# Patient Record
Sex: FEMALE | Race: BLACK OR AFRICAN AMERICAN | Employment: OTHER | URBAN - METROPOLITAN AREA
[De-identification: names, ages, dates, MRNs, and addresses within clinical notes are randomized per-mention and may not be internally consistent; named-entity substitution may affect disease eponyms.]

---

## 2017-01-03 ENCOUNTER — TELEPHONE (OUTPATIENT)
Dept: PRIMARY CARE CLINIC | Age: 82
End: 2017-01-03

## 2017-01-03 DIAGNOSIS — I26.99 OTHER PULMONARY EMBOLISM WITHOUT ACUTE COR PULMONALE, UNSPECIFIED CHRONICITY (HCC): ICD-10-CM

## 2017-01-03 RX ORDER — WARFARIN SODIUM 6 MG/1
6 TABLET ORAL DAILY
Qty: 30 TABLET | Refills: 3 | Status: SHIPPED | OUTPATIENT
Start: 2017-01-03 | End: 2017-05-16 | Stop reason: SDUPTHER

## 2017-01-03 RX ORDER — WARFARIN SODIUM 1 MG/1
1 TABLET ORAL DAILY
Qty: 90 TABLET | Refills: 1 | Status: SHIPPED | OUTPATIENT
Start: 2017-01-03 | End: 2017-01-03 | Stop reason: CLARIF

## 2017-01-03 RX ORDER — BUSPIRONE HYDROCHLORIDE 5 MG/1
5 TABLET ORAL 3 TIMES DAILY
Qty: 90 TABLET | Refills: 0 | Status: SHIPPED | OUTPATIENT
Start: 2017-01-03 | End: 2017-01-03 | Stop reason: CLARIF

## 2017-01-04 ENCOUNTER — ANTI-COAG VISIT (OUTPATIENT)
Dept: PRIMARY CARE CLINIC | Age: 82
End: 2017-01-04

## 2017-01-04 ENCOUNTER — TELEPHONE (OUTPATIENT)
Dept: PRIMARY CARE CLINIC | Age: 82
End: 2017-01-04

## 2017-01-04 LAB — INR BLD: 1.2

## 2017-01-06 ENCOUNTER — TELEPHONE (OUTPATIENT)
Dept: PRIMARY CARE CLINIC | Age: 82
End: 2017-01-06

## 2017-01-08 RX ORDER — CIPROFLOXACIN 250 MG/1
250 TABLET, FILM COATED ORAL 2 TIMES DAILY
Qty: 20 TABLET | Refills: 0 | Status: SHIPPED | OUTPATIENT
Start: 2017-01-08 | End: 2017-01-18

## 2017-01-09 ENCOUNTER — ANTI-COAG VISIT (OUTPATIENT)
Dept: PRIMARY CARE CLINIC | Age: 82
End: 2017-01-09

## 2017-01-09 LAB — INR BLD: 1.4

## 2017-01-18 ENCOUNTER — OFFICE VISIT (OUTPATIENT)
Dept: PRIMARY CARE CLINIC | Age: 82
End: 2017-01-18

## 2017-01-18 VITALS
SYSTOLIC BLOOD PRESSURE: 155 MMHG | OXYGEN SATURATION: 99 % | BODY MASS INDEX: 30.36 KG/M2 | RESPIRATION RATE: 14 BRPM | TEMPERATURE: 97 F | DIASTOLIC BLOOD PRESSURE: 74 MMHG | HEART RATE: 70 BPM | WEIGHT: 165 LBS

## 2017-01-18 DIAGNOSIS — E08.21 DIABETIC NEPHROPATHY ASSOCIATED WITH DIABETES MELLITUS DUE TO UNDERLYING CONDITION (HCC): ICD-10-CM

## 2017-01-18 DIAGNOSIS — E03.4 HYPOTHYROIDISM DUE TO ACQUIRED ATROPHY OF THYROID: ICD-10-CM

## 2017-01-18 DIAGNOSIS — E55.9 VITAMIN D DEFICIENCY: ICD-10-CM

## 2017-01-18 DIAGNOSIS — E21.0 PRIMARY HYPERPARATHYROIDISM (HCC): ICD-10-CM

## 2017-01-18 DIAGNOSIS — Z79.4 TYPE 2 DIABETES MELLITUS WITH COMPLICATION, WITH LONG-TERM CURRENT USE OF INSULIN (HCC): ICD-10-CM

## 2017-01-18 DIAGNOSIS — B37.31 CANDIDA VAGINITIS: ICD-10-CM

## 2017-01-18 DIAGNOSIS — R30.0 DYSURIA: ICD-10-CM

## 2017-01-18 DIAGNOSIS — G25.2 COARSE TREMORS: Primary | ICD-10-CM

## 2017-01-18 DIAGNOSIS — G35 MULTIPLE SCLEROSIS (HCC): ICD-10-CM

## 2017-01-18 DIAGNOSIS — E11.8 TYPE 2 DIABETES MELLITUS WITH COMPLICATION, WITH LONG-TERM CURRENT USE OF INSULIN (HCC): ICD-10-CM

## 2017-01-18 LAB
A/G RATIO: 1.5 (ref 1.1–2.2)
ALBUMIN SERPL-MCNC: 4 G/DL (ref 3.4–5)
ALP BLD-CCNC: 56 U/L (ref 40–129)
ALT SERPL-CCNC: 14 U/L (ref 10–40)
ANION GAP SERPL CALCULATED.3IONS-SCNC: 14 MMOL/L (ref 3–16)
AST SERPL-CCNC: 16 U/L (ref 15–37)
BASOPHILS ABSOLUTE: 0 K/UL (ref 0–0.2)
BASOPHILS RELATIVE PERCENT: 0.2 %
BILIRUB SERPL-MCNC: 0.3 MG/DL (ref 0–1)
BILIRUBIN URINE: NEGATIVE
BLOOD, URINE: NEGATIVE
BUN BLDV-MCNC: 26 MG/DL (ref 7–20)
CALCIUM SERPL-MCNC: 10.5 MG/DL (ref 8.3–10.6)
CHLORIDE BLD-SCNC: 106 MMOL/L (ref 99–110)
CHOLESTEROL, TOTAL: 148 MG/DL (ref 0–199)
CLARITY: CLEAR
CO2: 20 MMOL/L (ref 21–32)
COLOR: YELLOW
CREAT SERPL-MCNC: 0.8 MG/DL (ref 0.6–1.2)
EOSINOPHILS ABSOLUTE: 0.1 K/UL (ref 0–0.6)
EOSINOPHILS RELATIVE PERCENT: 0.8 %
GFR AFRICAN AMERICAN: >60
GFR NON-AFRICAN AMERICAN: >60
GLOBULIN: 2.7 G/DL
GLUCOSE BLD-MCNC: 91 MG/DL (ref 70–99)
GLUCOSE URINE: NEGATIVE MG/DL
HCT VFR BLD CALC: 33.4 % (ref 36–48)
HDLC SERPL-MCNC: 53 MG/DL (ref 40–60)
HEMOGLOBIN: 10.8 G/DL (ref 12–16)
KETONES, URINE: NEGATIVE MG/DL
LDL CHOLESTEROL CALCULATED: 81 MG/DL
LEUKOCYTE ESTERASE, URINE: NEGATIVE
LYMPHOCYTES ABSOLUTE: 1.6 K/UL (ref 1–5.1)
LYMPHOCYTES RELATIVE PERCENT: 21.4 %
MCH RBC QN AUTO: 30 PG (ref 26–34)
MCHC RBC AUTO-ENTMCNC: 32.4 G/DL (ref 31–36)
MCV RBC AUTO: 92.7 FL (ref 80–100)
MICROSCOPIC EXAMINATION: NORMAL
MONOCYTES ABSOLUTE: 0.5 K/UL (ref 0–1.3)
MONOCYTES RELATIVE PERCENT: 6.6 %
NEUTROPHILS ABSOLUTE: 5.2 K/UL (ref 1.7–7.7)
NEUTROPHILS RELATIVE PERCENT: 71 %
NITRITE, URINE: NEGATIVE
PDW BLD-RTO: 17.5 % (ref 12.4–15.4)
PH UA: 6.5
PLATELET # BLD: 169 K/UL (ref 135–450)
PMV BLD AUTO: 10.9 FL (ref 5–10.5)
POTASSIUM SERPL-SCNC: 4.5 MMOL/L (ref 3.5–5.1)
PROTEIN UA: NEGATIVE MG/DL
RBC # BLD: 3.61 M/UL (ref 4–5.2)
SODIUM BLD-SCNC: 140 MMOL/L (ref 136–145)
SPECIFIC GRAVITY UA: 1.01
TOTAL CK: 92 U/L (ref 26–192)
TOTAL PROTEIN: 6.7 G/DL (ref 6.4–8.2)
TRIGL SERPL-MCNC: 69 MG/DL (ref 0–150)
TSH REFLEX FT4: 5.84 UIU/ML (ref 0.27–4.2)
URINE TYPE: NORMAL
UROBILINOGEN, URINE: 0.2 E.U./DL
VITAMIN B-12: >2000 PG/ML (ref 211–911)
VLDLC SERPL CALC-MCNC: 14 MG/DL
WBC # BLD: 7.3 K/UL (ref 4–11)

## 2017-01-18 PROCEDURE — 99214 OFFICE O/P EST MOD 30 MIN: CPT | Performed by: INTERNAL MEDICINE

## 2017-01-18 RX ORDER — FLUCONAZOLE 150 MG/1
150 TABLET ORAL
Qty: 3 TABLET | Refills: 0 | Status: SHIPPED | OUTPATIENT
Start: 2017-01-18 | End: 2017-09-14

## 2017-01-18 RX ORDER — LIDOCAINE 50 MG/G
OINTMENT TOPICAL
Qty: 10 G | Refills: 5 | Status: SHIPPED | OUTPATIENT
Start: 2017-01-18 | End: 2017-09-27 | Stop reason: SDUPTHER

## 2017-01-18 ASSESSMENT — ENCOUNTER SYMPTOMS
NAUSEA: 0
DIARRHEA: 1
RHINORRHEA: 1
TROUBLE SWALLOWING: 0
ABDOMINAL PAIN: 1
SHORTNESS OF BREATH: 1
CONSTIPATION: 1
VOMITING: 1
VOICE CHANGE: 0

## 2017-01-18 ASSESSMENT — PATIENT HEALTH QUESTIONNAIRE - PHQ9
SUM OF ALL RESPONSES TO PHQ9 QUESTIONS 1 & 2: 0
1. LITTLE INTEREST OR PLEASURE IN DOING THINGS: 0
SUM OF ALL RESPONSES TO PHQ QUESTIONS 1-9: 0
2. FEELING DOWN, DEPRESSED OR HOPELESS: 0

## 2017-01-19 LAB
ESTIMATED AVERAGE GLUCOSE: 105.4 MG/DL
HBA1C MFR BLD: 5.3 %
INR BLD: 1.5
T4 FREE: 1.5 NG/DL (ref 0.9–1.8)
VITAMIN D 25-HYDROXY: 59.9 NG/ML

## 2017-01-19 ASSESSMENT — ENCOUNTER SYMPTOMS
SINUS PRESSURE: 0
EYE REDNESS: 0
COUGH: 0
EYE DISCHARGE: 0
WHEEZING: 0
SORE THROAT: 0
CHEST TIGHTNESS: 0
PHOTOPHOBIA: 0
EYE PAIN: 0
CHOKING: 0
EYE ITCHING: 0
STRIDOR: 0
APNEA: 0

## 2017-01-20 LAB — URINE CULTURE, ROUTINE: NORMAL

## 2017-01-23 ENCOUNTER — ANTI-COAG VISIT (OUTPATIENT)
Dept: PRIMARY CARE CLINIC | Age: 82
End: 2017-01-23

## 2017-01-26 LAB — INR BLD: 1.5

## 2017-01-27 ENCOUNTER — TELEPHONE (OUTPATIENT)
Dept: PRIMARY CARE CLINIC | Age: 82
End: 2017-01-27

## 2017-01-27 ENCOUNTER — ANTI-COAG VISIT (OUTPATIENT)
Dept: PRIMARY CARE CLINIC | Age: 82
End: 2017-01-27

## 2017-02-03 ENCOUNTER — ANTI-COAG VISIT (OUTPATIENT)
Dept: PRIMARY CARE CLINIC | Age: 82
End: 2017-02-03

## 2017-02-03 LAB — INR BLD: 1.2

## 2017-02-09 ENCOUNTER — ANTI-COAG VISIT (OUTPATIENT)
Dept: PRIMARY CARE CLINIC | Age: 82
End: 2017-02-09

## 2017-02-09 DIAGNOSIS — R30.0 DYSURIA: Primary | ICD-10-CM

## 2017-02-09 LAB
INR BLD: 1.2
INR BLD: 1.2 (ref 2–3)

## 2017-02-10 ENCOUNTER — ANTI-COAG VISIT (OUTPATIENT)
Dept: PRIMARY CARE CLINIC | Age: 82
End: 2017-02-10

## 2017-02-10 LAB
BILIRUBIN URINE: NEGATIVE
BLOOD, URINE: NEGATIVE
CLARITY: CLEAR
COLOR: YELLOW
EPITHELIAL CELLS, UA: 1 /HPF (ref 0–5)
GLUCOSE URINE: NEGATIVE MG/DL
HYALINE CASTS: 2 /HPF (ref 0–8)
KETONES, URINE: NEGATIVE MG/DL
LEUKOCYTE ESTERASE, URINE: ABNORMAL
MICROSCOPIC EXAMINATION: YES
NITRITE, URINE: NEGATIVE
PH UA: 6.5
PROTEIN UA: NEGATIVE MG/DL
RBC UA: 2 /HPF (ref 0–4)
SPECIFIC GRAVITY UA: 1.01
URINE TYPE: ABNORMAL
UROBILINOGEN, URINE: 0.2 E.U./DL
WBC UA: 24 /HPF (ref 0–5)

## 2017-02-12 LAB — URINE CULTURE, ROUTINE: NORMAL

## 2017-02-12 RX ORDER — FLUCONAZOLE 150 MG/1
150 TABLET ORAL WEEKLY
Qty: 3 TABLET | Refills: 0 | Status: SHIPPED | OUTPATIENT
Start: 2017-02-12 | End: 2017-03-05

## 2017-03-01 LAB — INR BLD: 1.6

## 2017-03-02 ENCOUNTER — ANTI-COAG VISIT (OUTPATIENT)
Dept: PRIMARY CARE CLINIC | Age: 82
End: 2017-03-02

## 2017-03-09 LAB — INR BLD: 2 (ref 2–3)

## 2017-03-14 ENCOUNTER — ANTI-COAG VISIT (OUTPATIENT)
Dept: PRIMARY CARE CLINIC | Age: 82
End: 2017-03-14

## 2017-03-16 ENCOUNTER — TELEPHONE (OUTPATIENT)
Dept: PRIMARY CARE CLINIC | Age: 82
End: 2017-03-16

## 2017-03-16 DIAGNOSIS — R82.81 PYURIA: ICD-10-CM

## 2017-03-16 DIAGNOSIS — R35.0 FREQUENCY OF URINATION: Primary | ICD-10-CM

## 2017-03-16 DIAGNOSIS — R30.0 DYSURIA: ICD-10-CM

## 2017-03-16 LAB — INR BLD: 1.3 (ref 2–3)

## 2017-03-18 ENCOUNTER — ANTI-COAG VISIT (OUTPATIENT)
Dept: PRIMARY CARE CLINIC | Age: 82
End: 2017-03-18

## 2017-03-18 DIAGNOSIS — A49.8 PROTEUS MIRABILIS INFECTION: Primary | ICD-10-CM

## 2017-03-18 LAB
ORGANISM: ABNORMAL
URINE CULTURE, ROUTINE: ABNORMAL

## 2017-03-18 RX ORDER — AMOXICILLIN 500 MG/1
1 TABLET, FILM COATED ORAL 3 TIMES DAILY
Qty: 30 TABLET | Refills: 0 | Status: SHIPPED | OUTPATIENT
Start: 2017-03-18 | End: 2018-08-22

## 2017-03-24 ENCOUNTER — ANTI-COAG VISIT (OUTPATIENT)
Dept: PRIMARY CARE CLINIC | Age: 82
End: 2017-03-24

## 2017-03-29 ENCOUNTER — TELEPHONE (OUTPATIENT)
Dept: PRIMARY CARE CLINIC | Age: 82
End: 2017-03-29

## 2017-03-29 LAB — INR BLD: 1.7

## 2017-03-30 ENCOUNTER — ANTI-COAG VISIT (OUTPATIENT)
Dept: PRIMARY CARE CLINIC | Age: 82
End: 2017-03-30

## 2017-03-31 DIAGNOSIS — Z79.4 TYPE 2 DIABETES MELLITUS WITH COMPLICATION, WITH LONG-TERM CURRENT USE OF INSULIN (HCC): ICD-10-CM

## 2017-03-31 DIAGNOSIS — E11.8 TYPE 2 DIABETES MELLITUS WITH COMPLICATION, WITH LONG-TERM CURRENT USE OF INSULIN (HCC): ICD-10-CM

## 2017-04-07 ENCOUNTER — ANTI-COAG VISIT (OUTPATIENT)
Dept: PRIMARY CARE CLINIC | Age: 82
End: 2017-04-07

## 2017-04-07 LAB — INR BLD: 1.6

## 2017-04-12 ENCOUNTER — OFFICE VISIT (OUTPATIENT)
Dept: PRIMARY CARE CLINIC | Age: 82
End: 2017-04-12

## 2017-04-12 ENCOUNTER — ANTI-COAG VISIT (OUTPATIENT)
Dept: PRIMARY CARE CLINIC | Age: 82
End: 2017-04-12

## 2017-04-12 ENCOUNTER — HOSPITAL ENCOUNTER (OUTPATIENT)
Dept: GENERAL RADIOLOGY | Facility: MEDICAL CENTER | Age: 82
Discharge: HOME | End: 2017-04-12
Attending: INTERNAL MEDICINE | Admitting: INTERNAL MEDICINE

## 2017-04-12 VITALS
HEART RATE: 69 BPM | OXYGEN SATURATION: 96 % | DIASTOLIC BLOOD PRESSURE: 57 MMHG | SYSTOLIC BLOOD PRESSURE: 118 MMHG | RESPIRATION RATE: 18 BRPM | TEMPERATURE: 97.4 F

## 2017-04-12 DIAGNOSIS — M54.2 MUSCULOSKELETAL NECK PAIN: Primary | ICD-10-CM

## 2017-04-12 DIAGNOSIS — N39.0 URINARY TRACT INFECTION, SITE UNSPECIFIED: ICD-10-CM

## 2017-04-12 DIAGNOSIS — R30.0 DYSURIA: Primary | ICD-10-CM

## 2017-04-12 DIAGNOSIS — M54.2 CERVICAL SPINE PAIN: ICD-10-CM

## 2017-04-12 LAB
ALBUMIN SERPL-MCNC: 3.5 G/DL (ref 3.4–5)
ANION GAP SERPL CALCULATED.3IONS-SCNC: 12 MMOL/L (ref 3–16)
BACTERIA: ABNORMAL /HPF
BILIRUBIN URINE: NEGATIVE
BLOOD, URINE: NEGATIVE
BUN BLDV-MCNC: 34 MG/DL (ref 7–20)
CALCIUM SERPL-MCNC: 9.8 MG/DL (ref 8.3–10.6)
CHLORIDE BLD-SCNC: 105 MMOL/L (ref 99–110)
CLARITY: ABNORMAL
CO2: 24 MMOL/L (ref 21–32)
COLOR: YELLOW
CREAT SERPL-MCNC: 0.8 MG/DL (ref 0.6–1.2)
EPITHELIAL CELLS, UA: 1 /HPF (ref 0–5)
GFR AFRICAN AMERICAN: >60
GFR NON-AFRICAN AMERICAN: >60
GLUCOSE BLD-MCNC: 132 MG/DL (ref 70–99)
GLUCOSE URINE: NEGATIVE MG/DL
HYALINE CASTS: 0 /LPF (ref 0–8)
KETONES, URINE: NEGATIVE MG/DL
LEUKOCYTE ESTERASE, URINE: ABNORMAL
MICROSCOPIC EXAMINATION: YES
NITRITE, URINE: NEGATIVE
PH UA: 8.5
PHOSPHORUS: 2.3 MG/DL (ref 2.5–4.9)
POTASSIUM SERPL-SCNC: 4.2 MMOL/L (ref 3.5–5.1)
PROTEIN UA: 30 MG/DL
RBC UA: 2 /HPF (ref 0–4)
SODIUM BLD-SCNC: 141 MMOL/L (ref 136–145)
SPECIFIC GRAVITY UA: 1.02
URINE TYPE: ABNORMAL
UROBILINOGEN, URINE: 0.2 E.U./DL
WBC UA: 34 /HPF (ref 0–5)

## 2017-04-12 PROCEDURE — 99213 OFFICE O/P EST LOW 20 MIN: CPT | Performed by: INTERNAL MEDICINE

## 2017-04-12 RX ORDER — BACLOFEN 10 MG/1
10 TABLET ORAL NIGHTLY
Qty: 10 TABLET | Refills: 0 | Status: SHIPPED | OUTPATIENT
Start: 2017-04-12 | End: 2017-09-27

## 2017-04-12 RX ORDER — SULFAMETHOXAZOLE AND TRIMETHOPRIM 800; 160 MG/1; MG/1
0.5 TABLET ORAL 2 TIMES DAILY
Qty: 10 TABLET | Refills: 0 | Status: SHIPPED | OUTPATIENT
Start: 2017-04-12 | End: 2017-04-22

## 2017-04-13 ENCOUNTER — TELEPHONE (OUTPATIENT)
Dept: PRIMARY CARE CLINIC | Age: 82
End: 2017-04-13

## 2017-04-13 LAB
BASOPHILS ABSOLUTE: 0 K/UL (ref 0–0.2)
BASOPHILS RELATIVE PERCENT: 0.6 %
EOSINOPHILS ABSOLUTE: 0.1 K/UL (ref 0–0.6)
EOSINOPHILS RELATIVE PERCENT: 1.2 %
HCT VFR BLD CALC: 31.8 % (ref 36–48)
HEMOGLOBIN: 10.3 G/DL (ref 12–16)
LYMPHOCYTES ABSOLUTE: 1.5 K/UL (ref 1–5.1)
LYMPHOCYTES RELATIVE PERCENT: 22.8 %
MCH RBC QN AUTO: 30.1 PG (ref 26–34)
MCHC RBC AUTO-ENTMCNC: 32.4 G/DL (ref 31–36)
MCV RBC AUTO: 92.7 FL (ref 80–100)
MONOCYTES ABSOLUTE: 0.8 K/UL (ref 0–1.3)
MONOCYTES RELATIVE PERCENT: 12.3 %
NEUTROPHILS ABSOLUTE: 4.2 K/UL (ref 1.7–7.7)
NEUTROPHILS RELATIVE PERCENT: 63.1 %
PDW BLD-RTO: 16 % (ref 12.4–15.4)
PLATELET # BLD: 186 K/UL (ref 135–450)
PMV BLD AUTO: 10.5 FL (ref 5–10.5)
RBC # BLD: 3.44 M/UL (ref 4–5.2)
WBC # BLD: 6.7 K/UL (ref 4–11)

## 2017-04-14 LAB
ORGANISM: ABNORMAL
URINE CULTURE, ROUTINE: ABNORMAL

## 2017-04-16 ASSESSMENT — PATIENT HEALTH QUESTIONNAIRE - PHQ9
1. LITTLE INTEREST OR PLEASURE IN DOING THINGS: 0
2. FEELING DOWN, DEPRESSED OR HOPELESS: 0
SUM OF ALL RESPONSES TO PHQ9 QUESTIONS 1 & 2: 0
SUM OF ALL RESPONSES TO PHQ QUESTIONS 1-9: 0

## 2017-04-16 ASSESSMENT — ENCOUNTER SYMPTOMS
PHOTOPHOBIA: 0
DIARRHEA: 0
SINUS PRESSURE: 0
TROUBLE SWALLOWING: 0
VOMITING: 0
VISUAL CHANGE: 0
RHINORRHEA: 0
BLOOD IN STOOL: 0
ABDOMINAL PAIN: 0
GASTROINTESTINAL NEGATIVE: 1
CONSTIPATION: 0
NAUSEA: 0
RESPIRATORY NEGATIVE: 1

## 2017-04-18 DIAGNOSIS — E11.21 DIABETIC NEPHROPATHY ASSOCIATED WITH TYPE 2 DIABETES MELLITUS (HCC): Primary | ICD-10-CM

## 2017-04-18 RX ORDER — AMOXICILLIN 250 MG/1
250 CAPSULE ORAL 3 TIMES DAILY
Qty: 30 CAPSULE | Refills: 0 | Status: SHIPPED | OUTPATIENT
Start: 2017-04-18 | End: 2017-04-28

## 2017-04-20 ENCOUNTER — TELEPHONE (OUTPATIENT)
Dept: PRIMARY CARE CLINIC | Age: 82
End: 2017-04-20

## 2017-04-20 LAB — INR BLD: 1.2 (ref 2–3)

## 2017-04-21 ENCOUNTER — ANTI-COAG VISIT (OUTPATIENT)
Dept: PRIMARY CARE CLINIC | Age: 82
End: 2017-04-21

## 2017-04-21 LAB
INR BLD: 1.2
INR BLD: 1.2

## 2017-04-24 ENCOUNTER — PATIENT MESSAGE (OUTPATIENT)
Dept: PRIMARY CARE CLINIC | Age: 82
End: 2017-04-24

## 2017-04-26 ENCOUNTER — ANTI-COAG VISIT (OUTPATIENT)
Dept: PRIMARY CARE CLINIC | Age: 82
End: 2017-04-26

## 2017-04-26 DIAGNOSIS — M54.2 NECK PAIN: Primary | ICD-10-CM

## 2017-04-26 LAB — INR BLD: 1.3

## 2017-04-26 RX ORDER — WARFARIN SODIUM 1 MG/1
3 TABLET ORAL DAILY
Qty: 90 TABLET | Refills: 3 | Status: SHIPPED | OUTPATIENT
Start: 2017-04-26 | End: 2017-09-14 | Stop reason: SDUPTHER

## 2017-04-27 ENCOUNTER — ANTI-COAG VISIT (OUTPATIENT)
Dept: PRIMARY CARE CLINIC | Age: 82
End: 2017-04-27

## 2017-05-03 ENCOUNTER — OFFICE VISIT (OUTPATIENT)
Dept: PRIMARY CARE CLINIC | Age: 82
End: 2017-05-03

## 2017-05-03 ENCOUNTER — ANTI-COAG VISIT (OUTPATIENT)
Dept: PRIMARY CARE CLINIC | Age: 82
End: 2017-05-03

## 2017-05-03 VITALS
BODY MASS INDEX: 29.88 KG/M2 | OXYGEN SATURATION: 97 % | WEIGHT: 168.6 LBS | HEIGHT: 63 IN | SYSTOLIC BLOOD PRESSURE: 144 MMHG | HEART RATE: 64 BPM | DIASTOLIC BLOOD PRESSURE: 72 MMHG

## 2017-05-03 DIAGNOSIS — Z79.4 TYPE 2 DIABETES MELLITUS WITH COMPLICATION, WITH LONG-TERM CURRENT USE OF INSULIN (HCC): ICD-10-CM

## 2017-05-03 DIAGNOSIS — E83.39 HYPOPHOSPHATEMIA: ICD-10-CM

## 2017-05-03 DIAGNOSIS — E11.8 TYPE 2 DIABETES MELLITUS WITH COMPLICATION, WITH LONG-TERM CURRENT USE OF INSULIN (HCC): ICD-10-CM

## 2017-05-03 DIAGNOSIS — R60.0 BILATERAL LEG EDEMA: ICD-10-CM

## 2017-05-03 DIAGNOSIS — E03.4 HYPOTHYROIDISM DUE TO ACQUIRED ATROPHY OF THYROID: ICD-10-CM

## 2017-05-03 DIAGNOSIS — R35.0 URINARY FREQUENCY: ICD-10-CM

## 2017-05-03 DIAGNOSIS — E21.0 PRIMARY HYPERPARATHYROIDISM (HCC): ICD-10-CM

## 2017-05-03 DIAGNOSIS — E21.3 HYPERPARATHYROIDISM (HCC): Primary | ICD-10-CM

## 2017-05-03 DIAGNOSIS — E21.3 HYPERPARATHYROIDISM (HCC): ICD-10-CM

## 2017-05-03 DIAGNOSIS — Z23 NEED FOR PNEUMOCOCCAL VACCINATION: Primary | ICD-10-CM

## 2017-05-03 DIAGNOSIS — R19.5 HIGH STOOL BILE ACIDS: ICD-10-CM

## 2017-05-03 LAB
A/G RATIO: 1.5 (ref 1.1–2.2)
ALBUMIN SERPL-MCNC: 4.3 G/DL (ref 3.4–5)
ALP BLD-CCNC: 63 U/L (ref 40–129)
ALT SERPL-CCNC: 20 U/L (ref 10–40)
ANION GAP SERPL CALCULATED.3IONS-SCNC: 14 MMOL/L (ref 3–16)
AST SERPL-CCNC: 18 U/L (ref 15–37)
BILIRUB SERPL-MCNC: <0.2 MG/DL (ref 0–1)
BUN BLDV-MCNC: 46 MG/DL (ref 7–20)
CALCIUM SERPL-MCNC: 10.8 MG/DL (ref 8.3–10.6)
CHLORIDE BLD-SCNC: 103 MMOL/L (ref 99–110)
CO2: 23 MMOL/L (ref 21–32)
CREAT SERPL-MCNC: 0.7 MG/DL (ref 0.6–1.2)
CREATININE URINE: 18.5 MG/DL (ref 28–259)
GFR AFRICAN AMERICAN: >60
GFR NON-AFRICAN AMERICAN: >60
GLOBULIN: 2.8 G/DL
GLUCOSE BLD-MCNC: 84 MG/DL (ref 70–99)
INR BLD: 1.5 (ref 2–3)
MICROALBUMIN UR-MCNC: 1.6 MG/DL
MICROALBUMIN/CREAT UR-RTO: 86.5 MG/G (ref 0–30)
PARATHYROID HORMONE INTACT: 79.8 PG/ML (ref 14–72)
PHOSPHORUS: 3.2 MG/DL (ref 2.5–4.9)
POTASSIUM SERPL-SCNC: 4.2 MMOL/L (ref 3.5–5.1)
PREALBUMIN: 26 MG/DL (ref 20–40)
SODIUM BLD-SCNC: 140 MMOL/L (ref 136–145)
TOTAL PROTEIN: 7.1 G/DL (ref 6.4–8.2)
TSH REFLEX FT4: 2.66 UIU/ML (ref 0.27–4.2)

## 2017-05-03 PROCEDURE — 3288F FALL RISK ASSESSMENT DOCD: CPT | Performed by: INTERNAL MEDICINE

## 2017-05-03 PROCEDURE — G8510 SCR DEP NEG, NO PLAN REQD: HCPCS | Performed by: INTERNAL MEDICINE

## 2017-05-03 PROCEDURE — 99214 OFFICE O/P EST MOD 30 MIN: CPT | Performed by: INTERNAL MEDICINE

## 2017-05-03 RX ORDER — CHOLESTYRAMINE 4 G/9G
2 POWDER, FOR SUSPENSION ORAL 2 TIMES DAILY
Qty: 180 PACKET | Refills: 11 | Status: SHIPPED | OUTPATIENT
Start: 2017-05-03 | End: 2021-07-02

## 2017-05-03 ASSESSMENT — ENCOUNTER SYMPTOMS
ABDOMINAL PAIN: 0
TROUBLE SWALLOWING: 0
VOMITING: 0
PHOTOPHOBIA: 0
DIARRHEA: 0
CONSTIPATION: 0
RESPIRATORY NEGATIVE: 1
RHINORRHEA: 0
BLOOD IN STOOL: 0
SINUS PRESSURE: 0
NAUSEA: 0
GASTROINTESTINAL NEGATIVE: 1

## 2017-05-03 ASSESSMENT — PATIENT HEALTH QUESTIONNAIRE - PHQ9
SUM OF ALL RESPONSES TO PHQ9 QUESTIONS 1 & 2: 0
1. LITTLE INTEREST OR PLEASURE IN DOING THINGS: 0
2. FEELING DOWN, DEPRESSED OR HOPELESS: 0
SUM OF ALL RESPONSES TO PHQ QUESTIONS 1-9: 0

## 2017-05-04 LAB
ESTIMATED AVERAGE GLUCOSE: 105.4 MG/DL
HBA1C MFR BLD: 5.3 %

## 2017-05-06 LAB
ORGANISM: ABNORMAL
URINE CULTURE, ROUTINE: ABNORMAL

## 2017-05-06 RX ORDER — CIPROFLOXACIN 250 MG/1
250 TABLET, FILM COATED ORAL 2 TIMES DAILY
Qty: 20 TABLET | Refills: 0 | Status: SHIPPED | OUTPATIENT
Start: 2017-05-06 | End: 2017-05-16

## 2017-05-08 ENCOUNTER — HOSPITAL ENCOUNTER (OUTPATIENT)
Dept: PHYSICAL THERAPY | Age: 82
Discharge: HOME | End: 2017-05-31
Attending: INTERNAL MEDICINE | Admitting: INTERNAL MEDICINE

## 2017-05-08 ASSESSMENT — PAIN SCALES - GENERAL: PAINLEVEL_OUTOF10: 7

## 2017-05-08 ASSESSMENT — PAIN DESCRIPTION - DESCRIPTORS: DESCRIPTORS: SHARP

## 2017-05-08 ASSESSMENT — PAIN DESCRIPTION - LOCATION: LOCATION: NECK

## 2017-05-08 ASSESSMENT — PAIN DESCRIPTION - PROGRESSION: CLINICAL_PROGRESSION: NOT CHANGED

## 2017-05-11 ENCOUNTER — TELEPHONE (OUTPATIENT)
Dept: PRIMARY CARE CLINIC | Age: 82
End: 2017-05-11

## 2017-05-11 ENCOUNTER — ANTI-COAG VISIT (OUTPATIENT)
Dept: PRIMARY CARE CLINIC | Age: 82
End: 2017-05-11

## 2017-05-11 LAB — INR BLD: 1.6

## 2017-05-15 DIAGNOSIS — G35 MULTIPLE SCLEROSIS (HCC): Primary | ICD-10-CM

## 2017-05-16 DIAGNOSIS — I10 ESSENTIAL HYPERTENSION: ICD-10-CM

## 2017-05-16 RX ORDER — ATENOLOL 50 MG/1
TABLET ORAL
Qty: 90 TABLET | Refills: 3 | Status: SHIPPED | OUTPATIENT
Start: 2017-05-16 | End: 2017-09-06

## 2017-05-16 RX ORDER — WARFARIN SODIUM 6 MG/1
6 TABLET ORAL DAILY
Qty: 30 TABLET | Refills: 3 | Status: SHIPPED | OUTPATIENT
Start: 2017-05-16 | End: 2017-09-14 | Stop reason: SDUPTHER

## 2017-05-17 ENCOUNTER — ANTI-COAG VISIT (OUTPATIENT)
Dept: PRIMARY CARE CLINIC | Age: 82
End: 2017-05-17

## 2017-05-17 LAB — INR BLD: 2.1 (ref 2–3)

## 2017-05-24 LAB — INR BLD: 1.9

## 2017-05-25 ENCOUNTER — ANTI-COAG VISIT (OUTPATIENT)
Dept: PRIMARY CARE CLINIC | Age: 82
End: 2017-05-25

## 2017-05-31 ENCOUNTER — ANTI-COAG VISIT (OUTPATIENT)
Dept: PRIMARY CARE CLINIC | Age: 82
End: 2017-05-31

## 2017-05-31 LAB — INR BLD: 1.8

## 2017-05-31 RX ORDER — MONTELUKAST SODIUM 10 MG/1
TABLET ORAL
Qty: 30 TABLET | Refills: 7 | Status: SHIPPED | OUTPATIENT
Start: 2017-05-31 | End: 2017-09-27 | Stop reason: SDUPTHER

## 2017-06-05 ENCOUNTER — TELEPHONE (OUTPATIENT)
Dept: PRIMARY CARE CLINIC | Age: 82
End: 2017-06-05

## 2017-06-05 DIAGNOSIS — R30.0 DYSURIA: Primary | ICD-10-CM

## 2017-06-06 DIAGNOSIS — R30.0 DYSURIA: ICD-10-CM

## 2017-06-06 LAB
BILIRUBIN URINE: NEGATIVE
BLOOD, URINE: NEGATIVE
CLARITY: CLEAR
COLOR: YELLOW
EPITHELIAL CELLS, UA: 1 /HPF (ref 0–5)
GLUCOSE URINE: NEGATIVE MG/DL
HYALINE CASTS: 0 /LPF (ref 0–8)
KETONES, URINE: NEGATIVE MG/DL
LEUKOCYTE ESTERASE, URINE: ABNORMAL
MICROSCOPIC EXAMINATION: YES
NITRITE, URINE: NEGATIVE
PH UA: 6.5
PROTEIN UA: NEGATIVE MG/DL
RBC UA: 4 /HPF (ref 0–4)
SPECIFIC GRAVITY UA: 1.01
URINE TYPE: ABNORMAL
UROBILINOGEN, URINE: 0.2 E.U./DL
WBC UA: 7 /HPF (ref 0–5)

## 2017-06-07 LAB — URINE CULTURE, ROUTINE: NORMAL

## 2017-06-14 ENCOUNTER — ANTI-COAG VISIT (OUTPATIENT)
Dept: PRIMARY CARE CLINIC | Age: 82
End: 2017-06-14

## 2017-06-14 DIAGNOSIS — M65.319 TRIGGER FINGER OF THUMB, UNSPECIFIED LATERALITY: Primary | ICD-10-CM

## 2017-06-14 LAB — INR BLD: 2.6 (ref 2–3)

## 2017-06-16 DIAGNOSIS — M79.643 PAIN OF HAND, UNSPECIFIED LATERALITY: Primary | ICD-10-CM

## 2017-06-21 LAB — INR BLD: 2.6

## 2017-06-26 ENCOUNTER — ANTI-COAG VISIT (OUTPATIENT)
Dept: PRIMARY CARE CLINIC | Age: 82
End: 2017-06-26

## 2017-06-29 LAB — INR BLD: 2.7

## 2017-06-30 DIAGNOSIS — E11.8 TYPE 2 DIABETES MELLITUS WITH COMPLICATION, WITH LONG-TERM CURRENT USE OF INSULIN (HCC): ICD-10-CM

## 2017-06-30 DIAGNOSIS — R30.0 DYSURIA: Primary | ICD-10-CM

## 2017-06-30 DIAGNOSIS — Z79.4 TYPE 2 DIABETES MELLITUS WITH COMPLICATION, WITH LONG-TERM CURRENT USE OF INSULIN (HCC): ICD-10-CM

## 2017-06-30 LAB
EPITHELIAL CELLS, UA: 0 /HPF (ref 0–5)
HYALINE CASTS: 1 /LPF (ref 0–8)
RBC UA: 6 /HPF (ref 0–4)
WBC UA: 432 /HPF (ref 0–5)

## 2017-07-01 DIAGNOSIS — N30.90 BLADDER INFECTION: Primary | ICD-10-CM

## 2017-07-01 RX ORDER — NITROFURANTOIN MACROCRYSTALS 50 MG/1
50 CAPSULE ORAL 4 TIMES DAILY
Qty: 40 CAPSULE | Refills: 0 | Status: SHIPPED | OUTPATIENT
Start: 2017-07-01 | End: 2017-07-11

## 2017-07-01 RX ORDER — TERCONAZOLE 80 MG/1
80 SUPPOSITORY VAGINAL NIGHTLY
Qty: 7 SUPPOSITORY | Refills: 0 | Status: SHIPPED | OUTPATIENT
Start: 2017-07-01 | End: 2017-07-08

## 2017-07-03 ENCOUNTER — ANTI-COAG VISIT (OUTPATIENT)
Dept: PRIMARY CARE CLINIC | Age: 82
End: 2017-07-03

## 2017-07-05 ENCOUNTER — ANTI-COAG VISIT (OUTPATIENT)
Dept: PRIMARY CARE CLINIC | Age: 82
End: 2017-07-05

## 2017-07-06 ENCOUNTER — ANTI-COAG VISIT (OUTPATIENT)
Dept: PRIMARY CARE CLINIC | Age: 82
End: 2017-07-06

## 2017-07-06 LAB — INR BLD: 2.4 (ref 2–3)

## 2017-07-13 ENCOUNTER — TELEPHONE (OUTPATIENT)
Dept: PRIMARY CARE CLINIC | Age: 82
End: 2017-07-13

## 2017-07-13 ENCOUNTER — ANTI-COAG VISIT (OUTPATIENT)
Dept: PRIMARY CARE CLINIC | Age: 82
End: 2017-07-13

## 2017-07-13 LAB — INR BLD: 1.7 (ref 2–3)

## 2017-07-19 LAB — INR BLD: 2 (ref 2–3)

## 2017-07-21 ENCOUNTER — ANTI-COAG VISIT (OUTPATIENT)
Dept: PRIMARY CARE CLINIC | Age: 82
End: 2017-07-21

## 2017-07-24 DIAGNOSIS — R30.0 DYSURIA: Primary | ICD-10-CM

## 2017-07-24 DIAGNOSIS — R30.0 DYSURIA: ICD-10-CM

## 2017-07-24 LAB
BILIRUBIN URINE: NEGATIVE
BLOOD, URINE: ABNORMAL
CLARITY: ABNORMAL
COLOR: YELLOW
EPITHELIAL CELLS, UA: 0 /HPF (ref 0–5)
GLUCOSE URINE: NEGATIVE MG/DL
HYALINE CASTS: 0 /LPF (ref 0–8)
KETONES, URINE: NEGATIVE MG/DL
LEUKOCYTE ESTERASE, URINE: ABNORMAL
MICROSCOPIC EXAMINATION: YES
NITRITE, URINE: NEGATIVE
PH UA: 6.5
PROTEIN UA: NEGATIVE MG/DL
RBC UA: 10 /HPF (ref 0–4)
SPECIFIC GRAVITY UA: 1.01
URINE TYPE: ABNORMAL
UROBILINOGEN, URINE: 0.2 E.U./DL
WBC UA: 377 /HPF (ref 0–5)

## 2017-07-25 DIAGNOSIS — N39.0 URINARY TRACT INFECTION, SITE UNSPECIFIED: Primary | ICD-10-CM

## 2017-07-25 RX ORDER — CIPROFLOXACIN 250 MG/1
250 TABLET, FILM COATED ORAL 2 TIMES DAILY
Qty: 20 TABLET | Refills: 0 | Status: SHIPPED | OUTPATIENT
Start: 2017-07-25 | End: 2017-08-04

## 2017-07-26 ENCOUNTER — ANTI-COAG VISIT (OUTPATIENT)
Dept: PRIMARY CARE CLINIC | Age: 82
End: 2017-07-26

## 2017-07-26 LAB
INR BLD: 2.8
ORGANISM: ABNORMAL
URINE CULTURE, ROUTINE: ABNORMAL

## 2017-08-03 ENCOUNTER — ANTI-COAG VISIT (OUTPATIENT)
Dept: PRIMARY CARE CLINIC | Age: 82
End: 2017-08-03

## 2017-08-03 LAB — INR BLD: 2.1

## 2017-08-09 ENCOUNTER — TELEPHONE (OUTPATIENT)
Dept: PRIMARY CARE CLINIC | Age: 82
End: 2017-08-09

## 2017-08-09 DIAGNOSIS — N39.0 URINARY TRACT INFECTION WITHOUT HEMATURIA, SITE UNSPECIFIED: ICD-10-CM

## 2017-08-09 DIAGNOSIS — N39.0 URINARY TRACT INFECTION WITHOUT HEMATURIA, SITE UNSPECIFIED: Primary | ICD-10-CM

## 2017-08-09 LAB
BILIRUBIN URINE: NEGATIVE
BLOOD, URINE: NEGATIVE
CLARITY: CLEAR
COLOR: YELLOW
GLUCOSE URINE: NEGATIVE MG/DL
INR BLD: 2.4 (ref 2–3)
KETONES, URINE: NEGATIVE MG/DL
LEUKOCYTE ESTERASE, URINE: NEGATIVE
MICROSCOPIC EXAMINATION: NORMAL
NITRITE, URINE: NEGATIVE
PH UA: 6
PROTEIN UA: NEGATIVE MG/DL
SPECIFIC GRAVITY UA: 1.01
URINE TYPE: NORMAL
UROBILINOGEN, URINE: 0.2 E.U./DL

## 2017-08-11 LAB — URINE CULTURE, ROUTINE: NORMAL

## 2017-08-11 RX ORDER — PANTOPRAZOLE SODIUM 40 MG/1
TABLET, DELAYED RELEASE ORAL
Qty: 30 TABLET | Refills: 11 | Status: SHIPPED | OUTPATIENT
Start: 2017-08-11 | End: 2017-09-27 | Stop reason: SDUPTHER

## 2017-08-12 DIAGNOSIS — E11.8 TYPE 2 DIABETES MELLITUS WITH COMPLICATION, WITH LONG-TERM CURRENT USE OF INSULIN (HCC): ICD-10-CM

## 2017-08-12 DIAGNOSIS — E11.8 TYPE 2 DIABETES WITH COMPLICATION (HCC): ICD-10-CM

## 2017-08-12 DIAGNOSIS — Z79.4 TYPE 2 DIABETES MELLITUS WITH COMPLICATION, WITH LONG-TERM CURRENT USE OF INSULIN (HCC): ICD-10-CM

## 2017-08-14 ENCOUNTER — ANTI-COAG VISIT (OUTPATIENT)
Dept: PRIMARY CARE CLINIC | Age: 82
End: 2017-08-14

## 2017-08-14 RX ORDER — PEN NEEDLE, DIABETIC 31 GX5/16"
NEEDLE, DISPOSABLE MISCELLANEOUS
Qty: 100 EACH | Refills: 3 | Status: SHIPPED | OUTPATIENT
Start: 2017-08-14 | End: 2017-09-27 | Stop reason: SDUPTHER

## 2017-08-14 RX ORDER — LANCETS 33 GAUGE
EACH MISCELLANEOUS
Qty: 100 EACH | Refills: 8 | Status: SHIPPED | OUTPATIENT
Start: 2017-08-14 | End: 2017-09-27 | Stop reason: SDUPTHER

## 2017-08-16 LAB — INR BLD: 2.4 (ref 2–3)

## 2017-08-18 ENCOUNTER — ANTI-COAG VISIT (OUTPATIENT)
Dept: PRIMARY CARE CLINIC | Age: 82
End: 2017-08-18

## 2017-08-22 RX ORDER — KETOCONAZOLE 20 MG/G
CREAM TOPICAL
Qty: 120 G | Refills: 5 | Status: SHIPPED | OUTPATIENT
Start: 2017-08-22 | End: 2018-07-09 | Stop reason: SDUPTHER

## 2017-08-22 RX ORDER — KETOCONAZOLE 20 MG/G
CREAM TOPICAL
Qty: 360 G | Refills: 11 | Status: SHIPPED | OUTPATIENT
Start: 2017-08-22 | End: 2017-09-27 | Stop reason: SDUPTHER

## 2017-08-23 ENCOUNTER — ANTI-COAG VISIT (OUTPATIENT)
Dept: PRIMARY CARE CLINIC | Age: 82
End: 2017-08-23

## 2017-08-23 LAB — INR BLD: 1.9

## 2017-08-30 ENCOUNTER — ANTI-COAG VISIT (OUTPATIENT)
Dept: PRIMARY CARE CLINIC | Age: 82
End: 2017-08-30

## 2017-08-30 LAB — INR BLD: 1.9

## 2017-08-31 RX ORDER — SODIUM BICARBONATE 325 MG/1
TABLET ORAL
Qty: 60 TABLET | Refills: 10 | Status: SHIPPED | OUTPATIENT
Start: 2017-08-31 | End: 2017-09-27 | Stop reason: SDUPTHER

## 2017-09-06 ENCOUNTER — PATIENT MESSAGE (OUTPATIENT)
Dept: PRIMARY CARE CLINIC | Age: 82
End: 2017-09-06

## 2017-09-06 LAB — INR BLD: 1.8

## 2017-09-06 RX ORDER — VERAPAMIL HYDROCHLORIDE 240 MG/1
TABLET, FILM COATED, EXTENDED RELEASE ORAL
Qty: 30 TABLET | Refills: 11 | Status: SHIPPED | OUTPATIENT
Start: 2017-09-06 | End: 2017-09-14 | Stop reason: SDUPTHER

## 2017-09-07 ENCOUNTER — ANTI-COAG VISIT (OUTPATIENT)
Dept: PRIMARY CARE CLINIC | Age: 82
End: 2017-09-07

## 2017-09-07 DIAGNOSIS — E03.4 HYPOTHYROIDISM DUE TO ACQUIRED ATROPHY OF THYROID: ICD-10-CM

## 2017-09-07 RX ORDER — LEVOTHYROXINE SODIUM 0.03 MG/1
TABLET ORAL
Qty: 30 TABLET | Refills: 11 | Status: SHIPPED | OUTPATIENT
Start: 2017-09-07 | End: 2017-09-27 | Stop reason: SDUPTHER

## 2017-09-13 LAB — INR BLD: 3.4

## 2017-09-14 RX ORDER — VERAPAMIL HYDROCHLORIDE 240 MG/1
TABLET, FILM COATED, EXTENDED RELEASE ORAL
Qty: 30 TABLET | Refills: 11 | Status: SHIPPED | OUTPATIENT
Start: 2017-09-14 | End: 2017-09-27 | Stop reason: SDUPTHER

## 2017-09-14 RX ORDER — WARFARIN SODIUM 6 MG/1
6 TABLET ORAL DAILY
Qty: 30 TABLET | Refills: 3 | Status: SHIPPED | OUTPATIENT
Start: 2017-09-14 | End: 2017-09-16

## 2017-09-14 RX ORDER — WARFARIN SODIUM 1 MG/1
3 TABLET ORAL DAILY
Qty: 90 TABLET | Refills: 3 | Status: SHIPPED | OUTPATIENT
Start: 2017-09-14 | End: 2017-09-16

## 2017-09-15 ENCOUNTER — ANTI-COAG VISIT (OUTPATIENT)
Dept: PRIMARY CARE CLINIC | Age: 82
End: 2017-09-15

## 2017-09-16 RX ORDER — WARFARIN SODIUM 6 MG/1
TABLET ORAL
Qty: 30 TABLET | Refills: 3 | Status: SHIPPED | OUTPATIENT
Start: 2017-09-16 | End: 2017-09-27 | Stop reason: SDUPTHER

## 2017-09-16 RX ORDER — WARFARIN SODIUM 1 MG/1
TABLET ORAL
Qty: 90 TABLET | Refills: 3 | Status: SHIPPED | OUTPATIENT
Start: 2017-09-16 | End: 2017-09-27 | Stop reason: SDUPTHER

## 2017-09-21 ENCOUNTER — TELEPHONE (OUTPATIENT)
Dept: PRIMARY CARE CLINIC | Age: 82
End: 2017-09-21

## 2017-09-25 DIAGNOSIS — G35 MULTIPLE SCLEROSIS (HCC): Primary | ICD-10-CM

## 2017-09-25 DIAGNOSIS — R53.81 PHYSICAL DECONDITIONING: ICD-10-CM

## 2017-09-27 ENCOUNTER — OFFICE VISIT (OUTPATIENT)
Dept: PRIMARY CARE CLINIC | Age: 82
End: 2017-09-27

## 2017-09-27 VITALS
WEIGHT: 170 LBS | SYSTOLIC BLOOD PRESSURE: 134 MMHG | DIASTOLIC BLOOD PRESSURE: 67 MMHG | HEART RATE: 67 BPM | TEMPERATURE: 97.3 F | OXYGEN SATURATION: 94 % | RESPIRATION RATE: 18 BRPM | BODY MASS INDEX: 30.12 KG/M2

## 2017-09-27 DIAGNOSIS — R29.810 MOUTH DROOP DUE TO FACIAL WEAKNESS: ICD-10-CM

## 2017-09-27 DIAGNOSIS — E11.8 TYPE 2 DIABETES MELLITUS WITH COMPLICATION, WITH LONG-TERM CURRENT USE OF INSULIN (HCC): ICD-10-CM

## 2017-09-27 DIAGNOSIS — Z86.711 HISTORY OF PULMONARY EMBOLISM: ICD-10-CM

## 2017-09-27 DIAGNOSIS — I10 ESSENTIAL HYPERTENSION: ICD-10-CM

## 2017-09-27 DIAGNOSIS — Z23 NEED FOR VACCINATION AGAINST STREPTOCOCCUS PNEUMONIAE: ICD-10-CM

## 2017-09-27 DIAGNOSIS — Z79.4 TYPE 2 DIABETES MELLITUS WITH COMPLICATION, WITH LONG-TERM CURRENT USE OF INSULIN (HCC): ICD-10-CM

## 2017-09-27 DIAGNOSIS — R60.0 BILATERAL LEG EDEMA: ICD-10-CM

## 2017-09-27 DIAGNOSIS — R51.9 FRONTAL HEADACHE: ICD-10-CM

## 2017-09-27 DIAGNOSIS — M81.0 OSTEOPOROSIS WITHOUT PATHOLOGICAL FRACTURE: ICD-10-CM

## 2017-09-27 DIAGNOSIS — Z23 NEED FOR INFLUENZA VACCINATION: ICD-10-CM

## 2017-09-27 DIAGNOSIS — E03.4 HYPOTHYROIDISM DUE TO ACQUIRED ATROPHY OF THYROID: ICD-10-CM

## 2017-09-27 DIAGNOSIS — L85.3 DRY SKIN: ICD-10-CM

## 2017-09-27 DIAGNOSIS — M79.641 RIGHT HAND PAIN: ICD-10-CM

## 2017-09-27 DIAGNOSIS — E55.9 VITAMIN D DEFICIENCY: ICD-10-CM

## 2017-09-27 DIAGNOSIS — R30.0 DYSURIA: Primary | ICD-10-CM

## 2017-09-27 DIAGNOSIS — R30.0 DYSURIA: ICD-10-CM

## 2017-09-27 DIAGNOSIS — J45.991 COUGH VARIANT ASTHMA: ICD-10-CM

## 2017-09-27 LAB
A/G RATIO: 1.4 (ref 1.1–2.2)
ALBUMIN SERPL-MCNC: 4.3 G/DL (ref 3.4–5)
ALP BLD-CCNC: 56 U/L (ref 40–129)
ALT SERPL-CCNC: 16 U/L (ref 10–40)
ANION GAP SERPL CALCULATED.3IONS-SCNC: 15 MMOL/L (ref 3–16)
AST SERPL-CCNC: 17 U/L (ref 15–37)
BASOPHILS ABSOLUTE: 0 K/UL (ref 0–0.2)
BASOPHILS RELATIVE PERCENT: 0.2 %
BILIRUB SERPL-MCNC: 0.3 MG/DL (ref 0–1)
BUN BLDV-MCNC: 58 MG/DL (ref 7–20)
CALCIUM SERPL-MCNC: 10.8 MG/DL (ref 8.3–10.6)
CHLORIDE BLD-SCNC: 102 MMOL/L (ref 99–110)
CHOLESTEROL, TOTAL: 158 MG/DL (ref 0–199)
CO2: 24 MMOL/L (ref 21–32)
CREAT SERPL-MCNC: 0.8 MG/DL (ref 0.6–1.2)
CREATININE URINE: 24 MG/DL (ref 28–259)
EOSINOPHILS ABSOLUTE: 0 K/UL (ref 0–0.6)
EOSINOPHILS RELATIVE PERCENT: 0.5 %
GFR AFRICAN AMERICAN: >60
GFR NON-AFRICAN AMERICAN: >60
GLOBULIN: 3.1 G/DL
GLUCOSE BLD-MCNC: 111 MG/DL (ref 70–99)
HCT VFR BLD CALC: 35.9 % (ref 36–48)
HDLC SERPL-MCNC: 77 MG/DL (ref 40–60)
HEMOGLOBIN: 11.6 G/DL (ref 12–16)
LDL CHOLESTEROL CALCULATED: 71 MG/DL
LYMPHOCYTES ABSOLUTE: 1.4 K/UL (ref 1–5.1)
LYMPHOCYTES RELATIVE PERCENT: 15.5 %
MCH RBC QN AUTO: 29.1 PG (ref 26–34)
MCHC RBC AUTO-ENTMCNC: 32.3 G/DL (ref 31–36)
MCV RBC AUTO: 90.2 FL (ref 80–100)
MICROALBUMIN UR-MCNC: 6.4 MG/DL
MICROALBUMIN/CREAT UR-RTO: 266.7 MG/G (ref 0–30)
MONOCYTES ABSOLUTE: 0.7 K/UL (ref 0–1.3)
MONOCYTES RELATIVE PERCENT: 8 %
NEUTROPHILS ABSOLUTE: 6.6 K/UL (ref 1.7–7.7)
NEUTROPHILS RELATIVE PERCENT: 75.8 %
PDW BLD-RTO: 18.4 % (ref 12.4–15.4)
PLATELET # BLD: 172 K/UL (ref 135–450)
PMV BLD AUTO: 10.9 FL (ref 5–10.5)
POTASSIUM SERPL-SCNC: 4.5 MMOL/L (ref 3.5–5.1)
PREALBUMIN: 24.6 MG/DL (ref 20–40)
RBC # BLD: 3.98 M/UL (ref 4–5.2)
SEDIMENTATION RATE, ERYTHROCYTE: 16 MM/HR (ref 0–30)
SODIUM BLD-SCNC: 141 MMOL/L (ref 136–145)
TOTAL PROTEIN: 7.4 G/DL (ref 6.4–8.2)
TRIGL SERPL-MCNC: 49 MG/DL (ref 0–150)
TSH REFLEX FT4: 2.02 UIU/ML (ref 0.27–4.2)
VITAMIN D 25-HYDROXY: 56.1 NG/ML
VLDLC SERPL CALC-MCNC: 10 MG/DL
WBC # BLD: 8.7 K/UL (ref 4–11)

## 2017-09-27 PROCEDURE — 99214 OFFICE O/P EST MOD 30 MIN: CPT | Performed by: INTERNAL MEDICINE

## 2017-09-27 PROCEDURE — G0008 ADMIN INFLUENZA VIRUS VAC: HCPCS | Performed by: INTERNAL MEDICINE

## 2017-09-27 PROCEDURE — G0009 ADMIN PNEUMOCOCCAL VACCINE: HCPCS | Performed by: INTERNAL MEDICINE

## 2017-09-27 PROCEDURE — 90662 IIV NO PRSV INCREASED AG IM: CPT | Performed by: INTERNAL MEDICINE

## 2017-09-27 PROCEDURE — 90732 PPSV23 VACC 2 YRS+ SUBQ/IM: CPT | Performed by: INTERNAL MEDICINE

## 2017-09-27 RX ORDER — LEVOTHYROXINE SODIUM 0.03 MG/1
TABLET ORAL
Qty: 30 TABLET | Refills: 11 | Status: SHIPPED | OUTPATIENT
Start: 2017-09-27 | End: 2018-08-22 | Stop reason: SDUPTHER

## 2017-09-27 RX ORDER — INHALER, ASSIST DEVICES
1 SPACER (EA) MISCELLANEOUS 2 TIMES DAILY
Qty: 1 EACH | Refills: 3 | Status: SHIPPED | OUTPATIENT
Start: 2017-09-27 | End: 2020-01-15 | Stop reason: SDUPTHER

## 2017-09-27 RX ORDER — ATORVASTATIN CALCIUM 40 MG/1
TABLET, FILM COATED ORAL
Qty: 90 TABLET | Refills: 3 | Status: SHIPPED | OUTPATIENT
Start: 2017-09-27 | End: 2018-08-22 | Stop reason: SDUPTHER

## 2017-09-27 RX ORDER — HYDRALAZINE HYDROCHLORIDE 50 MG/1
TABLET, FILM COATED ORAL
Qty: 90 TABLET | Refills: 11 | Status: SHIPPED | OUTPATIENT
Start: 2017-09-27 | End: 2018-07-30 | Stop reason: SDUPTHER

## 2017-09-27 RX ORDER — SODIUM BICARBONATE 325 MG/1
TABLET ORAL
Qty: 60 TABLET | Refills: 11 | Status: SHIPPED | OUTPATIENT
Start: 2017-09-27 | End: 2018-09-11 | Stop reason: SDUPTHER

## 2017-09-27 RX ORDER — AZELASTINE 1 MG/ML
1 SPRAY, METERED NASAL 2 TIMES DAILY
Qty: 1 BOTTLE | Refills: 11 | Status: SHIPPED | OUTPATIENT
Start: 2017-09-27 | End: 2018-08-22 | Stop reason: SDUPTHER

## 2017-09-27 RX ORDER — DIVALPROEX SODIUM 125 MG/1
125 TABLET, DELAYED RELEASE ORAL 3 TIMES DAILY
Qty: 270 TABLET | Refills: 4 | Status: SHIPPED | OUTPATIENT
Start: 2017-09-27 | End: 2017-10-30

## 2017-09-27 RX ORDER — LANCETS 33 GAUGE
EACH MISCELLANEOUS
Qty: 100 EACH | Refills: 8 | Status: SHIPPED | OUTPATIENT
Start: 2017-09-27 | End: 2018-06-21 | Stop reason: SDUPTHER

## 2017-09-27 RX ORDER — MONTELUKAST SODIUM 10 MG/1
TABLET ORAL
Qty: 30 TABLET | Refills: 7 | Status: SHIPPED | OUTPATIENT
Start: 2017-09-27 | End: 2018-05-07 | Stop reason: SDUPTHER

## 2017-09-27 RX ORDER — KETOCONAZOLE 20 MG/G
CREAM TOPICAL
Qty: 360 G | Refills: 11 | Status: SHIPPED | OUTPATIENT
Start: 2017-09-27 | End: 2018-09-19 | Stop reason: SDUPTHER

## 2017-09-27 RX ORDER — PANTOPRAZOLE SODIUM 40 MG/1
TABLET, DELAYED RELEASE ORAL
Qty: 30 TABLET | Refills: 11 | Status: SHIPPED | OUTPATIENT
Start: 2017-09-27 | End: 2018-07-30 | Stop reason: SDUPTHER

## 2017-09-27 RX ORDER — FUROSEMIDE 20 MG/1
20 TABLET ORAL DAILY
Qty: 30 TABLET | Refills: 5 | Status: SHIPPED | OUTPATIENT
Start: 2017-09-27 | End: 2018-04-02

## 2017-09-27 RX ORDER — WARFARIN SODIUM 6 MG/1
TABLET ORAL
Qty: 30 TABLET | Refills: 3 | Status: SHIPPED | OUTPATIENT
Start: 2017-09-27 | End: 2017-09-27

## 2017-09-27 RX ORDER — BUDESONIDE AND FORMOTEROL FUMARATE DIHYDRATE 160; 4.5 UG/1; UG/1
AEROSOL RESPIRATORY (INHALATION)
Qty: 10.2 INHALER | Refills: 11 | Status: SHIPPED | OUTPATIENT
Start: 2017-09-27 | End: 2018-08-22 | Stop reason: SDUPTHER

## 2017-09-27 RX ORDER — LIDOCAINE 50 MG/G
OINTMENT TOPICAL
Qty: 10 G | Refills: 5 | Status: SHIPPED | OUTPATIENT
Start: 2017-09-27 | End: 2018-08-22

## 2017-09-27 RX ORDER — WARFARIN SODIUM 1 MG/1
TABLET ORAL
Qty: 90 TABLET | Refills: 3 | Status: SHIPPED | OUTPATIENT
Start: 2017-09-27 | End: 2017-09-27

## 2017-09-27 RX ORDER — AMMONIUM LACTATE 12 G/100G
LOTION TOPICAL
Qty: 225 ML | Refills: 4 | Status: SHIPPED | OUTPATIENT
Start: 2017-09-27 | End: 2018-10-12 | Stop reason: SDUPTHER

## 2017-09-27 RX ORDER — VERAPAMIL HYDROCHLORIDE 240 MG/1
TABLET, FILM COATED, EXTENDED RELEASE ORAL
Qty: 30 TABLET | Refills: 11 | Status: SHIPPED | OUTPATIENT
Start: 2017-09-27 | End: 2018-07-30 | Stop reason: SDUPTHER

## 2017-09-27 ASSESSMENT — ENCOUNTER SYMPTOMS
DIARRHEA: 0
RHINORRHEA: 0
ABDOMINAL PAIN: 0
VOMITING: 0
PHOTOPHOBIA: 0
CONSTIPATION: 0
NAUSEA: 0
SINUS PRESSURE: 0
BLOOD IN STOOL: 0
GASTROINTESTINAL NEGATIVE: 1
RESPIRATORY NEGATIVE: 1
TROUBLE SWALLOWING: 0

## 2017-09-27 ASSESSMENT — PATIENT HEALTH QUESTIONNAIRE - PHQ9
2. FEELING DOWN, DEPRESSED OR HOPELESS: 1
1. LITTLE INTEREST OR PLEASURE IN DOING THINGS: 1
SUM OF ALL RESPONSES TO PHQ QUESTIONS 1-9: 2
SUM OF ALL RESPONSES TO PHQ9 QUESTIONS 1 & 2: 2

## 2017-09-27 NOTE — PROGRESS NOTES
Subjective:      Patient ID: Elias Olvera is a [de-identified] y.o. female. HPI  Patient is seen for evaluation of the followin. Dysuria for 3 days. Severe urge incontinence. mybetric has not helped yet. Will see urology again. Using impressa. 2 helped a little. Increased to size 3 today and too early to tell. 2. Dry skin helped with lac hydrin. Lab Results   Component Value Date    TSHFT4 2.66 2017    TSH 4.22 (H) 2015     Needs updated TSH. 3. Essential hypertension has been controlled. Needs updated renal.  New frontal headaches intermittently. Controlled on current regimen. 4. Type 2 diabetes mellitus with complication, with long-term current use of insulin ([address] present for years controlled with diet and Lantus. No more diarrhea since metformin was discontinued. needs follow-up A1c. No numbness or tingling of feet. No hypoglycemia. patient test blood sugars 3 times a day. 5. Hypothyroidism due to acquired atrophy of thyroid present for years and clinically hypothyroid with complaint of leg edema and fatigue and dry skin. Also cold intolerance. 6. Cough variant asthma control with inhaler with no increased cough shortness of breath or wheezing. Present for years. 7. Right hand pain severe tendon pain. Pain is in the first metacarpal joint. This is exquisitely tender with palpation and any movement of the finger. Patient has to keep it splinted to control pain. Symptoms present for months     8. Bilateral leg edema , has compression stockings but needs a new pair. Also on chronic Coumadin therapy for history of PE in the past.  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 It did occur during immobilization during her hospitalization. Discussed switching patient to a novel agent since INR is 1.9 today. Patient agrees to start on Eliquis 5 mg twice a day.   We'll need to discontinue and trouble swallowing. Decreased neck muscle spasm and decreased pain. ROM still limited to 30 degrees flexion and extension. Eyes: Negative for photophobia and visual disturbance. Cataracts   Respiratory: Negative. Cardiovascular: Positive for leg swelling. Negative for chest pain and palpitations. Hypertension hyperlipidemia with increased leg edema past week. Gastrointestinal: Negative. Negative for abdominal pain, blood in stool, constipation, diarrhea, nausea and vomiting. Bowel acid diarrhea has been controlled cholestyramine. Remote cholecystectomy. Endocrine: Negative for polydipsia and polyphagia. Genitourinary: Positive for frequency and urgency. Negative for dysuria, flank pain, hematuria, pelvic pain, vaginal bleeding, vaginal discharge and vaginal pain. Scared to take ditropan after reading side effects   Musculoskeletal: Positive for gait problem, joint swelling and neck pain. Ambulating with walker. Multiple sclerosis symptoms have been stable. No progression in years. Skin: Negative for pallor and rash. Allergic/Immunologic: Negative for environmental allergies, food allergies and immunocompromised state. Neurological: Negative for dizziness, seizures, syncope, speech difficulty, weakness, light-headedness, numbness and headaches. Hematological: Negative. Psychiatric/Behavioral: Negative for agitation, confusion, decreased concentration and sleep disturbance. The patient is not nervous/anxious. Sleep disturbance related to frequent urination       Objective:   Physical Exam   Constitutional: She is oriented to person, place, and time. She appears well-developed and well-nourished. HENT:   Head: Normocephalic and atraumatic. Right Ear: External ear normal.   Left Ear: External ear normal.   Nose: Nose normal.   Mouth/Throat: Oropharynx is clear and moist. No oropharyngeal exudate.    Spasm in  Neck with limited range of

## 2017-09-27 NOTE — MR AVS SNAPSHOT
Urine Culture [BWL700 Custom]  12/26/2017 9/27/2018         Information from Your Visit        Department     Name Address Phone Fax    [address] [facility] [address] [place] [address] [phone]      You Were Seen for:         Comments    Dysuria   [788. 1. ICD-9-CM]         Vital Signs     Blood Pressure Pulse Temperature Respirations Weight Oxygen Saturation    134/67 67 97.3 °F (36.3 °C) (Oral) 18 170 lb (77.1 kg) 94%    Body Mass Index Smoking Status                30.12 kg/m2 Never Smoker          Additional Information about your Body Mass Index (BMI)           Your BMI as listed above is considered obese (30 or more). BMI is an estimate of body fat, calculated from your height and weight. The higher your BMI, the greater your risk of heart disease, high blood pressure, type 2 diabetes, stroke, gallstones, arthritis, sleep apnea, and certain cancers. BMI is not perfect. It may overestimate body fat in athletes and people who are more muscular. Even a small weight loss (between 5 and 10 percent of your current weight) by decreasing your calorie intake and becoming more physically active will help lower your risk of developing or worsening diseases associated with obesity. Learn more at: [employer]             Today's Medication Changes          These changes are accurate as of: 9/27/17 11:25 AM.  If you have any questions, ask your nurse or doctor. CHANGE how you take these medications           Insulin Pen Needle 31G X 5 MM Misc   Commonly known as:  B-D UF III MINI PEN NEEDLES   Instructions:  USE AS DIRECTED NIGHTLY WITH LANTUS PEN   Quantity:  100 each   Refills:  5   What changed:  See the new instructions. Changed by:  Zoe Pope MD       * ketoconazole 2 % cream   Commonly known as:  NIZORAL   Instructions:  Apply topically daily.    Quantity:  120 g   Refills:  5 daily    glucose blood VI test strips (ONE TOUCH TEST STRIPS) strip 1 each by In Vitro route 4 times daily    Incontinence Supply Disposable (PALLAVI SERENITY BRIEFS LARGE) MISC 1 each by Does not apply route three times daily Large to extra large. Give Pallavi underwear protections. Daughter will select the brand. allopurinol (ZYLOPRIM) 300 MG tablet TAKE 1 TABLET BY MOUTH DAILY. CREON 84105 UNITS per capsule TAKE 3 CAPSULES BY MOUTH 3 TIMES DAILY (WITH MEALS). oxybutynin (DITROPAN XL) 5 MG CR tablet Take 1 tablet by mouth daily    insulin lispro (HUMALOG KWIKPEN) 100 UNIT/ML pen Inject 2-5 Units into the skin 3 times daily (with meals)    metFORMIN ER (GLUCOPHAGE-XR) 750 MG XR tablet TAKE 1 TABLET BY MOUTH 2 TIMES DAILY. fluocinonide (LIDEX) 0.05 % cream Apply topically 2 times daily. Incontinence Supplies (BEDPAN) MISC Sorry, but \"1 bottle\" is the only way it will allow us to order this bedpan. vitamin D (CHOLECALCIFEROL) 1000 UNIT TABS tablet Take 3 tablets by mouth daily. loratadine (CLARITIN) 10 MG tablet Take 1 tablet by mouth daily. Biotin 1000 MCG TABS Take  by mouth daily. ALBUTEROL 90 mcg by Does not apply route. Multiple Vitamins-Minerals (CENTRUM SILVER PO) Take  by mouth. Lancet Devices (ACCU-CHEK SOFTCLIX LANCET DEV) by Does not apply route 2 times daily as needed. Amoxicillin 500 MG TABS Take 1 each by mouth 3 times daily    budesonide-formoterol (SYMBICORT) 160-4.5 MCG/ACT AERO Inhale 2 puffs into the lungs 2 times daily for 90 days. Allergies              Belcher Meal     Nausea, vomiting and diarrhea with almond milk.     Celexa [Citalopram Hydrobromide]     Famciclovir     Lactose     Metronidazole     Peanut-containing Drug Products     Udall Oil [Nutritional Supplements] Diarrhea    Zofran       We Ordered/Performed the following           INFLUENZA, HIGH DOSE, 65 YRS +, IM, PF, PREFILL SYR, 0.5ML (FLUZONE HD)     TSH WITH REFLEX TO FT4 Additional Information  If you have questions, please contact the physician practice where you receive care. Remember, MyChart is NOT to be used for urgent needs. For medical emergencies, dial 911. For questions regarding your MyChart account call [phone]. If you have a clinical question, please call your doctor's office.

## 2017-09-28 LAB
ESTIMATED AVERAGE GLUCOSE: 105.4 MG/DL
HBA1C MFR BLD: 5.3 %

## 2017-09-29 LAB
ORGANISM: ABNORMAL
URINE CULTURE, ROUTINE: ABNORMAL
URINE CULTURE, ROUTINE: ABNORMAL

## 2017-10-02 ENCOUNTER — ANTI-COAG VISIT (OUTPATIENT)
Dept: PRIMARY CARE CLINIC | Age: 82
End: 2017-10-02

## 2017-10-23 DIAGNOSIS — R31.29 MICROSCOPIC HEMATURIA: Primary | ICD-10-CM

## 2017-10-23 DIAGNOSIS — R30.0 DYSURIA: ICD-10-CM

## 2017-10-23 LAB
BACTERIA: ABNORMAL /HPF
BILIRUBIN URINE: NEGATIVE
BLOOD, URINE: NEGATIVE
CLARITY: CLEAR
COLOR: YELLOW
EPITHELIAL CELLS, UA: 2 /HPF (ref 0–5)
GLUCOSE URINE: NEGATIVE MG/DL
HYALINE CASTS: 0 /LPF (ref 0–8)
KETONES, URINE: NEGATIVE MG/DL
LEUKOCYTE ESTERASE, URINE: ABNORMAL
MICROSCOPIC EXAMINATION: YES
NITRITE, URINE: NEGATIVE
PH UA: 8.5
PROTEIN UA: NEGATIVE MG/DL
RBC UA: 2 /HPF (ref 0–4)
SPECIFIC GRAVITY UA: 1.01
URINE TYPE: ABNORMAL
UROBILINOGEN, URINE: 0.2 E.U./DL
WBC UA: 2 /HPF (ref 0–5)

## 2017-10-24 RX ORDER — FLUCONAZOLE 150 MG/1
150 TABLET ORAL ONCE
Qty: 1 TABLET | Refills: 0 | Status: SHIPPED | OUTPATIENT
Start: 2017-10-24 | End: 2017-10-24

## 2017-10-25 ENCOUNTER — TELEPHONE (OUTPATIENT)
Dept: PRIMARY CARE CLINIC | Age: 82
End: 2017-10-25

## 2017-10-26 ENCOUNTER — HOSPITAL ENCOUNTER (OUTPATIENT)
Dept: VASCULAR LAB | Age: 82
Discharge: HOME | End: 2017-10-26
Attending: INTERNAL MEDICINE | Admitting: INTERNAL MEDICINE

## 2017-10-26 DIAGNOSIS — R51.9 FRONTAL HEADACHE: ICD-10-CM

## 2017-10-26 DIAGNOSIS — R30.0 DYSURIA: Primary | ICD-10-CM

## 2017-10-26 DIAGNOSIS — R60.0 EDEMA, LOWER EXTREMITY: Primary | ICD-10-CM

## 2017-10-26 DIAGNOSIS — R60.0 LOCALIZED EDEMA: ICD-10-CM

## 2017-10-26 RX ORDER — SULFAMETHOXAZOLE AND TRIMETHOPRIM 800; 160 MG/1; MG/1
1 TABLET ORAL DAILY
Qty: 3 TABLET | Refills: 0 | Status: SHIPPED | OUTPATIENT
Start: 2017-10-26 | End: 2018-03-15 | Stop reason: SDUPTHER

## 2017-10-30 RX ORDER — DIVALPROEX SODIUM 125 MG/1
125 TABLET, DELAYED RELEASE ORAL 3 TIMES DAILY
Qty: 270 TABLET | Refills: 2 | Status: SHIPPED | OUTPATIENT
Start: 2017-10-30 | End: 2018-10-17 | Stop reason: SDUPTHER

## 2017-12-18 ENCOUNTER — PATIENT MESSAGE (OUTPATIENT)
Dept: PRIMARY CARE CLINIC | Age: 82
End: 2017-12-18

## 2017-12-18 DIAGNOSIS — E79.0 HYPERURICEMIA: ICD-10-CM

## 2017-12-18 NOTE — TELEPHONE ENCOUNTER
From: Ty Ansari  To: Rahul Mcelroy MD  Sent: 12/18/2017 12:50 PM EST  Subject: Prescription Question    Need refill sent for Allopurinol

## 2017-12-19 DIAGNOSIS — E79.0 HYPERURICEMIA: ICD-10-CM

## 2017-12-19 RX ORDER — ALLOPURINOL 300 MG/1
TABLET ORAL
Qty: 90 TABLET | Refills: 1 | Status: SHIPPED | OUTPATIENT
Start: 2017-12-19 | End: 2017-12-19

## 2017-12-19 RX ORDER — ALLOPURINOL 300 MG/1
TABLET ORAL
Qty: 90 TABLET | Refills: 3 | Status: SHIPPED | OUTPATIENT
Start: 2017-12-19 | End: 2018-08-22 | Stop reason: SDUPTHER

## 2017-12-21 LAB — DIABETIC RETINOPATHY: NORMAL

## 2018-02-02 RX ORDER — OSELTAMIVIR PHOSPHATE 75 MG/1
75 CAPSULE ORAL 2 TIMES DAILY
Qty: 10 CAPSULE | Refills: 0 | Status: SHIPPED | OUTPATIENT
Start: 2018-02-02 | End: 2018-02-07

## 2018-02-11 DIAGNOSIS — Z86.711 HISTORY OF PULMONARY EMBOLISM: ICD-10-CM

## 2018-02-13 RX ORDER — APIXABAN 5 MG/1
5 TABLET, FILM COATED ORAL 2 TIMES DAILY
Qty: 60 TABLET | Refills: 2 | Status: SHIPPED | OUTPATIENT
Start: 2018-02-13 | End: 2018-05-17 | Stop reason: SDUPTHER

## 2018-02-26 DIAGNOSIS — E11.8 TYPE 2 DIABETES MELLITUS WITH COMPLICATION, WITH LONG-TERM CURRENT USE OF INSULIN (HCC): ICD-10-CM

## 2018-02-26 DIAGNOSIS — Z79.4 TYPE 2 DIABETES MELLITUS WITH COMPLICATION, WITH LONG-TERM CURRENT USE OF INSULIN (HCC): ICD-10-CM

## 2018-02-27 DIAGNOSIS — E11.8 TYPE 2 DIABETES MELLITUS WITH COMPLICATION, WITH LONG-TERM CURRENT USE OF INSULIN (HCC): Primary | ICD-10-CM

## 2018-02-27 DIAGNOSIS — Z79.4 TYPE 2 DIABETES MELLITUS WITH COMPLICATION, WITH LONG-TERM CURRENT USE OF INSULIN (HCC): Primary | ICD-10-CM

## 2018-02-27 RX ORDER — BLOOD-GLUCOSE METER
1 EACH MISCELLANEOUS
Qty: 1 KIT | Refills: 1 | Status: SHIPPED | OUTPATIENT
Start: 2018-02-27 | End: 2018-03-08 | Stop reason: SDUPTHER

## 2018-02-27 NOTE — PROGRESS NOTES
Subjective:      Patient ID: Jenny Almonte is a 82 y.o. female. HPI    Review of Systems    Objective:   Physical Exam    Assessment:      Received call that patient needs test strip script faxed with ICD 10 code inorder to dispense. Order Ecribed with ICD 10 code.       Plan:

## 2018-02-28 ENCOUNTER — TELEPHONE (OUTPATIENT)
Dept: PRIMARY CARE CLINIC | Age: 83
End: 2018-02-28

## 2018-02-28 NOTE — TELEPHONE ENCOUNTER
Caller:Reagan Dunn (spouse)   FOLLOW UP:   Pt's husb states that a letter was faxed to Dr Jimi Lorenz in order that pt would receive a glucometer and test strips free of charge. Pt wants to know if you have received a fax from [email or web address] in order to benefit from this promotion. [employer]: [phone]  pls advise. Thank you!      Rosalie: [phone]

## 2018-03-02 DIAGNOSIS — N39.0 URINARY TRACT INFECTION WITHOUT HEMATURIA, SITE UNSPECIFIED: Primary | ICD-10-CM

## 2018-03-04 DIAGNOSIS — N30.90 CYSTITIS: Primary | ICD-10-CM

## 2018-03-04 LAB
ORGANISM: ABNORMAL
URINE CULTURE, ROUTINE: ABNORMAL
URINE CULTURE, ROUTINE: ABNORMAL

## 2018-03-04 RX ORDER — CEFUROXIME AXETIL 500 MG/1
500 TABLET ORAL 2 TIMES DAILY
Qty: 20 TABLET | Refills: 0 | Status: SHIPPED | OUTPATIENT
Start: 2018-03-04 | End: 2018-03-14

## 2018-03-08 DIAGNOSIS — E11.8 TYPE 2 DIABETES MELLITUS WITH COMPLICATION, WITH LONG-TERM CURRENT USE OF INSULIN (HCC): ICD-10-CM

## 2018-03-08 DIAGNOSIS — Z79.4 TYPE 2 DIABETES MELLITUS WITH COMPLICATION, WITH LONG-TERM CURRENT USE OF INSULIN (HCC): ICD-10-CM

## 2018-03-09 RX ORDER — BLOOD-GLUCOSE METER
1 EACH MISCELLANEOUS
Qty: 1 KIT | Refills: 1 | Status: SHIPPED | OUTPATIENT
Start: 2018-03-09 | End: 2021-07-02

## 2018-03-15 DIAGNOSIS — R30.0 DYSURIA: ICD-10-CM

## 2018-03-15 RX ORDER — SULFAMETHOXAZOLE AND TRIMETHOPRIM 800; 160 MG/1; MG/1
1 TABLET ORAL DAILY
Qty: 3 TABLET | Refills: 0 | Status: SHIPPED | OUTPATIENT
Start: 2018-03-15 | End: 2018-05-16 | Stop reason: ALTCHOICE

## 2018-03-16 DIAGNOSIS — R30.0 DYSURIA: ICD-10-CM

## 2018-03-16 LAB
BILIRUBIN URINE: NEGATIVE
BLOOD, URINE: NEGATIVE
CLARITY: CLEAR
COLOR: YELLOW
GLUCOSE URINE: NEGATIVE MG/DL
KETONES, URINE: NEGATIVE MG/DL
LEUKOCYTE ESTERASE, URINE: NEGATIVE
MICROSCOPIC EXAMINATION: NORMAL
NITRITE, URINE: NEGATIVE
PH UA: 6.5
PROTEIN UA: NEGATIVE MG/DL
SPECIFIC GRAVITY UA: 1.01
URINE TYPE: NORMAL
UROBILINOGEN, URINE: 0.2 E.U./DL

## 2018-03-16 NOTE — PROGRESS NOTES
Subjective:      Patient ID: Reyes Germain is a 82 y.o. female. HPI    Review of Systems    Objective:   Physical Exam    Assessment:      Dysuria, will get urinalysis and culture and give bactrim ds daily for three days.       Plan:

## 2018-03-18 LAB — URINE CULTURE, ROUTINE: NORMAL

## 2018-03-18 RX ORDER — FLUCONAZOLE 150 MG/1
150 TABLET ORAL ONCE
Qty: 1 TABLET | Refills: 0 | Status: SHIPPED | OUTPATIENT
Start: 2018-03-18 | End: 2018-09-19 | Stop reason: SDUPTHER

## 2018-03-28 DIAGNOSIS — R30.0 DYSURIA: ICD-10-CM

## 2018-03-28 DIAGNOSIS — I15.9 SECONDARY HYPERTENSION: ICD-10-CM

## 2018-03-28 DIAGNOSIS — Z79.4 TYPE 2 DIABETES MELLITUS WITH COMPLICATION, WITH LONG-TERM CURRENT USE OF INSULIN (HCC): Primary | ICD-10-CM

## 2018-03-28 DIAGNOSIS — Z79.4 TYPE 2 DIABETES MELLITUS WITH COMPLICATION, WITH LONG-TERM CURRENT USE OF INSULIN (HCC): ICD-10-CM

## 2018-03-28 DIAGNOSIS — E11.8 TYPE 2 DIABETES MELLITUS WITH COMPLICATION, WITH LONG-TERM CURRENT USE OF INSULIN (HCC): Primary | ICD-10-CM

## 2018-03-28 DIAGNOSIS — E11.8 TYPE 2 DIABETES MELLITUS WITH COMPLICATION, WITH LONG-TERM CURRENT USE OF INSULIN (HCC): ICD-10-CM

## 2018-03-28 LAB
A/G RATIO: 1.4 (ref 1.1–2.2)
ALBUMIN SERPL-MCNC: 4.2 G/DL (ref 3.4–5)
ALP BLD-CCNC: 60 U/L (ref 40–129)
ALT SERPL-CCNC: 15 U/L (ref 10–40)
ANION GAP SERPL CALCULATED.3IONS-SCNC: 15 MMOL/L (ref 3–16)
AST SERPL-CCNC: 16 U/L (ref 15–37)
BASOPHILS ABSOLUTE: 0 K/UL (ref 0–0.2)
BASOPHILS RELATIVE PERCENT: 0.2 %
BILIRUB SERPL-MCNC: 0.3 MG/DL (ref 0–1)
BILIRUBIN URINE: NEGATIVE
BLOOD, URINE: NEGATIVE
BUN BLDV-MCNC: 53 MG/DL (ref 7–20)
CALCIUM SERPL-MCNC: 10.7 MG/DL (ref 8.3–10.6)
CHLORIDE BLD-SCNC: 104 MMOL/L (ref 99–110)
CHOLESTEROL, TOTAL: 152 MG/DL (ref 0–199)
CLARITY: ABNORMAL
CO2: 24 MMOL/L (ref 21–32)
COLOR: YELLOW
CREAT SERPL-MCNC: 0.7 MG/DL (ref 0.6–1.2)
EOSINOPHILS ABSOLUTE: 0.1 K/UL (ref 0–0.6)
EOSINOPHILS RELATIVE PERCENT: 1.3 %
EPITHELIAL CELLS, UA: 0 /HPF (ref 0–5)
GFR AFRICAN AMERICAN: >60
GFR NON-AFRICAN AMERICAN: >60
GLOBULIN: 3 G/DL
GLUCOSE BLD-MCNC: 132 MG/DL (ref 70–99)
GLUCOSE URINE: NEGATIVE MG/DL
HCT VFR BLD CALC: 38.3 % (ref 36–48)
HDLC SERPL-MCNC: 70 MG/DL (ref 40–60)
HEMOGLOBIN: 12.5 G/DL (ref 12–16)
HYALINE CASTS: 0 /LPF (ref 0–8)
KETONES, URINE: NEGATIVE MG/DL
LDL CHOLESTEROL CALCULATED: 70 MG/DL
LEUKOCYTE ESTERASE, URINE: NEGATIVE
LYMPHOCYTES ABSOLUTE: 1.9 K/UL (ref 1–5.1)
LYMPHOCYTES RELATIVE PERCENT: 26.1 %
MCH RBC QN AUTO: 30.2 PG (ref 26–34)
MCHC RBC AUTO-ENTMCNC: 32.5 G/DL (ref 31–36)
MCV RBC AUTO: 92.7 FL (ref 80–100)
MICROSCOPIC EXAMINATION: YES
MONOCYTES ABSOLUTE: 0.5 K/UL (ref 0–1.3)
MONOCYTES RELATIVE PERCENT: 7.4 %
NEUTROPHILS ABSOLUTE: 4.6 K/UL (ref 1.7–7.7)
NEUTROPHILS RELATIVE PERCENT: 65 %
NITRITE, URINE: NEGATIVE
PDW BLD-RTO: 18.7 % (ref 12.4–15.4)
PH UA: 6
PLATELET # BLD: 197 K/UL (ref 135–450)
PMV BLD AUTO: 11 FL (ref 5–10.5)
POTASSIUM SERPL-SCNC: 4 MMOL/L (ref 3.5–5.1)
PROTEIN UA: NEGATIVE MG/DL
RBC # BLD: 4.13 M/UL (ref 4–5.2)
RBC UA: 4 /HPF (ref 0–4)
SODIUM BLD-SCNC: 143 MMOL/L (ref 136–145)
SPECIFIC GRAVITY UA: 1.01
TOTAL PROTEIN: 7.2 G/DL (ref 6.4–8.2)
TRIGL SERPL-MCNC: 62 MG/DL (ref 0–150)
URINE TYPE: ABNORMAL
UROBILINOGEN, URINE: 0.2 E.U./DL
VLDLC SERPL CALC-MCNC: 12 MG/DL
WBC # BLD: 7.2 K/UL (ref 4–11)
WBC UA: 1 /HPF (ref 0–5)

## 2018-03-29 DIAGNOSIS — Z12.11 COLON CANCER SCREENING: Primary | ICD-10-CM

## 2018-03-29 LAB
ESTIMATED AVERAGE GLUCOSE: 111.2 MG/DL
HBA1C MFR BLD: 5.5 %

## 2018-03-31 DIAGNOSIS — N30.90 CYSTITIS WITHOUT HEMATURIA: Primary | ICD-10-CM

## 2018-03-31 LAB
ORGANISM: ABNORMAL
URINE CULTURE, ROUTINE: ABNORMAL
URINE CULTURE, ROUTINE: ABNORMAL

## 2018-03-31 RX ORDER — SULFAMETHOXAZOLE AND TRIMETHOPRIM 400; 80 MG/1; MG/1
1 TABLET ORAL 2 TIMES DAILY
Qty: 20 TABLET | Refills: 0 | Status: SHIPPED | OUTPATIENT
Start: 2018-03-31 | End: 2018-04-10

## 2018-04-02 DIAGNOSIS — R79.9 ELEVATED BUN: ICD-10-CM

## 2018-04-02 DIAGNOSIS — E83.52 HYPERCALCEMIA: Primary | ICD-10-CM

## 2018-04-05 DIAGNOSIS — E11.8 TYPE 2 DIABETES MELLITUS WITH COMPLICATION, WITH LONG-TERM CURRENT USE OF INSULIN (HCC): ICD-10-CM

## 2018-04-05 DIAGNOSIS — E86.0 DEHYDRATION: Primary | ICD-10-CM

## 2018-04-05 DIAGNOSIS — Z79.4 TYPE 2 DIABETES MELLITUS WITH COMPLICATION, WITH LONG-TERM CURRENT USE OF INSULIN (HCC): ICD-10-CM

## 2018-05-16 ENCOUNTER — OFFICE VISIT (OUTPATIENT)
Dept: PRIMARY CARE CLINIC | Age: 83
End: 2018-05-16

## 2018-05-16 VITALS
TEMPERATURE: 97.1 F | BODY MASS INDEX: 31.01 KG/M2 | HEART RATE: 68 BPM | SYSTOLIC BLOOD PRESSURE: 143 MMHG | DIASTOLIC BLOOD PRESSURE: 66 MMHG | WEIGHT: 175 LBS | OXYGEN SATURATION: 95 %

## 2018-05-16 DIAGNOSIS — M10.00 IDIOPATHIC GOUT, UNSPECIFIED CHRONICITY, UNSPECIFIED SITE: ICD-10-CM

## 2018-05-16 DIAGNOSIS — G35 MULTIPLE SCLEROSIS, PRIMARY CHRONIC PROGRESSIVE (HCC): Primary | ICD-10-CM

## 2018-05-16 DIAGNOSIS — Z79.4 TYPE 2 DIABETES MELLITUS WITH COMPLICATION, WITH LONG-TERM CURRENT USE OF INSULIN (HCC): ICD-10-CM

## 2018-05-16 DIAGNOSIS — K59.01 SLOW TRANSIT CONSTIPATION: ICD-10-CM

## 2018-05-16 DIAGNOSIS — E11.21 DIABETIC NEPHROPATHY ASSOCIATED WITH TYPE 2 DIABETES MELLITUS (HCC): ICD-10-CM

## 2018-05-16 DIAGNOSIS — B35.1 NAIL FUNGUS: ICD-10-CM

## 2018-05-16 DIAGNOSIS — E21.0 PRIMARY HYPERPARATHYROIDISM (HCC): ICD-10-CM

## 2018-05-16 DIAGNOSIS — E83.52 HYPERCALCEMIA: ICD-10-CM

## 2018-05-16 DIAGNOSIS — Z91.81 AT HIGH RISK FOR FALLS: ICD-10-CM

## 2018-05-16 DIAGNOSIS — E11.8 TYPE 2 DIABETES MELLITUS WITH COMPLICATION, WITH LONG-TERM CURRENT USE OF INSULIN (HCC): ICD-10-CM

## 2018-05-16 DIAGNOSIS — E03.4 HYPOTHYROIDISM DUE TO ACQUIRED ATROPHY OF THYROID: ICD-10-CM

## 2018-05-16 DIAGNOSIS — Z12.11 COLON CANCER SCREENING: ICD-10-CM

## 2018-05-16 LAB
ALBUMIN SERPL-MCNC: 4.1 G/DL (ref 3.4–5)
ANION GAP SERPL CALCULATED.3IONS-SCNC: 14 MMOL/L (ref 3–16)
BUN BLDV-MCNC: 49 MG/DL (ref 7–20)
CALCIUM SERPL-MCNC: 10.3 MG/DL (ref 8.3–10.6)
CHLORIDE BLD-SCNC: 102 MMOL/L (ref 99–110)
CO2: 21 MMOL/L (ref 21–32)
CREAT SERPL-MCNC: 0.8 MG/DL (ref 0.6–1.2)
GFR AFRICAN AMERICAN: >60
GFR NON-AFRICAN AMERICAN: >60
GLUCOSE BLD-MCNC: 112 MG/DL (ref 70–99)
PARATHYROID HORMONE INTACT: 72.1 PG/ML (ref 14–72)
PHOSPHORUS: 2.8 MG/DL (ref 2.5–4.9)
POTASSIUM SERPL-SCNC: 4.4 MMOL/L (ref 3.5–5.1)
PROTEIN PROTEIN: 0.01 G/DL
PROTEIN PROTEIN: 13 MG/DL
SODIUM BLD-SCNC: 137 MMOL/L (ref 136–145)
TSH REFLEX FT4: 2.73 UIU/ML (ref 0.27–4.2)
URIC ACID, SERUM: 3.5 MG/DL (ref 2.6–6)

## 2018-05-16 PROCEDURE — 99213 OFFICE O/P EST LOW 20 MIN: CPT | Performed by: INTERNAL MEDICINE

## 2018-05-16 RX ORDER — TERBINAFINE HYDROCHLORIDE 250 MG/1
250 TABLET ORAL DAILY
Qty: 42 TABLET | Refills: 0 | Status: SHIPPED | OUTPATIENT
Start: 2018-05-16 | End: 2018-06-27

## 2018-05-16 ASSESSMENT — PATIENT HEALTH QUESTIONNAIRE - PHQ9
1. LITTLE INTEREST OR PLEASURE IN DOING THINGS: 0
SUM OF ALL RESPONSES TO PHQ QUESTIONS 1-9: 0
SUM OF ALL RESPONSES TO PHQ9 QUESTIONS 1 & 2: 0
2. FEELING DOWN, DEPRESSED OR HOPELESS: 0

## 2018-05-17 DIAGNOSIS — Z86.711 HISTORY OF PULMONARY EMBOLISM: ICD-10-CM

## 2018-05-17 DIAGNOSIS — R80.0 ISOLATED PROTEINURIA WITHOUT SPECIFIC MORPHOLOGIC LESION: Primary | ICD-10-CM

## 2018-05-17 LAB
ESTIMATED AVERAGE GLUCOSE: 99.7 MG/DL
HBA1C MFR BLD: 5.1 %

## 2018-05-17 RX ORDER — APIXABAN 5 MG/1
5 TABLET, FILM COATED ORAL 2 TIMES DAILY
Qty: 60 TABLET | Refills: 2 | Status: SHIPPED | OUTPATIENT
Start: 2018-05-17 | End: 2018-08-17 | Stop reason: SDUPTHER

## 2018-05-17 ASSESSMENT — ENCOUNTER SYMPTOMS
ABDOMINAL PAIN: 0
NAUSEA: 0
SINUS PRESSURE: 0
RHINORRHEA: 0
CONSTIPATION: 0
PHOTOPHOBIA: 0
TROUBLE SWALLOWING: 0
GASTROINTESTINAL NEGATIVE: 1
BLOOD IN STOOL: 0
RESPIRATORY NEGATIVE: 1
VOMITING: 0
DIARRHEA: 0

## 2018-05-18 LAB
ALBUMIN SERPL-MCNC: 3.5 G/DL (ref 3.1–4.9)
ALPHA-1-GLOBULIN: 0.2 G/DL (ref 0.2–0.4)
ALPHA-2-GLOBULIN: 0.7 G/DL (ref 0.4–1.1)
BETA GLOBULIN: 1 G/DL (ref 0.9–1.6)
GAMMA GLOBULIN: 1.3 G/DL (ref 0.6–1.8)
SPE/IFE INTERPRETATION: NORMAL
TOTAL PROTEIN: 6.6 G/DL (ref 6.4–8.2)
URINE ELECTROPHORESIS INTERP: NORMAL

## 2018-05-23 DIAGNOSIS — Z12.11 COLON CANCER SCREENING: ICD-10-CM

## 2018-05-23 LAB
CONTROL: NORMAL
HEMOCCULT STL QL: NEGATIVE

## 2018-05-23 PROCEDURE — 82274 ASSAY TEST FOR BLOOD FECAL: CPT | Performed by: INTERNAL MEDICINE

## 2018-05-24 DIAGNOSIS — I10 ESSENTIAL HYPERTENSION: ICD-10-CM

## 2018-05-24 RX ORDER — ATENOLOL 50 MG/1
TABLET ORAL
Qty: 90 TABLET | Refills: 3 | Status: SHIPPED | OUTPATIENT
Start: 2018-05-24 | End: 2018-11-13 | Stop reason: SDUPTHER

## 2018-06-08 ENCOUNTER — TELEPHONE (OUTPATIENT)
Dept: PRIMARY CARE CLINIC | Age: 83
End: 2018-06-08

## 2018-06-08 DIAGNOSIS — R30.0 DYSURIA: Primary | ICD-10-CM

## 2018-06-08 DIAGNOSIS — R80.0 ISOLATED PROTEINURIA WITHOUT SPECIFIC MORPHOLOGIC LESION: ICD-10-CM

## 2018-06-08 DIAGNOSIS — R30.0 DYSURIA: ICD-10-CM

## 2018-06-08 LAB
BACTERIA: ABNORMAL /HPF
BILIRUBIN URINE: NEGATIVE
BLOOD, URINE: NEGATIVE
CLARITY: ABNORMAL
COLOR: YELLOW
EPITHELIAL CELLS, UA: 0 /HPF (ref 0–5)
GLUCOSE URINE: NEGATIVE MG/DL
HYALINE CASTS: 0 /LPF (ref 0–8)
KETONES, URINE: NEGATIVE MG/DL
LEUKOCYTE ESTERASE, URINE: ABNORMAL
MICROSCOPIC EXAMINATION: YES
NITRITE, URINE: POSITIVE
PH UA: 6.5
PROTEIN PROTEIN: 0.01 G/DL
PROTEIN PROTEIN: 12 MG/DL
PROTEIN UA: NEGATIVE MG/DL
RBC UA: 1 /HPF (ref 0–4)
SPECIFIC GRAVITY UA: 1.01
URINE TYPE: ABNORMAL
UROBILINOGEN, URINE: 0.2 E.U./DL
WBC UA: 26 /HPF (ref 0–5)

## 2018-06-08 RX ORDER — CIPROFLOXACIN 250 MG/1
250 TABLET, FILM COATED ORAL 2 TIMES DAILY
Qty: 20 TABLET | Refills: 0 | Status: SHIPPED | OUTPATIENT
Start: 2018-06-08 | End: 2018-06-18

## 2018-06-10 LAB
ORGANISM: ABNORMAL
URINE CULTURE, ROUTINE: ABNORMAL
URINE CULTURE, ROUTINE: ABNORMAL

## 2018-06-11 LAB — URINE ELECTROPHORESIS INTERP: NORMAL

## 2018-06-20 DIAGNOSIS — E11.8 TYPE 2 DIABETES MELLITUS WITH COMPLICATION, WITH LONG-TERM CURRENT USE OF INSULIN (HCC): ICD-10-CM

## 2018-06-20 DIAGNOSIS — Z79.4 TYPE 2 DIABETES MELLITUS WITH COMPLICATION, WITH LONG-TERM CURRENT USE OF INSULIN (HCC): ICD-10-CM

## 2018-06-21 DIAGNOSIS — Z79.4 TYPE 2 DIABETES MELLITUS WITH COMPLICATION, WITH LONG-TERM CURRENT USE OF INSULIN (HCC): ICD-10-CM

## 2018-06-21 DIAGNOSIS — E11.8 TYPE 2 DIABETES MELLITUS WITH COMPLICATION, WITH LONG-TERM CURRENT USE OF INSULIN (HCC): ICD-10-CM

## 2018-06-21 RX ORDER — LANCETS 33 GAUGE
EACH MISCELLANEOUS
Qty: 100 EACH | Refills: 8 | Status: SHIPPED | OUTPATIENT
Start: 2018-06-21 | End: 2019-06-04 | Stop reason: SDUPTHER

## 2018-07-09 ENCOUNTER — TELEPHONE (OUTPATIENT)
Dept: PRIMARY CARE CLINIC | Age: 83
End: 2018-07-09

## 2018-07-09 DIAGNOSIS — R35.0 FREQUENCY OF URINATION: ICD-10-CM

## 2018-07-09 DIAGNOSIS — R35.0 FREQUENCY OF URINATION: Primary | ICD-10-CM

## 2018-07-09 LAB
BILIRUBIN URINE: NEGATIVE
BLOOD, URINE: NEGATIVE
CLARITY: CLEAR
COLOR: YELLOW
GLUCOSE URINE: NEGATIVE MG/DL
KETONES, URINE: NEGATIVE MG/DL
LEUKOCYTE ESTERASE, URINE: NEGATIVE
MICROSCOPIC EXAMINATION: NORMAL
NITRITE, URINE: NEGATIVE
PH UA: 6
PROTEIN UA: NEGATIVE MG/DL
SPECIFIC GRAVITY UA: 1.02
URINE TYPE: NORMAL
UROBILINOGEN, URINE: 0.2 E.U./DL

## 2018-07-09 NOTE — TELEPHONE ENCOUNTER
Patient's daughter calling stating MOM is currently having urinary frequency, and dysuria.  Patient has chronic UTI's and daughter is wanting to know if she can bring a specimen in to test?Patient was just treated a month ago for UTI and exact symptoms are reoccurring  Please advise

## 2018-07-10 ENCOUNTER — TELEPHONE (OUTPATIENT)
Dept: PRIMARY CARE CLINIC | Age: 83
End: 2018-07-10

## 2018-07-10 DIAGNOSIS — B37.2 CANDIDIASIS OF SKIN: ICD-10-CM

## 2018-07-11 LAB — URINE CULTURE, ROUTINE: NORMAL

## 2018-07-12 DIAGNOSIS — G35 MULTIPLE SCLEROSIS, PRIMARY CHRONIC PROGRESSIVE (HCC): Primary | ICD-10-CM

## 2018-07-12 DIAGNOSIS — Z91.81 AT HIGH RISK FOR FALLS: ICD-10-CM

## 2018-07-17 ENCOUNTER — HOSPITAL ENCOUNTER (OUTPATIENT)
Dept: PHYSICAL THERAPY | Age: 83
Setting detail: THERAPIES SERIES
Discharge: HOME | End: 2018-07-17
Payer: MEDICARE

## 2018-07-17 PROCEDURE — 97110 THERAPEUTIC EXERCISES: CPT | Performed by: PHYSICAL THERAPIST

## 2018-07-17 PROCEDURE — 97116 GAIT TRAINING THERAPY: CPT | Performed by: PHYSICAL THERAPIST

## 2018-07-17 NOTE — FLOWSHEET NOTE
Physical Therapy Daily Treatment Note  Date:  2018     Patient Name:  Asia Elena    :  1935  MRN: [id number]  Restrictions/Precautions:    Medical/Treatment Diagnosis Information:  · Diagnosis: Z91.81 (ICD-10-CM) - At high risk for falls; G35 (ICD-10-CM) - Multiple sclerosis, primary chronic progressive ([facility] [address]  · Treatment Diagnosis: Decreased Balance  Insurance/Certification information:  PT Insurance Information: [employer] Medicare  Physician Information:  Referring Practitioner: Queta Garcia MD Follow-up:   Plan of care signed (Y/N):    Visit# / total visits:  per POC through 18  Pain level: /10     G-Code noted on 2018:   PT G-Codes  Functional Assessment Tool Used: Activities Specific Balance Confidence (ABC) Scale  Score: 6.8% average confidence = 93.2% mobility impairment  Functional Limitation: Mobility: Walking and moving around  Mobility: Walking and Moving Around Current Status (): At least 80 percent but less than 100 percent impaired, limited or restricted  Mobility: Walking and Moving Around Goal Status (): At least 40 percent but less than 60 percent impaired, limited or restricted    Progress Note: [x]  Yes  []  No  Next due by: Visit #10      Subjective:  Pt reports that she has been doing some of her exercises at home. Objective:  Observation: Pt ambulates with standard walker from waiting room into clinic, with kyphotic posture and slow gait speed, decreased step length, requiring verbal cues for posture.   Test measurements:  L Hip weakness, 2+/5 strength    Exercises:  Exercise/Equipment Resistance/Repetitions Other comments   NuStep 5 min Level 2 resistance   Sit to stand 10 x  UE support on arm rests of chair   L hip flex - seated  L hip flex - standing 3 x, failed due to fatigue  10 x     L Knee ext - seated 10 x     Ankle DF - seated 15 x bilat    Minisquats 10 x  Holding onto parallel bars                                             Other

## 2018-07-19 ENCOUNTER — HOSPITAL ENCOUNTER (OUTPATIENT)
Dept: PHYSICAL THERAPY | Age: 83
Setting detail: THERAPIES SERIES
Discharge: HOME | End: 2018-07-19
Payer: MEDICARE

## 2018-07-19 PROCEDURE — 97110 THERAPEUTIC EXERCISES: CPT | Performed by: PHYSICAL THERAPIST

## 2018-07-19 PROCEDURE — 97116 GAIT TRAINING THERAPY: CPT | Performed by: PHYSICAL THERAPIST

## 2018-07-20 NOTE — FLOWSHEET NOTE
Physical Therapy Daily Treatment Note  Date:  2018     Patient Name:  David Adams    :  1935  MRN: [id number]  Restrictions/Precautions:    Medical/Treatment Diagnosis Information:  · Diagnosis: Z91.81 (ICD-10-CM) - At high risk for falls; G35 (ICD-10-CM) - Multiple sclerosis, primary chronic progressive ([facility] [address]  · Treatment Diagnosis: Decreased Balance  Insurance/Certification information:  PT Insurance Information: [employer] Medicare  Physician Information:  Referring Practitioner: Norma Bang MD Follow-up:   Plan of care signed (Y/N):    Visit# / total visits: 3 / 12 per POC through 18  Pain level: /10     G-Code noted on 2018:   PT G-Codes  Functional Assessment Tool Used: Activities Specific Balance Confidence (ABC) Scale  Score: 6.8% average confidence = 93.2% mobility impairment  Functional Limitation: Mobility: Walking and moving around  Mobility: Walking and Moving Around Current Status (): At least 80 percent but less than 100 percent impaired, limited or restricted  Mobility: Walking and Moving Around Goal Status (): At least 40 percent but less than 60 percent impaired, limited or restricted    Progress Note: [x]  Yes  []  No  Next due by: Visit #10      Subjective:  Pt c/o fatigue following last visit, and had difficulty getting up from her     Objective:  Observation: Pt ambulates with standard walker from waiting room into clinic, with kyphotic posture and slow gait speed, decreased step length, requiring verbal cues for posture. Pt requires min assist with LLE to transfer onto and off of NuStep to clear the pedals.   Test measurements:  L Hip weakness, 2+/5 strength     Exercises:  Exercise/Equipment Resistance/Repetitions Other comments   NuStep 6 min Level 2 resistance   Sit to stand 5 x  UE support on arm rests of chair   L hip flex - seated  L hip flex - standing -  10 x     L Knee ext - seated 10 x     Ankle DF - seated 20 x bilat    Minisquats 5 x entrance of patient's home.     Plan:   [x] Continue per plan of care [] Alter current plan (see comments)  [] Plan of care initiated [] Hold pending MD visit [] Discharge    Plan for Next Session:  endurance and balance exercises    Electronically signed by: , PT [id number]       Juana Parsons

## 2018-07-24 ENCOUNTER — HOSPITAL ENCOUNTER (OUTPATIENT)
Dept: PHYSICAL THERAPY | Age: 83
Setting detail: THERAPIES SERIES
Discharge: HOME | End: 2018-07-24
Payer: MEDICARE

## 2018-07-24 PROCEDURE — 97116 GAIT TRAINING THERAPY: CPT | Performed by: PHYSICAL THERAPIST

## 2018-07-24 PROCEDURE — 97110 THERAPEUTIC EXERCISES: CPT | Performed by: PHYSICAL THERAPIST

## 2018-07-24 NOTE — FLOWSHEET NOTE
Physical Therapy Daily Treatment Note  Date:  2018     Patient Name:  Fredi Garcia    :  1935  MRN: [id number]  Restrictions/Precautions:    Medical/Treatment Diagnosis Information:  · Diagnosis: Z91.81 (ICD-10-CM) - At high risk for falls; G35 (ICD-10-CM) - Multiple sclerosis, primary chronic progressive ([facility] [address]  · Treatment Diagnosis: Decreased Balance  Insurance/Certification information:  PT Insurance Information: [employer] Medicare  Physician Information:  Referring Practitioner: Tyrone Sandhoff MD Follow-up:   Plan of care signed (Y/N):    Visit# / total visits:  per POC through 18  Pain level: no rating given, patient reports having some hand soreness     G-Code noted on 2018:   PT G-Codes  Functional Assessment Tool Used: Activities Specific Balance Confidence (ABC) Scale  Score: 6.8% average confidence = 93.2% mobility impairment  Functional Limitation: Mobility: Walking and moving around  Mobility: Walking and Moving Around Current Status (): At least 80 percent but less than 100 percent impaired, limited or restricted  Mobility: Walking and Moving Around Goal Status (): At least 40 percent but less than 60 percent impaired, limited or restricted    Progress Note: [x]  Yes  []  No  Next due by: Visit #10      Subjective:  Pt reports that she felt good after last visit, and that she feels great when she uses the NuStep. Objective:  Observation: Pt ambulates with standard walker from waiting room into clinic, with kyphotic posture and slow gait speed, decreased step length, requiring verbal cues for posture. Pt requires min assist with LLE to transfer onto and off of NuStep to clear the pedals.   Test measurements:  L Hip weakness, 2+/5 strength     Exercises:  Exercise/Equipment Resistance/Repetitions Other comments   NuStep 8 min Level 2 resistance, PT verbal cues to maintain >70 SPM   Sit to stand 10 x  UE support on arm rests of chair, PT instruction for \"nose over toes\" and foot positioning. L hip flex - seated  L hip flex - standing -  10 x     L Knee ext - seated 10 x     Ankle DF - seated 20 x bilat    Minisquats 5 x   Holding onto parallel bars        Gait Training 2 x 50' with standard walker          2 laps in // bars walking backwards    2 laps lateral walking each direction PT verbal cues for posture, and for foot clearance, with education to listen for feet shuffling on floor. PT cues for step length and foot clearance    PT instruction for neutral hip and toes forward. Other Therapeutic Activities:  HEP planning, patient and family education on transferring, posture, role of PT, plan of care    Home Exercise Program:  See above    Manual Treatments:  n/a    Modalities:  n/a    Timed Code Treatment Minutes:  TE x 18 min,  GT x 11 min    Total Treatment Minutes:  29 min    Treatment/Activity Tolerance:  [] Patient tolerated treatment well [x] Patient limited by fatigue  [] Patient limited by pain  [] Patient limited by other medical complications  [] Other:     Assessment: Pt presents to PT with a history of falling with injury. Pt has limited mobility around the home, and is reliant on family, friends, and assistive devices for ADLs. Prognosis: [] Good [x] Fair  [] Poor    Patient Requires Follow-up: [x] Yes  [] No    Goals:  Short term goals  Time Frame for Short term goals: within 3 weeks, patient will:  Short term goal 1: Improve TUG score from 107 seconds to 60 seconds or less to improve mobility to restroom. Short term goal 2: Improve 30 second chair stand test from 5 repetitions to 8 repetitions or more to improve safety and strength with transfers. Short term goal 3: Improve 2 min step test from 30 repetitions to 45 repetitions or more to improve endurance with walking.   Long term goals  Time Frame for Long term goals : within 6 weeks, patient will:  Long term goal 1: Improve TUG score from 107

## 2018-07-26 ENCOUNTER — HOSPITAL ENCOUNTER (OUTPATIENT)
Dept: PHYSICAL THERAPY | Age: 83
Setting detail: THERAPIES SERIES
Discharge: HOME | End: 2018-07-26
Payer: MEDICARE

## 2018-07-26 PROCEDURE — 97110 THERAPEUTIC EXERCISES: CPT | Performed by: PHYSICAL THERAPIST

## 2018-07-26 PROCEDURE — 97116 GAIT TRAINING THERAPY: CPT | Performed by: PHYSICAL THERAPIST

## 2018-07-27 NOTE — FLOWSHEET NOTE
Physical Therapy Daily Treatment Note  Date:  2018     Patient Name:  Nj Goncalves    :  1935  MRN: [id number]  Restrictions/Precautions:    Medical/Treatment Diagnosis Information:  · Diagnosis: Z91.81 (ICD-10-CM) - At high risk for falls; G35 (ICD-10-CM) - Multiple sclerosis, primary chronic progressive ([facility] [address]  · Treatment Diagnosis: Decreased Balance  Insurance/Certification information:  PT Insurance Information: A[employer]na Medicare  Physician Information:  Referring Practitioner: Tomer Adhikari MD Follow-up:   Plan of care signed (Y/N):    Visit# / total visits:  per POC through 18  Pain level: no rating given, patient reports having some hand soreness     G-Code noted on 2018:   PT G-Codes  Functional Assessment Tool Used: Activities Specific Balance Confidence (ABC) Scale  Score: 6.8% average confidence = 93.2% mobility impairment  Functional Limitation: Mobility: Walking and moving around  Mobility: Walking and Moving Around Current Status (): At least 80 percent but less than 100 percent impaired, limited or restricted  Mobility: Walking and Moving Around Goal Status (): At least 40 percent but less than 60 percent impaired, limited or restricted    Progress Note: [x]  Yes  []  No  Next due by: Visit #10      Subjective:  Pt states that she is very fatigued today due to her asthma and to having very little sleep last night. Objective:  Observation: Pt fatigues more quickly with NuStep this date. Test measurements:     Exercises:  Exercise/Equipment Resistance/Repetitions Other comments   NuStep 6 min Level 2 resistance, PT verbal cues to maintain >70 SPM   Sit to stand 10 x  UE support on arm rests of chair, PT instruction for \"nose over toes\" and foot positioning.    L hip flex - seated  L hip flex - standing -  10 x     L Knee ext - seated 10 x     Ankle DF - seated 20 x bilat L DF weakness, PT verbal cues for L ankle eccentric focus   Minisquats 5 x

## 2018-07-30 DIAGNOSIS — I10 ESSENTIAL HYPERTENSION: ICD-10-CM

## 2018-07-30 RX ORDER — PANTOPRAZOLE SODIUM 40 MG/1
TABLET, DELAYED RELEASE ORAL
Qty: 30 TABLET | Refills: 3 | Status: SHIPPED | OUTPATIENT
Start: 2018-07-30 | End: 2019-01-02 | Stop reason: SDUPTHER

## 2018-07-30 RX ORDER — HYDRALAZINE HYDROCHLORIDE 50 MG/1
TABLET, FILM COATED ORAL
Qty: 90 TABLET | Refills: 3 | Status: SHIPPED | OUTPATIENT
Start: 2018-07-30 | End: 2018-11-30 | Stop reason: SDUPTHER

## 2018-07-30 NOTE — TELEPHONE ENCOUNTER
Last ov: 05/16/18  Next ov: n/a    meds pended    Requested Prescriptions     Pending Prescriptions Disp Refills    pantoprazole (PROTONIX) 40 MG tablet [Pharmacy Med Name: PANTOPRAZOLE SOD DR 40 MG TAB] 30 tablet 3     Sig: TAKE 1 TABLET BY MOUTH DAILY.  hydrALAZINE (APRESOLINE) 50 MG tablet [Pharmacy Med Name: HYDRALAZINE 50 MG TABLET] 90 tablet 3     Sig: TAKE 1 TABLET BY MOUTH 3 TIMES DAILY.

## 2018-07-31 ENCOUNTER — HOSPITAL ENCOUNTER (OUTPATIENT)
Dept: PHYSICAL THERAPY | Age: 83
Setting detail: THERAPIES SERIES
Discharge: HOME | End: 2018-07-31
Payer: MEDICARE

## 2018-07-31 PROCEDURE — 97110 THERAPEUTIC EXERCISES: CPT | Performed by: PHYSICAL THERAPIST

## 2018-07-31 PROCEDURE — 97116 GAIT TRAINING THERAPY: CPT | Performed by: PHYSICAL THERAPIST

## 2018-07-31 NOTE — FLOWSHEET NOTE
Physical Therapy Daily Treatment Note  Date:  2018      Patient Name:  Sammi Chen    :  1935  MRN: [id number]  Restrictions/Precautions:    Medical/Treatment Diagnosis Information:  · Diagnosis: Z91.81 (ICD-10-CM) - At high risk for falls; G35 (ICD-10-CM) - Multiple sclerosis, primary chronic progressive ([facility] [address]  · Treatment Diagnosis: Decreased Balance  Insurance/Certification information:  PT Insurance Information: A[employer]na Medicare  Physician Information:  Referring Practitioner: Awais Cano MD Follow-up:   Plan of care signed (Y/N):    Visit# / total visits:  per POC through 18  Pain level: no rating given, patient reports having some hand soreness     G-Code noted on 2018:   PT G-Codes  Functional Assessment Tool Used: Activities Specific Balance Confidence (ABC) Scale  Score: 6.8% average confidence = 93.2% mobility impairment  Functional Limitation: Mobility: Walking and moving around  Mobility: Walking and Moving Around Current Status (): At least 80 percent but less than 100 percent impaired, limited or restricted  Mobility: Walking and Moving Around Goal Status (): At least 40 percent but less than 60 percent impaired, limited or restricted    Progress Note: [x]  Yes  []  No  Next due by: Visit #10      Subjective:  Pt reports that she is achy, and blames the rainy weather. Objective:  Observation: Pt able to tolerate increased time on NuStep, and demonstrates increased endurance during sit-to-stand with more repetitions, and able to softly land on her chair as she sits. Pt ambulates with increased foot clearance during swing phase. Test measurements:     Exercises:  Exercise/Equipment Resistance/Repetitions Other comments   NuStep 8 min Level 2 resistance, PT verbal cues to maintain >70 SPM   Sit to stand 8 x  UE support on arm rests of chair, PT instruction for \"nose over toes\" and foot positioning.    L hip flex - seated  L hip flex - standing -  15 x     L Knee ext - seated 15 x     Ankle DF - seated  Ankle PF - seated 20 x bilat  20 x bilat L DF weakness, PT verbal cues for L ankle eccentric focus   Minisquats 5 x   Holding onto parallel bars        Gait Training 2 x 50' with standard walker          2 laps in // bars walking backwards    2 laps lateral walking each direction PT verbal cues for posture, and for foot clearance, with education to listen for feet shuffling on floor. PT cues for step length and foot clearance    PT instruction for neutral hip and toes forward. Other Therapeutic Activities:  HEP planning, patient and family education on transferring, posture, role of PT, plan of care    Home Exercise Program:  See above    Manual Treatments:  n/a    Modalities:  n/a    Timed Code Treatment Minutes:  TE x 26 min,  GT x 13 min    Total Treatment Minutes:  39 min    Treatment/Activity Tolerance:  [] Patient tolerated treatment well [x] Patient limited by fatigue  [] Patient limited by pain  [] Patient limited by other medical complications  [] Other:     Assessment: Pt demonstrates improved strength and endurance with all exercises and gait activities. Pt will continue to benefit from skilled PT to work towards goals. Prognosis: [] Good [x] Fair  [] Poor    Patient Requires Follow-up: [x] Yes  [] No    Goals:  Short term goals  Time Frame for Short term goals: within 3 weeks, patient will:  Short term goal 1: Improve TUG score from 107 seconds to 60 seconds or less to improve mobility to restroom. Short term goal 2: Improve 30 second chair stand test from 5 repetitions to 8 repetitions or more to improve safety and strength with transfers. Short term goal 3: Improve 2 min step test from 30 repetitions to 45 repetitions or more to improve endurance with walking.   Long term goals  Time Frame for Long term goals : within 6 weeks, patient will:  Long term goal 1: Improve TUG score from 107 seconds to 20 seconds or less to improve mobility around home. Long term goal 2: Improve 30 second chair stand test from 5 repetitions to 10 repetitions or more to improve safety and strength with transfers. Long term goal 3: Improve 2 min step test from 30 repetitions to 60 repetitions or more to improve endurance with walking. Long term goal 4: Improve ABC scale score from 93% impairment to 50% impairment or less to improve confidence with mobility. Long term goal 5: Climb 5 steps to enter front entrance of patient's home.     Plan:   [x] Continue per plan of care [] Alter current plan (see comments)  [] Plan of care initiated [] Hold pending MD visit [] Discharge    Plan for Next Session:  endurance and balance exercises    Electronically signed by: , PT [id number]       Susan Bhat

## 2018-08-01 DIAGNOSIS — Z12.31 ENCOUNTER FOR SCREENING MAMMOGRAM FOR BREAST CANCER: Primary | ICD-10-CM

## 2018-08-02 ENCOUNTER — HOSPITAL ENCOUNTER (OUTPATIENT)
Dept: PHYSICAL THERAPY | Age: 83
Setting detail: THERAPIES SERIES
Discharge: HOME | End: 2018-08-02
Payer: MEDICARE

## 2018-08-02 PROCEDURE — 97116 GAIT TRAINING THERAPY: CPT | Performed by: PHYSICAL THERAPIST

## 2018-08-02 PROCEDURE — 97110 THERAPEUTIC EXERCISES: CPT | Performed by: PHYSICAL THERAPIST

## 2018-08-02 NOTE — FLOWSHEET NOTE
hip flex - standing -  20 x     L Knee ext - seated 20 x     Ankle DF - seated  Ankle PF - seated 20 x bilat  20 x bilat L DF weakness, PT verbal cues for L ankle eccentric focus             Gait Training 2 x 50' with standard walker          2 laps in // bars walking backwards    2 laps lateral walking each direction PT verbal cues for posture, and for foot clearance, with education to listen for feet shuffling on floor. PT cues for step length and foot clearance    PT instruction for neutral hip and toes forward. Other Therapeutic Activities:  HEP planning, patient and family education on transferring, posture, role of PT, plan of care    Home Exercise Program:  See above    Manual Treatments:  n/a    Modalities:  n/a    Timed Code Treatment Minutes:  TE x 28 min,  GT x 12 min    Total Treatment Minutes:  40 min    Treatment/Activity Tolerance:  [x] Patient tolerated treatment well [] Patient limited by fatigue  [] Patient limited by pain  [] Patient limited by other medical complications  [] Other:     Assessment: Pt demonstrates improved strength and endurance with all exercises and gait activities. Pt will continue to benefit from skilled PT to work towards goals. Prognosis: [] Good [x] Fair  [] Poor    Patient Requires Follow-up: [x] Yes  [] No    Goals:  Short term goals  Time Frame for Short term goals: within 3 weeks, patient will:  Short term goal 1: Improve TUG score from 107 seconds to 60 seconds or less to improve mobility to restroom. Short term goal 2: Improve 30 second chair stand test from 5 repetitions to 8 repetitions or more to improve safety and strength with transfers. Short term goal 3: Improve 2 min step test from 30 repetitions to 45 repetitions or more to improve endurance with walking.   Long term goals  Time Frame for Long term goals : within 6 weeks, patient will:  Long term goal 1: Improve TUG score from 107 seconds to 20 seconds or less to improve mobility around home. Long term goal 2: Improve 30 second chair stand test from 5 repetitions to 10 repetitions or more to improve safety and strength with transfers. Long term goal 3: Improve 2 min step test from 30 repetitions to 60 repetitions or more to improve endurance with walking. Long term goal 4: Improve ABC scale score from 93% impairment to 50% impairment or less to improve confidence with mobility. Long term goal 5: Climb 5 steps to enter front entrance of patient's home.     Plan:   [x] Continue per plan of care [] Alter current plan (see comments)  [] Plan of care initiated [] Hold pending MD visit [] Discharge    Plan for Next Session:  endurance and balance exercises, reassess functional tests next visit  Electronically signed by: , PT [id number]       Lilli Campbell

## 2018-08-07 ENCOUNTER — HOSPITAL ENCOUNTER (OUTPATIENT)
Dept: PHYSICAL THERAPY | Age: 83
Setting detail: THERAPIES SERIES
Discharge: HOME | End: 2018-08-07
Payer: MEDICARE

## 2018-08-07 PROCEDURE — 97116 GAIT TRAINING THERAPY: CPT | Performed by: PHYSICAL THERAPIST

## 2018-08-07 PROCEDURE — 97110 THERAPEUTIC EXERCISES: CPT | Performed by: PHYSICAL THERAPIST

## 2018-08-07 NOTE — FLOWSHEET NOTE
for \"nose over toes\" and foot positioning. L hip flex - seated  L hip flex - standing -  20 x     L Knee ext - seated 20 x     Ankle DF - seated  Ankle PF - seated 20 x bilat  20 x bilat L DF weakness, PT verbal cues for L ankle eccentric focus             Gait Training 2 x 50' with standard walker          2 laps in // bars walking backwards    2 laps lateral walking each direction PT verbal cues for posture, and for foot clearance, with education to listen for feet shuffling on floor. PT cues for step length and foot clearance    PT instruction for neutral hip and toes forward. Other Therapeutic Activities:  HEP planning, patient and family education on transferring, posture, role of PT, plan of care    Home Exercise Program:  See above    Manual Treatments:  n/a    Modalities:  n/a    Timed Code Treatment Minutes:  TE x 30 min,  GT x 9 min    Total Treatment Minutes:  39 min    Treatment/Activity Tolerance:  [x] Patient tolerated treatment well [] Patient limited by fatigue  [] Patient limited by pain  [] Patient limited by other medical complications  [] Other:     Assessment: Pt demonstrates improved strength and endurance with all exercises and gait activities. Pt will continue to benefit from skilled PT to work towards goals. Prognosis: [] Good [x] Fair  [] Poor    Patient Requires Follow-up: [x] Yes  [] No    Goals:  Short term goals  Time Frame for Short term goals: within 3 weeks, patient will:  Short term goal 1: Improve TUG score from 107 seconds to 60 seconds or less to improve mobility to restroom. Short term goal 2: Improve 30 second chair stand test from 5 repetitions to 8 repetitions or more to improve safety and strength with transfers. Short term goal 3: Improve 2 min step test from 30 repetitions to 45 repetitions or more to improve endurance with walking.   Long term goals  Time Frame for Long term goals : within 6 weeks, patient will:  Long term

## 2018-08-09 ENCOUNTER — HOSPITAL ENCOUNTER (OUTPATIENT)
Dept: PHYSICAL THERAPY | Age: 83
Setting detail: THERAPIES SERIES
Discharge: HOME | End: 2018-08-09
Payer: MEDICARE

## 2018-08-09 PROCEDURE — 97110 THERAPEUTIC EXERCISES: CPT | Performed by: PHYSICAL THERAPIST

## 2018-08-09 PROCEDURE — 97116 GAIT TRAINING THERAPY: CPT | Performed by: PHYSICAL THERAPIST

## 2018-08-10 NOTE — FLOWSHEET NOTE
Physical Therapy Daily Treatment Note  Date:  2018      Patient Name:  Jeanette Rudd    :  1935  MRN: [id number]  Restrictions/Precautions:    Medical/Treatment Diagnosis Information:  · Diagnosis: Z91.81 (ICD-10-CM) - At high risk for falls; G35 (ICD-10-CM) - Multiple sclerosis, primary chronic progressive ([facility] [address]  · Treatment Diagnosis: Decreased Balance  Insurance/Certification information:  PT Insurance Information: A[employer]na Medicare  Physician Information:  Referring Practitioner: Cinthia Mckinnon MD Follow-up:   Plan of care signed (Y/N):    Visit# / total visits:  per POC through 18  Pain level: no rating given, patient reports having some hand soreness     G-Code noted on 2018:   PT G-Codes  Functional Assessment Tool Used: Activities Specific Balance Confidence (ABC) Scale  Score: 6.8% average confidence = 93.2% mobility impairment  Functional Limitation: Mobility: Walking and moving around  Mobility: Walking and Moving Around Current Status (): At least 80 percent but less than 100 percent impaired, limited or restricted  Mobility: Walking and Moving Around Goal Status (): At least 40 percent but less than 60 percent impaired, limited or restricted    Progress Note: [x]  Yes  []  No  Next due by: Visit #10      Subjective:  Pt c/o being tired today, thinks it is due to the weather being so hot. Objective:  Observation: Pt demonstrates active L DF WNL, and is able to tolerate increased LLE exercises fewer rests. Pt exhibits improved foot clearance during swing phase bilaterally and requires less frequent cues for posture. Test measurements:     Exercises:  Exercise/Equipment Resistance/Repetitions Other comments   NuStep 8 min Level 3 resistance, PT verbal cues to maintain >80 SPM   Sit to stand 11 x  UE support on arm rests of wheelchair, PT instruction for \"nose over toes\" and foot positioning.    L hip flex - seated  L hip flex - standing -  20 x     L Knee ext - seated 20 x     Ankle DF - seated  Ankle PF - seated 20 x bilat  20 x bilat L DF weakness, PT verbal cues for L ankle eccentric focus   ADD Squeeze 20 x bilat Using small purple ball between knees        Gait Training 1 x 50', 1 x 25' with standard walker        2 laps in // bars walking backwards    2 laps lateral walking each direction PT verbal cues for posture, and for foot clearance, with education to listen for feet shuffling on floor. PT cues for step length and foot clearance    PT instruction for neutral hip and toes forward. Other Therapeutic Activities:  n/a    Home Exercise Program:  See above    Manual Treatments:  n/a    Modalities:  n/a    Timed Code Treatment Minutes:  TE x 22 min,  GT x 10 min    Total Treatment Minutes:  32 min    Treatment/Activity Tolerance:  [x] Patient tolerated treatment well [] Patient limited by fatigue  [] Patient limited by pain  [] Patient limited by other medical complications  [] Other:     Assessment: Pt demonstrates improved strength and endurance with all exercises and gait activities. Pt will continue to benefit from skilled PT to work towards goals. Prognosis: [] Good [x] Fair  [] Poor    Patient Requires Follow-up: [x] Yes  [] No    Goals:  Short term goals  Time Frame for Short term goals: within 3 weeks, patient will:  Short term goal 1: Improve TUG score from 107 seconds to 60 seconds or less to improve mobility to restroom. Short term goal 2: Improve 30 second chair stand test from 5 repetitions to 8 repetitions or more to improve safety and strength with transfers. Short term goal 3: Improve 2 min step test from 30 repetitions to 45 repetitions or more to improve endurance with walking. Long term goals  Time Frame for Long term goals : within 6 weeks, patient will:  Long term goal 1: Improve TUG score from 107 seconds to 20 seconds or less to improve mobility around home.   Long term goal 2: Improve 30 second chair stand test from 5 repetitions to 10 repetitions or more to improve safety and strength with transfers. Long term goal 3: Improve 2 min step test from 30 repetitions to 60 repetitions or more to improve endurance with walking. Long term goal 4: Improve ABC scale score from 93% impairment to 50% impairment or less to improve confidence with mobility. Long term goal 5: Climb 5 steps to enter front entrance of patient's home. Plan:   [x] Continue per plan of care [] Alter current plan (see comments)  [] Plan of care initiated [] Hold pending MD visit [] Discharge    Plan for Next Session:  endurance and balance exercises, reassess goals and progress note next visit.   Electronically signed by: , PT [id number]       Melanie Jain

## 2018-08-14 ENCOUNTER — HOSPITAL ENCOUNTER (OUTPATIENT)
Dept: PHYSICAL THERAPY | Age: 83
Setting detail: THERAPIES SERIES
Discharge: HOME | End: 2018-08-14
Payer: MEDICARE

## 2018-08-14 PROCEDURE — 97110 THERAPEUTIC EXERCISES: CPT | Performed by: PHYSICAL THERAPIST

## 2018-08-14 PROCEDURE — 97530 THERAPEUTIC ACTIVITIES: CPT | Performed by: PHYSICAL THERAPIST

## 2018-08-14 PROCEDURE — G8979 MOBILITY GOAL STATUS: HCPCS | Performed by: PHYSICAL THERAPIST

## 2018-08-14 PROCEDURE — G8978 MOBILITY CURRENT STATUS: HCPCS | Performed by: PHYSICAL THERAPIST

## 2018-08-14 NOTE — FLOWSHEET NOTE
strength with transfers. MET  Short term goal 3: Improve 2 min step test from 30 repetitions to 45 repetitions or more to improve endurance with walking. Long term goals Partially met    Time Frame for Long term goals : within 6 weeks, patient will:  Long term goal 1: Improve TUG score from 107 seconds to 20 seconds or less to improve mobility around home. Not met  Long term goal 2: Improve 30 second chair stand test from 5 repetitions to 10 repetitions or more to improve safety and strength with transfers. MET  Long term goal 3: Improve 2 min step test from 30 repetitions to 60 repetitions or more to improve endurance with walking. Not met  Long term goal 4: Improve ABC scale score from 93% impairment to 50% impairment or less to improve confidence with mobility. Partially met  Long term goal 5: Climb 5 steps to enter front entrance of patient's home.  Not met    Plan:   [x] Continue per plan of care [] Alter current plan (see comments)  [] Plan of care initiated [] Hold pending MD visit [] Discharge    Plan for Next Session:  Increase standing exercises as tolerated  Electronically signed by: , PT [id number]       Puja Dean

## 2018-08-16 ENCOUNTER — HOSPITAL ENCOUNTER (OUTPATIENT)
Dept: PHYSICAL THERAPY | Age: 83
Setting detail: THERAPIES SERIES
Discharge: HOME | End: 2018-08-16
Payer: MEDICARE

## 2018-08-16 PROCEDURE — 97110 THERAPEUTIC EXERCISES: CPT | Performed by: PHYSICAL THERAPIST

## 2018-08-16 NOTE — FLOWSHEET NOTE
Physical Therapy Daily Treatment Note  Date:  2018      Patient Name:  Mirna Velasco    :  1935  MRN: [id number]  Restrictions/Precautions:    Medical/Treatment Diagnosis Information:  · Diagnosis: Z91.81 (ICD-10-CM) - At high risk for falls; G35 (ICD-10-CM) - Multiple sclerosis, primary chronic progressive ([facility] [address]  · Treatment Diagnosis: Decreased Balance  Insurance/Certification information:  PT Insurance Information: A[employer]na Medicare  Physician Information:  Referring Practitioner: Vince Freeman MD Follow-up:   Plan of care signed (Y/N):  Yes  Visit# / total visits:  per updated POC through 10/4/18  Pain level: no rating given     G-Code noted on 2018:   PT G-Codes  Functional Assessment Tool Used: Activities Specific Balance Confidence (ABC) Scale  Score: 6.8% average confidence = 93.2% mobility impairment  Functional Limitation: Mobility: Walking and moving around  Mobility: Walking and Moving Around Current Status (): At least 80 percent but less than 100 percent impaired, limited or restricted  Mobility: Walking and Moving Around Goal Status (): At least 40 percent but less than 60 percent impaired, limited or restricted    G-Code noted on 2018:   PT G-Codes  Functional Assessment Tool Used: Activities-Specific Balance Confidence Scale  Score: 20.6% average confidence; 79.4% impairement  Functional Limitation: Mobility: Walking and moving around  Mobility: Walking and Moving Around Current Status (): At least 60 percent but less than 80 percent impaired, limited or restricted  Mobility: Walking and Moving Around Goal Status (): At least 40 percent but less than 60 percent impaired, limited or restricted     Progress Note: [x]  Yes  []  No  Next due by: Visit #10      Subjective:  Pt reports that she is feeling okay today. Objective:  Observation: Pt able to tolerate increased speed and resistance on NuStep with only one rest break.    Test measurements:     Exercises:  Exercise/Equipment Resistance/Repetitions Other comments   NuStep 8 min Level 4 resistance, PT verbal cues to maintain >85 SPM   Sit to stand 12 x  UE support on arm rests of wheelchair, PT instruction for \"nose over toes\" and foot positioning. L hip flex - seated  Bilat hip flex - standing -  20 x each   In parallel bars, PT cues for hip height   L Knee ext - seated 20 x  PT verbak cues for slow eccentric   Ankle DF - seated  Ankle PF - seated 20 x bilat  20 x bilat L DF weakness, PT verbal cues for L ankle eccentric focus   ADD Squeeze 20 x bilat Using small purple ball between knees        Gait Training 1 x 50', 1 x 25' with standard walker     PT verbal cues for posture, and for foot clearance, with education to listen for feet shuffling on floor. Standing Hip Abd 15 x bilat Pt standing in parallel bars, with PT giving cues for hip position and posture. Other Therapeutic Activities:  n/a    Home Exercise Program:  See above    Manual Treatments:  n/a    Modalities:  n/a    Timed Code Treatment Minutes:  TE x 24 min,  GT x 5 min    Total Treatment Minutes:  29 min    Treatment/Activity Tolerance:  [x] Patient tolerated treatment well [] Patient limited by fatigue  [] Patient limited by pain  [] Patient limited by other medical complications  [] Other:     Assessment: Pt demonstrates improved strength and endurance with all exercises and gait activities. Pt will continue to benefit from skilled PT to work towards goals. Prognosis: [] Good [x] Fair  [] Poor    Patient Requires Follow-up: [x] Yes  [] No    Goals:  Short term goals  Time Frame for Short term goals: within 3 weeks, patient will:  Short term goal 1: Improve TUG score from 107 seconds to 60 seconds or less to improve mobility to restroom.  Partially met  Short term goal 2: Improve 30 second chair stand test from 5 repetitions to 8 repetitions or more to improve safety and strength with

## 2018-08-17 DIAGNOSIS — Z86.711 HISTORY OF PULMONARY EMBOLISM: ICD-10-CM

## 2018-08-17 RX ORDER — SITAGLIPTIN 50 MG/1
TABLET, FILM COATED ORAL
Qty: 30 TABLET | Refills: 7 | Status: SHIPPED | OUTPATIENT
Start: 2018-08-17 | End: 2019-03-17 | Stop reason: SDUPTHER

## 2018-08-17 RX ORDER — APIXABAN 5 MG/1
5 TABLET, FILM COATED ORAL 2 TIMES DAILY
Qty: 60 TABLET | Refills: 2 | Status: SHIPPED | OUTPATIENT
Start: 2018-08-17 | End: 2018-09-18 | Stop reason: SDUPTHER

## 2018-08-17 NOTE — PROGRESS NOTES
re-certify for additional visits:      Requested frequency/duration: 2 X/week for 6 additional weeks from initial POC (through 10/4/18)  Electronically signed by: , PT [id number]       Julissa Ballard PT    If you have any questions or concerns, please don't hesitate to call.   Thank you for your referral.    Physician Signature:________________________________Date:__________________  By signing above, therapists plan is approved by physician

## 2018-08-21 ENCOUNTER — HOSPITAL ENCOUNTER (OUTPATIENT)
Dept: PHYSICAL THERAPY | Age: 83
Setting detail: THERAPIES SERIES
Discharge: HOME | End: 2018-08-21
Payer: MEDICARE

## 2018-08-21 PROCEDURE — 97110 THERAPEUTIC EXERCISES: CPT | Performed by: PHYSICAL THERAPIST

## 2018-08-21 PROCEDURE — 97116 GAIT TRAINING THERAPY: CPT | Performed by: PHYSICAL THERAPIST

## 2018-08-21 NOTE — FLOWSHEET NOTE
measurements:     Exercises:  Exercise/Equipment Resistance/Repetitions Other comments   NuStep 8 min Level 4 resistance, PT verbal cues to maintain >85 SPM   Sit to stand 12 x  UE support on arm rests of wheelchair, PT instruction for \"nose over toes\" and foot positioning. Bilat hip flex - standing 20 x each, tapping toes up onto 6\" step. In parallel bars, PT cues for hip height, pt unable to lift L foot up to top of 6\" step   L Knee ext - seated 20 x  PT verbak cues for slow eccentric   Ankle DF - seated  Ankle PF - seated 20 x bilat  20 x bilat L DF weakness, PT verbal cues for L ankle eccentric focus   ADD Squeeze 20 x bilat Using small purple ball between knees        Gait Training 3 x 25' with standard walker     PT verbal cues for  Step length. Pt improving with foot clearance without PT cues   Standing Hip Abd 15 x bilat Pt standing in parallel bars, with PT giving cues for hip position and posture. Other Therapeutic Activities:  n/a    Home Exercise Program:  See above    Manual Treatments:  n/a    Modalities:  n/a    Timed Code Treatment Minutes:  TE x 22 min,  GT x 10 min    Total Treatment Minutes:  32 min    Treatment/Activity Tolerance:  [x] Patient tolerated treatment well [] Patient limited by fatigue  [] Patient limited by pain  [] Patient limited by other medical complications  [] Other:     Assessment: Pt demonstrates improved strength and endurance with all exercises and gait activities. Pt will continue to benefit from skilled PT to work towards goals. Prognosis: [] Good [x] Fair  [] Poor    Patient Requires Follow-up: [x] Yes  [] No    Goals:  Short term goals  Time Frame for Short term goals: within 3 weeks, patient will:  Short term goal 1: Improve TUG score from 107 seconds to 60 seconds or less to improve mobility to restroom.  Partially met  Short term goal 2: Improve 30 second chair stand test from 5 repetitions to 8 repetitions or more to improve safety and strength with transfers. MET  Short term goal 3: Improve 2 min step test from 30 repetitions to 45 repetitions or more to improve endurance with walking. Long term goals Partially met     Time Frame for Long term goals : within 6 weeks, patient will:  Long term goal 1: Improve TUG score from 107 seconds to 20 seconds or less to improve mobility around home. Not met  Long term goal 2: Improve 30 second chair stand test from 5 repetitions to 10 repetitions or more to improve safety and strength with transfers. MET  Long term goal 3: Improve 2 min step test from 30 repetitions to 60 repetitions or more to improve endurance with walking. Not met  Long term goal 4: Improve ABC scale score from 93% impairment to 50% impairment or less to improve confidence with mobility. Partially met  Long term goal 5: Climb 5 steps to enter front entrance of patient's home. Not met    Plan:   [x] Continue per plan of care [] Alter current plan (see comments)  [] Plan of care initiated [] Hold pending MD visit [] Discharge    Plan for Next Session:  Continue to improve endurance as tolerated, standing balance.   Electronically signed by: , PT [id number]       Otilia Aguirre

## 2018-08-22 ENCOUNTER — OFFICE VISIT (OUTPATIENT)
Dept: PRIMARY CARE CLINIC | Age: 83
End: 2018-08-22

## 2018-08-22 VITALS
WEIGHT: 180 LBS | DIASTOLIC BLOOD PRESSURE: 67 MMHG | SYSTOLIC BLOOD PRESSURE: 133 MMHG | BODY MASS INDEX: 31.89 KG/M2 | HEART RATE: 66 BPM | OXYGEN SATURATION: 97 % | TEMPERATURE: 97.3 F | RESPIRATION RATE: 18 BRPM

## 2018-08-22 DIAGNOSIS — R30.0 DYSURIA: ICD-10-CM

## 2018-08-22 DIAGNOSIS — E11.8 TYPE 2 DIABETES MELLITUS WITH COMPLICATION, WITH LONG-TERM CURRENT USE OF INSULIN (HCC): Primary | ICD-10-CM

## 2018-08-22 DIAGNOSIS — R10.33 PERIUMBILICAL ABDOMINAL PAIN: ICD-10-CM

## 2018-08-22 DIAGNOSIS — E03.4 HYPOTHYROIDISM DUE TO ACQUIRED ATROPHY OF THYROID: ICD-10-CM

## 2018-08-22 DIAGNOSIS — Z79.4 TYPE 2 DIABETES MELLITUS WITH COMPLICATION, WITH LONG-TERM CURRENT USE OF INSULIN (HCC): ICD-10-CM

## 2018-08-22 DIAGNOSIS — Z79.4 TYPE 2 DIABETES MELLITUS WITH COMPLICATION, WITH LONG-TERM CURRENT USE OF INSULIN (HCC): Primary | ICD-10-CM

## 2018-08-22 DIAGNOSIS — R35.0 URINARY FREQUENCY: ICD-10-CM

## 2018-08-22 DIAGNOSIS — E11.8 TYPE 2 DIABETES MELLITUS WITH COMPLICATION, WITH LONG-TERM CURRENT USE OF INSULIN (HCC): ICD-10-CM

## 2018-08-22 DIAGNOSIS — E79.0 HYPERURICEMIA: ICD-10-CM

## 2018-08-22 LAB
A/G RATIO: 1.4 (ref 1.1–2.2)
ALBUMIN SERPL-MCNC: 4.1 G/DL (ref 3.4–5)
ALP BLD-CCNC: 64 U/L (ref 40–129)
ALT SERPL-CCNC: 19 U/L (ref 10–40)
ANION GAP SERPL CALCULATED.3IONS-SCNC: 15 MMOL/L (ref 3–16)
AST SERPL-CCNC: 18 U/L (ref 15–37)
BASOPHILS ABSOLUTE: 0 K/UL (ref 0–0.2)
BASOPHILS RELATIVE PERCENT: 0.4 %
BILIRUB SERPL-MCNC: 0.3 MG/DL (ref 0–1)
BILIRUBIN URINE: NEGATIVE
BLOOD, URINE: NEGATIVE
BUN BLDV-MCNC: 44 MG/DL (ref 7–20)
CALCIUM SERPL-MCNC: 11.2 MG/DL (ref 8.3–10.6)
CHLORIDE BLD-SCNC: 103 MMOL/L (ref 99–110)
CLARITY: CLEAR
CO2: 21 MMOL/L (ref 21–32)
COLOR: YELLOW
CREAT SERPL-MCNC: 0.8 MG/DL (ref 0.6–1.2)
CREATININE URINE: 24.3 MG/DL (ref 28–259)
EOSINOPHILS ABSOLUTE: 0.1 K/UL (ref 0–0.6)
EOSINOPHILS RELATIVE PERCENT: 0.6 %
EPITHELIAL CELLS, UA: 1 /HPF (ref 0–5)
GFR AFRICAN AMERICAN: >60
GFR NON-AFRICAN AMERICAN: >60
GLOBULIN: 3 G/DL
GLUCOSE BLD-MCNC: 129 MG/DL (ref 70–99)
GLUCOSE URINE: NEGATIVE MG/DL
HCT VFR BLD CALC: 37.5 % (ref 36–48)
HEMOGLOBIN: 12.3 G/DL (ref 12–16)
HYALINE CASTS: 1 /LPF (ref 0–8)
KETONES, URINE: NEGATIVE MG/DL
LEUKOCYTE ESTERASE, URINE: ABNORMAL
LYMPHOCYTES ABSOLUTE: 1.5 K/UL (ref 1–5.1)
LYMPHOCYTES RELATIVE PERCENT: 14.9 %
MCH RBC QN AUTO: 30.5 PG (ref 26–34)
MCHC RBC AUTO-ENTMCNC: 32.7 G/DL (ref 31–36)
MCV RBC AUTO: 93.3 FL (ref 80–100)
MICROALBUMIN UR-MCNC: 3.8 MG/DL
MICROALBUMIN/CREAT UR-RTO: 156.4 MG/G (ref 0–30)
MICROSCOPIC EXAMINATION: YES
MONOCYTES ABSOLUTE: 0.7 K/UL (ref 0–1.3)
MONOCYTES RELATIVE PERCENT: 6.5 %
NEUTROPHILS ABSOLUTE: 7.9 K/UL (ref 1.7–7.7)
NEUTROPHILS RELATIVE PERCENT: 77.6 %
NITRITE, URINE: NEGATIVE
PDW BLD-RTO: 17.8 % (ref 12.4–15.4)
PH UA: 6
PLATELET # BLD: 212 K/UL (ref 135–450)
PMV BLD AUTO: 10.4 FL (ref 5–10.5)
POTASSIUM SERPL-SCNC: 4.6 MMOL/L (ref 3.5–5.1)
PROTEIN UA: NEGATIVE MG/DL
RBC # BLD: 4.02 M/UL (ref 4–5.2)
RBC UA: 1 /HPF (ref 0–4)
SODIUM BLD-SCNC: 139 MMOL/L (ref 136–145)
SPECIFIC GRAVITY UA: 1.01
TOTAL PROTEIN: 7.1 G/DL (ref 6.4–8.2)
TSH REFLEX FT4: 1.52 UIU/ML (ref 0.27–4.2)
URINE TYPE: ABNORMAL
UROBILINOGEN, URINE: 0.2 E.U./DL
WBC # BLD: 10.2 K/UL (ref 4–11)
WBC UA: 2 /HPF (ref 0–5)

## 2018-08-22 PROCEDURE — 99214 OFFICE O/P EST MOD 30 MIN: CPT | Performed by: INTERNAL MEDICINE

## 2018-08-22 RX ORDER — AZELASTINE 1 MG/ML
1 SPRAY, METERED NASAL 2 TIMES DAILY
Qty: 1 BOTTLE | Refills: 11 | Status: SHIPPED | OUTPATIENT
Start: 2018-08-22 | End: 2018-11-13 | Stop reason: SDUPTHER

## 2018-08-22 RX ORDER — LEVOTHYROXINE SODIUM 0.03 MG/1
TABLET ORAL
Qty: 30 TABLET | Refills: 11 | Status: SHIPPED | OUTPATIENT
Start: 2018-08-22 | End: 2019-08-07 | Stop reason: SDUPTHER

## 2018-08-22 RX ORDER — BUDESONIDE AND FORMOTEROL FUMARATE DIHYDRATE 160; 4.5 UG/1; UG/1
AEROSOL RESPIRATORY (INHALATION)
Qty: 10.2 INHALER | Refills: 11 | Status: SHIPPED | OUTPATIENT
Start: 2018-08-22 | End: 2019-08-07 | Stop reason: SDUPTHER

## 2018-08-22 RX ORDER — ATORVASTATIN CALCIUM 40 MG/1
TABLET, FILM COATED ORAL
Qty: 90 TABLET | Refills: 3 | Status: SHIPPED | OUTPATIENT
Start: 2018-08-22 | End: 2019-08-07 | Stop reason: SDUPTHER

## 2018-08-22 RX ORDER — ALLOPURINOL 300 MG/1
TABLET ORAL
Qty: 90 TABLET | Refills: 3 | Status: SHIPPED | OUTPATIENT
Start: 2018-08-22 | End: 2019-08-07 | Stop reason: SDUPTHER

## 2018-08-22 ASSESSMENT — PATIENT HEALTH QUESTIONNAIRE - PHQ9
SUM OF ALL RESPONSES TO PHQ QUESTIONS 1-9: 0
SUM OF ALL RESPONSES TO PHQ QUESTIONS 1-9: 0
2. FEELING DOWN, DEPRESSED OR HOPELESS: 0
SUM OF ALL RESPONSES TO PHQ9 QUESTIONS 1 & 2: 0
1. LITTLE INTEREST OR PLEASURE IN DOING THINGS: 0

## 2018-08-22 ASSESSMENT — ENCOUNTER SYMPTOMS
DIARRHEA: 0
RESPIRATORY NEGATIVE: 1
RHINORRHEA: 0
VOMITING: 0
TROUBLE SWALLOWING: 0
PHOTOPHOBIA: 0
BLOOD IN STOOL: 0
NAUSEA: 0
SINUS PRESSURE: 0
ABDOMINAL PAIN: 0
CONSTIPATION: 0

## 2018-08-22 NOTE — PROGRESS NOTES
2018     Diana Fowler (:  1935) is a 82 y.o. female, here for evaluation of the following medical concerns:    Diabetes   She presents for her follow-up diabetic visit. She has type 2 diabetes mellitus. No MedicAlert identification noted. Her disease course has been stable. There are no hypoglycemic associated symptoms. Pertinent negatives for hypoglycemia include no confusion, dizziness, headaches, nervousness/anxiousness, pallor, seizures or speech difficulty. There are no diabetic associated symptoms. Pertinent negatives for diabetes include no chest pain, no fatigue, no polydipsia, no polyphagia and no weakness. There are no hypoglycemic complications. Symptoms are stable. There are no diabetic complications. Pertinent negatives for diabetic complications include no CVA, PVD or retinopathy. Her weight is stable. She is following a diabetic diet. When asked about meal planning, she reported none. She has not had a previous visit with a dietitian. She participates in exercise daily. An ACE inhibitor/angiotensin II receptor blocker is being taken. Abdominal Pain   This is a new problem. The current episode started in the past 7 days. The onset quality is sudden. The problem occurs intermittently. The problem has been waxing and waning. The pain is located in the periumbilical region. The pain is at a severity of 5/10. The pain is moderate. The quality of the pain is aching. The abdominal pain does not radiate. Associated symptoms include flatus and frequency. Pertinent negatives include no anorexia, belching, constipation, diarrhea, dysuria, fever, headaches, hematuria, melena, myalgias, nausea or vomiting. Nothing aggravates the pain. She has tried nothing for the symptoms. The treatment provided no relief. There is no history of Crohn's disease, pancreatitis or PUD. cholecytectomy   Urinary Frequency    This is a recurrent problem. The current episode started in the past 7 days.  The problem vaginal pain. Scared to take ditropan after reading side effects   Musculoskeletal: Positive for gait problem, joint swelling and neck pain. Negative for myalgias. Ambulating with walker. Multiple sclerosis symptoms have been stable. No progression in years. Skin: Negative for pallor and rash. Allergic/Immunologic: Negative for environmental allergies, food allergies and immunocompromised state. Neurological: Negative for dizziness, seizures, syncope, speech difficulty, weakness, light-headedness, numbness and headaches. Hematological: Negative. Psychiatric/Behavioral: Negative for agitation, confusion, decreased concentration and sleep disturbance. The patient is not nervous/anxious. Sleep disturbance related to frequent urination       Prior to Visit Medications    Medication Sig Taking? Authorizing Provider   ELIQUIS 5 MG TABS tablet TAKE 1 TABLET BY MOUTH 2 TIMES DAILY STOP COUMADIN Yes Lisy Perez MD   JANUVIA 50 MG tablet TAKE 1 TABLET BY MOUTH DAILY. Yes Lisy Perez MD   verapamil (CALAN SR) 240 MG extended release tablet TAKE 1 TABLET BY MOUTH NIGHTLY Yes Lisy Perez MD   pantoprazole (PROTONIX) 40 MG tablet TAKE 1 TABLET BY MOUTH DAILY. Yes Lisy Perez MD   hydrALAZINE (APRESOLINE) 50 MG tablet TAKE 1 TABLET BY MOUTH 3 TIMES DAILY. Yes Lisy Perez MD   ketoconazole (NIZORAL) 2 % cream Apply topically daily. Yes Lisy Perez MD   [facility] 22N MISC USE AS DIRECTED FOUR TIMES A DAY Yes Karolina Alston MD   blood glucose test strips (ONE TOUCH ULTRA TEST) strip 1 each by In Vitro route 4 times daily (before meals and nightly) E11.8 is ICD 10 code Yes Karolina Alston MD   insulin glargine (LANTUS SOLOSTAR) 100 UNIT/ML injection pen Inject 14 Units into the skin nightly Yes Vin Keller MD   atenolol (TENORMIN) 50 MG tablet TAKE 1 TABLET BY MOUTH DAILY.  Yes Lisy Perez MD montelukast (SINGULAIR) 10 MG tablet TAKE 1 TABLET BY MOUTH AT BEDTIME Yes Kathy Calloway MD   Blood Glucose Monitoring Suppl (ONE TOUCH ULTRA 2) w/Device KIT 1 kit by Does not apply route 4 times daily (with meals and nightly) May substitute with covered brand. Ell.8 is ICD 10 code Yes Kathy Calloway MD   allopurinol (ZYLOPRIM) 300 MG tablet TAKE 1 TABLET BY MOUTH DAILY. Yes Kathy Calloway MD   divalproex (DEPAKOTE) 125 MG DR tablet TAKE 1 TABLET BY MOUTH 3 TIMES DAILY Yes Kathy Calloway MD   ammonium lactate (LAC-HYDRIN) 12 % lotion APPLY TOPICALLY DAILY. Yes Kathy Calloway MD   budesonide-formoterol (SYMBICORT) 160-4.5 MCG/ACT AERO INHALE 2 PUFFS INTO THE LUNGS 2 TIMES DAILY. Yes Kathy Calloway MD   atorvastatin (LIPITOR) 40 MG tablet TAKE 1 TABLET BY MOUTH DAILY. Yes Kathy Calloway MD   azelastine (ASTELIN) 0.1 % nasal spray 1 spray by Nasal route 2 times daily Use in each nostril as directed Yes Kathy Calloway MD   Insulin Pen Needle (B-D UF III MINI PEN NEEDLES) 31G X 5 MM MISC USE AS DIRECTED NIGHTLY WITH LANTUS PEN Yes Kathy Calloway MD   Blood Glucose Monitoring Suppl ([address]) w/Device KIT 1 kit by Does not apply route daily Yes Kathy Calloway MD   Elastic Bandages & Supports ([address]) [address] 2 each by Does not apply route daily 40 mm hg thigh high Yes Kathy Calloway MD   ketoconazole (NIZORAL) 2 % cream APPLY TOPICALLY DAILY. Yes Kathy Calloway MD   levothyroxine (SYNTHROID) 25 MCG tablet TAKE 1 TABLET BY MOUTH DAILY Yes Kathy Calloway MD   sodium bicarbonate 325 MG tablet TAKE 1 TABLET BY MOUTH 2 TIMES DAILY Yes Kathy Calloway MD   Spacer/Aero-Holding Chambers (AEROCHAMBER MV) MISC 1 each by Does not apply route 2 times daily Yes Kathy Calloway MD   Lift Chair MISC by Does not apply route Needs a lift chair due to mutiple sclerosis.   Needs assistance Temp: 97.3 °F (36.3 °C)   TempSrc: Oral   SpO2: 97%   Weight: 180 lb (81.6 kg)     Estimated body mass index is 31.89 kg/m² as calculated from the following:    Height as of 6/2/17: 5' 2.99\" (1.6 m). Weight as of this encounter: 180 lb (81.6 kg). Physical Exam   Constitutional: She is oriented to person, place, and time. She appears well-developed and well-nourished. HENT:   Head: Normocephalic and atraumatic. Right Ear: External ear normal.   Left Ear: External ear normal.   Nose: Nose normal.   Mouth/Throat: Oropharynx is clear and moist. No oropharyngeal exudate. Spasm in  Neck with limited range of motion   Eyes: Pupils are equal, round, and reactive to light. Conjunctivae and EOM are normal. Right eye exhibits no discharge. Left eye exhibits no discharge. Neck: Normal range of motion. Neck supple. No JVD present. No tracheal deviation present. No thyromegaly present. Cardiovascular: Normal rate, regular rhythm, normal heart sounds and intact distal pulses. BLE +2    Pulmonary/Chest: Effort normal. No respiratory distress. She has no wheezes. She has no rales. She exhibits no tenderness. Abdominal: Soft. Bowel sounds are normal. She exhibits no distension and no mass. There is no tenderness. There is no rebound and no guarding. Genitourinary: No vaginal discharge found. Musculoskeletal: She exhibits edema. Uses walker for ambulation    Increasing edema for the past week   Lymphadenopathy:     She has no cervical adenopathy. Neurological: She is alert and oriented to person, place, and time. Mild cog-wheeling and resting tremor. Skin: Skin is warm and dry. No erythema. Psychiatric: Judgment and thought content normal.       ASSESSMENT/PLAN:  1.  Type 2 diabetes mellitus with complication, with long-term current use of insulin ([facility])  Lab Results   Component Value Date    LABA1C 5.4 08/22/2018    LABA1C 5.1 05/16/2018    LABA1C 5.5 03/28/2018     Lab Results   Component Value Date    LABMICR 3.80 (H) 08/22/2018    LABMICR YES 08/22/2018    LDLCALC 70 03/28/2018    CREATININE 0.8 08/22/2018       - Hemoglobin A1C; Future  - Microalbumin / Creatinine Urine Ratio; Future  - Fructosamine; Future  - Comprehensive Metabolic Panel; Future    2. Hypothyroidism due to acquired atrophy of thyroid  Clinically euthyroid will monitor TSH.  - TSH WITH REFLEX TO FT4; Future    3. Urinary frequency  Evaluate for urinary infection  - Urinalysis; Future  - Urine Culture; Future  - CBC Auto Differential; Future    4. Periumbilical abdominal pain  Benign abdominal exam.  Will further evaluate if no urinary infection    5. Hyperuricemia controlled on allopurinol and no recent gout  Lab Results   Component Value Date    LABURIC 3.5 05/16/2018         6. Dysuria  Same as problem #3    Return in 3 months. Sandie Gallegos received counseling on the following healthy behaviors: medication adherence    Patient given educational materials on after visit summary with diagnosis and treatment plan. I have instructed Sandie Gallegos to complete a self tracking handout on Blood Pressures  and Weights and instructed them to bring it with them to her next appointment. Discussed use, benefit, and side effects of prescribed medications. Barriers to medication compliance addressed. All patient questions answered. Pt voiced understanding. An electronic signature was used to authenticate this note.     --Keyonna Avila MD on 8/22/2018 at 11:58 AM

## 2018-08-23 ENCOUNTER — HOSPITAL ENCOUNTER (OUTPATIENT)
Dept: PHYSICAL THERAPY | Age: 83
Setting detail: THERAPIES SERIES
Discharge: HOME | End: 2018-08-23
Payer: MEDICARE

## 2018-08-23 LAB
ESTIMATED AVERAGE GLUCOSE: 108.3 MG/DL
HBA1C MFR BLD: 5.4 %

## 2018-08-23 PROCEDURE — 97116 GAIT TRAINING THERAPY: CPT | Performed by: PHYSICAL THERAPIST

## 2018-08-23 PROCEDURE — 97110 THERAPEUTIC EXERCISES: CPT | Performed by: PHYSICAL THERAPIST

## 2018-08-23 ASSESSMENT — PATIENT HEALTH QUESTIONNAIRE - PHQ9
1. LITTLE INTEREST OR PLEASURE IN DOING THINGS: 0
2. FEELING DOWN, DEPRESSED OR HOPELESS: 0
SUM OF ALL RESPONSES TO PHQ QUESTIONS 1-9: 0
SUM OF ALL RESPONSES TO PHQ9 QUESTIONS 1 & 2: 0
SUM OF ALL RESPONSES TO PHQ QUESTIONS 1-9: 0

## 2018-08-23 ASSESSMENT — CROHNS DISEASE ACTIVITY INDEX (CDAI): CDAI SCORE: 0

## 2018-08-23 ASSESSMENT — ENCOUNTER SYMPTOMS
BELCHING: 0
FLATUS: 1

## 2018-08-23 NOTE — FLOWSHEET NOTE
measurements:     Exercises:  Exercise/Equipment Resistance/Repetitions Other comments   NuStep 9 min Level 4 resistance, PT verbal cues to maintain >85 SPM   Sit to stand 12 x  UE support on arm rests of wheelchair, PT instruction for \"nose over toes\" and foot positioning. Bilat hip flex - standing 20 x each, tapping toes up onto 6\" step. In parallel bars, PT cues for hip height, pt unable to lift L foot up to top of 6\" step   L Knee ext - seated 30 x  PT verbak cues for slow eccentric, fatigues on LLE   Ankle DF - seated  Ankle PF - seated 30 x bilat  30 x bilat L DF weakness, PT verbal cues for L ankle eccentric focus   ADD Squeeze 30 x bilat Using small purple ball between knees        Gait Training 3 x 25' with standard walker    Backward walking 3 x 6' in parallel bars     PT verbal cues for  Step length. Pt improving with foot clearance without PT cues   Standing Hip Abd 20 x bilat Pt standing in parallel bars, with PT giving cues for hip position and posture. Other Therapeutic Activities:  n/a    Home Exercise Program:  See above    Manual Treatments:  n/a    Modalities:  n/a    Timed Code Treatment Minutes:  TE x 22 min,  GT x 12 min    Total Treatment Minutes:  34 min    Treatment/Activity Tolerance:  [x] Patient tolerated treatment well [] Patient limited by fatigue  [] Patient limited by pain  [] Patient limited by other medical complications  [] Other:     Assessment: Pt demonstrates improved strength and endurance with all exercises and gait activities. Pt will continue to benefit from skilled PT to work towards goals. Prognosis: [] Good [x] Fair  [] Poor    Patient Requires Follow-up: [x] Yes  [] No    Goals:  Short term goals  Time Frame for Short term goals: within 3 weeks, patient will:  Short term goal 1: Improve TUG score from 107 seconds to 60 seconds or less to improve mobility to restroom.  Partially met  Short term goal 2: Improve 30 second chair stand test

## 2018-08-24 LAB
FRUCTOSAMINE: 283 UMOL/L (ref 170–285)
URINE CULTURE, ROUTINE: NORMAL

## 2018-08-28 ENCOUNTER — HOSPITAL ENCOUNTER (OUTPATIENT)
Dept: PHYSICAL THERAPY | Age: 83
Setting detail: THERAPIES SERIES
Discharge: HOME | End: 2018-08-28
Payer: MEDICARE

## 2018-08-28 PROCEDURE — 97116 GAIT TRAINING THERAPY: CPT | Performed by: PHYSICAL THERAPIST

## 2018-08-28 PROCEDURE — 97110 THERAPEUTIC EXERCISES: CPT | Performed by: PHYSICAL THERAPIST

## 2018-08-28 NOTE — FLOWSHEET NOTE
safety and strength with transfers. MET  Short term goal 3: Improve 2 min step test from 30 repetitions to 45 repetitions or more to improve endurance with walking. Long term goals Partially met     Time Frame for Long term goals : within 6 weeks, patient will:  Long term goal 1: Improve TUG score from 107 seconds to 20 seconds or less to improve mobility around home. Not met  Long term goal 2: Improve 30 second chair stand test from 5 repetitions to 10 repetitions or more to improve safety and strength with transfers. MET  Long term goal 3: Improve 2 min step test from 30 repetitions to 60 repetitions or more to improve endurance with walking. Not met  Long term goal 4: Improve ABC scale score from 93% impairment to 50% impairment or less to improve confidence with mobility. Partially met  Long term goal 5: Climb 5 steps to enter front entrance of patient's home. Not met    Plan:   [x] Continue per plan of care [] Alter current plan (see comments)  [] Plan of care initiated [] Hold pending MD visit [] Discharge    Plan for Next Session:  Continue to improve endurance as tolerated, standing balance.   Electronically signed by: , PT [id number]       Claudell Search

## 2018-08-30 ENCOUNTER — HOSPITAL ENCOUNTER (OUTPATIENT)
Dept: PHYSICAL THERAPY | Age: 83
Setting detail: THERAPIES SERIES
Discharge: HOME | End: 2018-08-30
Payer: MEDICARE

## 2018-08-30 PROCEDURE — 97116 GAIT TRAINING THERAPY: CPT | Performed by: PHYSICAL THERAPIST

## 2018-08-30 PROCEDURE — 97110 THERAPEUTIC EXERCISES: CPT | Performed by: PHYSICAL THERAPIST

## 2018-09-04 ENCOUNTER — HOSPITAL ENCOUNTER (OUTPATIENT)
Dept: PHYSICAL THERAPY | Age: 83
Setting detail: THERAPIES SERIES
Discharge: HOME | End: 2018-09-04
Payer: MEDICARE

## 2018-09-04 PROCEDURE — 97116 GAIT TRAINING THERAPY: CPT | Performed by: PHYSICAL THERAPIST

## 2018-09-04 PROCEDURE — 97110 THERAPEUTIC EXERCISES: CPT | Performed by: PHYSICAL THERAPIST

## 2018-09-04 NOTE — FLOWSHEET NOTE
No    Goals:  Short term goals  Time Frame for Short term goals: within 3 weeks, patient will:  Short term goal 1: Improve TUG score from 107 seconds to 60 seconds or less to improve mobility to restroom. Partially met  Short term goal 2: Improve 30 second chair stand test from 5 repetitions to 8 repetitions or more to improve safety and strength with transfers. MET  Short term goal 3: Improve 2 min step test from 30 repetitions to 45 repetitions or more to improve endurance with walking. Long term goals Partially met     Time Frame for Long term goals : within 6 weeks, patient will:  Long term goal 1: Improve TUG score from 107 seconds to 20 seconds or less to improve mobility around home. Not met  Long term goal 2: Improve 30 second chair stand test from 5 repetitions to 10 repetitions or more to improve safety and strength with transfers. MET  Long term goal 3: Improve 2 min step test from 30 repetitions to 60 repetitions or more to improve endurance with walking. Not met  Long term goal 4: Improve ABC scale score from 93% impairment to 50% impairment or less to improve confidence with mobility. Partially met  Long term goal 5: Climb 5 steps to enter front entrance of patient's home. Not met    Plan:   [x] Continue per plan of care [] Alter current plan (see comments)  [] Plan of care initiated [] Hold pending MD visit [] Discharge    Plan for Next Session:  Continue to improve endurance as tolerated, standing balance.   Electronically signed by: , PT [id number]       Romelia Cevallos

## 2018-09-06 ENCOUNTER — HOSPITAL ENCOUNTER (OUTPATIENT)
Dept: PHYSICAL THERAPY | Age: 83
Setting detail: THERAPIES SERIES
Discharge: HOME | End: 2018-09-06
Payer: MEDICARE

## 2018-09-06 PROCEDURE — 97110 THERAPEUTIC EXERCISES: CPT | Performed by: PHYSICAL THERAPIST

## 2018-09-06 PROCEDURE — 97116 GAIT TRAINING THERAPY: CPT | Performed by: PHYSICAL THERAPIST

## 2018-09-06 NOTE — FLOWSHEET NOTE
Physical Therapy Daily Treatment Note  Date:  2018      Patient Name:  Mino Cuenca    :  1935  MRN: [id number]  Restrictions/Precautions:    Medical/Treatment Diagnosis Information:  · Diagnosis: Z91.81 (ICD-10-CM) - At high risk for falls; G35 (ICD-10-CM) - Multiple sclerosis, primary chronic progressive ([facility] [address]  · Treatment Diagnosis: Decreased Balance  Insurance/Certification information:  PT Insurance Information: A[employer]na Medicare  Physician Information:  Referring Practitioner: Johan Lee MD Follow-up:   Plan of care signed (Y/N):  Yes  Visit# / total visits:  per updated POC through 10/4/18  Pain level: no rating given     G-Code noted on 2018:   PT G-Codes  Functional Assessment Tool Used: Activities Specific Balance Confidence (ABC) Scale  Score: 6.8% average confidence = 93.2% mobility impairment  Functional Limitation: Mobility: Walking and moving around  Mobility: Walking and Moving Around Current Status (): At least 80 percent but less than 100 percent impaired, limited or restricted  Mobility: Walking and Moving Around Goal Status (): At least 40 percent but less than 60 percent impaired, limited or restricted    G-Code noted on 2018:   PT G-Codes  Functional Assessment Tool Used: Activities-Specific Balance Confidence Scale  Score: 20.6% average confidence; 79.4% impairement  Functional Limitation: Mobility: Walking and moving around  Mobility: Walking and Moving Around Current Status (): At least 60 percent but less than 80 percent impaired, limited or restricted  Mobility: Walking and Moving Around Goal Status (): At least 40 percent but less than 60 percent impaired, limited or restricted     Progress Note: [x]  Yes  []  No  Next due by: Visit #10      Subjective:  Pt reports that she has had a lot of family issues going on, and that her L leg has been \"giving her fits\" and feels weak.            Objective:  Observation:  Pt tolerated increased repetitions this date   Test measurements:     Exercises:  Exercise/Equipment Resistance/Repetitions Other comments   NuStep 10 min Level 4 resistance, PT verbal cues to maintain >90 SPM   Sit to stand 15 x  UE support on arm rests of wheelchair, PT instruction for \"nose over toes\" and foot positioning. L Knee ext - seated 30 x  PT verbak cues for slow eccentric, fatigues on LLE   Ankle DF - seated  Ankle PF - seated 30 x bilat  30 x bilat L DF weakness, PT verbal cues for L ankle eccentric focus   ADD Squeeze 30 x bilat Using small purple ball between knees        Gait Training 2 x 25' with standard walker       PT verbal cues for  Step length. Pt improving with foot clearance without PT cues   Step up onto 2\" box 15 x bilat, alternating sides PT gives cues to lift foot off of box to step down, to avoid \"flopping\" it back down to the ground   Standing Hip Abd Held due to groin soreness from performing at home PT educated that pt may have performed this exercise too fast, or kicked into abduction too far, causing groin strain. Other Therapeutic Activities:  n/a    Home Exercise Program:  See above    Manual Treatments:  n/a    Modalities:  n/a    Timed Code Treatment Minutes:  TE x 27 min,  GT x 12 min    Total Treatment Minutes:  39 min    Treatment/Activity Tolerance:  [x] Patient tolerated treatment well [] Patient limited by fatigue  [] Patient limited by pain  [] Patient limited by other medical complications  [] Other:     Assessment: Pt demonstrates improved strength and endurance with all exercises and gait activities. Pt will continue to benefit from skilled PT to work towards goals.       Prognosis: [] Good [x] Fair  [] Poor    Patient Requires Follow-up: [x] Yes  [] No    Goals:  Short term goals  Time Frame for Short term goals: within 3 weeks, patient will:  Short term goal 1: Improve TUG score from 107 seconds to 60 seconds or less to improve mobility to

## 2018-09-07 DIAGNOSIS — R30.0 DYSURIA: ICD-10-CM

## 2018-09-07 DIAGNOSIS — R30.0 DYSURIA: Primary | ICD-10-CM

## 2018-09-07 LAB
BILIRUBIN URINE: NEGATIVE
BLOOD, URINE: NEGATIVE
CLARITY: ABNORMAL
COLOR: YELLOW
EPITHELIAL CELLS, UA: 2 /HPF (ref 0–5)
GLUCOSE URINE: NEGATIVE MG/DL
HYALINE CASTS: 0 /LPF (ref 0–8)
KETONES, URINE: NEGATIVE MG/DL
LEUKOCYTE ESTERASE, URINE: ABNORMAL
MICROSCOPIC EXAMINATION: YES
NITRITE, URINE: NEGATIVE
PH UA: 8.5
PROTEIN UA: 30 MG/DL
RBC UA: 1 /HPF (ref 0–4)
SPECIFIC GRAVITY UA: 1.01
URINE TYPE: ABNORMAL
UROBILINOGEN, URINE: 0.2 E.U./DL
WBC UA: 4 /HPF (ref 0–5)

## 2018-09-09 DIAGNOSIS — N30.00 ACUTE CYSTITIS WITHOUT HEMATURIA: Primary | ICD-10-CM

## 2018-09-09 LAB
ORGANISM: ABNORMAL
URINE CULTURE, ROUTINE: ABNORMAL
URINE CULTURE, ROUTINE: ABNORMAL

## 2018-09-09 RX ORDER — SULFAMETHOXAZOLE AND TRIMETHOPRIM 400; 80 MG/1; MG/1
1 TABLET ORAL 2 TIMES DAILY
Qty: 20 TABLET | Refills: 0 | Status: SHIPPED | OUTPATIENT
Start: 2018-09-09 | End: 2018-09-19

## 2018-09-11 ENCOUNTER — HOSPITAL ENCOUNTER (OUTPATIENT)
Dept: PHYSICAL THERAPY | Age: 83
Setting detail: THERAPIES SERIES
Discharge: HOME | End: 2018-09-11
Payer: MEDICARE

## 2018-09-11 PROCEDURE — 97110 THERAPEUTIC EXERCISES: CPT | Performed by: PHYSICAL THERAPIST

## 2018-09-11 PROCEDURE — 97116 GAIT TRAINING THERAPY: CPT | Performed by: PHYSICAL THERAPIST

## 2018-09-11 RX ORDER — SODIUM BICARBONATE 325 MG/1
TABLET ORAL
Qty: 60 TABLET | Refills: 11 | Status: SHIPPED | OUTPATIENT
Start: 2018-09-11 | End: 2019-08-07 | Stop reason: SDUPTHER

## 2018-09-13 ENCOUNTER — HOSPITAL ENCOUNTER (OUTPATIENT)
Dept: PHYSICAL THERAPY | Age: 83
Setting detail: THERAPIES SERIES
Discharge: HOME | End: 2018-09-13
Payer: MEDICARE

## 2018-09-13 PROCEDURE — 97530 THERAPEUTIC ACTIVITIES: CPT | Performed by: PHYSICAL THERAPIST

## 2018-09-13 PROCEDURE — G8979 MOBILITY GOAL STATUS: HCPCS | Performed by: PHYSICAL THERAPIST

## 2018-09-13 PROCEDURE — G8978 MOBILITY CURRENT STATUS: HCPCS | Performed by: PHYSICAL THERAPIST

## 2018-09-13 PROCEDURE — 97110 THERAPEUTIC EXERCISES: CPT | Performed by: PHYSICAL THERAPIST

## 2018-09-13 NOTE — FLOWSHEET NOTE
Physical Therapy Daily Treatment Note  Date:  2018      Patient Name:  Mary Collins    :  1935  MRN: [id number]  Restrictions/Precautions:    Medical/Treatment Diagnosis Information:  · Diagnosis: Z91.81 (ICD-10-CM) - At high risk for falls; G35 (ICD-10-CM) - Multiple sclerosis, primary chronic progressive ([facility] [address]  · Treatment Diagnosis: Decreased Balance  Insurance/Certification information:  PT Insurance Information: Aetna Medicare  Physician Information:  Referring Practitioner: Sylwia Veronica MD Follow-up:   Plan of care signed (Y/N):  Yes  Visit# / total visits:  per updated POC through 10/4/18  Pain level: no rating given     G-Code noted on 2018:   PT G-Codes  Functional Assessment Tool Used: Activities Specific Balance Confidence (ABC) Scale  Score: 6.8% average confidence = 93.2% mobility impairment  Functional Limitation: Mobility: Walking and moving around  Mobility: Walking and Moving Around Current Status (): At least 80 percent but less than 100 percent impaired, limited or restricted  Mobility: Walking and Moving Around Goal Status (): At least 40 percent but less than 60 percent impaired, limited or restricted    G-Code noted on 2018:   PT G-Codes  Functional Assessment Tool Used: Activities-Specific Balance Confidence Scale  Score: 20.6% average confidence; 79.4% impairement  Functional Limitation: Mobility: Walking and moving around  Mobility: Walking and Moving Around Current Status (): At least 60 percent but less than 80 percent impaired, limited or restricted  Mobility: Walking and Moving Around Goal Status (): At least 40 percent but less than 60 percent impaired, limited or restricted     G-Code noted on 2018:   PT G-Codes  Functional Assessment Tool Used:  Activities-Specific Balance Confidence Scale  Score: 32% average confidence; 68% impairment  Functional Limitation: Mobility: Walking and moving around  Mobility: Walking and 2: Improve 30 second chair stand test from 5 repetitions to 8 repetitions or more to improve safety and strength with transfers. MET  Short term goal 3: Improve 2 min step test from 30 repetitions to 45 repetitions or more to improve endurance with walking. Long term goals MET     Time Frame for Long term goals : within 6 weeks, patient will:  Long term goal 1: Improve TUG score from 107 seconds to 20 seconds or less to improve mobility around home. Not met  Long term goal 2: Improve 30 second chair stand test from 5 repetitions to 10 repetitions or more to improve safety and strength with transfers. MET  Long term goal 3: Improve 2 min step test from 30 repetitions to 60 repetitions or more to improve endurance with walking. Not met  Long term goal 4: Improve ABC scale score from 93% impairment to 50% impairment or less to improve confidence with mobility. Partially met  Long term goal 5: Climb 5 steps to enter front entrance of patient's home. Not met    Plan:   [x] Continue per plan of care [] Alter current plan (see comments)  [] Plan of care initiated [] Hold pending MD visit [] Discharge    Plan for Next Session:  Continue to improve endurance as tolerated, standing balance.   Electronically signed by: , PT [id number]       Anayeli Hernandez

## 2018-09-13 NOTE — PROGRESS NOTES
Outpatient Physical Therapy  Phone: [phone] Fax: [phone]    To: Angely Macdonald    From: Elias Astudillo, BINA   Date: 2018  Patient: Sheyla Zepeda     : 1935 MRN: [id number]  Medical/Treatment Diagnosis Information:  · Diagnosis: Z91.81 (ICD-10-CM) - At high risk for falls; G35 (ICD-10-CM) - Multiple sclerosis, primary chronic progressive ([facility]  · Treatment Diagnosis: Decreased Balance       Physical Therapy Progress Note    Time Period for Report:  18-18    Total Visits to date:  23  Cancels/No-shows to date: 0     Plan of Care/Treatment to date:  [x] Therapeutic Exercise (Review/Progress HEP and provide verbal/tactile cueing for activities related to strengthening, flexibility,  endurance, ROM.)       [x] Therapeutic Activity (Provide verbal/tactile cueing for dynamic activities to promote functional tasks.)          [x] Gait Training (Provide verbal/tactile/visual cueing for facilitation of normalized gait pattern without or with the least restrictive AD to decrease pain and/or risk for falling.)          [] Neuromuscular Re-education (Review/Progress HEP and provide verbal/tactile cueing for activities related to improving balance, coordination, kinesthetic sense, posture, motor skill, proprioception.)         [] Manual Therapy (Provide manual therapy to mobilize soft tissue/joints for the purpose of modulating pain, promoting relaxation, increasing ROM, reducing/eliminating soft tissue swelling/inflammation/tightness, improving soft tissue extensibility)                [] Modalities (For modulating pain/tenderness/paresthesias, reducing swelling/inflammation/tightness, improving soft tissue extensibility, and/or to increase muscle tone/strength):     [] Ultrasound  [] Electrical Stimulation        [] Cervical Traction [] Lumbar Traction    ?    [] Cold/hotpack [] Iontophoresis   Other:      []          []     Significant Findings At Last Visit/Comments:    · Activities-Specific

## 2018-09-13 NOTE — FLOWSHEET NOTE
Objective:  · Observation:  Pt able to ambulate 25 ft into clinic with minimal cues for gait pattern and posture  · Test measurements:      Exercises:  Exercise/Equipment Resistance/Repetitions Other comments   NuStep 10 min Level 4 resistance, PT verbal cues to maintain >90 SPM   Sit to stand 15 x  UE support on arm rests of wheelchair, PT instruction for \"nose over toes\" and foot positioning.           L Knee ext - seated 30 x  PT verbak cues for slow eccentric, fatigues on LLE   Ankle DF - seated  Ankle PF - seated 30 x bilat  30 x bilat L DF weakness, PT verbal cues for L ankle eccentric focus   ADD Squeeze 30 x bilat Using small purple ball between knees           Gait Training 2 x 25' with standard walker          PT verbal cues for  Step length. Pt improving with foot clearance without PT cues   Step up onto 2\" box 15 x bilat, alternating sides PT gives cues to lift foot off of box to step down, to avoid \"flopping\" it back down to the ground   Standing Hip Abd Held due to groin soreness from performing at home PT educated that pt may have performed this exercise too fast, or kicked into abduction too far, causing groin strain.                                       Other Therapeutic Activities:  n/a     Home Exercise Program:  See above     Manual Treatments:  n/a     Modalities:  n/a     Timed Code Treatment Minutes:  TE x 30 min,  GT x 12 min     Total Treatment Minutes:  42 min     Treatment/Activity Tolerance:  [x] Patient tolerated treatment well         [] Patient limited by fatigue  [] Patient limited by pain                       [] Patient limited by other medical complications  [] Other:      Assessment: Pt demonstrates improved strength and endurance with all exercises and gait activities.   Pt will continue to benefit from skilled PT to work towards goals.       Prognosis:     [] Good  [x] Fair                 [] Poor     Patient Requires Follow-up:            [x] Yes                 [] No     Goals:  Short term goals  Time Frame for Short term goals: within 3 weeks, patient will:  Short term goal 1: Improve TUG score from 107 seconds to 60 seconds or less to improve mobility to restroom. Partially met  Short term goal 2: Improve 30 second chair stand test from 5 repetitions to 8 repetitions or more to improve safety and strength with transfers. MET  Short term goal 3: Improve 2 min step test from 30 repetitions to 45 repetitions or more to improve endurance with walking. Long term goals Partially met     Time Frame for Long term goals : within 6 weeks, patient will:  Long term goal 1: Improve TUG score from 107 seconds to 20 seconds or less to improve mobility around home. Not met  Long term goal 2: Improve 30 second chair stand test from 5 repetitions to 10 repetitions or more to improve safety and strength with transfers. MET  Long term goal 3: Improve 2 min step test from 30 repetitions to 60 repetitions or more to improve endurance with walking. Not met  Long term goal 4: Improve ABC scale score from 93% impairment to 50% impairment or less to improve confidence with mobility. Partially met  Long term goal 5: Climb 5 steps to enter front entrance of patient's home. Not met     Plan:      [x] Continue per plan of care      [] Alter current plan (see comments)  [] Plan of care initiated  [] Hold pending MD visit            [] Discharge     Plan for Next Session:  Continue to improve endurance as tolerated, standing balance.   Electronically signed by: , PT [id number]                                                   Danny Estevez

## 2018-09-17 ENCOUNTER — HOSPITAL ENCOUNTER (OUTPATIENT)
Dept: PHYSICAL THERAPY | Age: 83
Setting detail: THERAPIES SERIES
Discharge: HOME | End: 2018-09-17
Payer: MEDICARE

## 2018-09-17 PROCEDURE — 97110 THERAPEUTIC EXERCISES: CPT | Performed by: PHYSICAL THERAPIST

## 2018-09-17 PROCEDURE — 97116 GAIT TRAINING THERAPY: CPT | Performed by: PHYSICAL THERAPIST

## 2018-09-18 DIAGNOSIS — Z86.711 HISTORY OF PULMONARY EMBOLISM: ICD-10-CM

## 2018-09-19 ENCOUNTER — TELEPHONE (OUTPATIENT)
Dept: PRIMARY CARE CLINIC | Age: 83
End: 2018-09-19

## 2018-09-19 ENCOUNTER — PATIENT MESSAGE (OUTPATIENT)
Dept: PRIMARY CARE CLINIC | Age: 83
End: 2018-09-19

## 2018-09-19 RX ORDER — KETOCONAZOLE 20 MG/G
CREAM TOPICAL
Qty: 120 G | Refills: 5 | Status: SHIPPED | OUTPATIENT
Start: 2018-09-19 | End: 2018-09-20

## 2018-09-19 RX ORDER — FLUCONAZOLE 150 MG/1
150 TABLET ORAL ONCE
Qty: 1 TABLET | Refills: 0 | Status: SHIPPED | OUTPATIENT
Start: 2018-09-19 | End: 2018-09-19

## 2018-09-20 ENCOUNTER — HOSPITAL ENCOUNTER (OUTPATIENT)
Dept: PHYSICAL THERAPY | Age: 83
Setting detail: THERAPIES SERIES
Discharge: HOME | End: 2018-09-20
Payer: MEDICARE

## 2018-09-20 PROCEDURE — 97110 THERAPEUTIC EXERCISES: CPT | Performed by: PHYSICAL THERAPIST

## 2018-09-20 PROCEDURE — 97116 GAIT TRAINING THERAPY: CPT | Performed by: PHYSICAL THERAPIST

## 2018-09-20 RX ORDER — KETOCONAZOLE 20 MG/G
CREAM TOPICAL
Qty: 360 G | Refills: 11 | Status: SHIPPED | OUTPATIENT
Start: 2018-09-20 | End: 2018-11-07

## 2018-09-24 NOTE — FLOWSHEET NOTE
from 107 seconds to 60 seconds or less to improve mobility to restroom. MET  Short term goal 2: Improve 30 second chair stand test from 5 repetitions to 8 repetitions or more to improve safety and strength with transfers. MET  Short term goal 3: Improve 2 min step test from 30 repetitions to 45 repetitions or more to improve endurance with walking. Long term goals MET     Time Frame for Long term goals : within 6 weeks, patient will:  Long term goal 1: Improve TUG score from 107 seconds to 20 seconds or less to improve mobility around home. Not met  Long term goal 2: Improve 30 second chair stand test from 5 repetitions to 10 repetitions or more to improve safety and strength with transfers. MET  Long term goal 3: Improve 2 min step test from 30 repetitions to 60 repetitions or more to improve endurance with walking. Not met  Long term goal 4: Improve ABC scale score from 93% impairment to 50% impairment or less to improve confidence with mobility. Partially met  Long term goal 5: Climb 5 steps to enter front entrance of patient's home. Not met    Plan:   [x] Continue per plan of care [] Alter current plan (see comments)  [] Plan of care initiated [] Hold pending MD visit [] Discharge    Plan for Next Session:  Continue to improve endurance as tolerated, standing balance.   Electronically signed by: , PT [id number]       Melanie Jain

## 2018-09-25 ENCOUNTER — HOSPITAL ENCOUNTER (OUTPATIENT)
Dept: PHYSICAL THERAPY | Age: 83
Setting detail: THERAPIES SERIES
Discharge: HOME | End: 2018-09-25
Payer: MEDICARE

## 2018-09-25 PROCEDURE — 97110 THERAPEUTIC EXERCISES: CPT | Performed by: PHYSICAL THERAPIST

## 2018-09-25 PROCEDURE — 97116 GAIT TRAINING THERAPY: CPT | Performed by: PHYSICAL THERAPIST

## 2018-09-25 NOTE — FLOWSHEET NOTE
improve mobility to restroom. MET  Short term goal 2: Improve 30 second chair stand test from 5 repetitions to 8 repetitions or more to improve safety and strength with transfers. MET  Short term goal 3: Improve 2 min step test from 30 repetitions to 45 repetitions or more to improve endurance with walking. Long term goals MET     Time Frame for Long term goals : within 6 weeks, patient will:  Long term goal 1: Improve TUG score from 107 seconds to 20 seconds or less to improve mobility around home. Not met  Long term goal 2: Improve 30 second chair stand test from 5 repetitions to 10 repetitions or more to improve safety and strength with transfers. MET  Long term goal 3: Improve 2 min step test from 30 repetitions to 60 repetitions or more to improve endurance with walking. Not met  Long term goal 4: Improve ABC scale score from 93% impairment to 50% impairment or less to improve confidence with mobility. Partially met  Long term goal 5: Climb 5 steps to enter front entrance of patient's home. Not met    Plan:   [x] Continue per plan of care [] Alter current plan (see comments)  [] Plan of care initiated [] Hold pending MD visit [] Discharge    Plan for Next Session:  Continue to improve endurance as tolerated, standing balance.   Electronically signed by: , PT [id number]       Romelia Cevallos

## 2018-09-27 ENCOUNTER — HOSPITAL ENCOUNTER (OUTPATIENT)
Dept: PHYSICAL THERAPY | Age: 83
Setting detail: THERAPIES SERIES
Discharge: HOME | End: 2018-09-27
Payer: MEDICARE

## 2018-09-27 PROCEDURE — 97110 THERAPEUTIC EXERCISES: CPT | Performed by: PHYSICAL THERAPIST

## 2018-09-27 PROCEDURE — 97116 GAIT TRAINING THERAPY: CPT | Performed by: PHYSICAL THERAPIST

## 2018-09-27 NOTE — FLOWSHEET NOTE
Physical Therapy Daily Treatment Note  Date:  2018     Patient Name:  Fredi Garcia    :  1935  MRN: [id number]  Restrictions/Precautions:    Medical/Treatment Diagnosis Information:  · Diagnosis: Z91.81 (ICD-10-CM) - At high risk for falls; G35 (ICD-10-CM) - Multiple sclerosis, primary chronic progressive ([facility] [address]  · Treatment Diagnosis: Decreased Balance  Insurance/Certification information:  PT Insurance Information: A[employer]na Medicare  Physician Information:  Referring Practitioner: Tyrone Sandhoff MD Follow-up:   Plan of care signed (Y/N):  Yes  Visit# / total visits:  per updated POC through 10/4/18  Pain level: no rating given     G-Code noted on 2018:   PT G-Codes  Functional Assessment Tool Used: Activities Specific Balance Confidence (ABC) Scale  Score: 6.8% average confidence = 93.2% mobility impairment  Functional Limitation: Mobility: Walking and moving around  Mobility: Walking and Moving Around Current Status (): At least 80 percent but less than 100 percent impaired, limited or restricted  Mobility: Walking and Moving Around Goal Status (): At least 40 percent but less than 60 percent impaired, limited or restricted    G-Code noted on 2018:   PT G-Codes  Functional Assessment Tool Used: Activities-Specific Balance Confidence Scale  Score: 20.6% average confidence; 79.4% impairement  Functional Limitation: Mobility: Walking and moving around  Mobility: Walking and Moving Around Current Status (): At least 60 percent but less than 80 percent impaired, limited or restricted  Mobility: Walking and Moving Around Goal Status (): At least 40 percent but less than 60 percent impaired, limited or restricted     G-Code noted on 2018:   PT G-Codes  Functional Assessment Tool Used:  Activities-Specific Balance Confidence Scale  Score: 32% average confidence; 68% impairment  Functional Limitation: Mobility: Walking and moving around  Mobility: Walking and

## 2018-10-02 ENCOUNTER — HOSPITAL ENCOUNTER (OUTPATIENT)
Dept: PHYSICAL THERAPY | Age: 83
Setting detail: THERAPIES SERIES
Discharge: HOME | End: 2018-10-02
Payer: MEDICARE

## 2018-10-02 PROCEDURE — 97110 THERAPEUTIC EXERCISES: CPT | Performed by: PHYSICAL THERAPIST

## 2018-10-02 PROCEDURE — 97116 GAIT TRAINING THERAPY: CPT | Performed by: PHYSICAL THERAPIST

## 2018-10-02 NOTE — FLOWSHEET NOTE
Moving Around Current Status (): At least 60 percent but less than 80 percent impaired, limited or restricted  Mobility: Walking and Moving Around Goal Status (): At least 40 percent but less than 60 percent impaired, limited or restricted     Progress Note: [x]  Yes  []  No  Next due by: Visit #29 or 10/13/18      Subjective: Pt reports that she is doing okay today. States that she is upset about family issues. Objective:  Observation:   Pt substitutes with hip abduction during step up/toe tap exercise onto 4\" step. Pt is able to lift foot up and off the 4\" box without scraping foot and letting it drop to the ground. Test measurements:       Exercises:  Exercise/Equipment Resistance/Repetitions Other comments   NuStep 10 min Level 3 resistance, PT verbal cues to maintain > 90 SPM   Sit to stand 20 x  Pt takes rest after 10 reps        L Knee ext - seated 40 x Pt takes rest after 15 reps on LLE   Ankle DF - seated  Ankle PF - seated 40 x   40 x    ADD Squeeze 40 x         Gait Training 2 x 20' with standard walker   Pt gives cues for posture, step length, and heel strike. Step up/Toe Tap 25 x onto 4\" box Alternating legs   Standing Hip Abd 20 x bilat                          Other Therapeutic Activities:  n/a    Home Exercise Program:  See above    Manual Treatments:  n/a    Modalities:  n/a    Timed Code Treatment Minutes:  TE x 36 min,  GT x 8 min    Total Treatment Minutes:  44 min    Treatment/Activity Tolerance:  [x] Patient tolerated treatment well [] Patient limited by fatigue  [] Patient limited by pain  [] Patient limited by other medical complications  [] Other:     Assessment: Pt improving towards all therapy goals, feels more confident with her balance. Pt will continue to benefit from skilled PT for balance, posture, gait normalization, and strength/endurance, to work towards goals.      Prognosis: [] Good [x] Fair  [] Poor    Patient Requires Follow-up: [x] Yes  []

## 2018-10-04 ENCOUNTER — HOSPITAL ENCOUNTER (OUTPATIENT)
Dept: PHYSICAL THERAPY | Age: 83
Setting detail: THERAPIES SERIES
Discharge: HOME | End: 2018-10-04
Payer: MEDICARE

## 2018-10-04 DIAGNOSIS — R35.0 URINARY FREQUENCY: Primary | ICD-10-CM

## 2018-10-04 PROCEDURE — 97110 THERAPEUTIC EXERCISES: CPT | Performed by: PHYSICAL THERAPIST

## 2018-10-04 PROCEDURE — 97116 GAIT TRAINING THERAPY: CPT | Performed by: PHYSICAL THERAPIST

## 2018-10-05 DIAGNOSIS — R35.0 URINARY FREQUENCY: ICD-10-CM

## 2018-10-05 LAB
BILIRUBIN URINE: NEGATIVE
BLOOD, URINE: NEGATIVE
CLARITY: CLEAR
COLOR: YELLOW
GLUCOSE URINE: NEGATIVE MG/DL
KETONES, URINE: NEGATIVE MG/DL
LEUKOCYTE ESTERASE, URINE: NEGATIVE
MICROSCOPIC EXAMINATION: NORMAL
NITRITE, URINE: NEGATIVE
PH UA: 6
PROTEIN UA: NEGATIVE MG/DL
SPECIFIC GRAVITY UA: 1.01
URINE TYPE: NORMAL
UROBILINOGEN, URINE: 0.2 E.U./DL

## 2018-10-08 LAB
ORGANISM: ABNORMAL
ORGANISM: ABNORMAL
URINE CULTURE, ROUTINE: ABNORMAL

## 2018-10-09 ENCOUNTER — HOSPITAL ENCOUNTER (OUTPATIENT)
Dept: PHYSICAL THERAPY | Age: 83
Setting detail: THERAPIES SERIES
Discharge: HOME | End: 2018-10-09
Payer: MEDICARE

## 2018-10-09 DIAGNOSIS — N30.00 ACUTE CYSTITIS WITHOUT HEMATURIA: Primary | ICD-10-CM

## 2018-10-09 PROCEDURE — 97116 GAIT TRAINING THERAPY: CPT | Performed by: PHYSICAL THERAPIST

## 2018-10-09 PROCEDURE — 97110 THERAPEUTIC EXERCISES: CPT | Performed by: PHYSICAL THERAPIST

## 2018-10-09 RX ORDER — CIPROFLOXACIN 250 MG/1
250 TABLET, FILM COATED ORAL 2 TIMES DAILY
Qty: 20 TABLET | Refills: 0 | Status: SHIPPED | OUTPATIENT
Start: 2018-10-09 | End: 2018-10-19

## 2018-10-09 NOTE — FLOWSHEET NOTE
Physical Therapy Daily Treatment Note  Date:  10/9/2018     Patient Name:  Katlyn Yo    :  1935  MRN: [id number]  Restrictions/Precautions:    Medical/Treatment Diagnosis Information:  · Diagnosis: Z91.81 (ICD-10-CM) - At high risk for falls; G35 (ICD-10-CM) - Multiple sclerosis, primary chronic progressive ([facility] [address]  · Treatment Diagnosis: Decreased Balance  Insurance/Certification information:  PT Insurance Information: A[employer]na Medicare  Physician Information:  Referring Practitioner: Azeem Mays MD Follow-up:   Plan of care signed (Y/N):  Yes  Visit# / total visits: 32 / 30 per updated POC through 10/25/18  Pain level: no rating given     G-Code noted on 2018:   PT G-Codes  Functional Assessment Tool Used: Activities Specific Balance Confidence (ABC) Scale  Score: 6.8% average confidence = 93.2% mobility impairment  Functional Limitation: Mobility: Walking and moving around  Mobility: Walking and Moving Around Current Status (): At least 80 percent but less than 100 percent impaired, limited or restricted  Mobility: Walking and Moving Around Goal Status (): At least 40 percent but less than 60 percent impaired, limited or restricted    G-Code noted on 2018:   PT G-Codes  Functional Assessment Tool Used: Activities-Specific Balance Confidence Scale  Score: 20.6% average confidence; 79.4% impairement  Functional Limitation: Mobility: Walking and moving around  Mobility: Walking and Moving Around Current Status (): At least 60 percent but less than 80 percent impaired, limited or restricted  Mobility: Walking and Moving Around Goal Status (): At least 40 percent but less than 60 percent impaired, limited or restricted     G-Code noted on 2018:   PT G-Codes  Functional Assessment Tool Used:  Activities-Specific Balance Confidence Scale  Score: 32% average confidence; 68% impairment  Functional Limitation: Mobility: Walking and moving around  Mobility: Walking and

## 2018-10-11 ENCOUNTER — HOSPITAL ENCOUNTER (OUTPATIENT)
Dept: PHYSICAL THERAPY | Age: 83
Setting detail: THERAPIES SERIES
Discharge: HOME | End: 2018-10-11
Payer: MEDICARE

## 2018-10-11 PROCEDURE — 97110 THERAPEUTIC EXERCISES: CPT | Performed by: PHYSICAL THERAPIST

## 2018-10-11 PROCEDURE — G8978 MOBILITY CURRENT STATUS: HCPCS | Performed by: PHYSICAL THERAPIST

## 2018-10-11 PROCEDURE — 97116 GAIT TRAINING THERAPY: CPT | Performed by: PHYSICAL THERAPIST

## 2018-10-11 PROCEDURE — 97530 THERAPEUTIC ACTIVITIES: CPT | Performed by: PHYSICAL THERAPIST

## 2018-10-11 PROCEDURE — G8979 MOBILITY GOAL STATUS: HCPCS | Performed by: PHYSICAL THERAPIST

## 2018-10-11 NOTE — PROGRESS NOTES
Balance Confidence Scale score improved from 93% impairment to 62% impairment. · TUG score improved from 107 sec to 47 seconds. · Modified 30 Second Chair Stand Test improved from 5 repetitions with bilat UE to 10 repetitions with LUE only. · Two Minute March Test improved from 30 repetitions to 62 repetitions. Progress towards goals:    Short term goals  Time Frame for Short term goals: within 3 weeks, patient will:  Short term goal 1: Improve TUG score from 107 seconds to 60 seconds or less to improve mobility to restroom. MET  Short term goal 2: Improve 30 second chair stand test from 5 repetitions to 8 repetitions or more to improve safety and strength with transfers. MET  Short term goal 3: Improve 2 min step test from 30 repetitions to 45 repetitions or more to improve endurance with walking. Long term goals MET     Time Frame for Long term goals : within 6 weeks, patient will:  Long term goal 1: Improve TUG score from 107 seconds to 20 seconds or less to improve mobility around home. Not met  Long term goal 2: Improve 30 second chair stand test from 5 repetitions to 10 repetitions or more to improve safety and strength with transfers. MET  Long term goal 3: Improve 2 min step test from 30 repetitions to 60 repetitions or more to improve endurance with walking. MET  Long term goal 4: Improve ABC scale score from 93% impairment to 50% impairment or less to improve confidence with mobility. Partially met  Long term goal 5: Climb 5 steps to enter front entrance of patient's home. Not met    Assessment: Pt improving towards all therapy goals, feels more confident with her balance. Pt will continue to benefit from skilled PT for balance, posture, gait normalization, and strength/endurance, to work towards goals.      Frequency/Duration:  # Days per week: [] 1 day # Weeks: [] 1 week [] 4 weeks      [x] 2 days?    [] 2 weeks [] 5 weeks      [] 3 days   [] 3 weeks [x] 6 weeks     Rehab Potential: []

## 2018-10-11 NOTE — FLOWSHEET NOTE
skilled PT for balance, endurance and strength with ADLs. Prognosis: [] Good [x] Fair  [] Poor    Patient Requires Follow-up: [x] Yes  [] No    Goals:  Short term goals  Time Frame for Short term goals: within 3 weeks, patient will:  Short term goal 1: Improve TUG score from 107 seconds to 60 seconds or less to improve mobility to restroom. MET  Short term goal 2: Improve 30 second chair stand test from 5 repetitions to 8 repetitions or more to improve safety and strength with transfers. MET  Short term goal 3: Improve 2 min step test from 30 repetitions to 45 repetitions or more to improve endurance with walking. Long term goals MET     Time Frame for Long term goals : within 6 weeks, patient will:  Long term goal 1: Improve TUG score from 107 seconds to 20 seconds or less to improve mobility around home. Not met  Long term goal 2: Improve 30 second chair stand test from 5 repetitions to 10 repetitions or more to improve safety and strength with transfers. MET  Long term goal 3: Improve 2 min step test from 30 repetitions to 60 repetitions or more to improve endurance with walking. MET  Long term goal 4: Improve ABC scale score from 93% impairment to 50% impairment or less to improve confidence with mobility. Partially met  Long term goal 5: Climb 5 steps to enter front entrance of patient's home. Not met    Plan:   [x] Continue per plan of care [] Alter current plan (see comments)  [] Plan of care initiated [] Hold pending MD visit [] Discharge    Plan for Next Session:  Continue to improve endurance as tolerated, standing balance.    Electronically signed by: , PT [id number]       Johnson Castrejon

## 2018-10-12 DIAGNOSIS — L85.3 DRY SKIN: ICD-10-CM

## 2018-10-16 ENCOUNTER — HOSPITAL ENCOUNTER (OUTPATIENT)
Dept: PHYSICAL THERAPY | Age: 83
Setting detail: THERAPIES SERIES
Discharge: HOME | End: 2018-10-16
Payer: MEDICARE

## 2018-10-16 PROCEDURE — 97116 GAIT TRAINING THERAPY: CPT | Performed by: PHYSICAL THERAPIST

## 2018-10-16 PROCEDURE — 97110 THERAPEUTIC EXERCISES: CPT | Performed by: PHYSICAL THERAPIST

## 2018-10-16 RX ORDER — AMMONIUM LACTATE 12 G/100G
LOTION TOPICAL
Qty: 225 ML | Refills: 4 | Status: SHIPPED | OUTPATIENT
Start: 2018-10-16 | End: 2019-06-22 | Stop reason: SDUPTHER

## 2018-10-16 NOTE — FLOWSHEET NOTE
Physical Therapy Daily Treatment Note  Date:  10/16/2018     Patient Name:  Cornelius Cuadra    :  1935  MRN: [id number]  Restrictions/Precautions:    Medical/Treatment Diagnosis Information:  · Diagnosis: Z91.81 (ICD-10-CM) - At high risk for falls; G35 (ICD-10-CM) - Multiple sclerosis, primary chronic progressive ([facility] [address]  · Treatment Diagnosis: Decreased Balance  Insurance/Certification information:  PT Insurance Information: A[employer]na Medicare  Physician Information:  Referring Practitioner: Eloise Dia MD Follow-up:   Plan of care signed (Y/N):  Yes  Visit# / total visits:  per updated POC through 10/25/18  Pain level: no rating given     G-Code noted on 2018:   PT G-Codes  Functional Assessment Tool Used: Activities Specific Balance Confidence (ABC) Scale  Score: 6.8% average confidence = 93.2% mobility impairment  Functional Limitation: Mobility: Walking and moving around  Mobility: Walking and Moving Around Current Status (): At least 80 percent but less than 100 percent impaired, limited or restricted  Mobility: Walking and Moving Around Goal Status (): At least 40 percent but less than 60 percent impaired, limited or restricted    G-Code noted on 2018:   PT G-Codes  Functional Assessment Tool Used: Activities-Specific Balance Confidence Scale  Score: 20.6% average confidence; 79.4% impairement  Functional Limitation: Mobility: Walking and moving around  Mobility: Walking and Moving Around Current Status (): At least 60 percent but less than 80 percent impaired, limited or restricted  Mobility: Walking and Moving Around Goal Status (): At least 40 percent but less than 60 percent impaired, limited or restricted     G-Code noted on 2018:   PT G-Codes  Functional Assessment Tool Used:  Activities-Specific Balance Confidence Scale  Score: 32% average confidence; 68% impairment  Functional Limitation: Mobility: Walking and moving around  Mobility: Walking and Moving Around Current Status (): At least 60 percent but less than 80 percent impaired, limited or restricted  Mobility: Walking and Moving Around Goal Status (): At least 40 percent but less than 60 percent impaired, limited or restricted    G-Code noted on 10/11/2018:   PT G-Codes  Functional Assessment Tool Used: Activities-Specific Balance Confidence Scale  Score: 38% average confidence; 62% impairment  Functional Limitation: Mobility: Walking and moving around  Mobility: Walking and Moving Around Current Status (): At least 60 percent but less than 80 percent impaired, limited or restricted  Mobility: Walking and Moving Around Goal Status (): At least 40 percent but less than 60 percent impaired, limited or restricted      Progress Note: [x]  Yes  []  No  Next due by: Visit #29 or 10/13/18      Subjective: Pt states that she has been dealing with a lot, but has been encouraged by her family and friends to at least get some walking in.             Objective:  Observation:   Pt ambulates 60 ft with RW, takes sitting rest, and then ambulates 40 ft with RW before fatiguing, and asking to sit. Test measurements:       Exercises:  Exercise/Equipment Resistance/Repetitions Other comments   NuStep 10 min Level 3 resistance, PT verbal cues to maintain > 105 SPM   Sit to stand 25 x         Bilat Knee ext - seated 40 x ea    Ankle DF - seated  Ankle PF - seated 40 x ea    ADD Squeeze 40 x ea         Gait Training 60' x 1 with RW  40' x 1 with RW Pt fatigues with ambulation. Requires minimal cues for posture   Step up and over 10 x on 4\" box    Standing Hip Abd 20 x ea                          Other Therapeutic Activities:  Functional testing re-assessment.     Home Exercise Program:  See above    Manual Treatments:  n/a    Modalities:  n/a    Timed Code Treatment Minutes:  TE x 32 min, GT x 9 min    Total Treatment Minutes:  41 min    Treatment/Activity Tolerance:  [x] Patient tolerated treatment

## 2018-10-17 ENCOUNTER — TELEPHONE (OUTPATIENT)
Dept: PRIMARY CARE CLINIC | Age: 83
End: 2018-10-17

## 2018-10-17 RX ORDER — DIVALPROEX SODIUM 125 MG/1
125 TABLET, DELAYED RELEASE ORAL 3 TIMES DAILY
Qty: 270 TABLET | Refills: 2 | Status: SHIPPED | OUTPATIENT
Start: 2018-10-17 | End: 2019-07-10 | Stop reason: SDUPTHER

## 2018-10-18 ENCOUNTER — APPOINTMENT (OUTPATIENT)
Dept: PHYSICAL THERAPY | Age: 83
End: 2018-10-18
Payer: MEDICARE

## 2018-10-18 ENCOUNTER — HOSPITAL ENCOUNTER (OUTPATIENT)
Dept: PHYSICAL THERAPY | Age: 83
Setting detail: THERAPIES SERIES
Discharge: HOME | End: 2018-10-18
Payer: MEDICARE

## 2018-10-18 PROCEDURE — 97116 GAIT TRAINING THERAPY: CPT | Performed by: PHYSICAL THERAPIST

## 2018-10-18 PROCEDURE — 97530 THERAPEUTIC ACTIVITIES: CPT | Performed by: PHYSICAL THERAPIST

## 2018-10-18 PROCEDURE — 97110 THERAPEUTIC EXERCISES: CPT | Performed by: PHYSICAL THERAPIST

## 2018-10-18 NOTE — FLOWSHEET NOTE
fatigue  [] Patient limited by pain  [] Patient limited by other medical complications  [] Other:     Assessment: Pt continues to improve towards all goals. Pt will continue to benefit from skilled PT for balance, endurance and strength with ADLs. Prognosis: [] Good [x] Fair  [] Poor    Patient Requires Follow-up: [x] Yes  [] No    Goals:  Short term goals  Time Frame for Short term goals: within 3 weeks, patient will:  Short term goal 1: Improve TUG score from 107 seconds to 60 seconds or less to improve mobility to restroom. MET  Short term goal 2: Improve 30 second chair stand test from 5 repetitions to 8 repetitions or more to improve safety and strength with transfers. MET  Short term goal 3: Improve 2 min step test from 30 repetitions to 45 repetitions or more to improve endurance with walking. Long term goals MET     Time Frame for Long term goals : within 6 weeks, patient will:  Long term goal 1: Improve TUG score from 107 seconds to 20 seconds or less to improve mobility around home. Not met  Long term goal 2: Improve 30 second chair stand test from 5 repetitions to 10 repetitions or more to improve safety and strength with transfers. MET  Long term goal 3: Improve 2 min step test from 30 repetitions to 60 repetitions or more to improve endurance with walking. MET  Long term goal 4: Improve ABC scale score from 93% impairment to 50% impairment or less to improve confidence with mobility. Partially met  Long term goal 5: Climb 5 steps to enter front entrance of patient's home. Partially met    Plan:   [x] Continue per plan of care [] Alter current plan (see comments)  [] Plan of care initiated [] Hold pending MD visit [] Discharge    Plan for Next Session:  Continue to improve endurance as tolerated, standing balance.    Electronically signed by: , PT [id number]       Peggi Collet

## 2018-10-23 ENCOUNTER — HOSPITAL ENCOUNTER (OUTPATIENT)
Dept: PHYSICAL THERAPY | Age: 83
Setting detail: THERAPIES SERIES
Discharge: HOME | End: 2018-10-23
Payer: MEDICARE

## 2018-10-23 PROCEDURE — 97110 THERAPEUTIC EXERCISES: CPT | Performed by: PHYSICAL THERAPIST

## 2018-10-23 NOTE — FLOWSHEET NOTE
Moving Around Current Status (): At least 60 percent but less than 80 percent impaired, limited or restricted  Mobility: Walking and Moving Around Goal Status (): At least 40 percent but less than 60 percent impaired, limited or restricted    G-Code noted on 10/11/2018:   PT G-Codes  Functional Assessment Tool Used: Activities-Specific Balance Confidence Scale  Score: 38% average confidence; 62% impairment  Functional Limitation: Mobility: Walking and moving around  Mobility: Walking and Moving Around Current Status (): At least 60 percent but less than 80 percent impaired, limited or restricted  Mobility: Walking and Moving Around Goal Status (): At least 40 percent but less than 60 percent impaired, limited or restricted      Progress Note: [x]  Yes  []  No  Next due by: Visit #37 or 11/10/18      Subjective:   Pt is having stomach issues today which is making her feel very fatigued. Pt requests not to ride the NuStep this date. Objective:  Observation:  Session shortened this date due to stomach issues. Test measurements:     Exercises:  Exercise/Equipment Resistance/Repetitions Other comments   NuStep     Sit to stand          Bilat Knee ext - seated 40 x ea    Ankle DF - seated  Ankle PF - seated 40 x ea    ADD Squeeze 40 x ea         Gait Training 40 ft with standard walker Pt ambulates more slowly this date due to general fatigue   Step up and over -    Standing Hip Abd -    Stairs Ascend/Descend 4 steps with bilat handrails, CGA, up with RLE, down with LLE PT CGA on lower step from patient, giving verbal cues for which foot to lead with.                     Other Therapeutic Activities: n/a    Home Exercise Program:  See above    Manual Treatments:  n/a    Modalities:  n/a    Timed Code Treatment Minutes:  TE x 25 min    Total Treatment Minutes:  25 min    Treatment/Activity Tolerance:  [] Patient tolerated treatment well [x] Patient limited by fatigue  [] Patient limited

## 2018-10-25 ENCOUNTER — HOSPITAL ENCOUNTER (OUTPATIENT)
Dept: PHYSICAL THERAPY | Age: 83
Setting detail: THERAPIES SERIES
Discharge: HOME | End: 2018-10-25
Payer: MEDICARE

## 2018-10-25 PROCEDURE — 97110 THERAPEUTIC EXERCISES: CPT | Performed by: PHYSICAL THERAPIST

## 2018-10-25 PROCEDURE — 97116 GAIT TRAINING THERAPY: CPT | Performed by: PHYSICAL THERAPIST

## 2018-10-29 ENCOUNTER — HOSPITAL ENCOUNTER (OUTPATIENT)
Dept: PHYSICAL THERAPY | Age: 83
Setting detail: THERAPIES SERIES
Discharge: HOME | End: 2018-10-29
Payer: MEDICARE

## 2018-10-29 PROCEDURE — 97110 THERAPEUTIC EXERCISES: CPT | Performed by: PHYSICAL THERAPIST

## 2018-10-29 PROCEDURE — 97116 GAIT TRAINING THERAPY: CPT | Performed by: PHYSICAL THERAPIST

## 2018-11-01 ENCOUNTER — HOSPITAL ENCOUNTER (OUTPATIENT)
Dept: PHYSICAL THERAPY | Age: 83
Setting detail: THERAPIES SERIES
Discharge: HOME | End: 2018-11-01
Payer: MEDICARE

## 2018-11-01 PROCEDURE — 97116 GAIT TRAINING THERAPY: CPT | Performed by: PHYSICAL THERAPIST

## 2018-11-01 PROCEDURE — 97110 THERAPEUTIC EXERCISES: CPT | Performed by: PHYSICAL THERAPIST

## 2018-11-01 NOTE — FLOWSHEET NOTE
Physical Therapy Daily Treatment Note  Date:  2018     Patient Name:  Elba Zuniga    :  1935  MRN: [id number]  Restrictions/Precautions:    Medical/Treatment Diagnosis Information:  · Diagnosis: Z91.81 (ICD-10-CM) - At high risk for falls; G35 (ICD-10-CM) - Multiple sclerosis, primary chronic progressive ([facility] [address]  · Treatment Diagnosis: Decreased Balance  Insurance/Certification information:  PT Insurance Information: A[employer]na Medicare  Physician Information:  Referring Practitioner: Keyla Epperson MD Follow-up:   Plan of care signed (Y/N):  Yes  Visit# / total visits: 33/40 per updated POC through 18  Pain level: no rating given     G-Code noted on 2018:   PT G-Codes  Functional Assessment Tool Used: Activities Specific Balance Confidence (ABC) Scale  Score: 6.8% average confidence = 93.2% mobility impairment  Functional Limitation: Mobility: Walking and moving around  Mobility: Walking and Moving Around Current Status (): At least 80 percent but less than 100 percent impaired, limited or restricted  Mobility: Walking and Moving Around Goal Status (): At least 40 percent but less than 60 percent impaired, limited or restricted    G-Code noted on 2018:   PT G-Codes  Functional Assessment Tool Used: Activities-Specific Balance Confidence Scale  Score: 20.6% average confidence; 79.4% impairement  Functional Limitation: Mobility: Walking and moving around  Mobility: Walking and Moving Around Current Status (): At least 60 percent but less than 80 percent impaired, limited or restricted  Mobility: Walking and Moving Around Goal Status (): At least 40 percent but less than 60 percent impaired, limited or restricted     G-Code noted on 2018:   PT G-Codes  Functional Assessment Tool Used:  Activities-Specific Balance Confidence Scale  Score: 32% average confidence; 68% impairment  Functional Limitation: Mobility: Walking and moving around  Mobility: Walking and

## 2018-11-06 ENCOUNTER — HOSPITAL ENCOUNTER (OUTPATIENT)
Dept: PHYSICAL THERAPY | Age: 83
Setting detail: THERAPIES SERIES
Discharge: HOME | End: 2018-11-06
Payer: MEDICARE

## 2018-11-06 PROCEDURE — 97116 GAIT TRAINING THERAPY: CPT | Performed by: PHYSICAL THERAPIST

## 2018-11-06 PROCEDURE — 97110 THERAPEUTIC EXERCISES: CPT | Performed by: PHYSICAL THERAPIST

## 2018-11-06 NOTE — FLOWSHEET NOTE
Moving Around Current Status (): At least 60 percent but less than 80 percent impaired, limited or restricted  Mobility: Walking and Moving Around Goal Status (): At least 40 percent but less than 60 percent impaired, limited or restricted    G-Code noted on 10/11/2018:   PT G-Codes  Functional Assessment Tool Used: Activities-Specific Balance Confidence Scale  Score: 38% average confidence; 62% impairment  Functional Limitation: Mobility: Walking and moving around  Mobility: Walking and Moving Around Current Status (): At least 60 percent but less than 80 percent impaired, limited or restricted  Mobility: Walking and Moving Around Goal Status (): At least 40 percent but less than 60 percent impaired, limited or restricted      Progress Note: [x]  Yes  []  No  Next due by: Visit #37 or 11/10/18      Subjective:  Pt doing well this date. Pt and daughter bring along patient's rolling walker. Objective:  Observation:  Pt able to ambulate with normalized bilateral step length, and with increased gait speed, when using rolling walker versus standard walker. Test measurements:         Exercises:  Exercise/Equipment Resistance/Repetitions Other comments   NuStep 10 min level 3 resist Pt maintains >100 steps per min, with minimal rest breaks and minimal PT cues   Sit to stand 25 x         Bilat Knee ext - seated 40 x ea . 5# ankle weights   Ankle DF - seated  Ankle PF - seated 40 x ea    ADD Squeeze 40 x ea         Gait Training 70 ft x 1 with rolling walker  40 ft x 1 with rolling walker PT gives verbal cues for step length, and to glide the RW forward as she steps   Step up and over -    Standing Hip Abd -    Stairs Ascend/Descend 4 steps with bilat handrails, CGA, up with RLE, down with LLE PT gives cues for posture and technique                    Other Therapeutic Activities: n/a    Home Exercise Program:  See above    Manual Treatments:  n/a    Modalities:  n/a    Timed Code Kaley,PT

## 2018-11-07 ENCOUNTER — OFFICE VISIT (OUTPATIENT)
Dept: PRIMARY CARE CLINIC | Age: 83
End: 2018-11-07
Payer: MEDICARE

## 2018-11-07 VITALS
RESPIRATION RATE: 18 BRPM | OXYGEN SATURATION: 96 % | SYSTOLIC BLOOD PRESSURE: 121 MMHG | BODY MASS INDEX: 32.25 KG/M2 | TEMPERATURE: 97.9 F | HEART RATE: 64 BPM | WEIGHT: 182 LBS | DIASTOLIC BLOOD PRESSURE: 66 MMHG

## 2018-11-07 DIAGNOSIS — E11.8 TYPE 2 DIABETES MELLITUS WITH COMPLICATION, WITH LONG-TERM CURRENT USE OF INSULIN (HCC): ICD-10-CM

## 2018-11-07 DIAGNOSIS — E55.9 VITAMIN D DEFICIENCY: ICD-10-CM

## 2018-11-07 DIAGNOSIS — H10.9 CONJUNCTIVITIS, BACTERIAL: ICD-10-CM

## 2018-11-07 DIAGNOSIS — R30.0 DYSURIA: ICD-10-CM

## 2018-11-07 DIAGNOSIS — G35 MULTIPLE SCLEROSIS, PRIMARY CHRONIC PROGRESSIVE (HCC): ICD-10-CM

## 2018-11-07 DIAGNOSIS — Z79.4 TYPE 2 DIABETES MELLITUS WITH COMPLICATION, WITH LONG-TERM CURRENT USE OF INSULIN (HCC): ICD-10-CM

## 2018-11-07 DIAGNOSIS — L30.9 ECZEMA, UNSPECIFIED TYPE: ICD-10-CM

## 2018-11-07 DIAGNOSIS — Z23 NEED FOR INFLUENZA VACCINATION: Primary | ICD-10-CM

## 2018-11-07 LAB
A/G RATIO: 1.7 (ref 1.1–2.2)
ALBUMIN SERPL-MCNC: 4.3 G/DL (ref 3.4–5)
ALP BLD-CCNC: 71 U/L (ref 40–129)
ALT SERPL-CCNC: 16 U/L (ref 10–40)
ANION GAP SERPL CALCULATED.3IONS-SCNC: 16 MMOL/L (ref 3–16)
AST SERPL-CCNC: 17 U/L (ref 15–37)
BILIRUB SERPL-MCNC: 0.3 MG/DL (ref 0–1)
BILIRUBIN URINE: NEGATIVE
BLOOD, URINE: NEGATIVE
BUN BLDV-MCNC: 48 MG/DL (ref 7–20)
CALCIUM SERPL-MCNC: 10.7 MG/DL (ref 8.3–10.6)
CHLORIDE BLD-SCNC: 102 MMOL/L (ref 99–110)
CLARITY: CLEAR
CO2: 21 MMOL/L (ref 21–32)
COLOR: YELLOW
CREAT SERPL-MCNC: 1 MG/DL (ref 0.6–1.2)
CREATININE URINE: 28.8 MG/DL (ref 28–259)
EPITHELIAL CELLS, UA: 0 /HPF (ref 0–5)
GFR AFRICAN AMERICAN: >60
GFR NON-AFRICAN AMERICAN: 53
GLOBULIN: 2.6 G/DL
GLUCOSE BLD-MCNC: 124 MG/DL (ref 70–99)
GLUCOSE URINE: NEGATIVE MG/DL
HYALINE CASTS: 0 /LPF (ref 0–8)
KETONES, URINE: NEGATIVE MG/DL
LEUKOCYTE ESTERASE, URINE: ABNORMAL
MICROALBUMIN UR-MCNC: 3.3 MG/DL
MICROALBUMIN/CREAT UR-RTO: 114.6 MG/G (ref 0–30)
MICROSCOPIC EXAMINATION: YES
NITRITE, URINE: NEGATIVE
PH UA: 6
POTASSIUM SERPL-SCNC: 4.6 MMOL/L (ref 3.5–5.1)
PROTEIN UA: NEGATIVE MG/DL
RBC UA: 1 /HPF (ref 0–4)
SODIUM BLD-SCNC: 139 MMOL/L (ref 136–145)
SPECIFIC GRAVITY UA: 1.01
TOTAL PROTEIN: 6.9 G/DL (ref 6.4–8.2)
URINE TYPE: ABNORMAL
UROBILINOGEN, URINE: 0.2 E.U./DL
VITAMIN B-12: >2000 PG/ML (ref 211–911)
VITAMIN D 25-HYDROXY: 59.1 NG/ML
WBC UA: 4 /HPF (ref 0–5)

## 2018-11-07 PROCEDURE — G8482 FLU IMMUNIZE ORDER/ADMIN: HCPCS | Performed by: INTERNAL MEDICINE

## 2018-11-07 PROCEDURE — 4040F PNEUMOC VAC/ADMIN/RCVD: CPT | Performed by: INTERNAL MEDICINE

## 2018-11-07 PROCEDURE — 90662 IIV NO PRSV INCREASED AG IM: CPT | Performed by: INTERNAL MEDICINE

## 2018-11-07 PROCEDURE — G8399 PT W/DXA RESULTS DOCUMENT: HCPCS | Performed by: INTERNAL MEDICINE

## 2018-11-07 PROCEDURE — 99214 OFFICE O/P EST MOD 30 MIN: CPT | Performed by: INTERNAL MEDICINE

## 2018-11-07 PROCEDURE — 1036F TOBACCO NON-USER: CPT | Performed by: INTERNAL MEDICINE

## 2018-11-07 PROCEDURE — 1123F ACP DISCUSS/DSCN MKR DOCD: CPT | Performed by: INTERNAL MEDICINE

## 2018-11-07 PROCEDURE — 1090F PRES/ABSN URINE INCON ASSESS: CPT | Performed by: INTERNAL MEDICINE

## 2018-11-07 PROCEDURE — G0008 ADMIN INFLUENZA VIRUS VAC: HCPCS | Performed by: INTERNAL MEDICINE

## 2018-11-07 PROCEDURE — G8427 DOCREV CUR MEDS BY ELIG CLIN: HCPCS | Performed by: INTERNAL MEDICINE

## 2018-11-07 PROCEDURE — G8417 CALC BMI ABV UP PARAM F/U: HCPCS | Performed by: INTERNAL MEDICINE

## 2018-11-07 PROCEDURE — 1101F PT FALLS ASSESS-DOCD LE1/YR: CPT | Performed by: INTERNAL MEDICINE

## 2018-11-07 RX ORDER — NEOMYCIN SULFATE, POLYMYXIN B SULFATE, BACITRACIN ZINC, HYDROCORTISONE 3.5; 10000; 400; 1 MG/G; [USP'U]/G; [USP'U]/G; MG/G
1 OINTMENT OPHTHALMIC 2 TIMES DAILY
Qty: 3.5 G | Refills: 0 | Status: SHIPPED | OUTPATIENT
Start: 2018-11-07 | End: 2018-11-17

## 2018-11-07 RX ORDER — KETOCONAZOLE 20 MG/G
CREAM TOPICAL
Qty: 120 G | Refills: 5 | Status: SHIPPED | OUTPATIENT
Start: 2018-11-07 | End: 2018-11-07 | Stop reason: SDUPTHER

## 2018-11-07 RX ORDER — KETOCONAZOLE 20 MG/G
CREAM TOPICAL
Qty: 360 G | Refills: 5 | Status: SHIPPED | OUTPATIENT
Start: 2018-11-07 | End: 2019-04-05

## 2018-11-07 NOTE — PROGRESS NOTES
insulin lispro (HUMALOG KWIKPEN) 100 UNIT/ML pen Inject 2-5 Units into the skin 3 times daily (with meals) Yes Robert Gillespie MD   fluocinonide (LIDEX) 0.05 % cream Apply topically 2 times daily. Yes Robert Gillespie MD   Incontinence Supplies (BEDPAN) [address], but \"5 bottle\" is the only way it will allow us to order this bedpan. Yes Robert Gillespie MD   vitamin D (CHOLECALCIFEROL) 1000 UNIT TABS tablet Take 3 tablets by mouth daily. Yes Robert Gillespie MD   loratadine (CLARITIN) 10 MG tablet Take 1 tablet by mouth daily. Yes Robert Gillespie MD   Biotin 1000 MCG TABS Take  by mouth daily. Yes Historical Provider, MD   ALBUTEROL 90 mcg by Does not apply route. Yes Historical Provider, MD   Multiple Vitamins-Minerals (CENTRUM SILVER PO) Take  by mouth. Yes Historical Provider, MD   Lancet Devices (ACCU-CHEK SOFTCLIX LANCET DEV) by Does not apply route 2 times daily as needed. Yes Historical Provider, MD        Social History   Substance Use Topics    Smoking status: Never Smoker    Smokeless tobacco: Never Used    Alcohol use No        Vitals:    11/07/18 1106   BP: 121/66   Site: Left Upper Arm   Position: Sitting   Cuff Size: Large Adult   Pulse: 64   Resp: 18   Temp: 97.9 °F (36.6 °C)   TempSrc: Oral   SpO2: 96%   Weight: 182 lb (82.6 kg)     Estimated body mass index is 32.25 kg/m² as calculated from the following:    Height as of 6/2/17: 5' 2.99\" (1.6 m). Weight as of this encounter: 182 lb (82.6 kg). Physical Exam   Constitutional: She is oriented to person, place, and time. She appears well-developed and well-nourished. HENT:   Head: Normocephalic and atraumatic. Right Ear: External ear normal.   Left Ear: External ear normal.   Nose: Nose normal.   Mouth/Throat: Oropharynx is clear and moist. No oropharyngeal exudate. Spasm in  Neck with limited range of motion   Eyes: Pupils are equal, round, and reactive to light.  Conjunctivae and EOM are normal. Right

## 2018-11-08 ENCOUNTER — HOSPITAL ENCOUNTER (OUTPATIENT)
Dept: PHYSICAL THERAPY | Age: 83
Setting detail: THERAPIES SERIES
Discharge: HOME | End: 2018-11-08
Payer: MEDICARE

## 2018-11-08 LAB
BASOPHILS ABSOLUTE: 0.1 K/UL (ref 0–0.2)
BASOPHILS RELATIVE PERCENT: 0.6 %
EOSINOPHILS ABSOLUTE: 0.1 K/UL (ref 0–0.6)
EOSINOPHILS RELATIVE PERCENT: 0.9 %
ESTIMATED AVERAGE GLUCOSE: 111.2 MG/DL
HBA1C MFR BLD: 5.5 %
HCT VFR BLD CALC: 37.3 % (ref 36–48)
HEMOGLOBIN: 12.2 G/DL (ref 12–16)
LYMPHOCYTES ABSOLUTE: 1.6 K/UL (ref 1–5.1)
LYMPHOCYTES RELATIVE PERCENT: 17.5 %
MCH RBC QN AUTO: 30.8 PG (ref 26–34)
MCHC RBC AUTO-ENTMCNC: 32.7 G/DL (ref 31–36)
MCV RBC AUTO: 94.2 FL (ref 80–100)
MONOCYTES ABSOLUTE: 0.7 K/UL (ref 0–1.3)
MONOCYTES RELATIVE PERCENT: 7.5 %
NEUTROPHILS ABSOLUTE: 6.5 K/UL (ref 1.7–7.7)
NEUTROPHILS RELATIVE PERCENT: 73.5 %
PDW BLD-RTO: 16.6 % (ref 12.4–15.4)
PLATELET # BLD: 176 K/UL (ref 135–450)
PMV BLD AUTO: 11 FL (ref 5–10.5)
RBC # BLD: 3.96 M/UL (ref 4–5.2)
WBC # BLD: 8.9 K/UL (ref 4–11)

## 2018-11-08 PROCEDURE — 97530 THERAPEUTIC ACTIVITIES: CPT | Performed by: PHYSICAL THERAPIST

## 2018-11-08 PROCEDURE — G8979 MOBILITY GOAL STATUS: HCPCS | Performed by: PHYSICAL THERAPIST

## 2018-11-08 PROCEDURE — G8978 MOBILITY CURRENT STATUS: HCPCS | Performed by: PHYSICAL THERAPIST

## 2018-11-08 PROCEDURE — 97110 THERAPEUTIC EXERCISES: CPT | Performed by: PHYSICAL THERAPIST

## 2018-11-08 PROCEDURE — 97116 GAIT TRAINING THERAPY: CPT | Performed by: PHYSICAL THERAPIST

## 2018-11-08 NOTE — FLOWSHEET NOTE
Physical Therapy Daily Treatment Note  Date:  2018     Patient Name:  Delmar Booth    :  1935  MRN: [id number]  Restrictions/Precautions:    Medical/Treatment Diagnosis Information:  · Diagnosis: Z91.81 (ICD-10-CM) - At high risk for falls; G35 (ICD-10-CM) - Multiple sclerosis, primary chronic progressive ([facility] [address]  · Treatment Diagnosis: Decreased Balance  Insurance/Certification information:  PT Insurance Information: Aetna Medicare  Physician Information:  Referring Practitioner: Jenifer Rodriguez MD Follow-up:   Plan of care signed (Y/N):  Yes  Visit# / total visits: 35/40 per updated POC through 18  Pain level: no rating given     G-Code noted on 2018:   PT G-Codes  Functional Assessment Tool Used: Activities Specific Balance Confidence (ABC) Scale  Score: 6.8% average confidence = 93.2% mobility impairment  Functional Limitation: Mobility: Walking and moving around  Mobility: Walking and Moving Around Current Status (): At least 80 percent but less than 100 percent impaired, limited or restricted  Mobility: Walking and Moving Around Goal Status (): At least 40 percent but less than 60 percent impaired, limited or restricted    G-Code noted on 2018:   PT G-Codes  Functional Assessment Tool Used: Activities-Specific Balance Confidence Scale  Score: 20.6% average confidence; 79.4% impairement  Functional Limitation: Mobility: Walking and moving around  Mobility: Walking and Moving Around Current Status (): At least 60 percent but less than 80 percent impaired, limited or restricted  Mobility: Walking and Moving Around Goal Status (): At least 40 percent but less than 60 percent impaired, limited or restricted     G-Code noted on 2018:   PT G-Codes  Functional Assessment Tool Used:  Activities-Specific Balance Confidence Scale  Score: 32% average confidence; 68% impairment  Functional Limitation: Mobility: Walking and moving around  Mobility: Walking and Moving Around Current Status (): At least 60 percent but less than 80 percent impaired, limited or restricted  Mobility: Walking and Moving Around Goal Status (): At least 40 percent but less than 60 percent impaired, limited or restricted    G-Code noted on 10/11/2018:   PT G-Codes  Functional Assessment Tool Used: Activities-Specific Balance Confidence Scale  Score: 38% average confidence; 62% impairment  Functional Limitation: Mobility: Walking and moving around  Mobility: Walking and Moving Around Current Status (): At least 60 percent but less than 80 percent impaired, limited or restricted  Mobility: Walking and Moving Around Goal Status (): At least 40 percent but less than 60 percent impaired, limited or restricted      G-Code noted on 2018:   PT G-Codes  Functional Assessment Tool Used: Activities-Specific Balance Confidence Scale  Score: 41% average confidence; 59% impairment  Functional Limitation: Mobility: Walking and moving around  Mobility: Walking and Moving Around Current Status (): At least 40 percent but less than 60 percent impaired, limited or restricted  Mobility: Walking and Moving Around Goal Status (): At least 40 percent but less than 60 percent impaired, limited or restricted    Progress Note: [x]  Yes  []  No  Next due by: Visit #37 or 11/10/18      Subjective:  Pt c/o fatigue, lack of sleep on this date. Patient's daughter accompanied patient to this session, and asked about continuing therapy until patient returned to baseline (walking with 4WW independently, ascending/descending stairs independently, improved mobility around home and community). PT discussed that as long as improvements are being made, and goals are attainable, and that therapy is medically necessary, that this shouldn't be a barrier. Objective:  Observation:  Pt fatigues on NuStep and requires frequent rest breaks.    Test measurements:    TU sec   30 Sec Chair

## 2018-11-09 LAB
ORGANISM: ABNORMAL
URINE CULTURE, ROUTINE: ABNORMAL
URINE CULTURE, ROUTINE: ABNORMAL

## 2018-11-13 ENCOUNTER — HOSPITAL ENCOUNTER (OUTPATIENT)
Dept: PHYSICAL THERAPY | Age: 83
Setting detail: THERAPIES SERIES
Discharge: HOME | End: 2018-11-13
Payer: MEDICARE

## 2018-11-13 DIAGNOSIS — I10 ESSENTIAL HYPERTENSION: ICD-10-CM

## 2018-11-13 PROCEDURE — 97116 GAIT TRAINING THERAPY: CPT | Performed by: PHYSICAL THERAPIST

## 2018-11-13 PROCEDURE — 97110 THERAPEUTIC EXERCISES: CPT | Performed by: PHYSICAL THERAPIST

## 2018-11-13 RX ORDER — AZELASTINE 1 MG/ML
1 SPRAY, METERED NASAL 2 TIMES DAILY
Qty: 1 BOTTLE | Refills: 11 | Status: SHIPPED | OUTPATIENT
Start: 2018-11-13 | End: 2019-08-07 | Stop reason: SDUPTHER

## 2018-11-13 RX ORDER — ATENOLOL 50 MG/1
TABLET ORAL
Qty: 90 TABLET | Refills: 3 | Status: SHIPPED | OUTPATIENT
Start: 2018-11-13 | End: 2019-08-07 | Stop reason: SDUPTHER

## 2018-11-13 ASSESSMENT — ENCOUNTER SYMPTOMS
RESPIRATORY NEGATIVE: 1
NAUSEA: 0
DIARRHEA: 0
ABDOMINAL PAIN: 0
RHINORRHEA: 0
VOMITING: 0
SINUS PRESSURE: 0
GASTROINTESTINAL NEGATIVE: 1
CONSTIPATION: 0
TROUBLE SWALLOWING: 0
PHOTOPHOBIA: 0
BLOOD IN STOOL: 0

## 2018-11-13 ASSESSMENT — PATIENT HEALTH QUESTIONNAIRE - PHQ9
SUM OF ALL RESPONSES TO PHQ QUESTIONS 1-9: 0
SUM OF ALL RESPONSES TO PHQ9 QUESTIONS 1 & 2: 0
1. LITTLE INTEREST OR PLEASURE IN DOING THINGS: 0
SUM OF ALL RESPONSES TO PHQ QUESTIONS 1-9: 0
2. FEELING DOWN, DEPRESSED OR HOPELESS: 0

## 2018-11-15 ENCOUNTER — HOSPITAL ENCOUNTER (OUTPATIENT)
Dept: PHYSICAL THERAPY | Age: 83
Setting detail: THERAPIES SERIES
Discharge: HOME | End: 2018-11-15
Payer: MEDICARE

## 2018-11-15 PROCEDURE — G8979 MOBILITY GOAL STATUS: HCPCS | Performed by: PHYSICAL THERAPIST

## 2018-11-15 PROCEDURE — 97110 THERAPEUTIC EXERCISES: CPT | Performed by: PHYSICAL THERAPIST

## 2018-11-15 PROCEDURE — G8978 MOBILITY CURRENT STATUS: HCPCS | Performed by: PHYSICAL THERAPIST

## 2018-11-15 PROCEDURE — 97116 GAIT TRAINING THERAPY: CPT | Performed by: PHYSICAL THERAPIST

## 2018-11-19 ENCOUNTER — HOSPITAL ENCOUNTER (OUTPATIENT)
Dept: PHYSICAL THERAPY | Age: 83
Setting detail: THERAPIES SERIES
Discharge: HOME | End: 2018-11-19
Payer: MEDICARE

## 2018-11-19 PROCEDURE — 97116 GAIT TRAINING THERAPY: CPT | Performed by: PHYSICAL THERAPIST

## 2018-11-19 PROCEDURE — 97110 THERAPEUTIC EXERCISES: CPT | Performed by: PHYSICAL THERAPIST

## 2018-11-19 NOTE — FLOWSHEET NOTE
Moving Around Current Status (): At least 60 percent but less than 80 percent impaired, limited or restricted  Mobility: Walking and Moving Around Goal Status (): At least 40 percent but less than 60 percent impaired, limited or restricted    G-Code noted on 10/11/2018:   PT G-Codes  Functional Assessment Tool Used: Activities-Specific Balance Confidence Scale  Score: 38% average confidence; 62% impairment  Functional Limitation: Mobility: Walking and moving around  Mobility: Walking and Moving Around Current Status (): At least 60 percent but less than 80 percent impaired, limited or restricted  Mobility: Walking and Moving Around Goal Status (): At least 40 percent but less than 60 percent impaired, limited or restricted      Progress Note: [x]  Yes  []  No  Next due by: Visit 12/8/18      Subjective:  Pt states she is feeling good today. Objective:  Observation:  Pt demonstrates improved endurance this date on the NuStep, only stopping once for rest, and maintaining speed above 100 steps/min   Test measurements:       Exercises:  Exercise/Equipment Resistance/Repetitions Other comments   NuStep 10 min at level 3    Sit to stand 25 x         Bilat Knee ext - seated 40 x ea 1# ankle weights, stopping for rest intermediate through LLE. Verbal cues for slow eccentric return to start position   Ankle DF - seated  Ankle PF - seated     ADD Squeeze          Gait Training 80 ft x 1 with rolling walker  40 ft x 1 with rolling walker Normalized gait pattern, verbal cues for posture. Pt improving with self-corrections   Toe Taps to 4\" step 15 x bilat LLE fatigue after 10 reps.    Standing Hip Abd -    Stairs Ascend/Descend 4 steps with bilat handrails, CGA, up with RLE, down with LLE PT gives cues for posture and technique                    Other Therapeutic Activities: n/a    Home Exercise Program:  See above    Manual Treatments:  n/a    Modalities:  n/a    Timed Code Treatment Minutes:  TE x

## 2018-11-20 DIAGNOSIS — R30.0 DYSURIA: Primary | ICD-10-CM

## 2018-11-21 ENCOUNTER — HOSPITAL ENCOUNTER (OUTPATIENT)
Dept: PHYSICAL THERAPY | Age: 83
Setting detail: THERAPIES SERIES
Discharge: HOME | End: 2018-11-21
Payer: MEDICARE

## 2018-11-21 DIAGNOSIS — R30.0 DYSURIA: ICD-10-CM

## 2018-11-21 PROCEDURE — 97110 THERAPEUTIC EXERCISES: CPT | Performed by: PHYSICAL THERAPIST

## 2018-11-21 PROCEDURE — 97116 GAIT TRAINING THERAPY: CPT | Performed by: PHYSICAL THERAPIST

## 2018-11-21 NOTE — FLOWSHEET NOTE
Moving Around Current Status (): At least 60 percent but less than 80 percent impaired, limited or restricted  Mobility: Walking and Moving Around Goal Status (): At least 40 percent but less than 60 percent impaired, limited or restricted    G-Code noted on 10/11/2018:   PT G-Codes  Functional Assessment Tool Used: Activities-Specific Balance Confidence Scale  Score: 38% average confidence; 62% impairment  Functional Limitation: Mobility: Walking and moving around  Mobility: Walking and Moving Around Current Status (): At least 60 percent but less than 80 percent impaired, limited or restricted  Mobility: Walking and Moving Around Goal Status (): At least 40 percent but less than 60 percent impaired, limited or restricted      Progress Note: [x]  Yes  []  No  Next due by: Visit 12/8/18      Subjective:  Pt has no new complaints. Pt feels confident in progress she has made. Objective:  Observation:  Pt stops on Nustep 3 times due to R leg soreness. (-) Nena's for DVT. Pt reports it is just sore from using her legs more. Pt tolerated all activities with 1# ankle weights, showing quicker fatigue, but was able to complete. Test measurements:       Exercises:  Exercise/Equipment Resistance/Repetitions Other comments   NuStep 10 min at level 3    Sit to stand 25 x         Bilat Knee ext - seated 40 x ea 1# ankle weights, stopping for rest long-term through LLE. Verbal cues for slow eccentric return to start position   Ankle DF - seated  Ankle PF - seated     ADD Squeeze          Gait Training 80 ft x 1 with rolling walker + 1# ankle weights  40 ft x 1 with rolling walker Normalized gait pattern, verbal cues for posture. Pt improving with self-corrections   Toe Taps to 4\" step 15 x bilat with 1# ankle weights LLE fatigue after 10 reps.    Standing Hip Abd -    Stairs Ascend/Descend 4 steps with bilat handrails, CGA, up with RLE, down with LLE, with 1# ankle weights PT gives cues for comments)  [] Plan of care initiated [] Hold pending MD visit [] Discharge    Plan for Next Session:  Progress endurance and balance.    Electronically signed by: , PT [id number]       Rodrigo Guerrero

## 2018-11-23 LAB — URINE CULTURE, ROUTINE: NORMAL

## 2018-11-26 DIAGNOSIS — I10 ESSENTIAL HYPERTENSION: ICD-10-CM

## 2018-11-26 RX ORDER — VERAPAMIL HYDROCHLORIDE 240 MG/1
TABLET, FILM COATED, EXTENDED RELEASE ORAL
Qty: 30 TABLET | Refills: 3 | Status: SHIPPED | OUTPATIENT
Start: 2018-11-26 | End: 2019-02-22 | Stop reason: SDUPTHER

## 2018-11-27 ENCOUNTER — HOSPITAL ENCOUNTER (OUTPATIENT)
Dept: PHYSICAL THERAPY | Age: 83
Setting detail: THERAPIES SERIES
Discharge: HOME | End: 2018-11-27
Payer: MEDICARE

## 2018-11-27 PROCEDURE — 97116 GAIT TRAINING THERAPY: CPT | Performed by: PHYSICAL THERAPIST

## 2018-11-27 PROCEDURE — 97110 THERAPEUTIC EXERCISES: CPT | Performed by: PHYSICAL THERAPIST

## 2018-11-27 NOTE — PROGRESS NOTES
additional PT to work towards updated goals below, in order to achieve maximum independence with mobility and ADLs. · Pt able to perform step up onto 4\" step with LLE. · Pt ascends and descends 4 stairs with CGA, ascending with RLE first, descending with LLE first.  · Pt able to walk 80 ft with RW with normal gait pattern and minimal cues for gait speed or heel strike. Progress towards goals:      Updated Goals (11/27/18): Within 4 weeks, patient will:  1: Perform TUG 20 seconds or less with RW  2. Perform 12 repetitions on the 30 second chair stand test  3. Perform 70 repetitions on the 2 minute step test  4. Score less than 50% impairment on the ABC Scale  5. Climb 8 stairs with bilat handrails. Frequency/Duration:  # Days per week: [] 1 day # Weeks: [] 1 week [x] 4 weeks      [x] 2 days? [] 2 weeks [] 5 weeks      [] 3 days   [] 3 weeks [] 6 weeks     Rehab Potential: [] Excellent [x] Good [] Fair  [] Poor     Goal Status:  [] Achieved [x] Partially Achieved  [] Not Achieved     Patient Status: [] Continue per initial plan of Care     [] Patient now discharged     [x] Additional visits requested, Please re-certify for additional visits:      Requested frequency/duration: 2 X/week for 4 weeks (through 12/25/18)    Electronically signed by: , PT [id number]       Sabino Saunders PT    If you have any questions or concerns, please don't hesitate to call.   Thank you for your referral.    Physician Signature:________________________________Date:__________________  By signing above, therapists plan is approved by physician

## 2018-11-29 ENCOUNTER — HOSPITAL ENCOUNTER (OUTPATIENT)
Dept: PHYSICAL THERAPY | Age: 83
Setting detail: THERAPIES SERIES
Discharge: HOME | End: 2018-11-29
Payer: MEDICARE

## 2018-11-29 PROCEDURE — 97116 GAIT TRAINING THERAPY: CPT | Performed by: PHYSICAL THERAPIST

## 2018-11-29 PROCEDURE — 97110 THERAPEUTIC EXERCISES: CPT | Performed by: PHYSICAL THERAPIST

## 2018-11-30 DIAGNOSIS — I10 ESSENTIAL HYPERTENSION: ICD-10-CM

## 2018-11-30 RX ORDER — HYDRALAZINE HYDROCHLORIDE 50 MG/1
TABLET, FILM COATED ORAL
Qty: 90 TABLET | Refills: 3 | Status: CANCELLED | OUTPATIENT
Start: 2018-11-30

## 2018-11-30 RX ORDER — HYDRALAZINE HYDROCHLORIDE 50 MG/1
TABLET, FILM COATED ORAL
Qty: 90 TABLET | Refills: 5 | Status: SHIPPED | OUTPATIENT
Start: 2018-11-30 | End: 2019-05-26 | Stop reason: SDUPTHER

## 2018-12-04 ENCOUNTER — HOSPITAL ENCOUNTER (OUTPATIENT)
Dept: PHYSICAL THERAPY | Age: 83
Setting detail: THERAPIES SERIES
Discharge: HOME | End: 2018-12-04
Payer: MEDICARE

## 2018-12-04 PROCEDURE — 97116 GAIT TRAINING THERAPY: CPT | Performed by: PHYSICAL THERAPIST

## 2018-12-04 PROCEDURE — 97110 THERAPEUTIC EXERCISES: CPT | Performed by: PHYSICAL THERAPIST

## 2018-12-06 ENCOUNTER — HOSPITAL ENCOUNTER (OUTPATIENT)
Dept: PHYSICAL THERAPY | Age: 83
Setting detail: THERAPIES SERIES
Discharge: HOME | End: 2018-12-06
Payer: MEDICARE

## 2018-12-06 PROCEDURE — 97530 THERAPEUTIC ACTIVITIES: CPT | Performed by: PHYSICAL THERAPIST

## 2018-12-06 PROCEDURE — G8978 MOBILITY CURRENT STATUS: HCPCS | Performed by: PHYSICAL THERAPIST

## 2018-12-06 PROCEDURE — 97110 THERAPEUTIC EXERCISES: CPT | Performed by: PHYSICAL THERAPIST

## 2018-12-06 PROCEDURE — G8979 MOBILITY GOAL STATUS: HCPCS | Performed by: PHYSICAL THERAPIST

## 2018-12-06 PROCEDURE — 97116 GAIT TRAINING THERAPY: CPT | Performed by: PHYSICAL THERAPIST

## 2018-12-07 DIAGNOSIS — Z86.711 HISTORY OF PULMONARY EMBOLISM: ICD-10-CM

## 2018-12-10 RX ORDER — APIXABAN 5 MG/1
5 TABLET, FILM COATED ORAL 2 TIMES DAILY
Qty: 60 TABLET | Refills: 2 | Status: SHIPPED | OUTPATIENT
Start: 2018-12-10 | End: 2019-02-06 | Stop reason: SDUPTHER

## 2018-12-11 ENCOUNTER — APPOINTMENT (OUTPATIENT)
Dept: PHYSICAL THERAPY | Age: 83
End: 2018-12-11
Payer: MEDICARE

## 2018-12-12 ENCOUNTER — HOSPITAL ENCOUNTER (OUTPATIENT)
Dept: PHYSICAL THERAPY | Age: 83
Setting detail: THERAPIES SERIES
Discharge: HOME | End: 2018-12-12
Payer: MEDICARE

## 2018-12-12 PROCEDURE — 97116 GAIT TRAINING THERAPY: CPT | Performed by: PHYSICAL THERAPIST

## 2018-12-12 PROCEDURE — 97110 THERAPEUTIC EXERCISES: CPT | Performed by: PHYSICAL THERAPIST

## 2018-12-12 NOTE — FLOWSHEET NOTE
Moving Around Current Status (): At least 60 percent but less than 80 percent impaired, limited or restricted  Mobility: Walking and Moving Around Goal Status (): At least 40 percent but less than 60 percent impaired, limited or restricted    G-Code noted on 10/11/2018:   PT G-Codes  Functional Assessment Tool Used: Activities-Specific Balance Confidence Scale  Score: 38% average confidence; 62% impairment  Functional Limitation: Mobility: Walking and moving around  Mobility: Walking and Moving Around Current Status (): At least 60 percent but less than 80 percent impaired, limited or restricted  Mobility: Walking and Moving Around Goal Status (): At least 40 percent but less than 60 percent impaired, limited or restricted      G-Code noted on 11/8/2018:   PT G-Codes  Functional Assessment Tool Used: Activities-Specific Balance Confidence Scale  Score: 41% average confidence; 59% impairment  Functional Limitation: Mobility: Walking and moving around  Mobility: Walking and Moving Around Current Status (): At least 40 percent but less than 60 percent impaired, limited or restricted  Mobility: Walking and Moving Around Goal Status (): At least 40 percent but less than 60 percent impaired, limited or restricted    G-Code noted on 12/6/2018:   PT G-Codes  Functional Assessment Tool Used: Activities-Specific Balance Confidenc Scale  Score: 43% average confidence: 57% impairment  Functional Limitation: Mobility: Walking and moving around  Mobility: Walking and Moving Around Current Status (): At least 40 percent but less than 60 percent impaired, limited or restricted  Mobility: Walking and Moving Around Goal Status (): At least 40 percent but less than 60 percent impaired, limited or restricted     Progress Note: [x]  Yes  []  No  Next due by: Visit 1/6/18      Subjective:  Pt states that she feels cold, stiff, and sore. Objective:  Observation:  Pt fatigues with NuStep.

## 2018-12-13 ENCOUNTER — HOSPITAL ENCOUNTER (OUTPATIENT)
Dept: PHYSICAL THERAPY | Age: 83
Setting detail: THERAPIES SERIES
Discharge: HOME | End: 2018-12-13
Payer: MEDICARE

## 2018-12-13 PROCEDURE — 97110 THERAPEUTIC EXERCISES: CPT | Performed by: PHYSICAL THERAPIST

## 2018-12-13 PROCEDURE — 97116 GAIT TRAINING THERAPY: CPT | Performed by: PHYSICAL THERAPIST

## 2018-12-13 NOTE — FLOWSHEET NOTE
Moving Around Current Status (): At least 60 percent but less than 80 percent impaired, limited or restricted  Mobility: Walking and Moving Around Goal Status (): At least 40 percent but less than 60 percent impaired, limited or restricted    G-Code noted on 10/11/2018:   PT G-Codes  Functional Assessment Tool Used: Activities-Specific Balance Confidence Scale  Score: 38% average confidence; 62% impairment  Functional Limitation: Mobility: Walking and moving around  Mobility: Walking and Moving Around Current Status (): At least 60 percent but less than 80 percent impaired, limited or restricted  Mobility: Walking and Moving Around Goal Status (): At least 40 percent but less than 60 percent impaired, limited or restricted      G-Code noted on 11/8/2018:   PT G-Codes  Functional Assessment Tool Used: Activities-Specific Balance Confidence Scale  Score: 41% average confidence; 59% impairment  Functional Limitation: Mobility: Walking and moving around  Mobility: Walking and Moving Around Current Status (): At least 40 percent but less than 60 percent impaired, limited or restricted  Mobility: Walking and Moving Around Goal Status (): At least 40 percent but less than 60 percent impaired, limited or restricted    G-Code noted on 12/6/2018:   PT G-Codes  Functional Assessment Tool Used: Activities-Specific Balance Confidenc Scale  Score: 43% average confidence: 57% impairment  Functional Limitation: Mobility: Walking and moving around  Mobility: Walking and Moving Around Current Status (): At least 40 percent but less than 60 percent impaired, limited or restricted  Mobility: Walking and Moving Around Goal Status (): At least 40 percent but less than 60 percent impaired, limited or restricted     Progress Note: [x]  Yes  []  No  Next due by: Visit 1/6/18      Subjective:  Pt reports that she feels much more energetic than yesterday.   Pt states that she is able to use her R \"arthritic\" hand more for writing, because she is not having to use it as much to support her body weight during walking, transfers, etc.          Objective:  Observation:   Pt able to ascend/descend 4 stairs x 2 reps. With a seated break between reps. Pt tolerates all exercises with fewer rest breaks this date. Test measurements:    TU.8 sec (18)   30 sec Chair Stand: 14 reps (18)    2 min march: 60 reps (18)      Exercises:  Exercise/Equipment Resistance/Repetitions Other comments   NuStep 10 min at level 3 avg 107 steps/min, verbal cues for steps/min, and coaching to maintain breathing. Sit to stand  Rest after 15 reps        Bilat Knee ext - seated 40 x bilat, 1# ankle weight Pt gives cues for soft landing with LLE, break at 20 reps on LLE   Ankle DF - seated  Ankle PF - seated     ADD Squeeze          Gait Training 120 ft x 1 with rolling walker  40 ft x 1 with rolling walker   Normalized gait pattern, verbal cues for posture. Pt improving with self-corrections   Toe Taps to 6\" step 10 reps, failed with LLE, missing top of step by about 1\". Marching in place 4 x 10 reps bilat    Stairs 4 stairs x 2 reps Rest break between reps   Step up onto 4\" step                 Other Therapeutic Activities:      Home Exercise Program:    18: Standing at counter, marching in place    Manual Treatments:  n/a    Modalities:  n/a    Timed Code Treatment Minutes:   GT x 9 min, TE x 45 min    Total Treatment Minutes:  54 min    Treatment/Activity Tolerance:  [x] Patient tolerated treatment well [] Patient limited by fatigue  [] Patient limited by pain  [] Patient limited by other medical complications  [] Other:     Assessment: Pt has good days and bad days, but overall is continuing to make significant improvements towards goals and with functional activities.       Prognosis: [] Good [x] Fair  [] Poor    Patient Requires Follow-up: [x] Yes  [] No    Goals:  Short term goals  Time Frame for

## 2018-12-18 ENCOUNTER — HOSPITAL ENCOUNTER (OUTPATIENT)
Dept: PHYSICAL THERAPY | Age: 83
Setting detail: THERAPIES SERIES
Discharge: HOME | End: 2018-12-18
Payer: MEDICARE

## 2018-12-18 PROCEDURE — 97116 GAIT TRAINING THERAPY: CPT | Performed by: PHYSICAL THERAPIST

## 2018-12-18 PROCEDURE — 97110 THERAPEUTIC EXERCISES: CPT | Performed by: PHYSICAL THERAPIST

## 2018-12-18 NOTE — FLOWSHEET NOTE
Physical Therapy Daily Treatment Note  Date:  2018     Patient Name:  Angelina Fields    :  1935  MRN: [id number]  Restrictions/Precautions:    Medical/Treatment Diagnosis Information:  · Diagnosis: Z91.81 (ICD-10-CM) - At high risk for falls; G35 (ICD-10-CM) - Multiple sclerosis, primary chronic progressive ([address]  · Treatment Diagnosis: Decreased Balance  Insurance/Certification information:  PT Insurance Information: [employer]  Physician Information:  Referring Practitioner: Joann Santiago MD Follow-up:   Plan of care signed (Y/N):  Yes  Visit# / total visits: 46/48 per updated POC through 18  Pain level: no rating given     G-Code noted on 2018:   PT G-Codes  Functional Assessment Tool Used: Activities Specific Balance Confidence (ABC) Scale  Score: 6.8% average confidence = 93.2% mobility impairment  Functional Limitation: Mobility: Walking and moving around  Mobility: Walking and Moving Around Current Status (): At least 80 percent but less than 100 percent impaired, limited or restricted  Mobility: Walking and Moving Around Goal Status (): At least 40 percent but less than 60 percent impaired, limited or restricted    G-Code noted on 2018:   PT G-Codes  Functional Assessment Tool Used: Activities-Specific Balance Confidence Scale  Score: 20.6% average confidence; 79.4% impairement  Functional Limitation: Mobility: Walking and moving around  Mobility: Walking and Moving Around Current Status (): At least 60 percent but less than 80 percent impaired, limited or restricted  Mobility: Walking and Moving Around Goal Status (): At least 40 percent but less than 60 percent impaired, limited or restricted     G-Code noted on 2018:   PT G-Codes  Functional Assessment Tool Used:  Activities-Specific Balance Confidence Scale  Score: 32% average confidence; 68% impairment  Functional Limitation: Mobility: Walking and moving around  Mobility: Walking and Objective:  Observation:   Pt fatigues with standing marches, requires cues for hip flexion on LLE  Test measurements:    TU.8 sec (18)   30 sec Chair Stand: 14 reps (18)    2 min march: 60 reps (18)      Exercises:  Exercise/Equipment Resistance/Repetitions Other comments   NuStep 10 min at level 3 avg 101 steps/min, verbal cues for steps/min, and coaching to maintain breathing. Sit to stand 10 x          Bilat Knee ext - seated 40 x bilat, 1.5# ankle weight Pt gives cues for soft landing with LLE, break at 20 reps on LLE   Ankle DF - seated  Ankle PF - seated     ADD Squeeze          Gait Training 120 ft x 1 with rolling walker  40 ft x 2 with rolling walker   Normalized gait pattern, verbal cues for posture. Pt improving with self-corrections        Marching in place 2 x 10 with 1.5# weights  3 x 10 no weights Pt fatigued with weights, required cues for hip flexion   Stairs 4 stairs x 2 reps Rest break between reps   Step up onto 4\" step                 Other Therapeutic Activities:      Home Exercise Program:    18: Standing at counter, marching in place    Manual Treatments:  n/a    Modalities:  n/a    Timed Code Treatment Minutes:   GT x 10 min, TE x 44 min    Total Treatment Minutes:  54 min    Treatment/Activity Tolerance:  [x] Patient tolerated treatment well [] Patient limited by fatigue  [] Patient limited by pain  [] Patient limited by other medical complications  [] Other:     Assessment: Pt has good days and bad days, but overall is continuing to make significant improvements towards goals and with functional activities. Prognosis: [] Good [x] Fair  [] Poor    Patient Requires Follow-up: [x] Yes  [] No    Goals:  Short term goals  Time Frame for Short term goals: within 3 weeks, patient will:  Short term goal 1: Improve TUG score from 107 seconds to 60 seconds or less to improve mobility to restroom.  MET  Short term goal 2: Improve 30 second chair stand test from 5 repetitions to 8 repetitions or more to improve safety and strength with transfers. MET  Short term goal 3: Improve 2 min step test from 30 repetitions to 45 repetitions or more to improve endurance with walking. Long term goals MET     Time Frame for Long term goals : within 6 weeks, patient will:  Long term goal 1: Improve TUG score from 107 seconds to 20 seconds or less to improve mobility around home. Not met  Long term goal 2: Improve 30 second chair stand test from 5 repetitions to 10 repetitions or more to improve safety and strength with transfers. MET  Long term goal 3: Improve 2 min step test from 30 repetitions to 60 repetitions or more to improve endurance with walking. MET  Long term goal 4: Improve ABC scale score from 93% impairment to 50% impairment or less to improve confidence with mobility. Partially met  Long term goal 5: Climb 5 steps to enter front entrance of patient's home. Partially met    Updated Goals (11/27/18): Within 4 weeks, patient will:  1: Perform TUG 20 seconds or less with RW - NOT MET  2. Perform 12 repetitions on the 30 second chair stand test  - MET  3. Perform 70 repetitions on the 2 minute step test - NOT MET  4. Score less than 50% impairment on the ABC Scale - NOT MET  5. Climb 8 stairs with bilat handrails. - NOT MET    Plan:   [x] Continue per plan of care [] Alter current plan (see comments)  [] Plan of care initiated [] Hold pending MD visit [] Discharge    Plan for Next Session:  Continue to progress towards updated goals.     Electronically signed by: , PT [id number]       Mirza Mary

## 2018-12-20 ENCOUNTER — HOSPITAL ENCOUNTER (OUTPATIENT)
Dept: PHYSICAL THERAPY | Age: 83
Setting detail: THERAPIES SERIES
Discharge: HOME | End: 2018-12-20
Payer: MEDICARE

## 2018-12-20 PROCEDURE — 97116 GAIT TRAINING THERAPY: CPT | Performed by: PHYSICAL THERAPIST

## 2018-12-20 PROCEDURE — 97110 THERAPEUTIC EXERCISES: CPT | Performed by: PHYSICAL THERAPIST

## 2018-12-20 NOTE — FLOWSHEET NOTE
Moving Around Current Status (): At least 60 percent but less than 80 percent impaired, limited or restricted  Mobility: Walking and Moving Around Goal Status (): At least 40 percent but less than 60 percent impaired, limited or restricted    G-Code noted on 10/11/2018:   PT G-Codes  Functional Assessment Tool Used: Activities-Specific Balance Confidence Scale  Score: 38% average confidence; 62% impairment  Functional Limitation: Mobility: Walking and moving around  Mobility: Walking and Moving Around Current Status (): At least 60 percent but less than 80 percent impaired, limited or restricted  Mobility: Walking and Moving Around Goal Status (): At least 40 percent but less than 60 percent impaired, limited or restricted      G-Code noted on 11/8/2018:   PT G-Codes  Functional Assessment Tool Used: Activities-Specific Balance Confidence Scale  Score: 41% average confidence; 59% impairment  Functional Limitation: Mobility: Walking and moving around  Mobility: Walking and Moving Around Current Status (): At least 40 percent but less than 60 percent impaired, limited or restricted  Mobility: Walking and Moving Around Goal Status (): At least 40 percent but less than 60 percent impaired, limited or restricted    G-Code noted on 12/6/2018:   PT G-Codes  Functional Assessment Tool Used: Activities-Specific Balance Confidenc Scale  Score: 43% average confidence: 57% impairment  Functional Limitation: Mobility: Walking and moving around  Mobility: Walking and Moving Around Current Status (): At least 40 percent but less than 60 percent impaired, limited or restricted  Mobility: Walking and Moving Around Goal Status ():  At least 40 percent but less than 60 percent impaired, limited or restricted     Progress Note: [x]  Yes  []  No  Next due by: Visit 1/6/18      Subjective:  Pt states that she is feeling good today         Objective:  Observation:   Pt fatigues with standing marches, more to improve safety and strength with transfers. MET  Short term goal 3: Improve 2 min step test from 30 repetitions to 45 repetitions or more to improve endurance with walking. Long term goals MET     Time Frame for Long term goals : within 6 weeks, patient will:  Long term goal 1: Improve TUG score from 107 seconds to 20 seconds or less to improve mobility around home. Not met  Long term goal 2: Improve 30 second chair stand test from 5 repetitions to 10 repetitions or more to improve safety and strength with transfers. MET  Long term goal 3: Improve 2 min step test from 30 repetitions to 60 repetitions or more to improve endurance with walking. MET  Long term goal 4: Improve ABC scale score from 93% impairment to 50% impairment or less to improve confidence with mobility. Partially met  Long term goal 5: Climb 5 steps to enter front entrance of patient's home. Partially met    Updated Goals (11/27/18): Within 4 weeks, patient will:  1: Perform TUG 20 seconds or less with RW - NOT MET  2. Perform 12 repetitions on the 30 second chair stand test  - MET  3. Perform 70 repetitions on the 2 minute step test - NOT MET  4. Score less than 50% impairment on the ABC Scale - NOT MET  5. Climb 8 stairs with bilat handrails. - NOT MET    Plan:   [x] Continue per plan of care [] Alter current plan (see comments)  [] Plan of care initiated [] Hold pending MD visit [] Discharge    Plan for Next Session:  Continue to progress towards updated goals.     Electronically signed by: , PT [id number]       Rodrigo Guerrero

## 2018-12-26 ENCOUNTER — PATIENT MESSAGE (OUTPATIENT)
Dept: PRIMARY CARE CLINIC | Age: 83
End: 2018-12-26

## 2018-12-26 ENCOUNTER — TELEPHONE (OUTPATIENT)
Dept: PRIMARY CARE CLINIC | Age: 83
End: 2018-12-26

## 2018-12-26 RX ORDER — LIDOCAINE 50 MG/G
OINTMENT TOPICAL
Qty: 10 G | Refills: 5 | Status: SHIPPED | OUTPATIENT
Start: 2018-12-26 | End: 2019-08-07

## 2018-12-26 RX ORDER — LIDOCAINE 50 MG/G
OINTMENT TOPICAL
Qty: 35.44 G | Refills: 3 | Status: CANCELLED | OUTPATIENT
Start: 2018-12-26

## 2018-12-28 ENCOUNTER — TELEPHONE (OUTPATIENT)
Dept: PRIMARY CARE CLINIC | Age: 83
End: 2018-12-28

## 2018-12-28 DIAGNOSIS — R30.0 DYSURIA: ICD-10-CM

## 2018-12-28 DIAGNOSIS — R30.0 DYSURIA: Primary | ICD-10-CM

## 2018-12-28 LAB
BACTERIA: ABNORMAL /HPF
BILIRUBIN URINE: NEGATIVE
BLOOD, URINE: ABNORMAL
CLARITY: ABNORMAL
COLOR: YELLOW
EPITHELIAL CELLS, UA: 1 /HPF (ref 0–5)
GLUCOSE URINE: NEGATIVE MG/DL
HYALINE CASTS: 0 /LPF (ref 0–8)
KETONES, URINE: NEGATIVE MG/DL
LEUKOCYTE ESTERASE, URINE: ABNORMAL
MICROSCOPIC EXAMINATION: YES
NITRITE, URINE: POSITIVE
PH UA: 6
PROTEIN UA: 30 MG/DL
RBC UA: 6 /HPF (ref 0–4)
SPECIFIC GRAVITY UA: 1.01
URINE TYPE: ABNORMAL
UROBILINOGEN, URINE: 0.2 E.U./DL
WBC UA: >900 /HPF (ref 0–5)

## 2018-12-29 RX ORDER — SULFAMETHOXAZOLE AND TRIMETHOPRIM 800; 160 MG/1; MG/1
0.5 TABLET ORAL 2 TIMES DAILY
Qty: 10 TABLET | Refills: 0 | Status: SHIPPED | OUTPATIENT
Start: 2018-12-29 | End: 2019-01-08

## 2018-12-31 LAB
ORGANISM: ABNORMAL
URINE CULTURE, ROUTINE: ABNORMAL
URINE CULTURE, ROUTINE: ABNORMAL

## 2019-01-02 RX ORDER — PANTOPRAZOLE SODIUM 40 MG/1
TABLET, DELAYED RELEASE ORAL
Qty: 30 TABLET | Refills: 3 | Status: SHIPPED | OUTPATIENT
Start: 2019-01-02 | End: 2019-02-06 | Stop reason: SDUPTHER

## 2019-01-03 ENCOUNTER — HOSPITAL ENCOUNTER (OUTPATIENT)
Dept: PHYSICAL THERAPY | Age: 84
Setting detail: THERAPIES SERIES
Discharge: HOME | End: 2019-01-03
Payer: MEDICARE

## 2019-01-03 PROCEDURE — 97530 THERAPEUTIC ACTIVITIES: CPT | Performed by: PHYSICAL THERAPIST

## 2019-01-03 PROCEDURE — 97110 THERAPEUTIC EXERCISES: CPT | Performed by: PHYSICAL THERAPIST

## 2019-01-03 PROCEDURE — 97116 GAIT TRAINING THERAPY: CPT | Performed by: PHYSICAL THERAPIST

## 2019-01-11 ENCOUNTER — TELEPHONE (OUTPATIENT)
Dept: PRIMARY CARE CLINIC | Age: 84
End: 2019-01-11

## 2019-01-11 DIAGNOSIS — R35.0 URINARY FREQUENCY: ICD-10-CM

## 2019-01-11 DIAGNOSIS — R19.7 DIARRHEA, UNSPECIFIED TYPE: Primary | ICD-10-CM

## 2019-01-17 ENCOUNTER — TELEPHONE (OUTPATIENT)
Dept: PRIMARY CARE CLINIC | Age: 84
End: 2019-01-17

## 2019-01-17 DIAGNOSIS — R35.0 URINARY FREQUENCY: Primary | ICD-10-CM

## 2019-02-06 ENCOUNTER — OFFICE VISIT (OUTPATIENT)
Dept: PRIMARY CARE CLINIC | Age: 84
End: 2019-02-06
Payer: MEDICARE

## 2019-02-06 VITALS
BODY MASS INDEX: 32.07 KG/M2 | HEART RATE: 70 BPM | RESPIRATION RATE: 18 BRPM | WEIGHT: 181 LBS | DIASTOLIC BLOOD PRESSURE: 65 MMHG | OXYGEN SATURATION: 98 % | SYSTOLIC BLOOD PRESSURE: 138 MMHG | TEMPERATURE: 96.9 F

## 2019-02-06 DIAGNOSIS — E55.9 VITAMIN D DEFICIENCY: ICD-10-CM

## 2019-02-06 DIAGNOSIS — K21.9 GASTROESOPHAGEAL REFLUX DISEASE WITHOUT ESOPHAGITIS: ICD-10-CM

## 2019-02-06 DIAGNOSIS — E11.21 DIABETIC NEPHROPATHY ASSOCIATED WITH TYPE 2 DIABETES MELLITUS (HCC): ICD-10-CM

## 2019-02-06 DIAGNOSIS — E03.4 HYPOTHYROIDISM DUE TO ACQUIRED ATROPHY OF THYROID: ICD-10-CM

## 2019-02-06 DIAGNOSIS — E11.8 TYPE 2 DIABETES MELLITUS WITH COMPLICATION, WITH LONG-TERM CURRENT USE OF INSULIN (HCC): ICD-10-CM

## 2019-02-06 DIAGNOSIS — R35.0 URINARY FREQUENCY: Primary | ICD-10-CM

## 2019-02-06 DIAGNOSIS — Z79.4 TYPE 2 DIABETES MELLITUS WITH COMPLICATION, WITH LONG-TERM CURRENT USE OF INSULIN (HCC): ICD-10-CM

## 2019-02-06 DIAGNOSIS — Z12.31 ENCOUNTER FOR SCREENING MAMMOGRAM FOR BREAST CANCER: ICD-10-CM

## 2019-02-06 DIAGNOSIS — E21.0 PRIMARY HYPERPARATHYROIDISM (HCC): ICD-10-CM

## 2019-02-06 DIAGNOSIS — G35 MULTIPLE SCLEROSIS (HCC): ICD-10-CM

## 2019-02-06 DIAGNOSIS — Z12.11 COLON CANCER SCREENING: ICD-10-CM

## 2019-02-06 DIAGNOSIS — Z86.711 HISTORY OF PULMONARY EMBOLISM: ICD-10-CM

## 2019-02-06 LAB
A/G RATIO: 1.3 (ref 1.1–2.2)
ALBUMIN SERPL-MCNC: 3.8 G/DL (ref 3.4–5)
ALP BLD-CCNC: 70 U/L (ref 40–129)
ALT SERPL-CCNC: 16 U/L (ref 10–40)
ANION GAP SERPL CALCULATED.3IONS-SCNC: 15 MMOL/L (ref 3–16)
AST SERPL-CCNC: 16 U/L (ref 15–37)
BILIRUB SERPL-MCNC: 0.3 MG/DL (ref 0–1)
BUN BLDV-MCNC: 43 MG/DL (ref 7–20)
CALCIUM SERPL-MCNC: 10.8 MG/DL (ref 8.3–10.6)
CHLORIDE BLD-SCNC: 104 MMOL/L (ref 99–110)
CO2: 20 MMOL/L (ref 21–32)
CREAT SERPL-MCNC: 1 MG/DL (ref 0.6–1.2)
GFR AFRICAN AMERICAN: >60
GFR NON-AFRICAN AMERICAN: 53
GLOBULIN: 2.9 G/DL
GLUCOSE BLD-MCNC: 90 MG/DL (ref 70–99)
POTASSIUM SERPL-SCNC: 4.2 MMOL/L (ref 3.5–5.1)
SODIUM BLD-SCNC: 139 MMOL/L (ref 136–145)
TOTAL PROTEIN: 6.7 G/DL (ref 6.4–8.2)
TSH REFLEX FT4: 2.14 UIU/ML (ref 0.27–4.2)
VITAMIN D 25-HYDROXY: 58.3 NG/ML

## 2019-02-06 PROCEDURE — 99214 OFFICE O/P EST MOD 30 MIN: CPT | Performed by: INTERNAL MEDICINE

## 2019-02-06 RX ORDER — FLASH GLUCOSE SENSOR
1 KIT MISCELLANEOUS
Qty: 1 DEVICE | Refills: 5 | Status: SHIPPED | OUTPATIENT
Start: 2019-02-06 | End: 2019-03-13 | Stop reason: SDUPTHER

## 2019-02-06 RX ORDER — FLASH GLUCOSE SENSOR
1 KIT MISCELLANEOUS
Qty: 1 EACH | Refills: 5 | Status: SHIPPED | OUTPATIENT
Start: 2019-02-06 | End: 2019-03-13 | Stop reason: SDUPTHER

## 2019-02-06 RX ORDER — PANTOPRAZOLE SODIUM 40 MG/1
TABLET, DELAYED RELEASE ORAL
Qty: 30 TABLET | Refills: 3 | Status: SHIPPED | OUTPATIENT
Start: 2019-02-06 | End: 2019-07-02 | Stop reason: SDUPTHER

## 2019-02-06 RX ORDER — FLASH GLUCOSE SENSOR
1 KIT MISCELLANEOUS
Qty: 2 EACH | Refills: 11 | Status: SHIPPED | OUTPATIENT
Start: 2019-02-06 | End: 2019-03-13 | Stop reason: SDUPTHER

## 2019-02-06 RX ORDER — FLASH GLUCOSE SCANNING READER
1 EACH MISCELLANEOUS
Qty: 2 DEVICE | Refills: 5 | Status: SHIPPED | OUTPATIENT
Start: 2019-02-06 | End: 2019-03-13 | Stop reason: SDUPTHER

## 2019-02-06 ASSESSMENT — PATIENT HEALTH QUESTIONNAIRE - PHQ9
1. LITTLE INTEREST OR PLEASURE IN DOING THINGS: 0
SUM OF ALL RESPONSES TO PHQ QUESTIONS 1-9: 0
SUM OF ALL RESPONSES TO PHQ9 QUESTIONS 1 & 2: 0
SUM OF ALL RESPONSES TO PHQ QUESTIONS 1-9: 0
2. FEELING DOWN, DEPRESSED OR HOPELESS: 0

## 2019-02-07 DIAGNOSIS — E21.0 PRIMARY HYPERPARATHYROIDISM (HCC): Primary | ICD-10-CM

## 2019-02-07 LAB
ESTIMATED AVERAGE GLUCOSE: 102.5 MG/DL
HBA1C MFR BLD: 5.2 %

## 2019-02-08 DIAGNOSIS — R35.0 URINARY FREQUENCY: ICD-10-CM

## 2019-02-08 LAB
BACTERIA: ABNORMAL /HPF
BILIRUBIN URINE: NEGATIVE
BLOOD, URINE: NEGATIVE
CLARITY: CLEAR
COLOR: YELLOW
CREATININE URINE: 28.6 MG/DL (ref 28–259)
EPITHELIAL CELLS, UA: 0 /HPF (ref 0–5)
GLUCOSE URINE: NEGATIVE MG/DL
HYALINE CASTS: 0 /LPF (ref 0–8)
KETONES, URINE: NEGATIVE MG/DL
LEUKOCYTE ESTERASE, URINE: ABNORMAL
MICROALBUMIN UR-MCNC: 2.4 MG/DL
MICROALBUMIN/CREAT UR-RTO: 83.9 MG/G (ref 0–30)
MICROSCOPIC EXAMINATION: YES
NITRITE, URINE: NEGATIVE
PH UA: 8.5
PROTEIN UA: NEGATIVE MG/DL
RBC UA: 0 /HPF (ref 0–4)
SPECIFIC GRAVITY UA: 1.01
URINE TYPE: ABNORMAL
UROBILINOGEN, URINE: 0.2 E.U./DL
WBC UA: 26 /HPF (ref 0–5)

## 2019-02-10 DIAGNOSIS — N30.90 CYSTITIS: Primary | ICD-10-CM

## 2019-02-10 LAB
ORGANISM: ABNORMAL
URINE CULTURE, ROUTINE: ABNORMAL
URINE CULTURE, ROUTINE: ABNORMAL

## 2019-02-10 RX ORDER — SULFAMETHOXAZOLE AND TRIMETHOPRIM 800; 160 MG/1; MG/1
0.5 TABLET ORAL 2 TIMES DAILY
Qty: 10 TABLET | Refills: 0 | Status: SHIPPED | OUTPATIENT
Start: 2019-02-10 | End: 2019-02-20

## 2019-02-17 ASSESSMENT — ENCOUNTER SYMPTOMS
BLURRED VISION: 0
RESPIRATORY NEGATIVE: 1
SINUS PRESSURE: 0
DIARRHEA: 0
ABDOMINAL PAIN: 0
RHINORRHEA: 0
PHOTOPHOBIA: 0
CONSTIPATION: 0
VISUAL CHANGE: 0
GASTROINTESTINAL NEGATIVE: 1
NAUSEA: 0
TROUBLE SWALLOWING: 0
BLOOD IN STOOL: 0
VOMITING: 0

## 2019-02-17 ASSESSMENT — PATIENT HEALTH QUESTIONNAIRE - PHQ9
1. LITTLE INTEREST OR PLEASURE IN DOING THINGS: 0
2. FEELING DOWN, DEPRESSED OR HOPELESS: 0
SUM OF ALL RESPONSES TO PHQ9 QUESTIONS 1 & 2: 0
SUM OF ALL RESPONSES TO PHQ QUESTIONS 1-9: 0
SUM OF ALL RESPONSES TO PHQ QUESTIONS 1-9: 0

## 2019-02-22 DIAGNOSIS — I10 ESSENTIAL HYPERTENSION: ICD-10-CM

## 2019-02-23 RX ORDER — VERAPAMIL HYDROCHLORIDE 240 MG/1
TABLET, FILM COATED, EXTENDED RELEASE ORAL
Qty: 30 TABLET | Refills: 2 | Status: SHIPPED | OUTPATIENT
Start: 2019-02-23 | End: 2019-03-08

## 2019-03-08 DIAGNOSIS — I10 ESSENTIAL HYPERTENSION: ICD-10-CM

## 2019-03-08 DIAGNOSIS — R30.0 DYSURIA: ICD-10-CM

## 2019-03-08 DIAGNOSIS — R30.0 DYSURIA: Primary | ICD-10-CM

## 2019-03-08 LAB
BILIRUBIN URINE: NEGATIVE
BLOOD, URINE: NEGATIVE
CLARITY: ABNORMAL
COLOR: YELLOW
EPITHELIAL CELLS, UA: 1 /HPF (ref 0–5)
GLUCOSE URINE: NEGATIVE MG/DL
HYALINE CASTS: 1 /LPF (ref 0–8)
KETONES, URINE: NEGATIVE MG/DL
LEUKOCYTE ESTERASE, URINE: ABNORMAL
MICROSCOPIC EXAMINATION: YES
NITRITE, URINE: NEGATIVE
PH UA: 6 (ref 5–8)
PROTEIN UA: NEGATIVE MG/DL
RBC UA: 1 /HPF (ref 0–4)
SPECIFIC GRAVITY UA: 1.01 (ref 1–1.03)
URINE TYPE: ABNORMAL
UROBILINOGEN, URINE: 0.2 E.U./DL
WBC UA: 85 /HPF (ref 0–5)

## 2019-03-08 RX ORDER — CIPROFLOXACIN 250 MG/1
250 TABLET, FILM COATED ORAL 2 TIMES DAILY
Qty: 20 TABLET | Refills: 0 | Status: SHIPPED | OUTPATIENT
Start: 2019-03-08 | End: 2019-03-18

## 2019-03-08 RX ORDER — VERAPAMIL HYDROCHLORIDE 240 MG/1
TABLET, FILM COATED, EXTENDED RELEASE ORAL
Qty: 30 TABLET | Refills: 5 | Status: SHIPPED | OUTPATIENT
Start: 2019-03-08 | End: 2019-10-23

## 2019-03-10 DIAGNOSIS — Z86.711 HISTORY OF PULMONARY EMBOLISM: ICD-10-CM

## 2019-03-10 LAB
ORGANISM: ABNORMAL
ORGANISM: ABNORMAL
URINE CULTURE, ROUTINE: ABNORMAL

## 2019-03-11 RX ORDER — APIXABAN 5 MG/1
5 TABLET, FILM COATED ORAL 2 TIMES DAILY
Qty: 60 TABLET | Refills: 2 | Status: SHIPPED | OUTPATIENT
Start: 2019-03-11 | End: 2019-04-03 | Stop reason: SDUPTHER

## 2019-03-19 RX ORDER — SITAGLIPTIN 50 MG/1
TABLET, FILM COATED ORAL
Qty: 30 TABLET | Refills: 6 | Status: SHIPPED | OUTPATIENT
Start: 2019-03-19 | End: 2019-06-20

## 2019-03-29 DIAGNOSIS — N30.00 ACUTE CYSTITIS WITHOUT HEMATURIA: Primary | ICD-10-CM

## 2019-03-29 RX ORDER — NITROFURANTOIN 25; 75 MG/1; MG/1
100 CAPSULE ORAL 2 TIMES DAILY
Qty: 14 CAPSULE | Refills: 0 | Status: SHIPPED | OUTPATIENT
Start: 2019-03-29 | End: 2019-07-11 | Stop reason: SDUPTHER

## 2019-04-03 ENCOUNTER — TELEPHONE (OUTPATIENT)
Dept: PRIMARY CARE CLINIC | Age: 84
End: 2019-04-03

## 2019-04-03 DIAGNOSIS — Z86.711 HISTORY OF PULMONARY EMBOLISM: ICD-10-CM

## 2019-04-04 DIAGNOSIS — N18.30 CKD (CHRONIC KIDNEY DISEASE) STAGE 3, GFR 30-59 ML/MIN (HCC): Primary | ICD-10-CM

## 2019-04-04 DIAGNOSIS — N39.41 URGE INCONTINENCE: ICD-10-CM

## 2019-04-05 RX ORDER — KETOCONAZOLE 20 MG/G
CREAM TOPICAL
Qty: 30 G | Refills: 1 | Status: SHIPPED | OUTPATIENT
Start: 2019-04-05 | End: 2019-04-17 | Stop reason: SDUPTHER

## 2019-04-11 ENCOUNTER — HOSPITAL ENCOUNTER (OUTPATIENT)
Dept: ULTRASOUND IMAGING | Age: 84
Discharge: HOME | End: 2019-04-11
Payer: MEDICARE

## 2019-04-11 DIAGNOSIS — N18.30 CKD (CHRONIC KIDNEY DISEASE) STAGE 3, GFR 30-59 ML/MIN (HCC): ICD-10-CM

## 2019-04-11 PROCEDURE — 76770 US EXAM ABDO BACK WALL COMP: CPT

## 2019-04-14 DIAGNOSIS — R33.9 URINARY RETENTION WITH INCOMPLETE BLADDER EMPTYING: Primary | ICD-10-CM

## 2019-04-17 RX ORDER — KETOCONAZOLE 20 MG/G
CREAM TOPICAL
Qty: 120 G | Refills: 5 | Status: SHIPPED | OUTPATIENT
Start: 2019-04-17 | End: 2019-08-07 | Stop reason: SDUPTHER

## 2019-05-08 ENCOUNTER — OFFICE VISIT (OUTPATIENT)
Dept: PRIMARY CARE CLINIC | Age: 84
End: 2019-05-08
Payer: MEDICARE

## 2019-05-08 VITALS
DIASTOLIC BLOOD PRESSURE: 64 MMHG | TEMPERATURE: 97 F | WEIGHT: 176.4 LBS | HEIGHT: 63 IN | SYSTOLIC BLOOD PRESSURE: 135 MMHG | BODY MASS INDEX: 31.25 KG/M2 | OXYGEN SATURATION: 100 % | HEART RATE: 60 BPM

## 2019-05-08 DIAGNOSIS — E03.4 HYPOTHYROIDISM DUE TO ACQUIRED ATROPHY OF THYROID: ICD-10-CM

## 2019-05-08 DIAGNOSIS — Z79.4 TYPE 2 DIABETES MELLITUS WITH COMPLICATION, WITH LONG-TERM CURRENT USE OF INSULIN (HCC): ICD-10-CM

## 2019-05-08 DIAGNOSIS — E11.8 TYPE 2 DIABETES MELLITUS WITH COMPLICATION, WITH LONG-TERM CURRENT USE OF INSULIN (HCC): ICD-10-CM

## 2019-05-08 DIAGNOSIS — R33.9 URINARY RETENTION: ICD-10-CM

## 2019-05-08 DIAGNOSIS — I10 ESSENTIAL HYPERTENSION: Primary | ICD-10-CM

## 2019-05-08 LAB
ALBUMIN SERPL-MCNC: 4.1 G/DL (ref 3.4–5)
ANION GAP SERPL CALCULATED.3IONS-SCNC: 14 MMOL/L (ref 3–16)
BILIRUBIN URINE: NEGATIVE
BLOOD, URINE: NEGATIVE
BUN BLDV-MCNC: 38 MG/DL (ref 7–20)
CALCIUM SERPL-MCNC: 10.4 MG/DL (ref 8.3–10.6)
CHLORIDE BLD-SCNC: 101 MMOL/L (ref 99–110)
CHOLESTEROL, TOTAL: 149 MG/DL (ref 0–199)
CLARITY: CLEAR
CO2: 21 MMOL/L (ref 21–32)
COLOR: YELLOW
CREAT SERPL-MCNC: 0.8 MG/DL (ref 0.6–1.2)
CREATININE URINE: 54.5 MG/DL (ref 28–259)
GFR AFRICAN AMERICAN: >60
GFR NON-AFRICAN AMERICAN: >60
GLUCOSE BLD-MCNC: 101 MG/DL (ref 70–99)
GLUCOSE URINE: NEGATIVE MG/DL
HDLC SERPL-MCNC: 63 MG/DL (ref 40–60)
KETONES, URINE: NEGATIVE MG/DL
LDL CHOLESTEROL CALCULATED: 71 MG/DL
LEUKOCYTE ESTERASE, URINE: NEGATIVE
MICROALBUMIN UR-MCNC: 3.9 MG/DL
MICROALBUMIN/CREAT UR-RTO: 71.6 MG/G (ref 0–30)
MICROSCOPIC EXAMINATION: NORMAL
NITRITE, URINE: NEGATIVE
PH UA: 6 (ref 5–8)
PHOSPHORUS: 2.3 MG/DL (ref 2.5–4.9)
POTASSIUM SERPL-SCNC: 4.3 MMOL/L (ref 3.5–5.1)
PROTEIN UA: NEGATIVE MG/DL
SODIUM BLD-SCNC: 136 MMOL/L (ref 136–145)
SPECIFIC GRAVITY UA: 1.01 (ref 1–1.03)
TRIGL SERPL-MCNC: 75 MG/DL (ref 0–150)
TSH REFLEX FT4: 2.06 UIU/ML (ref 0.27–4.2)
URINE TYPE: NORMAL
UROBILINOGEN, URINE: 0.2 E.U./DL
VLDLC SERPL CALC-MCNC: 15 MG/DL

## 2019-05-08 PROCEDURE — 99214 OFFICE O/P EST MOD 30 MIN: CPT | Performed by: INTERNAL MEDICINE

## 2019-05-08 ASSESSMENT — ENCOUNTER SYMPTOMS
GASTROINTESTINAL NEGATIVE: 1
RHINORRHEA: 0
RESPIRATORY NEGATIVE: 1
BLOOD IN STOOL: 0
VOMITING: 0
ORTHOPNEA: 0
SHORTNESS OF BREATH: 0
ABDOMINAL PAIN: 0
NAUSEA: 0
CONSTIPATION: 0
DIARRHEA: 0
SINUS PRESSURE: 0
BLURRED VISION: 0
PHOTOPHOBIA: 0
TROUBLE SWALLOWING: 0

## 2019-05-08 NOTE — PROGRESS NOTES
2019     Solomon Karimi (:  1935) is a 82 y.o. female, here for evaluation of the following medical concerns:    Urinary Frequency    This is a chronic (renal us with 149 ml retention) problem. The problem has been unchanged. The quality of the pain is described as aching. The pain is at a severity of 2/10. The pain is mild. There has been no fever. She is not sexually active. There is no history of pyelonephritis. Associated symptoms include frequency. Pertinent negatives include no chills, discharge, flank pain, hematuria, hesitancy, nausea, possible pregnancy, sweats, urgency or vomiting. She has tried nothing (patient declined urecholine and urinary cath tid.) for the symptoms. The treatment provided no relief. Her past medical history is significant for recurrent UTIs. There is no history of catheterization. Diabetes   She presents for her follow-up diabetic visit. She has type 2 diabetes mellitus. Her disease course has been stable. There are no hypoglycemic associated symptoms. Pertinent negatives for hypoglycemia include no confusion, dizziness, headaches, nervousness/anxiousness, pallor, seizures, speech difficulty or sweats. There are no diabetic associated symptoms. Pertinent negatives for diabetes include no blurred vision, no chest pain, no fatigue, no polydipsia, no polyphagia, no polyuria and no weakness. There are no hypoglycemic complications. Symptoms are stable. There are no diabetic complications. Pertinent negatives for diabetic complications include no CVA, PVD or retinopathy. There are no known risk factors for coronary artery disease. Current diabetic treatments: 18-19 units lantus q hs. januvia 50 mg qd. She is compliant with treatment all of the time. Her weight is decreasing steadily. When asked about meal planning, she reported none. She has not had a previous visit with a dietitian. Her breakfast blood glucose range is generally  mg/dl.  She does not see a flank pain, hematuria, hesitancy, pelvic pain, urgency, vaginal bleeding, vaginal discharge and vaginal pain. Scared to take ditropan after reading side effects   Musculoskeletal: Positive for gait problem and joint swelling. Negative for neck pain. Ambulating with walker. Multiple sclerosis symptoms have been stable. No progression in years. Skin: Negative for pallor and rash. Allergic/Immunologic: Negative for environmental allergies, food allergies and immunocompromised state. Neurological: Negative for dizziness, seizures, syncope, speech difficulty, weakness, light-headedness, numbness and headaches. Hematological: Negative. Negative for adenopathy. Does not bruise/bleed easily. 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 Psychiatric/Behavioral: Negative for agitation, confusion, decreased concentration and sleep disturbance. The patient is not nervous/anxious. Sleep disturbance related to frequent urination       Prior to Visit Medications    Medication Sig Taking? Authorizing Provider   ketoconazole (NIZORAL) 2 % cream Apply topically daily. Yes Loki Martin MD   apixaban (ELIQUIS) 5 MG TABS tablet Take 1 tablet by mouth 2 times daily Stop coumadin Yes Loki Martin MD   JANUVIA 50 MG tablet TAKE 1 TABLET BY MOUTH DAILY.  Yes Loki Martin MD   Continuous Blood Gluc  (FREESTYLE SHIRLEY 14 DAY READER) JOCY 1 each by Does not apply route every 14 days DX: E11.8 diabetes Yes Loki Martin MD   Continuous Blood Gluc Sensor (FREESTYLE SHIRLEY 14 DAY SENSOR) MISC 1 each by Does not apply route every 14 days DX: E11.8 diabetes Yes Loki Martin MD   Continuous Blood Gluc  (FREESTYLE SHIRLEY READER) [address] 1 each by Does not apply route 4 times daily (before meals and nightly) DX: E11.8 diabetes Yes Loki Martin MD   Continuous Blood Gluc Sensor Rudolph Hampton 68Y MISC USE AS DIRECTED FOUR TIMES A DAY Yes Emeterio Hugo MD   blood glucose test strips (ONE TOUCH ULTRA TEST) strip 1 each by In Vitro route 4 times daily (before meals and nightly) E11.8 is ICD 10 code Yes Emeterio Hugo MD   montelukast (SINGULAIR) 10 MG tablet TAKE 1 TABLET BY MOUTH AT BEDTIME Yes Andrew Crum MD   Blood Glucose Monitoring Suppl (ONE TOUCH ULTRA 2) w/Device KIT 1 kit by Does not apply route 4 times daily (with meals and nightly) May substitute with covered brand. Ell.8 is ICD 10 code Yes Andrew Crum MD   Blood Glucose Monitoring Suppl ([address]) w/Device KIT 1 kit by Does not apply route daily Yes Andrew Crum MD   Elastic Bandages & Supports (MEDICAL COMPRESSION STOCKINGS) [address] 2 each by Does not apply route daily 40 mm hg thigh high Yes Andrew Crum MD   Spacer/Aero-Holding Chambers (AEROCHAMBER MV) MISC 1 each by Does not apply route 2 times daily Yes Andrew Crum MD   Lift Chair MISC by Does not apply route Needs a lift chair due to mutiple sclerosis. Needs assistance getting out of chair, but once out of chair can ambulate independently with walker or cane. Yes Andrew Crum MD   cholestyramine Yovani Showman) 4 G packet Take 2 packets by mouth 2 times daily Yes Andrew Crum MD   Incontinence Supply Disposable (PALLAVI SERENITY BRIEFS LARGE) MISC 1 each by Does not apply route three times daily Large to extra large. Give Pallavi underwear protections. Daughter will select the brand. Yes Andrew Crum MD   CREON 26248 UNITS per capsule TAKE 3 CAPSULES BY MOUTH 3 TIMES DAILY (WITH MEALS). Yes Andrew Crum MD   insulin lispro (HUMALOG KWIKPEN) 100 UNIT/ML pen Inject 2-5 Units into the skin 3 times daily (with meals) Yes Andrew Crum MD   fluocinonide (LIDEX) 0.05 % cream Apply topically 2 times daily.  Yes Andrew Crum MD   Incontinence Supplies (BEDPAN) MISC Sorry, but \"1 bottle\" is the only way it will allow us to order this bedpan. Yes Jose Villalobos MD   vitamin D (CHOLECALCIFEROL) 1000 UNIT TABS tablet Take 3 tablets by mouth daily. Yes Jose Villalobos MD   loratadine (CLARITIN) 10 MG tablet Take 1 tablet by mouth daily. Yes Jose Villalobos MD   Biotin 1000 MCG TABS Take  by mouth daily. Yes Historical Provider, MD   ALBUTEROL 90 mcg by Does not apply route. Yes Historical Provider, MD   Multiple Vitamins-Minerals (CENTRUM SILVER PO) Take  by mouth. Yes Historical Provider, MD   Lancet Devices (ACCU-CHEK SOFTCLIX LANCET DEV) by Does not apply route 2 times daily as needed. Yes Historical Provider, MD        Social History     Tobacco Use    Smoking status: Never Smoker    Smokeless tobacco: Never Used   Substance Use Topics    Alcohol use: No        Vitals:    05/08/19 1037   BP: 135/64   Pulse: 60   Temp: 97 °F (36.1 °C)   TempSrc: Oral   SpO2: 100%   Weight: 176 lb 6.4 oz (80 kg)   Height: 5' 3\" (1.6 m)     Estimated body mass index is 31.25 kg/m² as calculated from the following:    Height as of this encounter: 5' 3\" (1.6 m). Weight as of this encounter: 176 lb 6.4 oz (80 kg). Physical Exam   Constitutional: She is oriented to person, place, and time. She appears well-developed and well-nourished. HENT:   Head: Normocephalic and atraumatic. Right Ear: External ear normal.   Left Ear: External ear normal.   Nose: Nose normal.   Mouth/Throat: Oropharynx is clear and moist. No oropharyngeal exudate. Spasm in  Neck with limited range of motion   Eyes: Pupils are equal, round, and reactive to light. Conjunctivae and EOM are normal. Right eye exhibits no discharge. Left eye exhibits no discharge. Neck: Normal range of motion. Neck supple. No JVD present. No tracheal deviation present. No thyromegaly present. Cardiovascular: Normal rate, regular rhythm, normal heart sounds and intact distal pulses.    BLE +2 Pulmonary/Chest: Effort normal. No respiratory distress. She has no wheezes. She has no rales. She exhibits no tenderness. Abdominal: Soft. Bowel sounds are normal. She exhibits no distension and no mass. There is no tenderness. There is no rebound and no guarding. Musculoskeletal: She exhibits no edema. Uses walker for ambulation    Bilateral lower leg weakness. .  Good sitting posture. No difficulty swallowing. Lymphadenopathy:     She has no cervical adenopathy. Neurological: She is alert and oriented to person, place, and time. Mild cog-wheeling and resting tremor. Skin: Skin is warm and dry. No erythema. Psychiatric: She has a normal mood and affect. Her behavior is normal. Judgment and thought content normal.       ASSESSMENT/PLAN:   Diagnosis Orders   1. Essential hypertension is controlled, continue meds. 2. Hypothyroidism due to acquired atrophy of thyroid euthyroid clinically. TSH WITH REFLEX TO FT4   3. Type 2 diabetes mellitus with complication, with long-term current use of insulin (HCC) has been controlled. Hemoglobin A1C    Fructosamine    Microalbumin / Creatinine Urine Ratio    Renal Function Panel    Lipid Panel   4. Urinary retention does not want straight cath or urecholine. Urinalysis    Urine Culture   Leonor Tapia received counseling on the following healthy behaviors: medication adherence    Patient given educational materials on After visit summary with diagnosis and treatment plan. I have instructed Leonor Tapia to complete a self tracking handout on Blood Pressures  and Weights and instructed them to bring it with them to her next appointment. Discussed use, benefit, and side effects of prescribed medications. Barriers to medication compliance addressed. All patient questions answered. Pt voiced understanding. Patient is taking over the counter meds and discussed as to how they interact with prescription medications.     An electronic signature was used to authenticate this note.     --[facility] MD TAY on 5/8/2019 at 11:00 AM

## 2019-05-09 LAB
ESTIMATED AVERAGE GLUCOSE: 99.7 MG/DL
HBA1C MFR BLD: 5.1 %
URINE CULTURE, ROUTINE: NORMAL

## 2019-05-10 LAB — FRUCTOSAMINE: 276 UMOL/L (ref 170–285)

## 2019-05-26 DIAGNOSIS — I10 ESSENTIAL HYPERTENSION: ICD-10-CM

## 2019-05-29 RX ORDER — MONTELUKAST SODIUM 10 MG/1
TABLET ORAL
Qty: 90 TABLET | Refills: 3 | Status: SHIPPED | OUTPATIENT
Start: 2019-05-29 | End: 2020-05-18

## 2019-05-29 RX ORDER — HYDRALAZINE HYDROCHLORIDE 50 MG/1
TABLET, FILM COATED ORAL
Qty: 90 TABLET | Refills: 5 | Status: SHIPPED | OUTPATIENT
Start: 2019-05-29 | End: 2019-08-07 | Stop reason: SDUPTHER

## 2019-06-04 DIAGNOSIS — Z79.4 TYPE 2 DIABETES MELLITUS WITH COMPLICATION, WITH LONG-TERM CURRENT USE OF INSULIN (HCC): ICD-10-CM

## 2019-06-04 DIAGNOSIS — E11.8 TYPE 2 DIABETES MELLITUS WITH COMPLICATION, WITH LONG-TERM CURRENT USE OF INSULIN (HCC): ICD-10-CM

## 2019-06-06 DIAGNOSIS — Z79.4 TYPE 2 DIABETES MELLITUS WITH COMPLICATION, WITH LONG-TERM CURRENT USE OF INSULIN (HCC): ICD-10-CM

## 2019-06-06 DIAGNOSIS — E11.8 TYPE 2 DIABETES MELLITUS WITH COMPLICATION, WITH LONG-TERM CURRENT USE OF INSULIN (HCC): ICD-10-CM

## 2019-06-06 RX ORDER — LANCETS 33 GAUGE
EACH MISCELLANEOUS
Qty: 200 EACH | Refills: 11 | Status: SHIPPED | OUTPATIENT
Start: 2019-06-06 | End: 2021-10-06

## 2019-06-12 ENCOUNTER — TELEPHONE (OUTPATIENT)
Dept: PRIMARY CARE CLINIC | Age: 84
End: 2019-06-12

## 2019-06-12 DIAGNOSIS — R30.0 DYSURIA: Primary | ICD-10-CM

## 2019-06-12 DIAGNOSIS — R35.0 FREQUENCY OF URINATION: ICD-10-CM

## 2019-06-12 LAB
BACTERIA: ABNORMAL /HPF
BILIRUBIN URINE: NEGATIVE
BLOOD, URINE: NEGATIVE
CLARITY: ABNORMAL
COLOR: YELLOW
COMMENT UA: ABNORMAL
EPITHELIAL CELLS, UA: 0 /HPF (ref 0–5)
GLUCOSE URINE: NEGATIVE MG/DL
HYALINE CASTS: 0 /LPF (ref 0–8)
KETONES, URINE: NEGATIVE MG/DL
LEUKOCYTE ESTERASE, URINE: ABNORMAL
MICROSCOPIC EXAMINATION: YES
NITRITE, URINE: POSITIVE
PH UA: 6 (ref 5–8)
PROTEIN UA: 30 MG/DL
RBC UA: ABNORMAL /HPF (ref 0–2)
SPECIFIC GRAVITY UA: 1.02 (ref 1–1.03)
URINE TYPE: ABNORMAL
UROBILINOGEN, URINE: 0.2 E.U./DL
WBC UA: 672 /HPF (ref 0–5)

## 2019-06-12 NOTE — TELEPHONE ENCOUNTER
Pt wants to know if Dr. Sekou Yeh will order her a urine culture. Pls advise,thanks !       Best contact :[phone]

## 2019-06-13 DIAGNOSIS — R30.0 DYSURIA: Primary | ICD-10-CM

## 2019-06-14 RX ORDER — SULFAMETHOXAZOLE AND TRIMETHOPRIM 400; 80 MG/1; MG/1
1 TABLET ORAL DAILY
Qty: 14 TABLET | Refills: 0 | Status: SHIPPED | OUTPATIENT
Start: 2019-06-14 | End: 2019-06-20 | Stop reason: SDUPTHER

## 2019-06-15 LAB
ORGANISM: ABNORMAL
URINE CULTURE, ROUTINE: ABNORMAL
URINE CULTURE, ROUTINE: ABNORMAL

## 2019-06-20 RX ORDER — SULFAMETHOXAZOLE AND TRIMETHOPRIM 400; 80 MG/1; MG/1
1 TABLET ORAL DAILY
Qty: 14 TABLET | Refills: 0 | OUTPATIENT
Start: 2019-06-20 | End: 2019-06-27

## 2019-06-20 RX ORDER — SULFAMETHOXAZOLE AND TRIMETHOPRIM 400; 80 MG/1; MG/1
1 TABLET ORAL DAILY
Qty: 14 TABLET | Refills: 0 | Status: SHIPPED | OUTPATIENT
Start: 2019-06-20 | End: 2019-06-27

## 2019-07-02 DIAGNOSIS — K21.9 GASTROESOPHAGEAL REFLUX DISEASE WITHOUT ESOPHAGITIS: ICD-10-CM

## 2019-07-03 RX ORDER — PANTOPRAZOLE SODIUM 40 MG/1
TABLET, DELAYED RELEASE ORAL
Qty: 90 TABLET | Refills: 1 | Status: SHIPPED | OUTPATIENT
Start: 2019-07-03 | End: 2019-10-26 | Stop reason: ALTCHOICE

## 2019-07-09 DIAGNOSIS — R30.0 DYSURIA: ICD-10-CM

## 2019-07-09 LAB
BACTERIA: ABNORMAL /HPF
BILIRUBIN URINE: NEGATIVE
BLOOD, URINE: NEGATIVE
CLARITY: ABNORMAL
COLOR: YELLOW
EPITHELIAL CELLS, UA: 0 /HPF (ref 0–5)
GLUCOSE URINE: NEGATIVE MG/DL
HYALINE CASTS: 0 /LPF (ref 0–8)
KETONES, URINE: NEGATIVE MG/DL
LEUKOCYTE ESTERASE, URINE: ABNORMAL
MICROSCOPIC EXAMINATION: YES
NITRITE, URINE: NEGATIVE
PH UA: 6 (ref 5–8)
PROTEIN UA: NEGATIVE MG/DL
RBC UA: 5 /HPF (ref 0–4)
SPECIFIC GRAVITY UA: 1.01 (ref 1–1.03)
URINE TYPE: ABNORMAL
UROBILINOGEN, URINE: 0.2 E.U./DL
WBC UA: 869 /HPF (ref 0–5)

## 2019-07-11 DIAGNOSIS — N30.00 ACUTE CYSTITIS WITHOUT HEMATURIA: ICD-10-CM

## 2019-07-11 RX ORDER — NITROFURANTOIN 25; 75 MG/1; MG/1
100 CAPSULE ORAL 2 TIMES DAILY
Qty: 14 CAPSULE | Refills: 0 | Status: SHIPPED | OUTPATIENT
Start: 2019-07-11 | End: 2019-11-27 | Stop reason: SDUPTHER

## 2019-07-11 RX ORDER — DIVALPROEX SODIUM 125 MG/1
125 TABLET, DELAYED RELEASE ORAL 3 TIMES DAILY
Qty: 270 TABLET | Refills: 2 | Status: SHIPPED | OUTPATIENT
Start: 2019-07-11 | End: 2019-07-17 | Stop reason: SDUPTHER

## 2019-07-12 DIAGNOSIS — E11.8 TYPE 2 DIABETES MELLITUS WITH COMPLICATION, WITH LONG-TERM CURRENT USE OF INSULIN (HCC): ICD-10-CM

## 2019-07-12 DIAGNOSIS — Z79.4 TYPE 2 DIABETES MELLITUS WITH COMPLICATION, WITH LONG-TERM CURRENT USE OF INSULIN (HCC): ICD-10-CM

## 2019-07-12 LAB
ORGANISM: ABNORMAL
URINE CULTURE, ROUTINE: ABNORMAL
URINE CULTURE, ROUTINE: ABNORMAL

## 2019-07-17 RX ORDER — DIVALPROEX SODIUM 125 MG/1
125 TABLET, DELAYED RELEASE ORAL 3 TIMES DAILY
Qty: 270 TABLET | Refills: 2 | Status: SHIPPED | OUTPATIENT
Start: 2019-07-17 | End: 2019-07-18 | Stop reason: SDUPTHER

## 2019-07-17 RX ORDER — FLUCONAZOLE 150 MG/1
150 TABLET ORAL ONCE
Qty: 1 TABLET | Refills: 0 | Status: SHIPPED | OUTPATIENT
Start: 2019-07-17 | End: 2019-07-17

## 2019-08-07 ENCOUNTER — OFFICE VISIT (OUTPATIENT)
Dept: PRIMARY CARE CLINIC | Age: 84
End: 2019-08-07
Payer: MEDICARE

## 2019-08-07 VITALS
RESPIRATION RATE: 16 BRPM | HEART RATE: 85 BPM | DIASTOLIC BLOOD PRESSURE: 78 MMHG | TEMPERATURE: 97 F | BODY MASS INDEX: 32.06 KG/M2 | OXYGEN SATURATION: 98 % | SYSTOLIC BLOOD PRESSURE: 131 MMHG | WEIGHT: 181 LBS

## 2019-08-07 DIAGNOSIS — Z12.11 COLON CANCER SCREENING: Primary | ICD-10-CM

## 2019-08-07 DIAGNOSIS — E03.4 HYPOTHYROIDISM DUE TO ACQUIRED ATROPHY OF THYROID: ICD-10-CM

## 2019-08-07 DIAGNOSIS — E21.0 PRIMARY HYPERPARATHYROIDISM (HCC): ICD-10-CM

## 2019-08-07 DIAGNOSIS — Z79.4 TYPE 2 DIABETES MELLITUS WITH COMPLICATION, WITH LONG-TERM CURRENT USE OF INSULIN (HCC): ICD-10-CM

## 2019-08-07 DIAGNOSIS — I10 ESSENTIAL HYPERTENSION: ICD-10-CM

## 2019-08-07 DIAGNOSIS — E79.0 HYPERURICEMIA: ICD-10-CM

## 2019-08-07 DIAGNOSIS — Z86.711 HISTORY OF PULMONARY EMBOLISM: ICD-10-CM

## 2019-08-07 DIAGNOSIS — Z87.39 HISTORY OF GOUT: ICD-10-CM

## 2019-08-07 DIAGNOSIS — R30.0 DYSURIA: ICD-10-CM

## 2019-08-07 DIAGNOSIS — E11.8 TYPE 2 DIABETES MELLITUS WITH COMPLICATION, WITH LONG-TERM CURRENT USE OF INSULIN (HCC): ICD-10-CM

## 2019-08-07 PROCEDURE — 99214 OFFICE O/P EST MOD 30 MIN: CPT | Performed by: INTERNAL MEDICINE

## 2019-08-07 RX ORDER — POLYMYXIN B SULFATE AND TRIMETHOPRIM 1; 10000 MG/ML; [USP'U]/ML
1 SOLUTION OPHTHALMIC EVERY 6 HOURS
Qty: 10 ML | Refills: 0 | Status: SHIPPED | OUTPATIENT
Start: 2019-08-07 | End: 2019-08-17

## 2019-08-07 RX ORDER — ATENOLOL 50 MG/1
TABLET ORAL
Qty: 90 TABLET | Refills: 3 | Status: SHIPPED | OUTPATIENT
Start: 2019-08-07 | End: 2020-08-19

## 2019-08-07 RX ORDER — LIDOCAINE 50 MG/G
OINTMENT TOPICAL
Qty: 50 G | Refills: 5 | Status: SHIPPED | OUTPATIENT
Start: 2019-08-07 | End: 2020-12-01 | Stop reason: SDUPTHER

## 2019-08-07 RX ORDER — BUDESONIDE AND FORMOTEROL FUMARATE DIHYDRATE 160; 4.5 UG/1; UG/1
AEROSOL RESPIRATORY (INHALATION)
Qty: 10.2 INHALER | Refills: 11 | Status: SHIPPED | OUTPATIENT
Start: 2019-08-07 | End: 2019-08-30

## 2019-08-07 RX ORDER — HYDRALAZINE HYDROCHLORIDE 50 MG/1
TABLET, FILM COATED ORAL
Qty: 90 TABLET | Refills: 11 | Status: SHIPPED | OUTPATIENT
Start: 2019-08-07 | End: 2019-10-23

## 2019-08-07 RX ORDER — AZELASTINE 1 MG/ML
1 SPRAY, METERED NASAL 2 TIMES DAILY
Qty: 1 BOTTLE | Refills: 11 | Status: SHIPPED | OUTPATIENT
Start: 2019-08-07 | End: 2020-08-19

## 2019-08-07 RX ORDER — KETOCONAZOLE 20 MG/G
CREAM TOPICAL
Qty: 120 G | Refills: 5 | Status: SHIPPED | OUTPATIENT
Start: 2019-08-07 | End: 2020-01-15

## 2019-08-07 RX ORDER — DIVALPROEX SODIUM 125 MG/1
125 TABLET, DELAYED RELEASE ORAL 3 TIMES DAILY
Qty: 90 TABLET | Refills: 11 | Status: SHIPPED | OUTPATIENT
Start: 2019-08-07 | End: 2019-10-23

## 2019-08-07 RX ORDER — SODIUM BICARBONATE 325 MG/1
TABLET ORAL
Qty: 60 TABLET | Refills: 11 | Status: SHIPPED | OUTPATIENT
Start: 2019-08-07 | End: 2020-01-15

## 2019-08-07 RX ORDER — SULFAMETHOXAZOLE AND TRIMETHOPRIM 800; 160 MG/1; MG/1
0.5 TABLET ORAL 2 TIMES DAILY
Qty: 14 TABLET | Refills: 0 | Status: SHIPPED | OUTPATIENT
Start: 2019-08-07 | End: 2019-08-21

## 2019-08-07 RX ORDER — LEVOTHYROXINE SODIUM 0.03 MG/1
TABLET ORAL
Qty: 30 TABLET | Refills: 11 | Status: SHIPPED | OUTPATIENT
Start: 2019-08-07 | End: 2020-09-14

## 2019-08-07 RX ORDER — AMMONIUM LACTATE 12 G/100G
LOTION TOPICAL
Qty: 567 G | Refills: 5 | Status: SHIPPED | OUTPATIENT
Start: 2019-08-07 | End: 2021-03-18

## 2019-08-07 RX ORDER — ATORVASTATIN CALCIUM 40 MG/1
TABLET, FILM COATED ORAL
Qty: 90 TABLET | Refills: 3 | Status: SHIPPED | OUTPATIENT
Start: 2019-08-07 | End: 2020-07-17

## 2019-08-07 RX ORDER — LANCETS 30 GAUGE
1 EACH MISCELLANEOUS
Qty: 200 EACH | Refills: 11 | Status: SHIPPED | OUTPATIENT
Start: 2019-08-07 | End: 2021-07-02

## 2019-08-07 RX ORDER — ALLOPURINOL 300 MG/1
TABLET ORAL
Qty: 90 TABLET | Refills: 3 | Status: SHIPPED | OUTPATIENT
Start: 2019-08-07 | End: 2020-08-19

## 2019-08-07 ASSESSMENT — ENCOUNTER SYMPTOMS
SHORTNESS OF BREATH: 0
COUGH: 1
ABDOMINAL PAIN: 0
TROUBLE SWALLOWING: 0
VOMITING: 0
BLURRED VISION: 1
PHOTOPHOBIA: 0
HOARSE VOICE: 1
RHINORRHEA: 0
DIARRHEA: 0
CONSTIPATION: 0
GASTROINTESTINAL NEGATIVE: 1
SINUS PRESSURE: 1
NAUSEA: 0
BLOOD IN STOOL: 0
SINUS COMPLAINT: 1

## 2019-08-07 NOTE — PROGRESS NOTES
edema past week. Gastrointestinal: Negative. Negative for abdominal pain, blood in stool, constipation, diarrhea, nausea and vomiting. Bowel acid diarrhea has been controlled cholestyramine. Remote cholecystectomy. Endocrine: Negative for polydipsia, polyphagia and polyuria. Genitourinary: Positive for dysuria and frequency. Negative for flank pain, hematuria, pelvic pain, urgency, vaginal bleeding, vaginal discharge and vaginal pain. Urinary odor. Antibiotics clear up infection for a week and symptoms quickly return. Musculoskeletal: Positive for gait problem and joint swelling. Negative for neck pain. Ambulating with walker. Multiple sclerosis symptoms have been stable. No progression in years. Skin: Negative for pallor and rash. Allergic/Immunologic: Negative for environmental allergies, food allergies and immunocompromised state. Neurological: Negative for dizziness, seizures, syncope, speech difficulty, weakness, light-headedness, numbness and headaches. Hematological: Negative. Negative for adenopathy. Does not bruise/bleed easily. 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 Psychiatric/Behavioral: Negative for agitation, confusion, decreased concentration and sleep disturbance. The patient is not nervous/anxious. Sleep disturbance related to frequent urination     Wt Readings from Last 3 Encounters:   08/07/19 181 lb (82.1 kg)   05/08/19 176 lb 6.4 oz (80 kg)   02/06/19 181 lb (82.1 kg)     Prior to Visit Medications    Medication Sig Taking?  Authorizing Provider   ONETOUCH VERIO strip 1 EACH BY IN VITRO ROUTE 4 TIMES DAILY (BEFORE MEALS AND NIGHTLY) E11.8 IS ICD 10 CODE Yes Yamile Martin MD   divalproex (DEPAKOTE) 125 MG DR tablet Take 1 tablet by mouth 3 times daily Yes Dmitry Avalos MD   insulin glargine (LANTUS SOLOSTAR) 100 UNIT/ML injection pen Inject 15 Units into 160-4.5 MCG/ACT AERO   7. Hypothyroidism due to acquired atrophy of thyroid  levothyroxine (SYNTHROID) 25 MCG tablet       An electronic signature was used to authenticate this note.     --Richard Mclean MD on 8/7/2019 at 10:11 AM

## 2019-08-08 LAB
A/G RATIO: 1.5 (ref 1.1–2.2)
ALBUMIN SERPL-MCNC: 4 G/DL (ref 3.4–5)
ALP BLD-CCNC: 69 U/L (ref 40–129)
ALT SERPL-CCNC: 14 U/L (ref 10–40)
ANION GAP SERPL CALCULATED.3IONS-SCNC: 17 MMOL/L (ref 3–16)
AST SERPL-CCNC: 15 U/L (ref 15–37)
BACTERIA: ABNORMAL /HPF
BILIRUB SERPL-MCNC: 0.3 MG/DL (ref 0–1)
BILIRUBIN URINE: NEGATIVE
BLOOD, URINE: NEGATIVE
BUN BLDV-MCNC: 44 MG/DL (ref 7–20)
CALCIUM SERPL-MCNC: 11.2 MG/DL (ref 8.3–10.6)
CHLORIDE BLD-SCNC: 104 MMOL/L (ref 99–110)
CLARITY: ABNORMAL
CO2: 20 MMOL/L (ref 21–32)
COLOR: YELLOW
CREAT SERPL-MCNC: 0.8 MG/DL (ref 0.6–1.2)
EPITHELIAL CELLS, UA: 1 /HPF (ref 0–5)
ESTIMATED AVERAGE GLUCOSE: 116.9 MG/DL
GFR AFRICAN AMERICAN: >60
GFR NON-AFRICAN AMERICAN: >60
GLOBULIN: 2.7 G/DL
GLUCOSE BLD-MCNC: 133 MG/DL (ref 70–99)
GLUCOSE URINE: NEGATIVE MG/DL
HBA1C MFR BLD: 5.7 %
HYALINE CASTS: 0 /LPF (ref 0–8)
KETONES, URINE: NEGATIVE MG/DL
LEUKOCYTE ESTERASE, URINE: ABNORMAL
MICROSCOPIC EXAMINATION: YES
NITRITE, URINE: POSITIVE
PARATHYROID HORMONE INTACT: 91.5 PG/ML (ref 14–72)
PH UA: 6 (ref 5–8)
PHOSPHORUS: 2.6 MG/DL (ref 2.5–4.9)
POTASSIUM SERPL-SCNC: 3.9 MMOL/L (ref 3.5–5.1)
PROTEIN PROTEIN: 0.02 G/DL
PROTEIN PROTEIN: 16 MG/DL
PROTEIN UA: ABNORMAL MG/DL
RBC UA: ABNORMAL /HPF (ref 0–2)
SODIUM BLD-SCNC: 141 MMOL/L (ref 136–145)
SPECIFIC GRAVITY UA: 1.01 (ref 1–1.03)
TOTAL PROTEIN: 6.7 G/DL (ref 6.4–8.2)
URIC ACID, SERUM: 3 MG/DL (ref 2.6–6)
URINE TYPE: ABNORMAL
UROBILINOGEN, URINE: 0.2 E.U./DL
WBC UA: 860 /HPF (ref 0–5)

## 2019-08-09 LAB
CALCIUM IONIZED, CALC AT PH 7.4: 1.55 MMOL/L (ref 1.11–1.3)
CALCIUM IONIZED: 1.52 MMOL/L (ref 1.11–1.3)
FRUCTOSAMINE: 274 UMOL/L (ref 170–285)

## 2019-08-10 LAB
ORGANISM: ABNORMAL
URINE CULTURE, ROUTINE: ABNORMAL
URINE CULTURE, ROUTINE: ABNORMAL

## 2019-08-12 LAB
ALBUMIN SERPL-MCNC: 3.2 G/DL (ref 3.1–4.9)
ALPHA-1-GLOBULIN: 0.2 G/DL (ref 0.2–0.4)
ALPHA-2-GLOBULIN: 0.9 G/DL (ref 0.4–1.1)
BETA GLOBULIN: 1.2 G/DL (ref 0.9–1.6)
GAMMA GLOBULIN: 1.2 G/DL (ref 0.6–1.8)
SPE/IFE INTERPRETATION: NORMAL
URINE ELECTROPHORESIS INTERP: NORMAL

## 2019-08-30 DIAGNOSIS — R30.0 DYSURIA: ICD-10-CM

## 2019-08-30 RX ORDER — BUDESONIDE AND FORMOTEROL FUMARATE DIHYDRATE 160; 4.5 UG/1; UG/1
AEROSOL RESPIRATORY (INHALATION)
Qty: 30.6 INHALER | Refills: 3 | Status: SHIPPED | OUTPATIENT
Start: 2019-08-30 | End: 2020-11-09 | Stop reason: SDUPTHER

## 2019-09-11 ENCOUNTER — TELEPHONE (OUTPATIENT)
Dept: PRIMARY CARE CLINIC | Age: 84
End: 2019-09-11

## 2019-09-11 DIAGNOSIS — R35.0 FREQUENCY OF URINATION: ICD-10-CM

## 2019-09-11 DIAGNOSIS — R35.0 FREQUENCY OF URINATION: Primary | ICD-10-CM

## 2019-09-13 ENCOUNTER — PATIENT MESSAGE (OUTPATIENT)
Dept: PRIMARY CARE CLINIC | Age: 84
End: 2019-09-13

## 2019-09-13 LAB
ORGANISM: ABNORMAL
URINE CULTURE, ROUTINE: ABNORMAL

## 2019-09-14 RX ORDER — CIPROFLOXACIN 250 MG/1
250 TABLET, FILM COATED ORAL 2 TIMES DAILY
Qty: 28 TABLET | Refills: 0 | Status: SHIPPED | OUTPATIENT
Start: 2019-09-14 | End: 2019-09-28

## 2019-10-02 ENCOUNTER — PATIENT MESSAGE (OUTPATIENT)
Dept: PRIMARY CARE CLINIC | Age: 84
End: 2019-10-02

## 2019-10-02 DIAGNOSIS — R11.2 NAUSEA AND VOMITING, INTRACTABILITY OF VOMITING NOT SPECIFIED, UNSPECIFIED VOMITING TYPE: Primary | ICD-10-CM

## 2019-10-03 RX ORDER — PROMETHAZINE HYDROCHLORIDE 12.5 MG/1
12.5 TABLET ORAL EVERY 8 HOURS PRN
Qty: 20 TABLET | Refills: 0 | Status: SHIPPED | OUTPATIENT
Start: 2019-10-03 | End: 2019-10-10

## 2019-10-07 ENCOUNTER — NURSE TRIAGE (OUTPATIENT)
Dept: OTHER | Facility: CLINIC | Age: 84
End: 2019-10-07

## 2019-10-08 ENCOUNTER — CARE COORDINATION (OUTPATIENT)
Dept: CASE MANAGEMENT | Age: 84
End: 2019-10-08

## 2019-10-11 ENCOUNTER — CARE COORDINATION (OUTPATIENT)
Dept: CASE MANAGEMENT | Age: 84
End: 2019-10-11

## 2019-10-22 ENCOUNTER — TELEPHONE (OUTPATIENT)
Dept: PHARMACY | Facility: CLINIC | Age: 84
End: 2019-10-22

## 2019-10-22 DIAGNOSIS — K56.609 SMALL BOWEL OBSTRUCTION (HCC): Primary | ICD-10-CM

## 2019-10-22 PROCEDURE — 1111F DSCHRG MED/CURRENT MED MERGE: CPT

## 2019-10-23 RX ORDER — VERAPAMIL HYDROCHLORIDE 240 MG/1
TABLET, FILM COATED, EXTENDED RELEASE ORAL
COMMUNITY
End: 2019-11-18

## 2019-10-23 RX ORDER — ALBUTEROL SULFATE 90 UG/1
2 AEROSOL, METERED RESPIRATORY (INHALATION) EVERY 6 HOURS PRN
COMMUNITY
End: 2021-07-02

## 2019-10-23 RX ORDER — VERAPAMIL HYDROCHLORIDE 240 MG/1
240 TABLET, FILM COATED, EXTENDED RELEASE ORAL NIGHTLY
COMMUNITY
End: 2019-10-23

## 2019-10-23 RX ORDER — DIAPER,BRIEF,INFANT-TODD,DISP
EACH MISCELLANEOUS 2 TIMES DAILY PRN
COMMUNITY

## 2019-10-23 RX ORDER — DIVALPROEX SODIUM 125 MG/1
TABLET, DELAYED RELEASE ORAL
COMMUNITY
End: 2020-01-15 | Stop reason: SDUPTHER

## 2019-10-23 RX ORDER — HYDRALAZINE HYDROCHLORIDE 50 MG/1
75 TABLET, FILM COATED ORAL 2 TIMES DAILY
COMMUNITY
End: 2020-08-27

## 2019-10-26 RX ORDER — FAMOTIDINE 20 MG/1
20 TABLET, FILM COATED ORAL 2 TIMES DAILY
Qty: 60 TABLET | Refills: 3 | Status: SHIPPED | OUTPATIENT
Start: 2019-10-26 | End: 2020-02-18

## 2019-11-17 DIAGNOSIS — I10 ESSENTIAL HYPERTENSION: ICD-10-CM

## 2019-11-18 RX ORDER — VERAPAMIL HYDROCHLORIDE 240 MG/1
TABLET, FILM COATED, EXTENDED RELEASE ORAL
Qty: 90 TABLET | Refills: 1 | Status: SHIPPED | OUTPATIENT
Start: 2019-11-18 | End: 2020-05-18

## 2019-11-22 ENCOUNTER — TELEPHONE (OUTPATIENT)
Dept: PRIMARY CARE CLINIC | Age: 84
End: 2019-11-22

## 2019-11-22 DIAGNOSIS — R33.9 URINARY RETENTION WITH INCOMPLETE BLADDER EMPTYING: ICD-10-CM

## 2019-11-22 DIAGNOSIS — R33.9 URINARY RETENTION WITH INCOMPLETE BLADDER EMPTYING: Primary | ICD-10-CM

## 2019-11-22 DIAGNOSIS — N39.41 URGE INCONTINENCE: ICD-10-CM

## 2019-11-22 DIAGNOSIS — E79.0 HYPERURICEMIA: ICD-10-CM

## 2019-11-22 PROCEDURE — 81003 URINALYSIS AUTO W/O SCOPE: CPT | Performed by: INTERNAL MEDICINE

## 2019-11-23 LAB
BACTERIA: ABNORMAL /HPF
BILIRUBIN URINE: NEGATIVE
BLOOD, URINE: NEGATIVE
CLARITY: ABNORMAL
COLOR: YELLOW
COMMENT UA: ABNORMAL
CRYSTALS, UA: ABNORMAL /HPF
EPITHELIAL CELLS, UA: 3 /HPF (ref 0–5)
GLUCOSE URINE: NEGATIVE MG/DL
HYALINE CASTS: 13 /LPF (ref 0–8)
KETONES, URINE: NEGATIVE MG/DL
LEUKOCYTE ESTERASE, URINE: ABNORMAL
MICROSCOPIC EXAMINATION: YES
NITRITE, URINE: NEGATIVE
ORGANISM: ABNORMAL
PH UA: 7.5 (ref 5–8)
PROTEIN UA: NEGATIVE MG/DL
RBC UA: 2 /HPF (ref 0–4)
SPECIFIC GRAVITY UA: 1.01 (ref 1–1.03)
URINE CULTURE, ROUTINE: ABNORMAL
URINE REFLEX TO CULTURE: YES
URINE TYPE: ABNORMAL
UROBILINOGEN, URINE: 0.2 E.U./DL
WBC UA: 45 /HPF (ref 0–5)

## 2019-11-25 ENCOUNTER — TELEPHONE (OUTPATIENT)
Dept: PRIMARY CARE CLINIC | Age: 84
End: 2019-11-25

## 2019-11-26 RX ORDER — CIPROFLOXACIN 250 MG/1
250 TABLET, FILM COATED ORAL 2 TIMES DAILY
Qty: 20 TABLET | Refills: 0 | Status: SHIPPED | OUTPATIENT
Start: 2019-11-26 | End: 2019-11-27

## 2019-11-27 ENCOUNTER — TELEPHONE (OUTPATIENT)
Dept: PRIMARY CARE CLINIC | Age: 84
End: 2019-11-27

## 2019-11-27 DIAGNOSIS — N30.00 ACUTE CYSTITIS WITHOUT HEMATURIA: ICD-10-CM

## 2019-11-27 RX ORDER — NITROFURANTOIN 25; 75 MG/1; MG/1
100 CAPSULE ORAL 2 TIMES DAILY
Qty: 20 CAPSULE | Refills: 0 | Status: SHIPPED | OUTPATIENT
Start: 2019-11-27 | End: 2019-12-07

## 2019-12-01 DIAGNOSIS — Z79.4 TYPE 2 DIABETES MELLITUS WITH COMPLICATION, WITH LONG-TERM CURRENT USE OF INSULIN (HCC): ICD-10-CM

## 2019-12-01 DIAGNOSIS — E11.8 TYPE 2 DIABETES MELLITUS WITH COMPLICATION, WITH LONG-TERM CURRENT USE OF INSULIN (HCC): ICD-10-CM

## 2019-12-06 DIAGNOSIS — E11.8 TYPE 2 DIABETES MELLITUS WITH COMPLICATION, WITH LONG-TERM CURRENT USE OF INSULIN (HCC): ICD-10-CM

## 2019-12-06 DIAGNOSIS — R29.898 SEVERE MUSCLE DECONDITIONING: ICD-10-CM

## 2019-12-06 DIAGNOSIS — Z79.4 TYPE 2 DIABETES MELLITUS WITH COMPLICATION, WITH LONG-TERM CURRENT USE OF INSULIN (HCC): ICD-10-CM

## 2019-12-06 DIAGNOSIS — G35 MULTIPLE SCLEROSIS, PRIMARY CHRONIC PROGRESSIVE (HCC): ICD-10-CM

## 2019-12-06 DIAGNOSIS — S92.143P: Primary | ICD-10-CM

## 2019-12-31 DIAGNOSIS — Z86.711 HISTORY OF PULMONARY EMBOLISM: ICD-10-CM

## 2020-01-07 RX ORDER — PANTOPRAZOLE SODIUM 40 MG/1
TABLET, DELAYED RELEASE ORAL
Qty: 90 TABLET | Refills: 1 | Status: SHIPPED | OUTPATIENT
Start: 2020-01-07 | End: 2020-12-16

## 2020-01-15 ENCOUNTER — OFFICE VISIT (OUTPATIENT)
Dept: PRIMARY CARE CLINIC | Age: 85
End: 2020-01-15
Payer: MEDICARE

## 2020-01-15 VITALS
DIASTOLIC BLOOD PRESSURE: 76 MMHG | TEMPERATURE: 97 F | OXYGEN SATURATION: 100 % | SYSTOLIC BLOOD PRESSURE: 142 MMHG | HEART RATE: 60 BPM | RESPIRATION RATE: 16 BRPM

## 2020-01-15 LAB
A/G RATIO: 1.5 (ref 1.1–2.2)
ALBUMIN SERPL-MCNC: 4.3 G/DL (ref 3.4–5)
ALP BLD-CCNC: 66 U/L (ref 40–129)
ALT SERPL-CCNC: 12 U/L (ref 10–40)
AMORPHOUS: ABNORMAL /HPF
ANION GAP SERPL CALCULATED.3IONS-SCNC: 15 MMOL/L (ref 3–16)
AST SERPL-CCNC: 14 U/L (ref 15–37)
BACTERIA: ABNORMAL /HPF
BASOPHILS ABSOLUTE: 0 K/UL (ref 0–0.2)
BASOPHILS RELATIVE PERCENT: 0.5 %
BILIRUB SERPL-MCNC: 0.3 MG/DL (ref 0–1)
BILIRUBIN URINE: NEGATIVE
BLOOD, URINE: NEGATIVE
BUN BLDV-MCNC: 19 MG/DL (ref 7–20)
CALCIUM SERPL-MCNC: 11.3 MG/DL (ref 8.3–10.6)
CHLORIDE BLD-SCNC: 99 MMOL/L (ref 99–110)
CHOLESTEROL, TOTAL: 130 MG/DL (ref 0–199)
CLARITY: ABNORMAL
CO2: 19 MMOL/L (ref 21–32)
COLOR: YELLOW
COMMENT UA: ABNORMAL
CREAT SERPL-MCNC: 0.6 MG/DL (ref 0.6–1.2)
CREATININE URINE: 15.2 MG/DL (ref 28–259)
EOSINOPHILS ABSOLUTE: 0.1 K/UL (ref 0–0.6)
EOSINOPHILS RELATIVE PERCENT: 0.9 %
EPITHELIAL CELLS, UA: 1 /HPF (ref 0–5)
GFR AFRICAN AMERICAN: >60
GFR NON-AFRICAN AMERICAN: >60
GLOBULIN: 2.8 G/DL
GLUCOSE BLD-MCNC: 112 MG/DL (ref 70–99)
GLUCOSE URINE: NEGATIVE MG/DL
HCT VFR BLD CALC: 37.7 % (ref 36–48)
HDLC SERPL-MCNC: 51 MG/DL (ref 40–60)
HEMOGLOBIN: 12.5 G/DL (ref 12–16)
HYALINE CASTS: 1 /LPF (ref 0–8)
KETONES, URINE: NEGATIVE MG/DL
LDL CHOLESTEROL CALCULATED: 60 MG/DL
LEUKOCYTE ESTERASE, URINE: ABNORMAL
LYMPHOCYTES ABSOLUTE: 1.7 K/UL (ref 1–5.1)
LYMPHOCYTES RELATIVE PERCENT: 22.4 %
MCH RBC QN AUTO: 31.4 PG (ref 26–34)
MCHC RBC AUTO-ENTMCNC: 33.2 G/DL (ref 31–36)
MCV RBC AUTO: 94.5 FL (ref 80–100)
MICROALBUMIN UR-MCNC: 5 MG/DL
MICROALBUMIN/CREAT UR-RTO: 328.9 MG/G (ref 0–30)
MICROSCOPIC EXAMINATION: YES
MONOCYTES ABSOLUTE: 0.7 K/UL (ref 0–1.3)
MONOCYTES RELATIVE PERCENT: 9.1 %
MUCUS: ABNORMAL /LPF
NEUTROPHILS ABSOLUTE: 5 K/UL (ref 1.7–7.7)
NEUTROPHILS RELATIVE PERCENT: 67.1 %
NITRITE, URINE: NEGATIVE
PDW BLD-RTO: 16.7 % (ref 12.4–15.4)
PH UA: 6.5 (ref 5–8)
PLATELET # BLD: 179 K/UL (ref 135–450)
PMV BLD AUTO: 10.6 FL (ref 5–10.5)
POTASSIUM SERPL-SCNC: 4.4 MMOL/L (ref 3.5–5.1)
PREALBUMIN: 24.3 MG/DL (ref 20–40)
PROTEIN UA: NEGATIVE MG/DL
RBC # BLD: 3.99 M/UL (ref 4–5.2)
RBC UA: 2 /HPF (ref 0–4)
SODIUM BLD-SCNC: 133 MMOL/L (ref 136–145)
SPECIFIC GRAVITY UA: 1.01 (ref 1–1.03)
TOTAL PROTEIN: 7.1 G/DL (ref 6.4–8.2)
TRIGL SERPL-MCNC: 94 MG/DL (ref 0–150)
TSH REFLEX FT4: 2.23 UIU/ML (ref 0.27–4.2)
URINE TYPE: ABNORMAL
UROBILINOGEN, URINE: 0.2 E.U./DL
VITAMIN B-12: >2000 PG/ML (ref 211–911)
VITAMIN D 25-HYDROXY: 70.3 NG/ML
VLDLC SERPL CALC-MCNC: 19 MG/DL
WBC # BLD: 7.5 K/UL (ref 4–11)
WBC UA: 201 /HPF (ref 0–5)

## 2020-01-15 PROCEDURE — 90662 IIV NO PRSV INCREASED AG IM: CPT | Performed by: INTERNAL MEDICINE

## 2020-01-15 PROCEDURE — 99214 OFFICE O/P EST MOD 30 MIN: CPT | Performed by: INTERNAL MEDICINE

## 2020-01-15 PROCEDURE — G0008 ADMIN INFLUENZA VIRUS VAC: HCPCS | Performed by: INTERNAL MEDICINE

## 2020-01-15 RX ORDER — SODIUM BICARBONATE 650 MG/1
650 TABLET ORAL DAILY
Qty: 30 TABLET | Refills: 11 | Status: SHIPPED | OUTPATIENT
Start: 2020-01-15 | End: 2020-03-19

## 2020-01-15 RX ORDER — ESTRADIOL 0.1 MG/G
CREAM VAGINAL
Qty: 1 TUBE | Refills: 3 | Status: SHIPPED | OUTPATIENT
Start: 2020-01-15 | End: 2020-05-12 | Stop reason: SDUPTHER

## 2020-01-15 RX ORDER — INHALER, ASSIST DEVICES
1 SPACER (EA) MISCELLANEOUS 2 TIMES DAILY
Qty: 1 EACH | Refills: 3 | Status: SHIPPED | OUTPATIENT
Start: 2020-01-15

## 2020-01-15 RX ORDER — KETOCONAZOLE 20 MG/G
CREAM TOPICAL
Qty: 240 G | Refills: 5 | Status: SHIPPED | OUTPATIENT
Start: 2020-01-15 | End: 2020-08-27

## 2020-01-15 RX ORDER — DIVALPROEX SODIUM 125 MG/1
TABLET, DELAYED RELEASE ORAL
Qty: 90 TABLET | Refills: 5 | Status: SHIPPED | OUTPATIENT
Start: 2020-01-15 | End: 2020-03-30 | Stop reason: SDUPTHER

## 2020-01-15 ASSESSMENT — ENCOUNTER SYMPTOMS
PHOTOPHOBIA: 0
BLOOD IN STOOL: 0
RHINORRHEA: 0
GASTROINTESTINAL NEGATIVE: 1
DIARRHEA: 0
TROUBLE SWALLOWING: 0
COUGH: 0
VOMITING: 0
NAUSEA: 0
SHORTNESS OF BREATH: 0
ABDOMINAL PAIN: 0
CONSTIPATION: 0

## 2020-01-15 ASSESSMENT — PATIENT HEALTH QUESTIONNAIRE - PHQ9
2. FEELING DOWN, DEPRESSED OR HOPELESS: 0
SUM OF ALL RESPONSES TO PHQ QUESTIONS 1-9: 0
SUM OF ALL RESPONSES TO PHQ9 QUESTIONS 1 & 2: 0
1. LITTLE INTEREST OR PLEASURE IN DOING THINGS: 0
DEPRESSION UNABLE TO ASSESS: FUNCTIONAL CAPACITY MOTIVATION LIMITS ACCURACY
SUM OF ALL RESPONSES TO PHQ QUESTIONS 1-9: 0

## 2020-01-15 NOTE — PROGRESS NOTES
range is generally 130-140 mg/dl. An ACE inhibitor/angiotensin II receptor blocker is being taken. Patient Active Problem List   Diagnosis    Idiopathic gout    Coarse tremors    Saddle pulmonary embolus (HCC)    Multiple sclerosis, primary chronic progressive ([address]    Microscopic hematuria    Eczema    Primary hyperparathyroidism ([address]    Osteoporosis    Cough variant asthma    Hypothyroidism due to acquired atrophy of thyroid    Primary osteoarthritis of both first carpometacarpal joints    Long term use of bisphosphonates    Type 2 diabetes mellitus with complication, with long-term current use of insulin (HCC)    Partial small bowel obstruction ([address]    Diabetic nephropathy associated with type 2 diabetes mellitus ([address]    Neck pain    Mouth droop due to facial weakness    Hypertension    Cardiac shock syndrome (HCC)    Isolated proteinuria without specific morphologic lesion    Candidiasis of skin     Allergies   Allergen Reactions    [place] Meal      Nausea, vomiting and diarrhea with almond milk.  Celexa [Citalopram Hydrobromide]     Famciclovir     Lactose     Metronidazole     Peanut-Containing Drug Products     [place] Oil [Nutritional Supplements] Diarrhea    Zofran          Review of Systems   Constitutional: Negative for chills, fatigue and fever. HENT: Negative for congestion, dental problem, drooling, ear discharge, ear pain, hearing loss, nosebleeds, rhinorrhea, sneezing, tinnitus and trouble swallowing. Decreased neck muscle spasm and decreased pain. ROM still limited to 30 degrees flexion and extension. Eyes: Negative for blurred vision, photophobia and visual disturbance. Cataracts   Respiratory: Negative for cough and shortness of breath. Cardiovascular: Negative for chest pain, palpitations and leg swelling. Hypertension hyperlipidemia with increased leg edema past week. Gastrointestinal: Negative.   Negative for abdominal pain, TABS tablet Take 3 tablets by mouth daily. Yes Pako Gutierrez MD   loratadine (CLARITIN) 10 MG tablet Take 1 tablet by mouth daily. Yes Pako Gutierrez MD   Multiple Vitamins-Minerals (CENTRUM SILVER PO) Take 1 tablet by mouth daily  Yes Historical Provider, MD   Lancet Devices (ACCU-CHEK SOFTCLIX LANCET DEV) by Does not apply route 2 times daily as needed.  Yes Historical Provider, MD   pantoprazole (PROTONIX) 40 MG tablet TAKE 1 TABLET BY MOUTH EVERY DAY  Patient not taking: Reported on 1/15/2020  Pako Gutierrez MD   lipase-protease-amylase (CREON) 47635-29932 units delayed release capsule Take 3 capsules by mouth 3 times daily as needed (diarrhea)  Historical Provider, MD   Continuous Blood Gluc  (FREESTYLE SHIRLEY 14 DAY READER) JOCY 1 each by Does not apply route every 14 days DX: E11.8 diabetes  Patient not taking: Reported on 10/23/2019  Pako Gutierrze MD   Continuous Blood Gluc Sensor (FREESTYLE SHIRLEY 14 DAY SENSOR) MISC 1 each by Does not apply route every 14 days DX: E11.8 diabetes  Patient not taking: Reported on 10/23/2019  Pako Gutierrez MD   Continuous Blood Gluc  (FREESTYLE SHIRLEY READER) JOCY 1 each by Does not apply route 4 times daily (before meals and nightly) DX: E11.8 diabetes  Patient not taking: Reported on 10/23/2019  Pako Gutierrez MD   Continuous Blood Gluc Sensor ([address] [place] [address]) MISC 1 each by Does not apply route 4 times daily (before meals and nightly) DX: E11.8 diabetes  Patient not taking: Reported on 10/23/2019  Pkao Gutierrez MD   cholestyramine [employer]) 4 G packet Take 2 packets by mouth 2 times daily  Patient not taking: Reported on 1/15/2020  Pako Gutierrez MD   insulin lispro (HUMALOG KWIKPEN) 100 UNIT/ML pen Inject 2-5 Units into the skin 3 times daily (with meals)  Patient not taking: Reported on 10/23/2019  Pako Gutierrez MD        Social History     Tobacco Use    Smoking status: Never Smoker    Smokeless tobacco: Never Used   Substance Use Topics    Alcohol use: No        Vitals:    01/15/20 1103 01/15/20 1115   BP: (!) 147/65 (!) 142/76   Site: Left Upper Arm Left Upper Arm   Position: Sitting Sitting   Cuff Size: Large Adult Large Adult   Pulse: 60    Resp: 16    Temp: 97 °F (36.1 °C)    TempSrc: Oral    SpO2: 100%    Weight: Comment: Unable to obtain weight      Estimated body mass index is 32.06 kg/m² as calculated from the following:    Height as of 5/8/19: 5' 3\" (1.6 m). Weight as of 8/7/19: 181 lb (82.1 kg). Physical Exam  Constitutional:       Appearance: She is well-developed. HENT:      Head: Normocephalic and atraumatic. Right Ear: External ear normal. There is no impacted cerumen. Left Ear: External ear normal. There is no impacted cerumen. Nose: Nose normal. No congestion or rhinorrhea. Mouth/Throat:      Pharynx: No oropharyngeal exudate or posterior oropharyngeal erythema. Eyes:      General:         Right eye: No discharge. Left eye: No discharge. Conjunctiva/sclera: Conjunctivae normal.      Pupils: Pupils are equal, round, and reactive to light. Neck:      Musculoskeletal: Normal range of motion and neck supple. Thyroid: No thyromegaly. Vascular: No JVD. Trachea: No tracheal deviation. Cardiovascular:      Rate and Rhythm: Normal rate and regular rhythm. Heart sounds: Normal heart sounds. Comments: BLE +2   Pulmonary:      Effort: Pulmonary effort is normal. No respiratory distress. Breath sounds: No wheezing or rales. Chest:      Chest wall: No tenderness. Abdominal:      General: Bowel sounds are normal. There is no distension. Palpations: Abdomen is soft. There is no mass. Tenderness: There is no tenderness. There is no guarding or rebound. Musculoskeletal:      Comments: Uses walker for ambulation    Bilateral lower leg weakness. .  Good sitting posture.   No difficulty tablet   9. Dysuria with history of frequent urinary tract infections. Due to underlying recent stenosis from atrophic vaginitis. Will have patient start using x-rays [place] weekly and will culture to see current infection since very symptomatic. Urinalysis    Urine Culture    Urinalysis    Urine Culture   10 Vitamin D deficiency on supplement monitor levels effect goal 50-80. Vitamin D 25 Hydroxy    Vitamin D 25 Hydroxy   11. Mild protein-calorie malnutrition ([address] concern for protein amount nutrition after hospitalization with patient has been home over a month eating will monitor. Albumin. Will check due to leg edema. Prealbumin    Prealbumin   12 Need for influenza vaccination  INFLUENZA, HIGH DOSE, 65 YRS +, IM, PF, PREFILL SYR, 0.5ML (FLUZONE HD)       An electronic signature was used to authenticate this note.     --Pancho Ibrahim MD on 1/15/2020 at 11:35 AM

## 2020-01-16 LAB
ESTIMATED AVERAGE GLUCOSE: 108.3 MG/DL
FRUCTOSAMINE: 277 UMOL/L (ref 170–285)
HBA1C MFR BLD: 5.4 %
URINE CULTURE, ROUTINE: NORMAL

## 2020-01-16 ASSESSMENT — PATIENT HEALTH QUESTIONNAIRE - PHQ9
SUM OF ALL RESPONSES TO PHQ QUESTIONS 1-9: 0
2. FEELING DOWN, DEPRESSED OR HOPELESS: 0
SUM OF ALL RESPONSES TO PHQ QUESTIONS 1-9: 0
SUM OF ALL RESPONSES TO PHQ9 QUESTIONS 1 & 2: 0
1. LITTLE INTEREST OR PLEASURE IN DOING THINGS: 0

## 2020-01-16 ASSESSMENT — ENCOUNTER SYMPTOMS: BLURRED VISION: 0

## 2020-01-22 ENCOUNTER — TELEPHONE (OUTPATIENT)
Dept: ORTHOPEDIC SURGERY | Age: 85
End: 2020-01-22

## 2020-01-29 ENCOUNTER — TELEPHONE (OUTPATIENT)
Dept: PRIMARY CARE CLINIC | Age: 85
End: 2020-01-29

## 2020-01-29 LAB
ORGANISM: ABNORMAL
URINE CULTURE, ROUTINE: ABNORMAL

## 2020-01-29 RX ORDER — CEPHALEXIN 500 MG/1
500 CAPSULE ORAL 2 TIMES DAILY
Qty: 14 CAPSULE | Refills: 0 | Status: SHIPPED | OUTPATIENT
Start: 2020-01-29 | End: 2020-02-05

## 2020-02-14 ENCOUNTER — TELEPHONE (OUTPATIENT)
Dept: PRIMARY CARE CLINIC | Age: 85
End: 2020-02-14

## 2020-02-14 LAB
BILIRUBIN URINE: NEGATIVE
BLOOD, URINE: ABNORMAL
CLARITY: ABNORMAL
COLOR: YELLOW
EPITHELIAL CELLS, UA: 1 /HPF (ref 0–5)
GLUCOSE URINE: NEGATIVE MG/DL
HYALINE CASTS: 5 /LPF (ref 0–8)
KETONES, URINE: NEGATIVE MG/DL
LEUKOCYTE ESTERASE, URINE: ABNORMAL
MICROSCOPIC EXAMINATION: YES
NITRITE, URINE: NEGATIVE
PH UA: 6.5 (ref 5–8)
PROTEIN UA: 30 MG/DL
RBC UA: 5 /HPF (ref 0–4)
SPECIFIC GRAVITY UA: 1.01 (ref 1–1.03)
URINE TYPE: ABNORMAL
UROBILINOGEN, URINE: 1 E.U./DL
WBC UA: >900 /HPF (ref 0–5)

## 2020-02-15 RX ORDER — AMOXICILLIN 500 MG/1
500 CAPSULE ORAL 2 TIMES DAILY
Qty: 20 CAPSULE | Refills: 0 | Status: SHIPPED | OUTPATIENT
Start: 2020-02-15 | End: 2020-02-15

## 2020-02-15 RX ORDER — AMOXICILLIN 500 MG/1
500 CAPSULE ORAL 2 TIMES DAILY
Qty: 20 CAPSULE | Refills: 0 | Status: SHIPPED | OUTPATIENT
Start: 2020-02-15 | End: 2020-02-25

## 2020-02-15 RX ORDER — CIPROFLOXACIN 250 MG/1
250 TABLET, FILM COATED ORAL 2 TIMES DAILY
Qty: 20 TABLET | Refills: 0 | Status: SHIPPED | OUTPATIENT
Start: 2020-02-15 | End: 2020-02-15

## 2020-02-15 RX ORDER — AMOXICILLIN 500 MG/1
500 CAPSULE ORAL 2 TIMES DAILY
Qty: 20 CAPSULE | Refills: 0 | Status: SHIPPED | OUTPATIENT
Start: 2020-02-15 | End: 2020-02-15 | Stop reason: SDUPTHER

## 2020-02-16 LAB
ORGANISM: ABNORMAL
ORGANISM: ABNORMAL
URINE CULTURE, ROUTINE: ABNORMAL
URINE CULTURE, ROUTINE: ABNORMAL

## 2020-02-16 RX ORDER — SULFAMETHOXAZOLE AND TRIMETHOPRIM 800; 160 MG/1; MG/1
1 TABLET ORAL 2 TIMES DAILY
Qty: 14 TABLET | Refills: 0 | Status: SHIPPED | OUTPATIENT
Start: 2020-02-16 | End: 2020-02-23

## 2020-02-18 RX ORDER — FAMOTIDINE 20 MG/1
TABLET, FILM COATED ORAL
Qty: 180 TABLET | Refills: 1 | Status: SHIPPED | OUTPATIENT
Start: 2020-02-18 | End: 2020-10-13

## 2020-02-26 ENCOUNTER — OFFICE VISIT (OUTPATIENT)
Dept: PRIMARY CARE CLINIC | Age: 85
End: 2020-02-26
Payer: MEDICARE

## 2020-02-26 VITALS
DIASTOLIC BLOOD PRESSURE: 59 MMHG | HEART RATE: 65 BPM | RESPIRATION RATE: 14 BRPM | SYSTOLIC BLOOD PRESSURE: 108 MMHG | OXYGEN SATURATION: 96 % | TEMPERATURE: 97.3 F

## 2020-02-26 LAB
BASOPHILS ABSOLUTE: 0 K/UL (ref 0–0.2)
BASOPHILS RELATIVE PERCENT: 0.3 %
EOSINOPHILS ABSOLUTE: 0 K/UL (ref 0–0.6)
EOSINOPHILS RELATIVE PERCENT: 0.2 %
HCT VFR BLD CALC: 34.1 % (ref 36–48)
HEMOGLOBIN: 11.6 G/DL (ref 12–16)
LYMPHOCYTES ABSOLUTE: 1.4 K/UL (ref 1–5.1)
LYMPHOCYTES RELATIVE PERCENT: 11.9 %
MCH RBC QN AUTO: 31.9 PG (ref 26–34)
MCHC RBC AUTO-ENTMCNC: 33.8 G/DL (ref 31–36)
MCV RBC AUTO: 94.2 FL (ref 80–100)
MONOCYTES ABSOLUTE: 1 K/UL (ref 0–1.3)
MONOCYTES RELATIVE PERCENT: 8.3 %
NEUTROPHILS ABSOLUTE: 9.4 K/UL (ref 1.7–7.7)
NEUTROPHILS RELATIVE PERCENT: 79.3 %
PDW BLD-RTO: 16.6 % (ref 12.4–15.4)
PLATELET # BLD: 189 K/UL (ref 135–450)
PMV BLD AUTO: 9.9 FL (ref 5–10.5)
RBC # BLD: 3.62 M/UL (ref 4–5.2)
WBC # BLD: 11.9 K/UL (ref 4–11)

## 2020-02-26 PROCEDURE — 99214 OFFICE O/P EST MOD 30 MIN: CPT | Performed by: INTERNAL MEDICINE

## 2020-02-26 RX ORDER — POLYETHYLENE GLYCOL 3350 17 G/17G
17 POWDER, FOR SOLUTION ORAL 2 TIMES DAILY
Qty: 527 G | Refills: 5 | Status: SHIPPED | OUTPATIENT
Start: 2020-02-26 | End: 2020-03-05

## 2020-02-26 ASSESSMENT — ENCOUNTER SYMPTOMS
VOMITING: 1
CONSTIPATION: 1
NAUSEA: 1
DIARRHEA: 1
ABDOMINAL PAIN: 1

## 2020-02-26 NOTE — PROGRESS NOTES
2020     Dali Guajardo (:  1935) is a 82 y.o. female, here for evaluation of the following medical concerns:    Abdominal Pain   This is a recurrent (know large reducible ventral hernia) problem. The current episode started more than 1 month ago. The onset quality is sudden. The problem occurs intermittently. The problem has been unchanged. The pain is located in the generalized abdominal region (alot of bloating and discomfort. passing diarrhea, muddy stool , but is on bactrim. ). The pain is at a severity of 4/10. The pain is moderate. The quality of the pain is dull and aching. The abdominal pain does not radiate. Associated symptoms include constipation, diarrhea, dysuria, frequency, nausea and vomiting. Pertinent negatives include no fever, headaches or hematuria. Associated symptoms comments: On bactrim for UTI and urine clear now with chronic atrophic urethritis , now using estrace . 25 gm weekly. .     Fall two days ago. Standing up at the commode and spouse was cleaning her and her legs got weak and she slumped to the floor hitting back on the front of commode as she went down. Mid back pain since that time and bruising of the mid back. Mid back aching pain.   Patient Active Problem List   Diagnosis    Idiopathic gout    Coarse tremors    Saddle pulmonary embolus (HCC)    Multiple sclerosis, primary chronic progressive ([address]    Microscopic hematuria    Eczema    Primary hyperparathyroidism ([address]    Osteoporosis    Cough variant asthma    Hypothyroidism due to acquired atrophy of thyroid    Primary osteoarthritis of both first carpometacarpal joints    Long term use of bisphosphonates    Type 2 diabetes mellitus with complication, with long-term current use of insulin (HCC)    Partial small bowel obstruction ([address]    Diabetic nephropathy associated with type 2 diabetes mellitus ([address]    Neck pain    Mouth droop due to facial weakness    Hypertension    Cardiac shock syndrome ([address]    Isolated proteinuria without specific morphologic lesion    Candidiasis of skin     Allergies   Allergen Reactions    [place] Meal      Nausea, vomiting and diarrhea with almond milk.  Celexa [Citalopram Hydrobromide]     Famciclovir     Lactose     Metronidazole     Peanut-Containing Drug Products     [place] Oil [Nutritional Supplements] Diarrhea    Zofran        Review of Systems   Constitutional: Negative for chills, fatigue and fever. HENT: Negative for congestion, dental problem, drooling, ear discharge, ear pain, hearing loss, nosebleeds, rhinorrhea, sneezing, tinnitus and trouble swallowing. Decreased neck muscle spasm and decreased pain. ROM still limited to 30 degrees flexion and extension. Eyes: Negative for photophobia and visual disturbance. Cataracts   Respiratory: Negative for cough and shortness of breath. Cardiovascular: Negative for chest pain, palpitations and leg swelling. Hypertension hyperlipidemia with increased leg edema past week. Gastrointestinal: Positive for abdominal pain, constipation, diarrhea, nausea and vomiting. Negative for blood in stool. Bowel acid diarrhea has been controlled cholestyramine. Remote cholecystectomy. Endocrine: Negative for polydipsia, polyphagia and polyuria. Genitourinary: Positive for dysuria and frequency. Negative for flank pain, hematuria, pelvic pain, urgency, vaginal bleeding, vaginal discharge and vaginal pain. Urinary odor. Antibiotics clear up infection for a week and symptoms quickly return. Musculoskeletal: Positive for gait problem and joint swelling. Negative for neck pain. Progressive multiple sclerosis. Patient needs standby assistance with  [address] Cannot go up and down stairs. There are stair lifts in the house that allow patient to go from one floor to the other. Now very difficult for family to get her outside of the home and requires two people.    Skin: tablet TAKE 1 TABLET BY MOUTH EVERY EVENING Yes Diana Chung MD   albuterol sulfate  (90 Base) MCG/ACT inhaler Inhale 2 puffs into the lungs every 6 hours as needed for Shortness of Breath Yes Historical Provider, MD   aspirin 81 MG tablet Take 81 mg by mouth daily Yes Historical Provider, MD   Lactobacillus (PROBIOTIC ACIDOPHILUS PO) Take 1 capsule by mouth daily Yes Historical Provider, MD   hydrocortisone 1 % ointment Apply topically 2 times daily as needed (dry skin) Apply topically 2 times daily. Yes Historical Provider, MD   insulin glargine (LANTUS SOLOSTAR) 100 UNIT/ML injection Inject 13 Units into the skin nightly Yes Historical Provider, MD   hydrALAZINE (APRESOLINE) 50 MG tablet Take 75 mg by mouth 2 times daily Yes Historical Provider, MD   lipase-protease-amylase (CREON) 35538-28149 units delayed release capsule Take 3 capsules by mouth 3 times daily as needed (diarrhea) Yes Historical Provider, MD   budesonide-formoterol (SYMBICORT) 160-4.5 MCG/ACT AERO TAKE 2 PUFFS BY MOUTH TWICE A DAY Yes Diana Chung MD   azelastine (ASTELIN) 0.1 % nasal spray 1 spray by Nasal route 2 times daily Use in each nostril as directed Yes Diana Chung MD   atenolol (TENORMIN) 50 MG tablet TAKE 1 TABLET BY MOUTH DAILY. Yes Diana Chung MD   allopurinol (ZYLOPRIM) 300 MG tablet TAKE 1 TABLET BY MOUTH DAILY. Yes Diana Chung MD   atorvastatin (LIPITOR) 40 MG tablet TAKE 1 TABLET BY MOUTH DAILY. Yes Diana Chung MD   levothyroxine (SYNTHROID) 25 MCG tablet TAKE 1 TABLET BY MOUTH DAILY Yes Diana Chung MD   Lancets MISC 1 each by Does not apply route 4 times daily (before meals and nightly) Yes Diana Chung MD   ammonium lactate (LAC-HYDRIN) 12 % lotion Apply topically daily. Yes Diana Chung MD   lidocaine (XYLOCAINE) 5 % ointment Apply topically as needed.  Yes Diana Chung MD   ONETOUCH VERIO strip 1 EACH BY IN VITRO ROUTE 4 TIMES DAILY (BEFORE MEALS AND NIGHTLY) E11.8 IS ICD 10 CODE Yes Hu Urias MD   SITagliptin (JANUVIA) 100 MG tablet Take 1 tablet by mouth daily Yes Hu Urias MD   [facility] LANCETS 12T MISC USE AS DIRECTED FOUR TIMES A DAY Yes Hu Urias MD   montelukast (SINGULAIR) 10 MG tablet TAKE 1 TABLET BY MOUTH EVERYDAY AT BEDTIME Yes Hu Urias MD   Continuous Blood Gluc  (FREESTYLE SHIRLEY 14 DAY READER) JOCY 1 each by Does not apply route every 14 days DX: E11.8 diabetes Yes Hu Urisa MD   Continuous Blood Gluc Sensor (FREESTYLE SHIRLEY 14 DAY SENSOR) MISC 1 each by Does not apply route every 14 days DX: E11.8 diabetes Yes Hu Urias MD   Continuous Blood Gluc  (FREESTYLE SHIRLEY READER) JOCY 1 each by Does not apply route 4 times daily (before meals and nightly) DX: E11.8 diabetes Yes Hu Urias MD   Continuous Blood Gluc Sensor ([facility]) MISC 1 each by Does not apply route 4 times daily (before meals and nightly) DX: E11.8 diabetes Yes Hu Urias MD   Blood Glucose Monitoring Suppl (ONE TOUCH ULTRA 2) w/Device KIT 1 kit by Does not apply route 4 times daily (with meals and nightly) May substitute with covered brand. Ell.8 is ICD 10 code Yes Hu Urias MD   Blood Glucose Monitoring Suppl ([address]) w/Device KIT 1 kit by Does not apply route daily Yes Hu Urias MD   Lift Chair MISC by Does not apply route Needs a lift chair due to mutiple sclerosis. Needs assistance getting out of chair, but once out of chair can ambulate independently with walker or cane. Yes Hu Urias MD   cholestyramine [place]) 4 G packet Take 2 packets by mouth 2 times daily Yes Hu Urias MD   Incontinence Supply Disposable (JESSICA SERENITY BRIEFS LARGE) MISC 1 each by Does not apply route three times daily Large to extra large.         Give Pallavi underwear protections. Daughter will select the brand. Yes Ivette Starks MD   insulin lispro (HUMALOG KWIKPEN) 100 UNIT/ML pen Inject 2-5 Units into the skin 3 times daily (with meals) Yes Ivette Starks MD   fluocinonide (LIDEX) 0.05 % cream Apply topically 2 times daily. Yes Ivette Starks MD   Incontinence Supplies (BEDPAN) [address], but \"9 bottle\" is the only way it will allow us to order this bedpan. Yes Ivette Starks MD   vitamin D (CHOLECALCIFEROL) 1000 UNIT TABS tablet Take 3 tablets by mouth daily. Yes Ivette Starks MD   loratadine (CLARITIN) 10 MG tablet Take 1 tablet by mouth daily. Yes Ivette Starks MD   Multiple Vitamins-Minerals (CENTRUM SILVER PO) Take 1 tablet by mouth daily  Yes Historical Provider, MD   Lancet Devices (ACCU-CHEK SOFTCLIX LANCET DEV) by Does not apply route 2 times daily as needed. Yes Historical Provider, MD        Social History     Tobacco Use    Smoking status: Never Smoker    Smokeless tobacco: Never Used   Substance Use Topics    Alcohol use: No        Vitals:    02/26/20 1052   BP: (!) 108/59   Site: Left Upper Arm   Position: Sitting   Cuff Size: Large Adult   Pulse: 65   Resp: 14   Temp: 97.3 °F (36.3 °C)   TempSrc: Oral   SpO2: 96%   Weight: Comment: Patient unable to stand on scale     Estimated body mass index is 32.06 kg/m² as calculated from the following:    Height as of 5/8/19: 5' 3\" (1.6 m). Weight as of 8/7/19: 181 lb (82.1 kg). Physical Exam  Constitutional:       Appearance: She is well-developed. HENT:      Head: Normocephalic and atraumatic. Right Ear: External ear normal. There is no impacted cerumen. Left Ear: External ear normal. There is no impacted cerumen. Nose: Nose normal. No congestion or rhinorrhea. Mouth/Throat:      Pharynx: No oropharyngeal exudate or posterior oropharyngeal erythema. Eyes:      General:         Right eye: No discharge.          Left eye: No

## 2020-02-26 NOTE — LETTER
[facility]  [address] [id number]  Phone: [phone]  Fax: [phone]    Sonya Martin MD        February 26, 2020     Patient: Christelle Mcgee   YOB: 1935   Date of Visit: 2/26/2020       To [facility]:     Marielle Paulson requires a lift to help patient get off the floor. .    If you have any questions or concerns, please don't hesitate to call.     Sincerely,          Sonya Martin MD

## 2020-02-26 NOTE — LETTER
[facility]  [address] [address]  Phone: [phone]  Fax: [phone]    Rea Aguilar MD        February 26, 2020     Patient: Marisol Salmon   YOB: 1935   Date of Visit: 2/26/2020       To Whom It May Concern:     Martha Alee requires the pure wick external catheter for neurogenic bladder due to Multiple Sclerosis. .    If you have any questions or concerns, please don't hesitate to call.     Sincerely,          Rea Aguilar MD

## 2020-02-26 NOTE — LETTER
[facility]  [address] [address]  Phone: [phone]  Fax: [phone]    Zeyad Jackson MD        February 26, 2020     Patient: Joe Raphael   YOB: 1935   Date of Visit: 2/26/2020       To Whom It May Concern: It is my medical opinion that Flor Ko is handicapped and requires a lifetime placard due to multiple sclerosis and impaired mobility. .    If you have any questions or concerns, please don't hesitate to call.     Sincerely,          Zeyad Jackson MD

## 2020-02-26 NOTE — LETTER
[facility]  [address] [id number]  Phone: [phone]  Fax: [phone]    Rea Aguilar MD        February 26, 2020     Patient: Marisol Salmon   YOB: 1935   Date of Visit: 2/26/2020       To Whom It May Concern:     Martha Vickers requires an abdominal binder for large ventral hernia. .    If you have any questions or concerns, please don't hesitate to call.     Sincerely,        Rea Aguilar MD

## 2020-02-27 ENCOUNTER — TELEPHONE (OUTPATIENT)
Dept: PRIMARY CARE CLINIC | Age: 85
End: 2020-02-27

## 2020-02-27 LAB
A/G RATIO: 1.6 (ref 1.1–2.2)
ALBUMIN SERPL-MCNC: 3.9 G/DL (ref 3.4–5)
ALP BLD-CCNC: 64 U/L (ref 40–129)
ALT SERPL-CCNC: 12 U/L (ref 10–40)
ANION GAP SERPL CALCULATED.3IONS-SCNC: 16 MMOL/L (ref 3–16)
AST SERPL-CCNC: 16 U/L (ref 15–37)
BILIRUB SERPL-MCNC: 0.5 MG/DL (ref 0–1)
BUN BLDV-MCNC: 25 MG/DL (ref 7–20)
CALCIUM SERPL-MCNC: 10.4 MG/DL (ref 8.3–10.6)
CHLORIDE BLD-SCNC: 92 MMOL/L (ref 99–110)
CO2: 15 MMOL/L (ref 21–32)
CREAT SERPL-MCNC: 1 MG/DL (ref 0.6–1.2)
GFR AFRICAN AMERICAN: >60
GFR NON-AFRICAN AMERICAN: 53
GLOBULIN: 2.5 G/DL
GLUCOSE BLD-MCNC: 89 MG/DL (ref 70–99)
LIPASE: 16 U/L (ref 13–60)
POTASSIUM SERPL-SCNC: 5 MMOL/L (ref 3.5–5.1)
PREALBUMIN: 21 MG/DL (ref 20–40)
SODIUM BLD-SCNC: 123 MMOL/L (ref 136–145)
TOTAL PROTEIN: 6.4 G/DL (ref 6.4–8.2)

## 2020-02-27 NOTE — TELEPHONE ENCOUNTER
Spoke with patient's daughter. Patient feeling better today without diarrhea or abdominal pain. Will increase bicarb from 1/2 650 mg bid ( chronic dose ) to 650 mg bid for 3 days , then return to chronic dose. Increase sodium in diet. Repeat renal on 2/29/2020. Go to ER if confusion, abdominal pain, lethargy or any deterioration or failure for status to improve in the interim.

## 2020-02-28 LAB — H PYLORI BREATH TEST: NEGATIVE

## 2020-02-29 ASSESSMENT — ENCOUNTER SYMPTOMS
SHORTNESS OF BREATH: 0
PHOTOPHOBIA: 0
TROUBLE SWALLOWING: 0
RHINORRHEA: 0
COUGH: 0
BLOOD IN STOOL: 0

## 2020-03-04 ENCOUNTER — HOSPITAL ENCOUNTER (OUTPATIENT)
Dept: GENERAL RADIOLOGY | Age: 85
Discharge: HOME | End: 2020-03-04
Payer: MEDICARE

## 2020-03-04 ENCOUNTER — HOSPITAL ENCOUNTER (OUTPATIENT)
Age: 85
Discharge: HOME | End: 2020-03-04
Payer: MEDICARE

## 2020-03-04 DIAGNOSIS — E87.1 HYPONATREMIA: ICD-10-CM

## 2020-03-04 LAB — SODIUM URINE: 70 MMOL/L

## 2020-03-04 PROCEDURE — 74019 RADEX ABDOMEN 2 VIEWS: CPT

## 2020-03-04 PROCEDURE — 72070 X-RAY EXAM THORAC SPINE 2VWS: CPT

## 2020-03-05 DIAGNOSIS — E21.0 PRIMARY HYPERPARATHYROIDISM (HCC): ICD-10-CM

## 2020-03-05 PROBLEM — K58.1 IRRITABLE BOWEL SYNDROME WITH CONSTIPATION: Status: ACTIVE | Noted: 2020-03-05

## 2020-03-05 LAB
ALBUMIN SERPL-MCNC: 4 G/DL (ref 3.4–5)
ANION GAP SERPL CALCULATED.3IONS-SCNC: 14 MMOL/L (ref 3–16)
BUN BLDV-MCNC: 11 MG/DL (ref 7–20)
CALCIUM SERPL-MCNC: 10.7 MG/DL (ref 8.3–10.6)
CHLORIDE BLD-SCNC: 91 MMOL/L (ref 99–110)
CO2: 23 MMOL/L (ref 21–32)
CREAT SERPL-MCNC: 0.7 MG/DL (ref 0.6–1.2)
GFR AFRICAN AMERICAN: >60
GFR NON-AFRICAN AMERICAN: >60
GLUCOSE BLD-MCNC: 133 MG/DL (ref 70–99)
OSMOLALITY URINE: 206 MOSM/KG (ref 390–1070)
PARATHYROID HORMONE INTACT: 88.4 PG/ML (ref 14–72)
PHOSPHORUS: 2.4 MG/DL (ref 2.5–4.9)
POTASSIUM SERPL-SCNC: 4.3 MMOL/L (ref 3.5–5.1)
SODIUM BLD-SCNC: 128 MMOL/L (ref 136–145)

## 2020-03-05 RX ORDER — POLYETHYLENE GLYCOL 3350 17 G/17G
17 POWDER, FOR SOLUTION ORAL 2 TIMES DAILY
Qty: 527 G | Refills: 5 | Status: SHIPPED | OUTPATIENT
Start: 2020-03-05 | End: 2020-03-30 | Stop reason: SDUPTHER

## 2020-03-18 DIAGNOSIS — R30.0 DYSURIA: ICD-10-CM

## 2020-03-18 LAB
ALBUMIN SERPL-MCNC: 4 G/DL (ref 3.4–5)
ANION GAP SERPL CALCULATED.3IONS-SCNC: 16 MMOL/L (ref 3–16)
BACTERIA: ABNORMAL /HPF
BILIRUBIN URINE: NEGATIVE
BLOOD, URINE: NEGATIVE
BUN BLDV-MCNC: 12 MG/DL (ref 7–20)
CALCIUM SERPL-MCNC: 10.6 MG/DL (ref 8.3–10.6)
CHLORIDE BLD-SCNC: 98 MMOL/L (ref 99–110)
CLARITY: ABNORMAL
CO2: 19 MMOL/L (ref 21–32)
COLOR: YELLOW
CREAT SERPL-MCNC: 0.7 MG/DL (ref 0.6–1.2)
EPITHELIAL CELLS, UA: 1 /HPF (ref 0–5)
GFR AFRICAN AMERICAN: >60
GFR NON-AFRICAN AMERICAN: >60
GLUCOSE BLD-MCNC: 101 MG/DL (ref 70–99)
GLUCOSE URINE: NEGATIVE MG/DL
HYALINE CASTS: 4 /LPF (ref 0–8)
KETONES, URINE: NEGATIVE MG/DL
LEUKOCYTE ESTERASE, URINE: ABNORMAL
MICROSCOPIC EXAMINATION: YES
NITRITE, URINE: POSITIVE
PH UA: 7 (ref 5–8)
PHOSPHORUS: 2.4 MG/DL (ref 2.5–4.9)
POTASSIUM SERPL-SCNC: 4 MMOL/L (ref 3.5–5.1)
PROTEIN UA: 30 MG/DL
RBC UA: 2 /HPF (ref 0–4)
SODIUM BLD-SCNC: 133 MMOL/L (ref 136–145)
SPECIFIC GRAVITY UA: 1.01 (ref 1–1.03)
URINE TYPE: ABNORMAL
UROBILINOGEN, URINE: 1 E.U./DL
WBC UA: 130 /HPF (ref 0–5)

## 2020-03-19 RX ORDER — SODIUM BICARBONATE 650 MG/1
650 TABLET ORAL 2 TIMES DAILY
Qty: 60 TABLET | Refills: 11 | Status: SHIPPED | OUTPATIENT
Start: 2020-03-19 | End: 2020-03-30 | Stop reason: SDUPTHER

## 2020-03-20 ENCOUNTER — TELEPHONE (OUTPATIENT)
Dept: PRIMARY CARE CLINIC | Age: 85
End: 2020-03-20

## 2020-03-20 RX ORDER — SITAGLIPTIN 100 MG/1
TABLET, FILM COATED ORAL
Qty: 90 TABLET | Refills: 3 | Status: SHIPPED | OUTPATIENT
Start: 2020-03-20 | End: 2020-03-21

## 2020-03-20 NOTE — TELEPHONE ENCOUNTER
Patients daughter (Dr. Julieth Guzman ) calling to get mothers urine results please advise [phone]

## 2020-03-20 NOTE — TELEPHONE ENCOUNTER
Results called. Patient doing okay, with mild dysuria, no fever, can wait for sensitivities that will be available tomorrow.

## 2020-03-21 LAB
ORGANISM: ABNORMAL
URINE CULTURE, ROUTINE: ABNORMAL

## 2020-03-21 RX ORDER — CEFUROXIME AXETIL 250 MG/1
250 TABLET ORAL 2 TIMES DAILY
Qty: 20 TABLET | Refills: 0 | Status: SHIPPED | OUTPATIENT
Start: 2020-03-21 | End: 2020-03-31

## 2020-03-21 RX ORDER — CEFUROXIME AXETIL 250 MG/1
250 TABLET ORAL 2 TIMES DAILY
Qty: 20 TABLET | Refills: 0 | Status: SHIPPED | OUTPATIENT
Start: 2020-03-21 | End: 2020-03-21 | Stop reason: SDUPTHER

## 2020-03-30 RX ORDER — DIVALPROEX SODIUM 125 MG/1
TABLET, DELAYED RELEASE ORAL
Qty: 90 TABLET | Refills: 5 | Status: SHIPPED | OUTPATIENT
Start: 2020-03-30 | End: 2020-09-28

## 2020-03-30 RX ORDER — SODIUM BICARBONATE 650 MG/1
650 TABLET ORAL 2 TIMES DAILY
Qty: 60 TABLET | Refills: 11 | Status: SHIPPED | OUTPATIENT
Start: 2020-03-30 | End: 2020-12-02 | Stop reason: SDUPTHER

## 2020-03-30 RX ORDER — POLYETHYLENE GLYCOL 3350 17 G/17G
17 POWDER, FOR SOLUTION ORAL 2 TIMES DAILY
Qty: 527 G | Refills: 5 | Status: SHIPPED | OUTPATIENT
Start: 2020-03-30 | End: 2020-12-14 | Stop reason: SDUPTHER

## 2020-04-29 ENCOUNTER — TELEPHONE (OUTPATIENT)
Dept: PRIMARY CARE CLINIC | Age: 85
End: 2020-04-29

## 2020-04-29 DIAGNOSIS — R30.0 DYSURIA: ICD-10-CM

## 2020-04-29 LAB
BILIRUBIN URINE: NEGATIVE
BLOOD, URINE: NEGATIVE
CLARITY: ABNORMAL
COLOR: YELLOW
GLUCOSE URINE: NEGATIVE MG/DL
KETONES, URINE: NEGATIVE MG/DL
LEUKOCYTE ESTERASE, URINE: ABNORMAL
MICROSCOPIC EXAMINATION: YES
NITRITE, URINE: NEGATIVE
PH UA: 8.5 (ref 5–8)
PROTEIN UA: 30 MG/DL
SPECIFIC GRAVITY UA: 1.01 (ref 1–1.03)
URINE TYPE: ABNORMAL
UROBILINOGEN, URINE: 0.2 E.U./DL

## 2020-04-29 NOTE — TELEPHONE ENCOUNTER
Chart message sent that orders were placed for urinalysis and culture and the [facility] lab is still open.

## 2020-04-29 NOTE — TELEPHONE ENCOUNTER
Patient is calling to request a urinalysis lab culture, believes she has a UTI. Please advise at [phone].

## 2020-04-30 LAB
CRYSTALS, UA: ABNORMAL /HPF
EPITHELIAL CELLS, UA: 2 /HPF (ref 0–5)
HYALINE CASTS: 2 /LPF (ref 0–8)
RBC UA: ABNORMAL /HPF (ref 0–4)
WBC UA: 12 /HPF (ref 0–5)

## 2020-05-03 LAB
ORGANISM: ABNORMAL
URINE CULTURE, ROUTINE: ABNORMAL

## 2020-05-12 RX ORDER — ESTRADIOL 0.1 MG/G
CREAM VAGINAL
Qty: 1 TUBE | Refills: 3 | Status: SHIPPED | OUTPATIENT
Start: 2020-05-12 | End: 2020-10-13 | Stop reason: SDUPTHER

## 2020-05-18 RX ORDER — VERAPAMIL HYDROCHLORIDE 240 MG/1
TABLET, FILM COATED, EXTENDED RELEASE ORAL
Qty: 90 TABLET | Refills: 1 | Status: SHIPPED | OUTPATIENT
Start: 2020-05-18 | End: 2020-11-13

## 2020-05-18 RX ORDER — MONTELUKAST SODIUM 10 MG/1
TABLET ORAL
Qty: 90 TABLET | Refills: 3 | Status: SHIPPED | OUTPATIENT
Start: 2020-05-18 | End: 2020-12-14 | Stop reason: SDUPTHER

## 2020-05-27 ENCOUNTER — TELEPHONE (OUTPATIENT)
Dept: ADMINISTRATIVE | Age: 85
End: 2020-05-27

## 2020-05-27 ENCOUNTER — TELEPHONE (OUTPATIENT)
Dept: PRIMARY CARE CLINIC | Age: 85
End: 2020-05-27

## 2020-05-27 DIAGNOSIS — R30.0 DYSURIA: ICD-10-CM

## 2020-05-27 DIAGNOSIS — R19.7 DIARRHEA, UNSPECIFIED TYPE: ICD-10-CM

## 2020-05-27 LAB
BILIRUBIN URINE: NEGATIVE
BLOOD, URINE: ABNORMAL
CLARITY: ABNORMAL
COLOR: YELLOW
GLUCOSE URINE: NEGATIVE MG/DL
KETONES, URINE: NEGATIVE MG/DL
LEUKOCYTE ESTERASE, URINE: ABNORMAL
MICROSCOPIC EXAMINATION: YES
NITRITE, URINE: NEGATIVE
PH UA: 7.5 (ref 5–8)
PROTEIN UA: 30 MG/DL
SPECIFIC GRAVITY UA: 1.01 (ref 1–1.03)
URINE TYPE: ABNORMAL
UROBILINOGEN, URINE: 0.2 E.U./DL

## 2020-05-27 NOTE — TELEPHONE ENCOUNTER
Patient's Daughter Cary Mesa called today to request a DM 6 week check up. Was offered an appointment for 6/2, refused appointment. Patient would like Britton Rankin MD to schedule a virtual visit on a Wednesday morning as she would have to assist her mom with the video visit.

## 2020-05-27 NOTE — TELEPHONE ENCOUNTER
Pt's daughter wants to know if Dr Rickey Bonilla has seen the message regarding a urine culture. pls advise. Thank you!      Pt nabil: [phone]

## 2020-05-28 LAB
COMMENT UA: ABNORMAL
EPITHELIAL CELLS, UA: 1 /HPF (ref 0–5)
FINE CASTS, UA: ABNORMAL /LPF (ref 0–2)
RBC UA: 1 /HPF (ref 0–4)
URINE TYPE: ABNORMAL
WBC UA: >900 /HPF (ref 0–5)

## 2020-05-31 LAB
ORGANISM: ABNORMAL
URINE CULTURE, ROUTINE: ABNORMAL

## 2020-06-01 ENCOUNTER — PATIENT MESSAGE (OUTPATIENT)
Dept: PRIMARY CARE CLINIC | Age: 85
End: 2020-06-01

## 2020-06-01 RX ORDER — SULFAMETHOXAZOLE AND TRIMETHOPRIM 800; 160 MG/1; MG/1
1 TABLET ORAL 2 TIMES DAILY
Qty: 14 TABLET | Refills: 0 | Status: SHIPPED | OUTPATIENT
Start: 2020-06-01 | End: 2020-06-08

## 2020-06-10 ENCOUNTER — VIRTUAL VISIT (OUTPATIENT)
Dept: PRIMARY CARE CLINIC | Age: 85
End: 2020-06-10
Payer: MEDICARE

## 2020-06-10 ENCOUNTER — TELEPHONE (OUTPATIENT)
Dept: PRIMARY CARE CLINIC | Age: 85
End: 2020-06-10

## 2020-06-10 ENCOUNTER — NURSE TRIAGE (OUTPATIENT)
Dept: OTHER | Facility: CLINIC | Age: 85
End: 2020-06-10

## 2020-06-10 VITALS — DIASTOLIC BLOOD PRESSURE: 76 MMHG | SYSTOLIC BLOOD PRESSURE: 154 MMHG | TEMPERATURE: 98.9 F | HEART RATE: 76 BPM

## 2020-06-10 PROCEDURE — 99213 OFFICE O/P EST LOW 20 MIN: CPT | Performed by: INTERNAL MEDICINE

## 2020-06-10 ASSESSMENT — ENCOUNTER SYMPTOMS
ABDOMINAL PAIN: 1
COUGH: 0
SHORTNESS OF BREATH: 0
TROUBLE SWALLOWING: 0
RHINORRHEA: 0
PHOTOPHOBIA: 0
BLOOD IN STOOL: 0
NAUSEA: 1
DIARRHEA: 1
VOMITING: 1
CONSTIPATION: 1

## 2020-06-10 NOTE — TELEPHONE ENCOUNTER
Nurse Triage sent PT over to be scheduled today 6/10 either VV or in office. DR. Sally Callaway only had availability for tomorrow. Pt refused to see other provider, so she is set up with appointment 6/11 @ 8:00 A. M. Pt would stil like to see if she can be seen today because of her pain and distended abdomen and hernia. Please Call Pt and advice.

## 2020-06-10 NOTE — PROGRESS NOTES
6/10/2020     Lisa John (:  1935) is a 82 y.o. female, here for evaluation of the following medical concerns:    Abdominal Pain   This is a recurrent problem. The current episode started in the past 7 days. The onset quality is gradual. The problem occurs constantly. The problem has been gradually worsening. The pain is located in the generalized abdominal region. The pain is at a severity of 4/10. The pain is moderate. The quality of the pain is dull (very bloated. ). The abdominal pain does not radiate. Associated symptoms include anorexia, constipation, diarrhea, nausea and vomiting. Pertinent negatives include no dysuria, fever, frequency, headaches or hematuria. Associated symptoms comments: Patient gets temporary relief with vomiting and symptoms return. On bactrim for uti. Nothing aggravates the pain. Relieved by: vomiting. Treatments tried: clear liquids and miralax. The treatment provided no relief. small bowel obstruction due to adhesions times two in the past.     2020  7:34 AM - Mamie Aguayo Incoming Lab Results From Soft (Epic Adt)     Specimen Information: Urine, clean catch        Component Collected Lab   Organism Abnormal  2020  4:30 PM [facility]   Escherichia coli    Urine Culture, Routine 2020  4:30 PM 15 [facility]   >100,000 CFU/ml    Testing Performed By     Lab - [facility]. Name Director Address Valid Date Range    - Coleen Ganser Tonia Bakes, M.D., Ph.D. [address] [facility] [id number] 17 [id number]-Present   Narrative   Performed by: [facility]   ORDER#: [id number]                          ORDERED BY: Brigette Navas  SOURCE: Urine Clean Catch                  COLLECTED:  20 16:30  ANTIBIOTICS AT [facility]. :                      RECEIVED :  20 23:58  Performed at:  [facility]  [address] [facility] [address] [address]   Phone [phone]   Susceptibility     Escherichia coli (1)     Antibiotic Interpretation MARK Status   ampicillin Resistant >=32 mcg/mL    ceFAZolin Intermediate 16 mcg/mL     NOTE: Cefazolin should only be used for uncomplicated UTI        for E.coli or Klebsiella pneumoniae. cefepime Sensitive <=0.12 mcg/mL    cefTRIAXone Sensitive <=0.25 mcg/mL    ciprofloxacin Resistant >=4 mcg/mL    ertapenem Sensitive <=0.12 mcg/mL    gentamicin Resistant >=16 mcg/mL    levofloxacin Resistant >=8 mcg/mL    nitrofurantoin Sensitive <=16 mcg/mL    piperacillin-tazobactam Sensitive <=4 mcg/mL    tobramycin Intermediate 8 mcg/mL    trimethoprim-sulfamethoxazole             Review of Systems   Constitutional: Negative for chills, fatigue and fever. HENT: Negative for congestion, dental problem, drooling, ear discharge, ear pain, hearing loss, nosebleeds, rhinorrhea, sneezing, tinnitus and trouble swallowing. Decreased neck muscle spasm and decreased pain. ROM still limited to 30 degrees flexion and extension. Eyes: Negative for photophobia and visual disturbance. Cataracts   Respiratory: Negative for cough and shortness of breath. Cardiovascular: Negative for chest pain, palpitations and leg swelling. Hypertension hyperlipidemia with increased leg edema past week. Gastrointestinal: Positive for abdominal pain, anorexia, constipation, diarrhea, nausea and vomiting. Negative for blood in stool. Bowel acid diarrhea has been controlled cholestyramine. Remote cholecystectomy. Endocrine: Negative for polydipsia, polyphagia and polyuria. Genitourinary: Negative for dysuria, flank pain, frequency, hematuria, pelvic pain, urgency, vaginal bleeding, vaginal discharge and vaginal pain. Urinary odor. Antibiotics clear up infection for a week and symptoms quickly return. Musculoskeletal: Positive for gait problem. Negative for joint swelling and neck pain. Progressive multiple sclerosis. Patient needs standby assistance with  Shaune . Cannot go up and down stairs. There are stair lifts in the house that allow patient to go from one floor to the other. Now very difficult for family to get her outside of the home and requires two people. Skin: Negative for pallor and rash. Allergic/Immunologic: Negative for environmental allergies, food allergies and immunocompromised state. Neurological: Negative for dizziness, seizures, syncope, speech difficulty, weakness, light-headedness, numbness and headaches. Hematological: Negative. Negative for adenopathy. Does not bruise/bleed easily. 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 Psychiatric/Behavioral: Negative for agitation, confusion, decreased concentration and sleep disturbance. The patient is not nervous/anxious. Sleep disturbance related to frequent urination       Prior to Visit Medications    Medication Sig Taking?  Authorizing Provider   montelukast (SINGULAIR) 10 MG tablet TAKE 1 TABLET BY MOUTH EVERYDAY AT BEDTIME  Juan David Trujillo MD   verapamil (CALAN SR) 240 MG extended release tablet TAKE 1 TABLET BY MOUTH EVERY EVENING  Juan David Trujillo MD   estradiol (ESTRACE) 0.1 MG/GM vaginal cream Twice a week  Juan David Trujillo MD   sodium bicarbonate 650 MG tablet Take 1 tablet by mouth 2 times daily  Juan David Trujillo MD   polyethylene glycol (MIRALAX) packet Take 17 g by mouth 2 times daily  Juan David Trujillo MD   divalproex (DEPAKOTE) 125 MG DR tablet Take 1 tablet by mouth in the morning and 2 tablets by mouth in the evening  Juan David Trujillo MD   SITagliptin (JANUVIA) 100 MG tablet TAKE 1 TABLET BY MOUTH EVERY DAY  Juan David Trujillo MD   famotidine (PEPCID) 20 MG tablet TAKE 1 TABLET BY MOUTH TWICE A DAY  Juan David Trujillo MD   Elastic Bandages & Supports ([facility]) [address] [facility] 2 each by Does not apply Shayy Rangel MD   Lancets MISC 1 each by Does not apply route 4 times daily (before meals and nightly)  Heavenly Cary MD   ammonium lactate (LAC-HYDRIN) 12 % lotion Apply topically daily. Heavenly Cary MD   lidocaine (XYLOCAINE) 5 % ointment Apply topically as needed. Heavenly Cary MD   ONETOUCH VERIO strip 1 EACH BY IN VITRO ROUTE 4 TIMES DAILY (BEFORE MEALS AND NIGHTLY) E11.8 IS ICD 10 CODE  Heavenly Cary MD   Jenny Piano LANCETS 17O MISC USE AS DIRECTED FOUR TIMES A DAY  Heavenly Cary MD   Continuous Blood Gluc  (FREESTYLE SHIRLEY 14 DAY READER) JOCY 1 each by Does not apply route every 14 days DX: E11.8 diabetes  Heavenly Cary MD   Continuous Blood Gluc Sensor (FREESTYLE SHIRLEY 14 DAY SENSOR) MISC 1 each by Does not apply route every 14 days DX: E11.8 diabetes  Heavenly Cary MD   Continuous Blood Gluc  (FREESTYLE SHIRLEY READER) [address] 1 each by Does not apply route 4 times daily (before meals and nightly) DX: E11.8 diabetes  Heavenly Cary MD   Continuous Blood Gluc Sensor ([address] [facility]) MISC 1 each by Does not apply route 4 times daily (before meals and nightly) DX: E11.8 diabetes  Heavenly Cary MD   Blood Glucose Monitoring Suppl (ONE TOUCH ULTRA 2) w/Device KIT 1 kit by Does not apply route 4 times daily (with meals and nightly) May substitute with covered brand. Ell.8 is ICD 10 code  Heavenly Cary MD   Blood Glucose Monitoring Suppl ([address]) w/Device KIT 1 kit by Does not apply route daily  Heavenly Cary MD   Lift Chair MISC by Does not apply route Needs a lift chair due to mutiple sclerosis. Needs assistance getting out of chair, but once out of chair can ambulate independently with walker or cane.   Heavenly Cary MD   cholestyramine [place]) 4 G packet Take 2 packets by mouth 2 times daily  Heavenly Cary MD Status: She is alert and oriented to person, place, and time. Cranial Nerves: No cranial nerve deficit. Psychiatric:         Mood and Affect: Mood normal.         Behavior: Behavior normal.         Thought Content: Thought content normal.         Judgment: Judgment normal.         ASSESSMENT/PLAN:   Diagnosis Orders   1. Generalized abdominal pain       Very bloated, nausea and vomiting. No fever or myalgias, consistent with recurrent small bowel obstruction. Send to ER. Aleida Garg is a 82 y.o. female being evaluated by a Virtual Visit (video visit) encounter to address concerns as mentioned above. A caregiver was present when appropriate. Due to this being a TeleHealth encounter ([facility]), evaluation of the following organ systems was limited: Vitals/Constitutional/EENT/Resp/CV/GI//MS/Neuro/Skin/Heme-Lymph-Imm. Pursuant to the emergency declaration under the [address] [place] [address] and the [employer] and [employer] Act, this Virtual Visit was conducted with patient's (and/or legal guardian's) consent, to reduce the patient's risk of exposure to COVID-19 and provide necessary medical care. The patient (and/or legal guardian) has also been advised to contact this office for worsening conditions or problems, and seek emergency medical treatment and/or call 911 if deemed necessary. Patient identification was verified at the start of the visit: Yes    Total time spent for this encounter: 15 minutes    Services were provided through a video synchronous discussion virtually to substitute for in-person clinic visit. Patient and provider were located at their individual homes. --Alea Barajas MD on 6/10/2020 at 5:58 PM    An electronic signature was used to authenticate this note. An electronic signature was used to authenticate this note.     --Alea Barajas MD

## 2020-06-18 ENCOUNTER — TELEPHONE (OUTPATIENT)
Dept: PRIMARY CARE CLINIC | Age: 85
End: 2020-06-18

## 2020-06-18 NOTE — TELEPHONE ENCOUNTER
Needs demographics and insurance information for patient you can fax it over to fax number [phone]

## 2020-06-21 LAB
ALBUMIN SERPL-MCNC: 3.4 G/DL (ref 3.6–5.1)
ATYPICAL LYMPHOCYTE RELATIVE PERCENT: ABNORMAL % (ref 0–10)
BAND NEUTROPHILS: ABNORMAL %
BANDED NEUTROPHILS ABSOLUTE COUNT: ABNORMAL CELLS/UL (ref 0–750)
BASOPHILS ABSOLUTE: 31 CELLS/UL (ref 0–200)
BASOPHILS RELATIVE PERCENT: 0.6 %
BLASTS ABSOLUTE COUNT: ABNORMAL CELLS/UL
BLASTS RELATIVE PERCENT: ABNORMAL %
BUN / CREAT RATIO: 10 (CALC) (ref 6–22)
BUN BLDV-MCNC: 6 MG/DL (ref 7–25)
CALCIUM SERPL-MCNC: 9.9 MG/DL (ref 8.6–10.4)
CHLORIDE BLD-SCNC: 108 MMOL/L (ref 98–110)
CO2: 19 MMOL/L (ref 20–32)
COMMENT: ABNORMAL
COPY TO: NORMAL
CREAT SERPL-MCNC: 0.6 MG/DL (ref 0.6–0.88)
EOSINOPHILS ABSOLUTE: 78 CELLS/UL (ref 15–500)
EOSINOPHILS RELATIVE PERCENT: 1.5 %
GDT REPLACEMENT: NORMAL
GFR AFRICAN AMERICAN: 98 ML/MIN/1.73M2
GFR, ESTIMATED: 84 ML/MIN/1.73M2
GLUCOSE BLD-MCNC: 80 MG/DL (ref 65–99)
HCT VFR BLD CALC: 31.8 % (ref 35–45)
HEMOGLOBIN: 10.2 G/DL (ref 11.7–15.5)
LYMPHOCYTES ABSOLUTE: 2075 CELLS/UL (ref 850–3900)
LYMPHOCYTES RELATIVE PERCENT: 39.9 %
MCH RBC QN AUTO: 30.9 PG (ref 27–33)
MCHC RBC AUTO-ENTMCNC: 32.1 G/DL (ref 32–36)
MCV RBC AUTO: 96.4 FL (ref 80–100)
METAMYELOCYTES ABSOLUTE COUNT: ABNORMAL CELLS/UL
METAMYELOCYTES RELATIVE PERCENT: ABNORMAL %
MONOCYTES ABSOLUTE: 624 CELLS/UL (ref 200–950)
MONOCYTES RELATIVE PERCENT: 12 %
MYELOCYTE PERCENT: ABNORMAL %
MYELOCYTES ABSOLUTE COUNT: ABNORMAL CELLS/UL
NEUTROPHILS ABSOLUTE: 2392 CELLS/UL (ref 1500–7800)
NUCLEATED RED BLOOD CELLS: ABNORMAL /100 WBC
NUCLEATED RED BLOOD CELLS: ABNORMAL CELLS/UL
PDW BLD-RTO: 13.5 % (ref 11–15)
PHOSPHORUS: 2.1 MG/DL (ref 2.1–4.3)
PLATELET # BLD: 211 THOUSAND/UL (ref 140–400)
PMV BLD AUTO: 11.1 FL (ref 7.5–12.5)
POTASSIUM SERPL-SCNC: 3.4 MMOL/L (ref 3.5–5.3)
PROMYELOCYTES ABSOLUTE COUNT: ABNORMAL CELLS/UL
PROMYELOCYTES PERCENT: ABNORMAL %
RBC # BLD: 3.3 MILLION/UL (ref 3.8–5.1)
SEGMENTED NEUTROPHILS RELATIVE PERCENT: 46 %
SODIUM BLD-SCNC: 136 MMOL/L (ref 135–146)
SPECIMEN INTEGRITY COMPROMISED: NORMAL
SPECIMEN INTEGRITY COMPROMISED: NORMAL
WBC # BLD: 5.2 THOUSAND/UL (ref 3.8–10.8)

## 2020-07-17 ENCOUNTER — NURSE TRIAGE (OUTPATIENT)
Dept: OTHER | Facility: CLINIC | Age: 85
End: 2020-07-17

## 2020-07-17 DIAGNOSIS — R30.0 DYSURIA: ICD-10-CM

## 2020-07-17 LAB
BACTERIA: ABNORMAL /HPF
BACTERIA: ABNORMAL /HPF
BILIRUBIN URINE: NEGATIVE
BLOOD, URINE: ABNORMAL
CELLULAR CASTS: ABNORMAL /LPF
CLARITY: ABNORMAL
COLOR: YELLOW
EPITHELIAL CELLS, UA: 3 /HPF (ref 0–5)
EPITHELIAL CELLS, UA: 4 /HPF (ref 0–5)
GLUCOSE URINE: NEGATIVE MG/DL
KETONES, URINE: NEGATIVE MG/DL
LEUKOCYTE ESTERASE, URINE: ABNORMAL
MICROSCOPIC EXAMINATION: YES
NITRITE, URINE: NEGATIVE
PH UA: 7 (ref 5–8)
PROTEIN UA: 30 MG/DL
RBC UA: 6 /HPF (ref 0–4)
RBC UA: 7 /HPF (ref 0–4)
SPECIFIC GRAVITY UA: 1.01 (ref 1–1.03)
URINE TYPE: ABNORMAL
URINE TYPE: ABNORMAL
UROBILINOGEN, URINE: 0.2 E.U./DL
WBC UA: >900 /HPF (ref 0–5)
WBC UA: >900 /HPF (ref 0–5)

## 2020-07-17 RX ORDER — SULFAMETHOXAZOLE AND TRIMETHOPRIM 800; 160 MG/1; MG/1
0.5 TABLET ORAL 2 TIMES DAILY
Qty: 14 TABLET | Refills: 0 | Status: SHIPPED | OUTPATIENT
Start: 2020-07-17 | End: 2020-09-30 | Stop reason: SDUPTHER

## 2020-07-17 RX ORDER — ATORVASTATIN CALCIUM 40 MG/1
TABLET, FILM COATED ORAL
Qty: 90 TABLET | Refills: 3 | Status: SHIPPED | OUTPATIENT
Start: 2020-07-17 | End: 2020-12-14 | Stop reason: SDUPTHER

## 2020-07-17 NOTE — TELEPHONE ENCOUNTER
Reason for Disposition   [1] Discomfort (pain, burning or stinging) when passing urine AND [2] female   Diabetes mellitus or weak immune system (e.g., HIV positive, cancer chemo, splenectomy, organ transplant, chronic steroids)    Answer Assessment - Initial Assessment Questions  1. SYMPTOM: \"What's the main symptom you're concerned about? \" (e.g., frequency, incontinence)      Frequency, odor     2. ONSET: \"When did the    start? \"       About 3 weeks    3. PAIN: \"Is there any pain? \" If so, ask: \"How bad is it? \" (Scale: 1-10; mild, moderate, severe)        7/10    4. CAUSE: \"What do you think is causing the symptoms? \"      Pt has chronic bladder infections    5. OTHER SYMPTOMS: \"Do you have any other symptoms? \" (e.g., fever, flank pain, blood in urine, pain with urination)        [place], cloudy, odorous, painful urination    6. PREGNANCY: \"Is there any chance you are pregnant? \" \"When was your last menstrual period? \"    Protocols used: URINARY SYMPTOMS-ADULT-AH, URINATION PAIN - [facility]    [facility] to schedule appointment, with red flag complaint, transferred to RN access for triage. Pt is requesting that an order be sent so she can take urine to the lab     Caller reports symptoms as documented above. Caller informed of disposition. Soft transfer to pre-service [facility] to schedule appointment as recommended. Care advice as documented. Pt and daughter verbalized understanding and agreeable to plan. Please do not respond to the triage nurse through this encounter. Any subsequent communication should be directly with the patient.

## 2020-07-22 ENCOUNTER — VIRTUAL VISIT (OUTPATIENT)
Dept: PRIMARY CARE CLINIC | Age: 85
End: 2020-07-22
Payer: MEDICARE

## 2020-07-22 VITALS
DIASTOLIC BLOOD PRESSURE: 65 MMHG | TEMPERATURE: 96.6 F | RESPIRATION RATE: 16 BRPM | OXYGEN SATURATION: 97 % | HEART RATE: 69 BPM | SYSTOLIC BLOOD PRESSURE: 137 MMHG

## 2020-07-22 PROCEDURE — 99214 OFFICE O/P EST MOD 30 MIN: CPT | Performed by: INTERNAL MEDICINE

## 2020-07-22 ASSESSMENT — ENCOUNTER SYMPTOMS: ABDOMINAL PAIN: 1

## 2020-07-22 NOTE — PROGRESS NOTES
2020    TELEHEALTH EVALUATION -- Audio/Visual (During OIHPG-15 public health emergency)    HPI:    Milton Kolb (:  1935) has requested an audio/video evaluation for the following concern(s):    Diabetes   She presents for her follow-up diabetic visit. She has type 2 diabetes mellitus. Her disease course has been stable. There are no hypoglycemic associated symptoms. Pertinent negatives for hypoglycemia include no confusion, dizziness, headaches, nervousness/anxiousness, pallor, seizures, speech difficulty or tremors. There are no diabetic associated symptoms. Pertinent negatives for diabetes include no chest pain, no fatigue, no polydipsia, no polyphagia, no polyuria, no weakness and no weight loss. There are no hypoglycemic complications. Symptoms are stable. There are no diabetic complications. Pertinent negatives for diabetic complications include no CVA, heart disease, nephropathy or peripheral neuropathy. Risk factors for coronary artery disease include diabetes mellitus, dyslipidemia and hypertension. Current diabetic treatment includes diet (Januvia hundred milligrams daily, Lantus recently discontinued. ). She is compliant with treatment all of the time. She is following a generally healthy diet. Meal planning includes avoidance of concentrated sweets. She never participates in exercise. There is no change in her home blood glucose trend. Her overall blood glucose range is 110-130 mg/dl. An ACE inhibitor/angiotensin II receptor blocker is being taken. Abdominal Pain   This is a recurrent (History of recurrent small bowel partial obstructions with bloating and nausea but no symptoms for the past two weeks and bowel movements have been regular.) problem. The problem occurs intermittently. The problem has been waxing and waning. The pain is located in the generalized abdominal region. The pain is at a severity of 0/10 (No abdominal pain or bloating at present. ).  Pertinent negatives include no anorexia, arthralgias, belching, constipation, diarrhea, dysuria, fever, flatus, frequency, headaches, hematochezia, hematuria, melena, myalgias, nausea, vomiting or weight loss. Relieved by: Patient is on MiraLAX twice a day. Treatments tried: Taking MiraLAX twice a day. History of cholecystectomy       Review of Systems   Constitutional: Negative. Negative for activity change, appetite change, chills, diaphoresis, fatigue, fever and weight loss. HENT: Negative for dental problem, ear pain, hearing loss, mouth sores, nosebleeds, postnasal drip, rhinorrhea, sore throat, trouble swallowing and voice change. Eyes: Negative for photophobia, pain, discharge, redness, itching and visual disturbance. Respiratory: Negative for apnea, cough, choking, chest tightness, shortness of breath, wheezing and stridor. Cardiovascular: Negative for chest pain, palpitations and leg swelling. Hypertension   Gastrointestinal: Positive for abdominal pain. Negative for abdominal distention, anal bleeding, anorexia, blood in stool, constipation, diarrhea, flatus, hematochezia, melena, nausea, rectal pain and vomiting. History of small bowel obstruction   Endocrine: Negative for polydipsia, polyphagia and polyuria. Type II diabetes, insulin requiring in past  hyperlipidemia   Genitourinary: Negative for dysuria, flank pain, frequency and hematuria. Musculoskeletal: Negative for arthralgias, back pain, gait problem, joint swelling, myalgias, neck pain and neck stiffness. Skin: Negative for color change, pallor, rash and wound. Neurological: Negative for dizziness, tremors, seizures, syncope, facial asymmetry, speech difficulty, weakness, light-headedness, numbness and headaches. Multiple sclerosis needing standby assistance with ambulation unable to go up and down stairs. Present for over 30 years. Hematological: Negative for adenopathy. Does not bruise/bleed easily.    Psychiatric/Behavioral: Negative for agitation, behavioral problems, confusion, decreased concentration, dysphoric mood, hallucinations, self-injury, sleep disturbance and suicidal ideas. The patient is not nervous/anxious and is not hyperactive. Prior to Visit Medications    Medication Sig Taking? Authorizing Provider   atorvastatin (LIPITOR) 40 MG tablet TAKE 1 TABLET BY MOUTH EVERY DAY  Patricia Rivera MD   sulfamethoxazole-trimethoprim (BACTRIM DS;SEPTRA DS) 800-160 MG per tablet Take 0.5 tablets by mouth 2 times daily for 14 days  Patricia Rivera MD   ELIQUIS 5 MG TABS tablet TAKE 1 TABLET BY MOUTH 2 TIMES DAILY STOP COUMADIN  Patricia Rivera MD   montelukast (SINGULAIR) 10 MG tablet TAKE 1 TABLET BY MOUTH EVERYDAY AT BEDTIME  Patricia Rivera MD   verapamil (CALAN SR) 240 MG extended release tablet TAKE 1 TABLET BY MOUTH EVERY EVENING  Patricia Rivera MD   estradiol (ESTRACE) 0.1 MG/GM vaginal cream Twice a week  Patricia Rivera MD   sodium bicarbonate 650 MG tablet Take 1 tablet by mouth 2 times daily  Patricia Rivera MD   polyethylene glycol (MIRALAX) packet Take 17 g by mouth 2 times daily  Patricia Rivera MD   divalproex (DEPAKOTE) 125 MG DR tablet Take 1 tablet by mouth in the morning and 2 tablets by mouth in the evening  Patricia Rivera MD   SITagliptin (JANUVIA) 100 MG tablet TAKE 1 TABLET BY MOUTH EVERY DAY  Patricia Rivera MD   famotidine (PEPCID) 20 MG tablet TAKE 1 TABLET BY MOUTH TWICE A DAY  Patricia Rivera MD   Elastic Bandages & Supports (MEDICAL COMPRESSION STOCKINGS) [address] [facility] 2 each by Does not apply route daily 40 mm hg thigh high  Patricia Rivera MD   Spacer/Aero-Holding Chambers (AEROCHAMBER MV) MISC 1 each by Does not apply route 2 times daily  Patricia Rivera MD   ketoconazole (NIZORAL) 2 % cream Apply topically daily.   Patricia Rivera MD   pantoprazole (PROTONIX) 40 MG tablet TAKE 1 TABLET BY MOUTH EVERY DAY [address] Prado MD   [place] LANCETS 23Q MISC USE AS DIRECTED FOUR TIMES A DAY  Carmine Head MD   Continuous Blood Gluc  (FREESTYLE SHIRLEY 14 DAY READER) JOCY 1 each by Does not apply route every 14 days DX: E11.8 diabetes  Carmine Head MD   Continuous Blood Gluc Sensor (FREESTYLE SHIRLEY 14 DAY SENSOR) MISC 1 each by Does not apply route every 14 days DX: E11.8 diabetes  Carmine Head MD   Continuous Blood Gluc  (FREESTYLE SHIRLEY READER) [address] 1 each by Does not apply route 4 times daily (before meals and nightly) DX: E11.8 diabetes  Carmine Head MD   Continuous Blood Gluc Sensor ([address] [facility]) MISC 1 each by Does not apply route 4 times daily (before meals and nightly) DX: E11.8 diabetes  Carmine Head MD   Blood Glucose Monitoring Suppl (ONE TOUCH ULTRA 2) w/Device KIT 1 kit by Does not apply route 4 times daily (with meals and nightly) May substitute with covered brand. Ell.8 is ICD 10 code  Carmine Head MD   Blood Glucose Monitoring Suppl ([address]) w/Device KIT 1 kit by Does not apply route daily  Carmine Head MD   Lift Chair MISC by Does not apply route Needs a lift chair due to mutiple sclerosis. Needs assistance getting out of chair, but once out of chair can ambulate independently with walker or cane. Carmine Head MD   cholestyramine Marck Soto) 4 G packet Take 2 packets by mouth 2 times daily  Carmine Head MD   Incontinence Supply Disposable (PALLAVI SERENITY BRIEFS LARGE) MISC 1 each by Does not apply route three times daily Large to extra large. Give Pallavi underwear protections. Daughter will select the brand. Carmine Head MD   insulin lispro (HUMALOG KWIKPEN) 100 UNIT/ML pen Inject 2-5 Units into the skin 3 times daily (with meals)  Carmine Head MD   fluocinonide (LIDEX) 0.05 % cream Apply topically 2 times daily.   Marci Lewisacle Mildred Mittal MD   Incontinence Supplies (BEDPAN) MISC Sorry, but \"3 bottle\" is the only way it will allow us to order this bedpan. Mauro Cowart MD   vitamin D (CHOLECALCIFEROL) 1000 UNIT TABS tablet Take 3 tablets by mouth daily. Mauro Cowart MD   loratadine (CLARITIN) 10 MG tablet Take 1 tablet by mouth daily. Mauro Cowart MD   Multiple Vitamins-Minerals (CENTRUM SILVER PO) Take 1 tablet by mouth daily   Historical Provider, MD   Lancet Devices ([address]) by Does not apply route 2 times daily as needed. Historical Provider, MD       Social History     Tobacco Use    Smoking status: Never Smoker    Smokeless tobacco: Never Used   Substance Use Topics    Alcohol use: No    Drug use: No        Allergies   Allergen Reactions    [place] Meal      Nausea, vomiting and diarrhea with almond milk.     Celexa [Citalopram Hydrobromide]     Famciclovir     Lactose     Metronidazole     Peanut-Containing Drug Products     [place] Oil [Nutritional Supplements] Diarrhea    Zofran    ,   Past Medical History:   Diagnosis Date    Anemia, unspecified     Asthma     Cholelithiasis     Chronic kidney disease (CKD), stage III (moderate) (HCC)     Diabetes     Gout     Hyperlipidemia     Hypertension     Hypothyroidism 8/9/2015    Irritable bowel syndrome with constipation 3/5/2020    Kidney disease     Monoclonal paraproteinemia     Osteoporosis     Sclerosis of the skin     Type 2 diabetes mellitus without complication ([facility] 11/79/6481   ,   Past Surgical History:   Procedure Laterality Date    ABDOMINAL EXPLORATION SURGERY  01/04/13    **see [facility] scanned report (ER)    CHOLECYSTECTOMY     ,   Social History     Tobacco Use    Smoking status: Never Smoker    Smokeless tobacco: Never Used   Substance Use Topics    Alcohol use: No    Drug use: No   ,   Family History   Problem Relation Age of Onset    Cancer Father     High Blood Pressure Mother     High Cholesterol Mother     Obesity Mother     Cancer Maternal Aunt         skin   ,   Immunization History   Administered Date(s) Administered    Influenza, High Dose (Fluzone 65 yrs and older) 09/14/2011, 08/29/2012, 10/23/2013, 10/01/2014, 10/19/2015, 09/07/2016, 09/27/2017, 11/07/2018, 01/15/2020    Pneumococcal Conjugate 13-valent ([id number]) 11/04/2015    Pneumococcal Polysaccharide ([id number]) 09/27/2017    Tdap (Boostrix, Adacel) 02/08/2012    Zoster Live (Zostavax) 03/22/2013   ,   Health Maintenance   Topic Date Due    Shingles Vaccine (2 of 3) 05/17/2013    Annual Wellness Visit (AWV)  05/29/2019    Flu vaccine (1) 09/01/2020    Lipid screen  01/15/2021    DTaP/Tdap/Td vaccine (2 - Td) 02/08/2022    DEXA (modify frequency per FRAX score)  Completed    Pneumococcal 65+ years Vaccine  Completed    Hepatitis A vaccine  Aged Out    Hib vaccine  Aged Out    Meningococcal (ACWY) vaccine  Aged Out       PHYSICAL EXAMINATION:  [ INSTRUCTIONS:  \"[x]\" Indicates a positive item  \"[]\" Indicates a negative item  -- DELETE ALL ITEMS NOT EXAMINED]  Vital Signs: (As obtained by patient/caregiver or practitioner observation)    Blood pressure-137/65 heart rate-69   respiratory rate-  16 temperature-96.6 pulse oximetry-97% on room air    Constitutional: elderly woman who appears chronically ill. [] Appears well-developed and well-nourished [x] No apparent distress      [] Abnormal-   Mental status  [x] Alert and awake  [x] Oriented to person/place/time [x]Able to follow commands      Eyes:  EOM    [x]  Normal  [] Abnormal-  Sclera  [x]  Normal  [] Abnormal -         Discharge [x]  None visible  [] Abnormal -    HENT:   [x] Normocephalic, atraumatic.   [] Abnormal   [x] Mouth/Throat: Mucous membranes are moist.     External Ears [x] Normal  [] Abnormal-     Neck: [] No visualized mass     Pulmonary/Chest: [x] Respiratory effort normal.  [x] No visualized signs of difficulty breathing or respiratory distress        [] Abnormal-      Musculoskeletal:   [] Normal gait with no signs of ataxia         [x] Normal range of motion of neck        [x] Abnormal-chronically abnormal gait due to multiple sclerosis. Need standby assistance with walker for transfers and uses wheelchair within the house. Patient has stair lifts because unable to walk up and down stairs. Neurological:        [x] No Facial Asymmetry (Cranial nerve 7 motor function) (limited exam to video visit)          [x] No gaze palsy        [x] Abnormal-bilateral leg weakness. Skin:        [x] No significant exanthematous lesions or discoloration noted on facial skin         [] Abnormal-            Psychiatric:       [x] Normal Affect [x] No Hallucinations        [] Abnormal-     Other pertinent observable physical exam findings-     ASSESSMENT/PLAN:   Diagnosis Orders   1. Type 2 diabetes mellitus with complication, with long-term current use of insulin (HCC) has been controlled. Insulin discontinue because not needed but blood sugar is less than 130. No hypoglycemia. Following diet. Currently taking Januvia hundred milligrams daily. Hemoglobin A1C    Renal Function Panel   2. Hypothyroidism due to acquired atrophy of thyroid clinically you thyroid on levothyroxine 25 MCG daily will monitor TSH. TSH WITH REFLEX TO FT4   3. Hyponatremia noted on labs today. An appropriate age hormone will about in past. Low sodium intake. Will increase sodium in the diet with bouillon cubes hot water one TID. Later repeat sodium level one week. Labs Renal Latest Ref Rng & Units 7/24/2020 6/20/2020 3/18/2020 3/4/2020 2/26/2020   BUN 7 - 25 mg/dL 19 6(L) 12 11 25(H)   Cr 0.60 - 0.88 mg/dL 0.70 0.60 0.7 0.7 1.0   K 3.5 - 5.3 mmol/L 4.5 3.4(L) 4.0 4.3 5.0   Na 135 - 146 mmol/L 126(L) 136 133(L) 128(L) 123(L)         4. Essential hypertension is controlled on current regimen continue.   BP Readings from Last 3 Encounters:   07/22/20 137/65   06/10/20 (!) 154/76 02/26/20 (!) 108/59       5. Protein malnutrition ([address] improved protein and diet and edema has resolved will monitor preop human. Prealbumin   6. Bilateral leg edema has resolved with improved protein in the diet and will have patient wear compression stockings during the day and remove nightly. Compression Stockings MISC   7. Multiple sclerosis ([address] requiring help with ambulation and transferring. Patient is able to feed herself and no swallowing problems or aspiration symptoms. Continue to monitor. She receives excellent care at home from her family. 8. Vitamin D deficiency, on supplement monitor level to see effect goal 50-80. Vitamin D 25 Hydroxy       Navi Rendon is a 82 y.o. female being evaluated by a Virtual Visit (video visit) encounter to address concerns as mentioned above. A caregiver was present when appropriate. Due to this being a TeleHealth encounter (During IUEEW-20 public health emergency), evaluation of the following organ systems was limited: Vitals/Constitutional/EENT/Resp/CV/GI//MS/Neuro/Skin/Heme-Lymph-Imm. Pursuant to the emergency declaration under the [address] [place] [address], [address] [place] [address] authority and the [employer] and [employer] Act, this Virtual Visit was conducted with patient's (and/or legal guardian's) consent, to reduce the patient's risk of exposure to COVID-19 and provide necessary medical care. The patient (and/or legal guardian) has also been advised to contact this office for worsening conditions or problems, and seek emergency medical treatment and/or call 911 if deemed necessary. Patient identification was verified at the start of the visit: Yes    Total time spent on this encounter: 30 minutes    Services were provided through a video synchronous discussion virtually to substitute for in-person clinic visit. Patient and provider were located at their individual homes.     --Dieter Matt Shad Rodriguez MD on 7/22/2020 at 11:29 AM    An electronic signature was used to authenticate this note.

## 2020-07-25 LAB
ALBUMIN SERPL-MCNC: 3.2 G/DL (ref 3.6–5.1)
BUN / CREAT RATIO: ABNORMAL (CALC) (ref 6–22)
BUN BLDV-MCNC: 19 MG/DL (ref 7–25)
CALCIUM SERPL-MCNC: 10 MG/DL (ref 8.6–10.4)
CHLORIDE BLD-SCNC: 98 MMOL/L (ref 98–110)
CO2: 22 MMOL/L (ref 20–32)
CREAT SERPL-MCNC: 0.7 MG/DL (ref 0.6–0.88)
GFR AFRICAN AMERICAN: 93 ML/MIN/1.73M2
GFR, ESTIMATED: 80 ML/MIN/1.73M2
GLUCOSE BLD-MCNC: 100 MG/DL (ref 65–99)
HBA1C MFR BLD: 4.9 % OF TOTAL HGB
PHOSPHORUS: 2.8 MG/DL (ref 2.1–4.3)
POTASSIUM SERPL-SCNC: 4.5 MMOL/L (ref 3.5–5.3)
PREALBUMIN: 17 MG/DL (ref 17–34)
SODIUM BLD-SCNC: 126 MMOL/L (ref 135–146)
SPECIMEN INTEGRITY COMPROMISED: NORMAL
TSH ULTRASENSITIVE: 0.73 MIU/L (ref 0.4–4.5)
VITAMIN D 25-HYDROXY: 71 NG/ML (ref 30–100)

## 2020-07-26 PROBLEM — E46 PROTEIN MALNUTRITION (HCC): Status: ACTIVE | Noted: 2020-07-26

## 2020-07-26 PROBLEM — E55.9 VITAMIN D DEFICIENCY: Status: ACTIVE | Noted: 2020-07-26

## 2020-07-26 PROBLEM — R60.0 BILATERAL LEG EDEMA: Status: ACTIVE | Noted: 2020-07-26

## 2020-07-26 ASSESSMENT — ENCOUNTER SYMPTOMS
ANAL BLEEDING: 0
EYE ITCHING: 0
STRIDOR: 0
CHEST TIGHTNESS: 0
TROUBLE SWALLOWING: 0
SORE THROAT: 0
BELCHING: 0
RHINORRHEA: 0
CONSTIPATION: 0
CHOKING: 0
EYE DISCHARGE: 0
BACK PAIN: 0
HEMATOCHEZIA: 0
COLOR CHANGE: 0
BLOOD IN STOOL: 0
WHEEZING: 0
PHOTOPHOBIA: 0
EYE PAIN: 0
DIARRHEA: 0
RECTAL PAIN: 0
COUGH: 0
APNEA: 0
ABDOMINAL DISTENTION: 0
EYE REDNESS: 0
SHORTNESS OF BREATH: 0
NAUSEA: 0
VOICE CHANGE: 0
VOMITING: 0
FLATUS: 0

## 2020-07-26 ASSESSMENT — PATIENT HEALTH QUESTIONNAIRE - PHQ9
2. FEELING DOWN, DEPRESSED OR HOPELESS: 0
SUM OF ALL RESPONSES TO PHQ QUESTIONS 1-9: 0
SUM OF ALL RESPONSES TO PHQ9 QUESTIONS 1 & 2: 0
SUM OF ALL RESPONSES TO PHQ QUESTIONS 1-9: 0
1. LITTLE INTEREST OR PLEASURE IN DOING THINGS: 0

## 2020-08-18 ENCOUNTER — TELEPHONE (OUTPATIENT)
Dept: PRIMARY CARE CLINIC | Age: 85
End: 2020-08-18

## 2020-08-18 NOTE — TELEPHONE ENCOUNTER
Pt has low sodium. Daughter said Dr. Armin Posada arranged for home service to draw her blood. She would like her to have blood work again at home.         Julio Stafford, daughter - [phone]

## 2020-08-19 RX ORDER — ALLOPURINOL 300 MG/1
TABLET ORAL
Qty: 90 TABLET | Refills: 3 | Status: SHIPPED | OUTPATIENT
Start: 2020-08-19 | End: 2020-12-14 | Stop reason: SDUPTHER

## 2020-08-19 RX ORDER — AZELASTINE 1 MG/ML
1 SPRAY, METERED NASAL 2 TIMES DAILY
Qty: 1 BOTTLE | Refills: 5 | Status: SHIPPED | OUTPATIENT
Start: 2020-08-19 | End: 2021-07-02 | Stop reason: SDUPTHER

## 2020-08-19 RX ORDER — ATENOLOL 50 MG/1
TABLET ORAL
Qty: 90 TABLET | Refills: 3 | Status: SHIPPED | OUTPATIENT
Start: 2020-08-19 | End: 2020-12-14 | Stop reason: SDUPTHER

## 2020-08-21 LAB
ALBUMIN SERPL-MCNC: 3.3 G/DL (ref 3.6–5.1)
BUN / CREAT RATIO: 33 (CALC) (ref 6–22)
BUN BLDV-MCNC: 19 MG/DL (ref 7–25)
CALCIUM SERPL-MCNC: 10.2 MG/DL (ref 8.6–10.4)
CHLORIDE BLD-SCNC: 105 MMOL/L (ref 98–110)
CO2: 27 MMOL/L (ref 20–32)
CREAT SERPL-MCNC: 0.58 MG/DL (ref 0.6–0.88)
GFR AFRICAN AMERICAN: 99 ML/MIN/1.73M2
GFR, ESTIMATED: 85 ML/MIN/1.73M2
GLUCOSE BLD-MCNC: 130 MG/DL (ref 65–99)
PHOSPHORUS: 2.5 MG/DL (ref 2.1–4.3)
POTASSIUM SERPL-SCNC: 4.4 MMOL/L (ref 3.5–5.3)
PREALBUMIN: 20 MG/DL (ref 17–34)
SODIUM BLD-SCNC: 137 MMOL/L (ref 135–146)
SPECIMEN INTEGRITY COMPROMISED: NORMAL

## 2020-08-27 RX ORDER — HYDRALAZINE HYDROCHLORIDE 50 MG/1
TABLET, FILM COATED ORAL
Qty: 270 TABLET | Refills: 3 | Status: SHIPPED | OUTPATIENT
Start: 2020-08-27 | End: 2020-10-13 | Stop reason: SDUPTHER

## 2020-08-27 RX ORDER — KETOCONAZOLE 20 MG/G
CREAM TOPICAL
Qty: 60 G | Refills: 11 | Status: SHIPPED | OUTPATIENT
Start: 2020-08-27 | End: 2020-12-14 | Stop reason: SDUPTHER

## 2020-08-28 RX ORDER — BLOOD SUGAR DIAGNOSTIC
STRIP MISCELLANEOUS
Qty: 300 STRIP | Refills: 11 | Status: SHIPPED | OUTPATIENT
Start: 2020-08-28 | End: 2020-11-11 | Stop reason: SDUPTHER

## 2020-09-08 ENCOUNTER — TELEPHONE (OUTPATIENT)
Dept: PRIMARY CARE CLINIC | Age: 85
End: 2020-09-08

## 2020-09-08 NOTE — TELEPHONE ENCOUNTER
Spoke to pt's daughter Osiel Ford,  Please schedule pt at the 8: 20 am slot for Wednesday 9/9/20/Dr Isai Triana. thank you!    Pt's daughter: [phone]

## 2020-09-09 ENCOUNTER — OFFICE VISIT (OUTPATIENT)
Dept: PRIMARY CARE CLINIC | Age: 85
End: 2020-09-09
Payer: MEDICARE

## 2020-09-09 PROCEDURE — 99214 OFFICE O/P EST MOD 30 MIN: CPT | Performed by: INTERNAL MEDICINE

## 2020-09-09 ASSESSMENT — ENCOUNTER SYMPTOMS
SHORTNESS OF BREATH: 0
TROUBLE SWALLOWING: 0
VOICE CHANGE: 0
ABDOMINAL DISTENTION: 0
EYE DISCHARGE: 0
COUGH: 0
WHEEZING: 0
RECTAL PAIN: 0
ABDOMINAL PAIN: 0
EYE REDNESS: 0
PHOTOPHOBIA: 0
CHOKING: 0
CONSTIPATION: 0
BLOOD IN STOOL: 0
RHINORRHEA: 0
SORE THROAT: 0
ANAL BLEEDING: 0
CHEST TIGHTNESS: 0
APNEA: 0
COLOR CHANGE: 0
BACK PAIN: 0
VOMITING: 0
EYE PAIN: 0
STRIDOR: 0
DIARRHEA: 0
NAUSEA: 0
EYE ITCHING: 0

## 2020-09-09 NOTE — PROGRESS NOTES
fever.   HENT: Negative for dental problem, ear pain, hearing loss, mouth sores, nosebleeds, postnasal drip, rhinorrhea, sore throat, trouble swallowing and voice change. Eyes: Negative for photophobia, pain, discharge, redness, itching and visual disturbance. Respiratory: Negative for apnea, cough, choking, chest tightness, shortness of breath, wheezing and stridor. Cardiovascular: Negative for chest pain, palpitations and leg swelling. Hypertension   Gastrointestinal: Negative for abdominal distention, abdominal pain, anal bleeding, blood in stool, constipation, diarrhea, nausea, rectal pain and vomiting. History of small bowel obstruction   Endocrine: Negative for polydipsia, polyphagia and polyuria. Type II diabetes, insulin requiring in past  hyperlipidemia   Genitourinary: Negative for dysuria, flank pain, frequency and hematuria. Musculoskeletal: Negative for arthralgias, back pain, gait problem, joint swelling, myalgias, neck pain and neck stiffness. Skin: Negative for color change, pallor, rash and wound. Neurological: Negative for dizziness, tremors, seizures, syncope, facial asymmetry, speech difficulty, weakness, light-headedness, numbness and headaches. Multiple sclerosis needing standby assistance with ambulation unable to go up and down stairs. Present for over 30 years. Hematological: Negative for adenopathy. Does not bruise/bleed easily. Psychiatric/Behavioral: Negative for agitation, behavioral problems, confusion, decreased concentration, dysphoric mood, hallucinations, self-injury, sleep disturbance and suicidal ideas. The patient is not nervous/anxious and is not hyperactive. Prior to Visit Medications    Medication Sig Taking?  Authorizing Provider   Ritu maradiaga USE TO TEST 4 TIMES A DAY BEFORE MEALS AND AT BEDTIME  Petra Sanchez MD   hydrALAZINE (APRESOLINE) 50 MG tablet TAKE 1 TABLET BY MOUTH THREE TIMES A DAY  [place] Rick Varela MD   ketoconazole (NIZORAL) 2 % cream APPLY TO AFFECTED AREA EVERY DAY  Kaushik Way MD   atenolol (TENORMIN) 50 MG tablet TAKE 1 TABLET BY MOUTH EVERY DAY  Kaushik Way MD   allopurinol (ZYLOPRIM) 300 MG tablet TAKE 1 TABLET BY MOUTH EVERY DAY  Kaushik Way MD   azelastine (ASTELIN) 0.1 % nasal spray 1 SPRAY BY NASAL ROUTE 2 TIMES DAILY USE IN EACH NOSTRIL AS DIRECTED  Kaushik Way MD   insulin glargine (LANTUS SOLOSTAR) 100 UNIT/ML injection pen Inject 5 Units into the skin nightly If fasting glucose is above [address], MD   Compression Stockings MISC by Does not apply route Bilateral knee high  20-30 mm  Hg    Mail to patient.   Kaushik Way MD   atorvastatin (LIPITOR) 40 MG tablet TAKE 1 TABLET BY MOUTH EVERY DAY  Kaushik Way MD   ELIQUIS 5 MG TABS tablet TAKE 1 TABLET BY MOUTH 2 TIMES DAILY STOP COUMADIN  Kaushik Way MD   montelukast (SINGULAIR) 10 MG tablet TAKE 1 TABLET BY MOUTH EVERYDAY AT BEDTIME  Kaushik Way MD   verapamil (CALAN SR) 240 MG extended release tablet TAKE 1 TABLET BY MOUTH EVERY EVENING  Kaushik Way MD   estradiol (ESTRACE) 0.1 MG/GM vaginal cream Twice a week  Kaushik Way MD   sodium bicarbonate 650 MG tablet Take 1 tablet by mouth 2 times daily  Kaushik Way MD   polyethylene glycol (MIRALAX) packet Take 17 g by mouth 2 times daily  Kaushik Way MD   divalproex (DEPAKOTE) 125 MG DR tablet Take 1 tablet by mouth in the morning and 2 tablets by mouth in the evening  Kaushik Way MD   SITagliptin (JANUVIA) 100 MG tablet TAKE 1 TABLET BY MOUTH EVERY DAY  Kaushik Way MD   famotidine (PEPCID) 20 MG tablet TAKE 1 TABLET BY MOUTH TWICE A DAY  Kaushik Way MD   Elastic Bandages & Supports ([address]) [address] 2 each by Does not apply route daily 40 mm hg thigh high  Kaushik Way MD Spacer/Aero-Holding Chambers (AEROCHAMBER MV) MISC 1 each by Does not apply route 2 times daily  Nirmal Campos MD   pantoprazole (PROTONIX) 40 MG tablet TAKE 1 TABLET BY MOUTH EVERY DAY  Nirmal Campos MD   Insulin Pen Needle (B-D UF III MINI PEN NEEDLES) 31G X 5 MM MISC USE AS DIRECTED NIGHTLY WITH LANTUS PEN  Nirmal Campos MD   albuterol sulfate  (90 Base) MCG/ACT inhaler Inhale 2 puffs into the lungs every 6 hours as needed for Shortness of Breath  Historical Provider, MD   aspirin 81 MG tablet Take 81 mg by mouth daily  Historical Provider, MD   Lactobacillus (PROBIOTIC ACIDOPHILUS PO) Take 1 capsule by mouth daily  Historical Provider, MD   hydrocortisone 1 % ointment Apply topically 2 times daily as needed (dry skin) Apply topically 2 times daily. Historical Provider, MD   lipase-protease-amylase (CREON) 28892-65385 units delayed release capsule Take 3 capsules by mouth 3 times daily as needed (diarrhea)  Historical Provider, MD   budesonide-formoterol (SYMBICORT) 160-4.5 MCG/ACT AERO TAKE 2 PUFFS BY MOUTH TWICE A DAY  Nirmal Campos MD   levothyroxine (SYNTHROID) 25 MCG tablet TAKE 1 TABLET BY MOUTH DAILY  Nirmal Campos MD   Lancets MISC 1 each by Does not apply route 4 times daily (before meals and nightly)  Nirmal Campos MD   ammonium lactate (LAC-HYDRIN) 12 % lotion Apply topically daily. Nirmal Campos MD   lidocaine (XYLOCAINE) 5 % ointment Apply topically as needed.   Nirmal Campos MD   [place] Due LANCETS 19V MISC USE AS DIRECTED FOUR TIMES A DAY  Nirmal Campos MD   Continuous Blood Gluc  (FREESTYLE SHIRLEY 14 DAY READER) [address] 1 each by Does not apply route every 14 days DX: E11.8 diabetes  Nirmal Campos MD   Continuous Blood Gluc Sensor (FREESTYLE SHIRLEY 14 DAY SENSOR) MISC 1 each by Does not apply route every 14 days DX: E11.8 diabetes  Nirmal Campos MD   Continuous Blood Gluc  (FREESTYLE SHIRLEY READER) JOCY 1 each by Does not apply route 4 times daily (before meals and nightly) DX: E11.8 diabetes  Lebron Warren MD   Continuous Blood Gluc Sensor ([address]) MISC 1 each by Does not apply route 4 times daily (before meals and nightly) DX: E11.8 diabetes  Lebron Warren MD   Blood Glucose Monitoring Suppl (ONE TOUCH ULTRA 2) w/Device KIT 1 kit by Does not apply route 4 times daily (with meals and nightly) May substitute with covered brand. Ell.8 is ICD 10 code  Lebron Warren MD   Blood Glucose Monitoring Suppl ([address]) w/Device KIT 1 kit by Does not apply route daily  Lebron Warren MD   Lift Chair MISC by Does not apply route Needs a lift chair due to mutiple sclerosis. Needs assistance getting out of chair, but once out of chair can ambulate independently with walker or cane. Lebron Warren MD   cholestyramine Nik Easley) 4 G packet Take 2 packets by mouth 2 times daily  Lebron Warren MD   Incontinence Supply Disposable (PALLAVI SERENITY BRIEFS LARGE) MISC 1 each by Does not apply route three times daily Large to extra large. Give Pallavi underwear protections. Daughter will select the brand. Lebron Warren MD   fluocinonide (LIDEX) 0.05 % cream Apply topically 2 times daily. Lebron Warren MD   Incontinence Supplies (BEDPAN) [address], but \"9 bottle\" is the only way it will allow us to order this bedpan. Lebron Warren MD   vitamin D (CHOLECALCIFEROL) 1000 UNIT TABS tablet Take 3 tablets by mouth daily. Lebron Warren MD   loratadine (CLARITIN) 10 MG tablet Take 1 tablet by mouth daily. Lebron Warren MD   Multiple Vitamins-Minerals (CENTRUM SILVER PO) Take 1 tablet by mouth daily   Historical Provider, MD   Lancet Devices ([address]) by Does not apply route 2 times daily as needed.   Historical Provider, MD       Social History Tobacco Use    Smoking status: Never Smoker    Smokeless tobacco: Never Used   Substance Use Topics    Alcohol use: No    Drug use: No        Allergies   Allergen Reactions    [place] Meal      Nausea, vomiting and diarrhea with almond milk.     Celexa [Citalopram Hydrobromide]     Famciclovir     Lactose     Metronidazole     Peanut-Containing Drug Products     [place] Oil [Nutritional Supplements] Diarrhea    Zofran    ,   Past Medical History:   Diagnosis Date    Anemia, unspecified     Asthma     Cholelithiasis     Chronic kidney disease (CKD), stage III (moderate) (HCC)     Diabetes     Gout     Hyperlipidemia     Hypertension     Hypothyroidism 8/9/2015    Irritable bowel syndrome with constipation 3/5/2020    Kidney disease     Monoclonal paraproteinemia     Osteoporosis     Sclerosis of the skin     Type 2 diabetes mellitus without complication ([facility] [address] 79/28/6046   ,   Past Surgical History:   Procedure Laterality Date    ABDOMINAL EXPLORATION SURGERY  01/04/13    **see [employer] scanned report (ER)    CHOLECYSTECTOMY     ,   Social History     Tobacco Use    Smoking status: Never Smoker    Smokeless tobacco: Never Used   Substance Use Topics    Alcohol use: No    Drug use: No   ,   Family History   Problem Relation Age of Onset    Cancer Father     High Blood Pressure Mother     High Cholesterol Mother     Obesity Mother     Cancer Maternal Aunt         skin   ,   Immunization History   Administered Date(s) Administered    Influenza Virus Vaccine 08/29/2012, 10/23/2013, 10/01/2014, 10/19/2015, 09/07/2016    Influenza, High Dose (Fluzone 65 yrs and older) 09/14/2011, 08/29/2012, 10/23/2013, 10/01/2014, 10/19/2015, 09/07/2016, 09/27/2017, 11/07/2018, 01/15/2020    Pneumococcal Conjugate 13-valent ([id number]) 11/04/2015    Pneumococcal Polysaccharide ([id number]) 09/27/2017    Tdap (Boostrix, Adacel) 02/08/2012    Zoster Live (Zostavax) 03/22/2013   ,   Health Maintenance Topic Date Due    Shingles Vaccine (2 of 3) 05/17/2013    Annual Wellness Visit (AWV)  05/29/2019    Flu vaccine (1) 09/01/2020    Lipid screen  01/15/2021    DTaP/Tdap/Td vaccine (2 - Td) 02/08/2022    DEXA (modify frequency per FRAX score)  Completed    Pneumococcal 65+ years Vaccine  Completed    Hepatitis A vaccine  Aged Out    Hib vaccine  Aged Out    Meningococcal (ACWY) vaccine  Aged Out       PHYSICAL EXAMINATION:  [ INSTRUCTIONS:  \"[x]\" Indicates a positive item  \"[]\" Indicates a negative item  -- DELETE ALL ITEMS NOT EXAMINED]  Vital Signs: (As obtained by patient/caregiver or practitioner observation)    Blood pressure- 132/58 Heart rate- 54   Respiratory rate- 16   Temperature-  Pulse oximetry- 97% room air    Constitutional: [x] Appears well-developed and well-nourished [] No apparent distress      [] Abnormal-   Mental status  [x] Alert and awake  [x] Oriented to person/place/time [x]Able to follow commands      Eyes:  EOM    [x]  Normal  [] Abnormal-  Sclera  [x]  Normal  [] Abnormal -         Discharge []  None visible  [] Abnormal -    HENT:   [] Normocephalic, atraumatic. [] Abnormal   [x] Mouth/Throat: Mucous membranes are moist.     External Ears [x] Normal  [] Abnormal-     Neck: [x] No visualized mass     Pulmonary/Chest: [x] Respiratory effort normal.  [x] No visualized signs of difficulty breathing or respiratory distress        [] Abnormal-      Musculoskeletal:   [x] Normal gait with no signs of ataxia         [x] Normal range of motion of neck        [] Abnormal-       Neurological:        [x] No Facial Asymmetry (Cranial nerve 7 motor function) (limited exam to video visit)          [x] No gaze palsy        [] Abnormal-         Skin:        [x] No significant exanthematous lesions or discoloration noted on facial skin   Large lipoma on left posterior abdominal wall.        [] Abnormal-            Psychiatric:       [x] Normal Affect [] No Hallucinations        [] Abnormal- Other pertinent observable physical exam findings-     ASSESSMENT/PLAN:   Diagnosis Orders   1. Partial small bowel obstruction (HCC) resolved with no nausea, vomiting , abdominal pain or bloating for 3 weeks. Normal bowel movements. 2. Hyponatremia resolved with dietary supplement. 3. Type 2 diabetes mellitus with complication, with long-term current use of insulin (HCC) controlled on januvia, insulin stopped. No hyponatremia. Lab Results   Component Value Date    LABA1C 4.9 07/24/2020    LABA1C 5.4 01/15/2020    LABA1C 5.7 08/07/2019     Lab Results   Component Value Date    LABMICR YES 07/17/2020    LDLCALC 60 01/15/2020    CREATININE 0.58 (L) 08/20/2020         4. Hypothyroidism due to acquired atrophy of thyroid   Lab Results   Component Value Date    TSHFT4 2.23 01/15/2020    TSH 0.73 07/24/2020     Clinically euthyroid. Celio Hernandez is a 82 y.o. female being evaluated by a Virtual Visit (video visit) encounter to address concerns as mentioned above. A caregiver was present when appropriate. Due to this being a TeleHealth encounter (During SOUNP-09 public health emergency), evaluation of the following organ systems was limited: Vitals/Constitutional/EENT/Resp/CV/GI//MS/Neuro/Skin/Heme-Lymph-Imm. Pursuant to the emergency declaration under the [address] [place] [address], [address] [place] [address] authority and the [employer] Act, this Virtual Visit was conducted with patient's (and/or legal guardian's) consent, to reduce the patient's risk of exposure to COVID-19 and provide necessary medical care. The patient (and/or legal guardian) has also been advised to contact this office for worsening conditions or problems, and seek emergency medical treatment and/or call 911 if deemed necessary.      Patient identification was verified at the start of the visit: Yes    Total time spent on this encounter: 30 minutes    Services were provided through a video synchronous discussion virtually to substitute for in-person clinic visit. Patient and provider were located at their individual homes. --Elyse Melendez MD on 9/9/2020 at 9:12 AM    An electronic signature was used to authenticate this note.

## 2020-09-13 VITALS
OXYGEN SATURATION: 97 % | HEART RATE: 54 BPM | DIASTOLIC BLOOD PRESSURE: 58 MMHG | RESPIRATION RATE: 16 BRPM | SYSTOLIC BLOOD PRESSURE: 132 MMHG

## 2020-09-14 RX ORDER — LEVOTHYROXINE SODIUM 0.03 MG/1
TABLET ORAL
Qty: 90 TABLET | Refills: 3 | Status: SHIPPED | OUTPATIENT
Start: 2020-09-14 | End: 2020-12-14 | Stop reason: SDUPTHER

## 2020-09-28 RX ORDER — DIVALPROEX SODIUM 125 MG/1
TABLET, DELAYED RELEASE ORAL
Qty: 270 TABLET | Refills: 1 | Status: SHIPPED | OUTPATIENT
Start: 2020-09-28 | End: 2020-12-14 | Stop reason: SDUPTHER

## 2020-09-29 ENCOUNTER — TELEPHONE (OUTPATIENT)
Dept: PRIMARY CARE CLINIC | Age: 85
End: 2020-09-29

## 2020-09-29 LAB
ALBUMIN SERPL-MCNC: 3.3 G/DL (ref 3.6–5.1)
BILIRUBIN URINE: NEGATIVE
BLOOD, URINE: NEGATIVE
BUN / CREAT RATIO: ABNORMAL (CALC) (ref 6–22)
BUN BLDV-MCNC: 23 MG/DL (ref 7–25)
CALCIUM SERPL-MCNC: 10 MG/DL (ref 8.6–10.4)
CHLORIDE BLD-SCNC: 104 MMOL/L (ref 98–110)
CLARITY: ABNORMAL
CO2: 23 MMOL/L (ref 20–32)
COLOR: YELLOW
CREAT SERPL-MCNC: 0.63 MG/DL (ref 0.6–0.88)
EPITHELIAL CELLS, UA: 1 /HPF (ref 0–5)
GFR AFRICAN AMERICAN: 96 ML/MIN/1.73M2
GFR, ESTIMATED: 83 ML/MIN/1.73M2
GLUCOSE BLD-MCNC: 112 MG/DL (ref 65–99)
GLUCOSE URINE: NEGATIVE MG/DL
HBA1C MFR BLD: 5.3 % OF TOTAL HGB
HYALINE CASTS: 0 /LPF (ref 0–8)
KETONES, URINE: NEGATIVE MG/DL
LEUKOCYTE ESTERASE, URINE: ABNORMAL
MICROSCOPIC EXAMINATION: YES
NITRITE, URINE: POSITIVE
PH UA: 7 (ref 5–8)
PHOSPHORUS: 2.3 MG/DL (ref 2.1–4.3)
POTASSIUM SERPL-SCNC: 4 MMOL/L (ref 3.5–5.3)
PREALBUMIN: 19 MG/DL (ref 17–34)
PROTEIN UA: 30 MG/DL
RBC UA: 4 /HPF (ref 0–4)
SODIUM BLD-SCNC: 133 MMOL/L (ref 135–146)
SPECIFIC GRAVITY UA: 1.01 (ref 1–1.03)
SPECIMEN INTEGRITY COMPROMISED: NORMAL
URINE TYPE: ABNORMAL
UROBILINOGEN, URINE: 0.2 E.U./DL
WBC UA: 277 /HPF (ref 0–5)

## 2020-09-30 RX ORDER — SULFAMETHOXAZOLE AND TRIMETHOPRIM 800; 160 MG/1; MG/1
0.5 TABLET ORAL 2 TIMES DAILY
Qty: 14 TABLET | Refills: 0 | Status: SHIPPED | OUTPATIENT
Start: 2020-09-30 | End: 2020-10-09

## 2020-10-01 LAB
ORGANISM: ABNORMAL
URINE CULTURE, ROUTINE: ABNORMAL

## 2020-10-01 RX ORDER — CEFUROXIME AXETIL 250 MG/1
250 TABLET ORAL 2 TIMES DAILY
Qty: 20 TABLET | Refills: 0 | Status: SHIPPED | OUTPATIENT
Start: 2020-10-01 | End: 2020-10-11

## 2020-10-09 ENCOUNTER — OFFICE VISIT (OUTPATIENT)
Dept: PRIMARY CARE CLINIC | Age: 85
End: 2020-10-09
Payer: MEDICARE

## 2020-10-09 VITALS
HEART RATE: 64 BPM | TEMPERATURE: 97.3 F | OXYGEN SATURATION: 100 % | SYSTOLIC BLOOD PRESSURE: 164 MMHG | DIASTOLIC BLOOD PRESSURE: 90 MMHG

## 2020-10-09 PROCEDURE — 90662 IIV NO PRSV INCREASED AG IM: CPT | Performed by: INTERNAL MEDICINE

## 2020-10-09 PROCEDURE — G0008 ADMIN INFLUENZA VIRUS VAC: HCPCS | Performed by: INTERNAL MEDICINE

## 2020-10-09 PROCEDURE — 99214 OFFICE O/P EST MOD 30 MIN: CPT | Performed by: INTERNAL MEDICINE

## 2020-10-09 RX ORDER — LOSARTAN POTASSIUM 25 MG/1
25 TABLET ORAL DAILY
Qty: 30 TABLET | Refills: 0 | Status: SHIPPED | OUTPATIENT
Start: 2020-10-09 | End: 2020-11-18 | Stop reason: SDUPTHER

## 2020-10-09 ASSESSMENT — ENCOUNTER SYMPTOMS
BLOOD IN STOOL: 0
CONSTIPATION: 0
EYE PAIN: 0
COUGH: 0
RHINORRHEA: 0
SORE THROAT: 0
ABDOMINAL DISTENTION: 0
CHEST TIGHTNESS: 0
TROUBLE SWALLOWING: 0
EYE REDNESS: 0
CHOKING: 0
RECTAL PAIN: 0
WHEEZING: 0
COLOR CHANGE: 0
EYE ITCHING: 0
EYE DISCHARGE: 0
SHORTNESS OF BREATH: 0
VOMITING: 0
STRIDOR: 0
VOICE CHANGE: 0
ANAL BLEEDING: 0
NAUSEA: 0
DIARRHEA: 0
BACK PAIN: 0
PHOTOPHOBIA: 0
APNEA: 0
ABDOMINAL PAIN: 0

## 2020-10-09 NOTE — PROGRESS NOTES
high     10/9/2020     Raquel Guajardo (:  1935) is a 82 y.o. female, here for evaluation of the following medical concerns:    HPI  Patient Active Problem List   Diagnosis    Idiopathic gout    Coarse tremors    Saddle pulmonary embolus ([address]    Multiple sclerosis ([address]    Microscopic hematuria    Eczema    Primary hyperparathyroidism ([address]    Osteoporosis    Cough variant asthma    Hypothyroidism due to acquired atrophy of thyroid    Primary osteoarthritis of both first carpometacarpal joints    Long term use of bisphosphonates    Type 2 diabetes mellitus with complication, with long-term current use of insulin ([address]    Partial small bowel obstruction ([address]    Diabetic nephropathy associated with type 2 diabetes mellitus ([address]    Neck pain    Mouth droop due to facial weakness    Hypertension    Cardiac shock syndrome ([address]    Isolated proteinuria without specific morphologic lesion    Candidiasis of skin    Irritable bowel syndrome with constipation    Protein malnutrition (HCC)    Bilateral leg edema    Vitamin D deficiency     Allergies   Allergen Reactions    Aiken Meal      Nausea, vomiting and diarrhea with almond milk.  Celexa [Citalopram Hydrobromide]     Famciclovir     Lactose     Metronidazole     Peanut-Containing Drug Products     Peninsula Oil [Nutritional Supplements] Diarrhea    Zofran          Review of Systems   Constitutional: Negative. Negative for activity change, appetite change, chills, diaphoresis, fatigue and fever. HENT: Negative for dental problem, ear pain, hearing loss, mouth sores, nosebleeds, postnasal drip, rhinorrhea, sore throat, trouble swallowing and voice change. Eyes: Negative for photophobia, pain, discharge, redness, itching and visual disturbance. Respiratory: Negative for apnea, cough, choking, chest tightness, shortness of breath, wheezing and stridor.     Cardiovascular: Negative for chest pain, palpitations and leg swelling. Hypertension   Gastrointestinal: Negative for abdominal distention, abdominal pain, anal bleeding, blood in stool, constipation, diarrhea, nausea, rectal pain and vomiting. History of small bowel obstruction   Endocrine: Negative for polydipsia, polyphagia and polyuria. Type II diabetes, insulin requiring in past  hyperlipidemia   Genitourinary: Negative for dysuria, flank pain, frequency and hematuria. Musculoskeletal: Negative for arthralgias, back pain, gait problem, joint swelling, myalgias, neck pain and neck stiffness. Skin: Negative for color change, pallor, rash and wound. Neurological: Negative for dizziness, tremors, seizures, syncope, facial asymmetry, speech difficulty, weakness, light-headedness, numbness and headaches. Multiple sclerosis needing standby assistance with ambulation unable to go up and down stairs. Present for over 30 years. Hematological: Negative for adenopathy. Does not bruise/bleed easily. Psychiatric/Behavioral: Negative for agitation, behavioral problems, confusion, decreased concentration, dysphoric mood, hallucinations, self-injury, sleep disturbance and suicidal ideas. The patient is not nervous/anxious and is not hyperactive. Prior to Visit Medications    Medication Sig Taking?  Authorizing Provider   losartan (COZAAR) 25 MG tablet Take 1 tablet by mouth daily Yes Armin León MD   cefUROXime (CEFTIN) 250 MG tablet Take 1 tablet by mouth 2 times daily for 10 days Yes Armin León MD   divalproex (DEPAKOTE) 125 MG DR tablet TAKE 1 TABLET BY MOUTH IN THE MORNING AND 2 TABLETS BY MOUTH IN THE EVENING Yes Armin León MD   levothyroxine (SYNTHROID) 25 MCG tablet TAKE 1 TABLET BY MOUTH EVERY DAY Yes Armin León MD   ONETOUCH VERIO strip USE TO TEST 4 TIMES A DAY BEFORE MEALS AND AT BEDTIME Yes Armin León MD   hydrALAZINE (APRESOLINE) 50 MG tablet TAKE 1 TABLET BY MOUTH THREE Isabella Negus Yes Armin León MD   ketoconazole (NIZORAL) 2 % cream APPLY TO AFFECTED AREA EVERY DAY Yes Armin León MD   atenolol (TENORMIN) 50 MG tablet TAKE 1 TABLET BY MOUTH EVERY DAY Yes Armin León MD   allopurinol (ZYLOPRIM) 300 MG tablet TAKE 1 TABLET BY MOUTH EVERY DAY Yes Armin León MD   azelastine (ASTELIN) 0.1 % nasal spray 1 SPRAY BY NASAL ROUTE 2 TIMES DAILY USE IN EACH NOSTRIL AS DIRECTED Yes Armin León MD   insulin glargine (LANTUS SOLOSTAR) 100 UNIT/ML injection pen Inject 5 Units into the skin nightly If fasting glucose is above 160 Yes Armin León MD   Compression Stockings MISC by Does not apply route Bilateral knee high  20-30 mm  Hg    Mail to patient.  Yes Armin León MD   atorvastatin (LIPITOR) 40 MG tablet TAKE 1 TABLET BY MOUTH EVERY DAY Yes Armin León MD   ELIQUIS 5 MG TABS tablet TAKE 1 TABLET BY MOUTH 2 TIMES DAILY STOP COUMADIN Yes Armin León MD   montelukast (SINGULAIR) 10 MG tablet TAKE 1 TABLET BY MOUTH EVERYDAY AT BEDTIME Yes Armin León MD   verapamil (CALAN SR) 240 MG extended release tablet TAKE 1 TABLET BY MOUTH EVERY EVENING Yes Armin León MD   estradiol (ESTRACE) 0.1 MG/GM vaginal cream Twice a week Yes Armin León MD   sodium bicarbonate 650 MG tablet Take 1 tablet by mouth 2 times daily Yes Armin León MD   SITagliptin (JANUVIA) 100 MG tablet TAKE 1 TABLET BY MOUTH EVERY DAY Yes Armin León MD   famotidine (PEPCID) 20 MG tablet TAKE 1 TABLET BY MOUTH TWICE A DAY Yes Armin León MD   Elastic Bandages & Supports (MEDICAL COMPRESSION STOCKINGS) [address] 2 each by Does not apply route daily 40 mm hg thigh high Yes Armin León MD   Spacer/Aero-Holding Chambers (AEROCHAMBER MV) [facility] 1 each by Does not apply route 2 times daily Yes Armin León MD   pantoprazole (PROTONIX) 40 MG tablet TAKE 1 TABLET not apply route 4 times daily (before meals and nightly) DX: E11.8 diabetes Yes Nathanael Kimbrough MD   Blood Glucose Monitoring Suppl (ONE TOUCH ULTRA 2) w/Device KIT 1 kit by Does not apply route 4 times daily (with meals and nightly) May substitute with covered brand. Ell.8 is ICD 10 code Yes Nathanael Kimbrough MD   Blood Glucose Monitoring Suppl ([address]) w/Device KIT 1 kit by Does not apply route daily Yes Nathanael Kimbrough MD   Lift Chair MISC by Does not apply route Needs a lift chair due to mutiple sclerosis. Needs assistance getting out of chair, but once out of chair can ambulate independently with walker or cane. Yes Nathanael Kimbrough MD   cholestyramine [place]) 4 G packet Take 2 packets by mouth 2 times daily Yes Nathanael Kimbrough MD   Incontinence Supply Disposable (JESSICA SERENITY BRIEFS LARGE) MISC 1 each by Does not apply route three times daily Large to extra large. Give Jessica underwear protections. Daughter will select the brand. Yes Nathanael Kimbrough MD   fluocinonide (LIDEX) 0.05 % cream Apply topically 2 times daily. Yes Nathanael Kimbrough MD   Incontinence Supplies (BEDPAN) [address], but \"2 bottle\" is the only way it will allow us to order this bedpan. Yes Nathanael Kimbrough MD   vitamin D (CHOLECALCIFEROL) 1000 UNIT TABS tablet Take 3 tablets by mouth daily. Yes Nathanael Kimbrough MD   loratadine (CLARITIN) 10 MG tablet Take 1 tablet by mouth daily. Yes Nathanael Kimbrough MD   Multiple Vitamins-Minerals (CENTRUM SILVER PO) Take 1 tablet by mouth daily  Yes Historical Provider, MD   Lancet Devices (ACCU-CHEK SOFTCLIX LANCET DEV) by Does not apply route 2 times daily as needed.  Yes Historical Provider, MD   polyethylene glycol (MIRALAX) packet Take 17 g by mouth 2 times daily  Nathanael Kimbrough MD        Social History     Tobacco Use    Smoking status: Never Smoker    Smokeless tobacco: Never Used   Substance Use Topics    Alcohol use: No        Vitals:    10/09/20 0848   BP: (!) 164/90   Pulse: 64   Temp: 97.3 °F (36.3 °C)   SpO2: 100%     Estimated body mass index is 32.06 kg/m² as calculated from the following:    Height as of 5/8/19: 5' 3\" (1.6 m). Weight as of 8/7/19: 181 lb (82.1 kg). Physical Exam  Constitutional:       Appearance: She is well-developed. HENT:      Head: Normocephalic and atraumatic. Right Ear: External ear normal. There is no impacted cerumen. Left Ear: External ear normal. There is no impacted cerumen. Nose: Nose normal. No congestion or rhinorrhea. Mouth/Throat:      Pharynx: No oropharyngeal exudate or posterior oropharyngeal erythema. Eyes:      General:         Right eye: No discharge. Left eye: No discharge. Conjunctiva/sclera: Conjunctivae normal.      Pupils: Pupils are equal, round, and reactive to light. Neck:      Musculoskeletal: Normal range of motion and neck supple. Thyroid: No thyromegaly. Vascular: No JVD. Trachea: No tracheal deviation. Cardiovascular:      Rate and Rhythm: Normal rate and regular rhythm. Heart sounds: Normal heart sounds. Comments: BLE +2   Pulmonary:      Effort: Pulmonary effort is normal. No respiratory distress. Breath sounds: No wheezing or rales. Chest:      Chest wall: No tenderness. Abdominal:      General: Bowel sounds are normal. There is no distension. Palpations: Abdomen is soft. There is no mass. Tenderness: There is no abdominal tenderness. There is no guarding or rebound. Musculoskeletal:      Comments: Uses walker for ambulation    Bilateral lower leg weakness. .  Good sitting posture. No difficulty swallowing. Lymphadenopathy:      Cervical: No cervical adenopathy. Skin:     General: Skin is warm and dry. Findings: No erythema. Neurological:      Mental Status: She is alert and oriented to person, place, and time.    Psychiatric: Behavior: Behavior normal.         Thought Content: Thought content normal.         Judgment: Judgment normal.         ASSESSMENT/PLAN:       Diagnosis Orders   1. Essential hypertension not as well controlled because of needing to give high sodium diet to treat the hyponatremia. Will add losartan 25 mg daily. losartan (COZAAR) 25 MG tablet    Renal Function Panel   2. Type 2 diabetes mellitus with complication, with long-term current use of insulin ([address] controlled and patient is often insulin continue diet. Lab Results   Component Value Date    LABA1C 5.3 09/28/2020    LABA1C 4.9 07/24/2020    LABA1C 5.4 01/15/2020     Lab Results   Component Value Date    LABMICR YES 09/29/2020    LDLCALC 60 01/15/2020    CREATININE 0.63 09/28/2020         3. Irritable bowel syndrome with constipation controlled and current diet no more episodes of partial small bowel obstruction with bloating and pain nausea vomiting. Benign abdominal exam with soft abdomen and normal bowel sounds today. 4. Protein malnutrition ([address] protein supplements improving with reduced edema. 5. Encounter for screening mammogram for breast cancer normal breast exam schedule mammogram  CLAU NOREEN DIGITAL SCREEN BILATERAL   6. Need for influenza vaccination  INFLUENZA, HIGH-DOSE, QUADV, 65 YRS +, IM, PF, 0.7ML (FLUZONE)   7. Hereditary lymphedema of legs new order for compression stockings given. Reprinted handicap sticker order  Compression Stocking $30       An electronic signature was used to authenticate this note.     --Joann Knight MD on 10/9/2020 at 9:18 AM

## 2020-10-13 RX ORDER — FAMOTIDINE 20 MG/1
TABLET, FILM COATED ORAL
Qty: 180 TABLET | Refills: 1 | Status: SHIPPED | OUTPATIENT
Start: 2020-10-13 | End: 2020-11-18 | Stop reason: SDUPTHER

## 2020-10-13 RX ORDER — ESTRADIOL 0.1 MG/G
CREAM VAGINAL
Qty: 1 TUBE | Refills: 3 | Status: SHIPPED | OUTPATIENT
Start: 2020-10-13 | End: 2020-12-14 | Stop reason: SDUPTHER

## 2020-10-13 RX ORDER — HYDRALAZINE HYDROCHLORIDE 50 MG/1
75 TABLET, FILM COATED ORAL 3 TIMES DAILY
Qty: 270 TABLET | Refills: 3 | Status: SHIPPED | OUTPATIENT
Start: 2020-10-13 | End: 2020-11-18 | Stop reason: SDUPTHER

## 2020-10-15 RX ORDER — FLUCONAZOLE 150 MG/1
150 TABLET ORAL
Qty: 2 TABLET | Refills: 0 | Status: SHIPPED | OUTPATIENT
Start: 2020-10-15 | End: 2020-10-18 | Stop reason: SDUPTHER

## 2020-10-18 RX ORDER — NEOMYCIN SULFATE, POLYMYXIN B SULFATE AND GRAMICIDIN 1.75; 10000; .025 MG/ML; [USP'U]/ML; MG/ML
1 SOLUTION/ DROPS OPHTHALMIC 4 TIMES DAILY
Qty: 1 BOTTLE | Refills: 0 | Status: SHIPPED | OUTPATIENT
Start: 2020-10-18 | End: 2020-10-25

## 2020-10-18 RX ORDER — FLUCONAZOLE 150 MG/1
150 TABLET ORAL
Qty: 2 TABLET | Refills: 0 | Status: SHIPPED | OUTPATIENT
Start: 2020-10-18 | End: 2020-10-24

## 2020-10-20 LAB
ALBUMIN SERPL-MCNC: 3.2 G/DL (ref 3.6–5.1)
BUN / CREAT RATIO: 41 (CALC) (ref 6–22)
BUN BLDV-MCNC: 26 MG/DL (ref 7–25)
CALCIUM SERPL-MCNC: 9.7 MG/DL (ref 8.6–10.4)
CHLORIDE BLD-SCNC: 106 MMOL/L (ref 98–110)
CO2: 26 MMOL/L (ref 20–32)
CREAT SERPL-MCNC: 0.63 MG/DL (ref 0.6–0.88)
GFR AFRICAN AMERICAN: 96 ML/MIN/1.73M2
GFR, ESTIMATED: 83 ML/MIN/1.73M2
GLUCOSE BLD-MCNC: 139 MG/DL (ref 65–99)
PHOSPHORUS: 2.8 MG/DL (ref 2.1–4.3)
POTASSIUM SERPL-SCNC: 4.3 MMOL/L (ref 3.5–5.3)
SODIUM BLD-SCNC: 136 MMOL/L (ref 135–146)

## 2020-10-21 ENCOUNTER — TELEPHONE (OUTPATIENT)
Dept: PRIMARY CARE CLINIC | Age: 85
End: 2020-10-21

## 2020-10-21 DIAGNOSIS — R82.90 CLOUDY URINE: ICD-10-CM

## 2020-10-21 LAB
BILIRUBIN URINE: NEGATIVE
BLOOD, URINE: ABNORMAL
CLARITY: ABNORMAL
COLOR: YELLOW
EPITHELIAL CELLS, UA: 1 /HPF (ref 0–5)
GLUCOSE URINE: NEGATIVE MG/DL
HYALINE CASTS: 2 /LPF (ref 0–8)
KETONES, URINE: NEGATIVE MG/DL
LEUKOCYTE ESTERASE, URINE: ABNORMAL
MICROSCOPIC EXAMINATION: YES
NITRITE, URINE: POSITIVE
PH UA: 7.5 (ref 5–8)
PROTEIN UA: 100 MG/DL
RBC UA: 6 /HPF (ref 0–4)
SPECIFIC GRAVITY UA: 1.01 (ref 1–1.03)
URINE TYPE: ABNORMAL
UROBILINOGEN, URINE: 0.2 E.U./DL
WBC UA: 226 /HPF (ref 0–5)

## 2020-11-08 RX ORDER — BUDESONIDE AND FORMOTEROL FUMARATE DIHYDRATE 160; 4.5 UG/1; UG/1
AEROSOL RESPIRATORY (INHALATION)
Qty: 30.6 INHALER | Refills: 3 | Status: CANCELLED | OUTPATIENT
Start: 2020-11-08

## 2020-11-09 RX ORDER — BUDESONIDE AND FORMOTEROL FUMARATE DIHYDRATE 80; 4.5 UG/1; UG/1
2 AEROSOL RESPIRATORY (INHALATION) 2 TIMES DAILY
Qty: 1 INHALER | Refills: 5 | Status: SHIPPED | OUTPATIENT
Start: 2020-11-09 | End: 2021-07-02 | Stop reason: SDUPTHER

## 2020-11-09 NOTE — TELEPHONE ENCOUNTER
Medication:   Requested Prescriptions     Pending Prescriptions Disp Refills    budesonide-formoterol (SYMBICORT) 160-4.5 MCG/ACT AERO [Pharmacy Med Name: Mi Sprague 160-4.5 MCG INHALER] 30.6 Inhaler 3     Sig: TAKE 2 PUFFS BY MOUTH TWICE A DAY        Last Filled:      Patient Phone Number: [phone] (home)     Last appt: 10/09/20  Next appt: 11/11/20    Last OARRS: No flowsheet data found.

## 2020-11-11 ENCOUNTER — OFFICE VISIT (OUTPATIENT)
Dept: PRIMARY CARE CLINIC | Age: 85
End: 2020-11-11
Payer: MEDICARE

## 2020-11-11 VITALS
DIASTOLIC BLOOD PRESSURE: 65 MMHG | TEMPERATURE: 97.3 F | OXYGEN SATURATION: 97 % | HEART RATE: 78 BPM | SYSTOLIC BLOOD PRESSURE: 138 MMHG

## 2020-11-11 PROCEDURE — G0438 PPPS, INITIAL VISIT: HCPCS | Performed by: INTERNAL MEDICINE

## 2020-11-11 RX ORDER — BLOOD SUGAR DIAGNOSTIC
STRIP MISCELLANEOUS
Qty: 300 STRIP | Refills: 11 | Status: SHIPPED | OUTPATIENT
Start: 2020-11-11 | End: 2021-07-02 | Stop reason: SDUPTHER

## 2020-11-11 RX ORDER — BLOOD-GLUCOSE METER
1 EACH MISCELLANEOUS
Qty: 1 KIT | Refills: 11 | Status: SHIPPED | OUTPATIENT
Start: 2020-11-11 | End: 2021-10-06

## 2020-11-11 ASSESSMENT — PATIENT HEALTH QUESTIONNAIRE - PHQ9
SUM OF ALL RESPONSES TO PHQ9 QUESTIONS 1 & 2: 0
SUM OF ALL RESPONSES TO PHQ QUESTIONS 1-9: 0
2. FEELING DOWN, DEPRESSED OR HOPELESS: 0
1. LITTLE INTEREST OR PLEASURE IN DOING THINGS: 0
SUM OF ALL RESPONSES TO PHQ QUESTIONS 1-9: 0
2. FEELING DOWN, DEPRESSED OR HOPELESS: 0
SUM OF ALL RESPONSES TO PHQ9 QUESTIONS 1 & 2: 0
SUM OF ALL RESPONSES TO PHQ QUESTIONS 1-9: 0
1. LITTLE INTEREST OR PLEASURE IN DOING THINGS: 0

## 2020-11-11 ASSESSMENT — LIFESTYLE VARIABLES: HOW OFTEN DO YOU HAVE A DRINK CONTAINING ALCOHOL: 0

## 2020-11-11 NOTE — PROGRESS NOTES
Medicare Annual Wellness Visit  Name: Meryle Orts Date: 2020   MRN: <[id number]> Sex: Female   Age: 82 y.o. Ethnicity: Non-/Non    : 1935 Race: Black      Norma Vazquez is here for Medicare AWV    Screenings for behavioral, psychosocial and functional/safety risks, and cognitive dysfunction are all negative except as indicated below. These results, as well as other patient data from the [address] [facility] [address] form, are documented in Flowsheets linked to this Encounter. Allergies   Allergen Reactions    Brandywine Meal      Nausea, vomiting and diarrhea with almond milk.  Celexa [Citalopram Hydrobromide]     Famciclovir     Lactose     Metronidazole     Peanut-Containing Drug Products     Star Oil [Nutritional Supplements] Diarrhea    Zofran        Prior to Visit Medications    Medication Sig Taking?  Authorizing Provider   budesonide-formoterol (SYMBICORT) 80-4.5 MCG/ACT AERO Inhale 2 puffs into the lungs 2 times daily TAKE 2 PUFFS BY MOUTH TWICE A DAY Yes Lacy Meeks MD   famotidine (PEPCID) 20 MG tablet TAKE 1 TABLET BY MOUTH TWICE A DAY Yes Lacy Meeks MD   hydrALAZINE (APRESOLINE) 50 MG tablet Take 1.5 tablets by mouth 3 times daily Yes Lacy Meeks MD   estradiol (ESTRACE) 0.1 MG/GM vaginal cream Twice a week Yes Lacy Meeks MD   losartan (COZAAR) 25 MG tablet Take 1 tablet by mouth daily Yes Lacy Meeks MD   divalproex (DEPAKOTE) 125 MG DR tablet TAKE 1 TABLET BY MOUTH IN THE MORNING AND 2 TABLETS BY MOUTH IN THE EVENING Yes Lacy Meeks MD   levothyroxine (SYNTHROID) 25 MCG tablet TAKE 1 TABLET BY MOUTH EVERY DAY Yes Lacy Meeks MD   ONETOUCH VERIO strip USE TO TEST 4 TIMES A DAY BEFORE MEALS AND AT BEDTIME Yes Lacy Meeks MD   ketoconazole (NIZORAL) 2 % cream APPLY TO AFFECTED AREA EVERY DAY Yes Lacy Meeks MD   atenolol (TENORMIN) 50 MG tablet TAKE 1 TABLET BY MOUTH EVERY DAY Yes Gibran Yanez MD   allopurinol (ZYLOPRIM) 300 MG tablet TAKE 1 TABLET BY MOUTH EVERY DAY Yes Gibran Yanez MD   azelastine (ASTELIN) 0.1 % nasal spray 1 SPRAY BY NASAL ROUTE 2 TIMES DAILY USE IN EACH NOSTRIL AS DIRECTED Yes Gibran Yanez MD   insulin glargine (LANTUS SOLOSTAR) 100 UNIT/ML injection pen Inject 5 Units into the skin nightly If fasting glucose is above 160 Yes Gibran Yanez MD   Compression Stockings MISC by Does not apply route Bilateral knee high  20-30 mm  Hg    Mail to patient.  Yes Gibran Yanez MD   atorvastatin (LIPITOR) 40 MG tablet TAKE 1 TABLET BY MOUTH EVERY DAY Yes Gibran Yanez MD   ELIQUIS 5 MG TABS tablet TAKE 1 TABLET BY MOUTH 2 TIMES DAILY STOP COUMADIN Yes Gibran Yanez MD   montelukast (SINGULAIR) 10 MG tablet TAKE 1 TABLET BY MOUTH EVERYDAY AT BEDTIME Yes Gibran Yanez MD   verapamil (CALAN SR) 240 MG extended release tablet TAKE 1 TABLET BY MOUTH EVERY EVENING Yes Gibran Yanez MD   sodium bicarbonate 650 MG tablet Take 1 tablet by mouth 2 times daily Yes Gibran Yanez MD   SITagliptin (JANUVIA) 100 MG tablet TAKE 1 TABLET BY MOUTH EVERY DAY Yes Gibran Yanez MD   Elastic Bandages & Supports (MEDICAL COMPRESSION STOCKINGS) [address] [facility] 2 each by Does not apply route daily 40 mm hg thigh high Yes Gibran Yanez MD   Spacer/Aero-Holding Chambers (AEROCHAMBER MV) MISC 1 each by Does not apply route 2 times daily Yes Gibran Yanez MD   pantoprazole (PROTONIX) 40 MG tablet TAKE 1 TABLET BY MOUTH EVERY DAY Yes Gibran Yanez MD   Insulin Pen Needle (B-D UF III MINI PEN NEEDLES) 31G X 5 MM MISC USE AS DIRECTED NIGHTLY WITH LANTUS PEN Yes Gibran Yanez MD   albuterol sulfate  (90 Base) MCG/ACT inhaler Inhale 2 puffs into the lungs every 6 hours as needed for Shortness of Breath Yes Tosha Beltre MD   aspirin 81 MG tablet Take 81 mg by mouth daily Yes Historical Provider, MD   Lactobacillus (PROBIOTIC ACIDOPHILUS PO) Take 1 capsule by mouth daily Yes Historical Provider, MD   hydrocortisone 1 % ointment Apply topically 2 times daily as needed (dry skin) Apply topically 2 times daily. Yes Historical Provider, MD   lipase-protease-amylase (CREON) 29682-46988 units delayed release capsule Take 3 capsules by mouth 3 times daily as needed (diarrhea) Yes Historical Provider, MD   Lancets MISC 1 each by Does not apply route 4 times daily (before meals and nightly) Yes Claudine Wilson MD   ammonium lactate (LAC-HYDRIN) 12 % lotion Apply topically daily. Yes Claudine Wilson MD   lidocaine (XYLOCAINE) 5 % ointment Apply topically as needed. Yes Claudine Wilson MD   Pal Johann LANCETS 16W MISC USE AS DIRECTED FOUR TIMES A DAY Yes Claudine Wilson MD   Continuous Blood Gluc  (FREESTYLE SHIRLEY 14 DAY READER) JOCY 1 each by Does not apply route every 14 days DX: E11.8 diabetes Yes Claudine Wilson MD   Continuous Blood Gluc Sensor (FREESTYLE SHIRLEY 14 DAY SENSOR) [facility] 1 each by Does not apply route every 14 days DX: E11.8 diabetes Yes Claudine Wilson MD   Continuous Blood Gluc  (FREESTYLE SHIRLEY READER) JOCY 1 each by Does not apply route 4 times daily (before meals and nightly) DX: E11.8 diabetes Yes Claudine Wilson MD   Continuous Blood Gluc Sensor ([address] [place] [address]) [facility] 1 each by Does not apply route 4 times daily (before meals and nightly) DX: E11.8 diabetes Yes Claudine Wilson MD   Blood Glucose Monitoring Suppl (ONE TOUCH ULTRA 2) w/Device KIT 1 kit by Does not apply route 4 times daily (with meals and nightly) May substitute with covered brand.  Ell.8 is ICD 10 code Yes Claudine Wilson MD   Blood Glucose Monitoring Suppl ([address]) w/Device KIT 1 kit by Does not apply route daily Yes Claudine Wilson MD   Lift Chair MISC by Does not apply route Needs a lift chair due to mutiple sclerosis. Needs assistance getting out of chair, but once out of chair can ambulate independently with walker or cane. Yes Claudine Wilson MD   cholestyramine Sampsonjada Mccann) 4 G packet Take 2 packets by mouth 2 times daily Yes Claudine Wilson MD   Incontinence Supply Disposable (PALLAVI SERENITY BRIEFS LARGE) MISC 1 each by Does not apply route three times daily Large to extra large. Give Pallavi underwear protections. Daughter will select the brand. Yes Claudine Wilson MD   fluocinonide (LIDEX) 0.05 % cream Apply topically 2 times daily. Yes Claudine Wilson MD   Incontinence Supplies (BEDPAN) [address], but \"4 bottle\" is the only way it will allow us to order this bedpan. Yes Claudine Wilson MD   vitamin D (CHOLECALCIFEROL) 1000 UNIT TABS tablet Take 3 tablets by mouth daily. Yes Claudine Wilson MD   loratadine (CLARITIN) 10 MG tablet Take 1 tablet by mouth daily. Yes Claudine Wilson MD   Multiple Vitamins-Minerals (CENTRUM SILVER PO) Take 1 tablet by mouth daily  Yes Historical Provider, MD   Lancet Devices (ACCU-CHEK SOFTCLIX LANCET DEV) by Does not apply route 2 times daily as needed.  Yes Historical Provider, MD   polyethylene glycol (MIRALAX) packet Take 17 g by mouth 2 times daily  Claudine Wilson MD       Past Medical History:   Diagnosis Date    Anemia, unspecified     Asthma     Cholelithiasis     Chronic kidney disease (CKD), stage III (moderate)     Diabetes     Gout     Hyperlipidemia     Hypertension     Hypothyroidism 8/9/2015    Irritable bowel syndrome with constipation 3/5/2020    Kidney disease     Monoclonal paraproteinemia     Osteoporosis     Sclerosis of the skin     Type 2 [facility] mellitus without complication [facility] 15/83/4576       Past Surgical History:   Procedure Laterality Date    ABDOMINAL EXPLORATION SURGERY  01/04/13    **see [facility] scanned report (ER)    CHOLECYSTECTOMY Family History   Problem Relation Age of Onset    Cancer Father     High Blood Pressure Mother     High Cholesterol Mother     Obesity Mother     Cancer Maternal Aunt         skin       CareTeam (Including outside providers/suppliers regularly involved in providing care):   Patient Care Team:  Megan Bello MD as PCP - General (Internal Medicine)  Megan Bello MD as PCP - [facility] Empaneled Provider  Vane Hill MD as Consulting Physician (General Surgery)    Wt Readings from Last 3 Encounters:   08/07/19 181 lb (82.1 kg)   05/08/19 176 lb 6.4 oz (80 kg)   02/06/19 181 lb (82.1 kg)     Vitals:    11/11/20 1023   BP: 138/65   Pulse: 78   Temp: 97.3 °F (36.3 °C)   TempSrc: Oral   SpO2: 97%     There is no height or weight on file to calculate BMI. Based upon direct observation of the patient, evaluation of cognition reveals recent and remote memory intact. Physical Exam  Constitutional:       Appearance: She is well-developed. HENT:      Head: Normocephalic and atraumatic. Right Ear: External ear normal. There is no impacted cerumen. Left Ear: External ear normal. There is no impacted cerumen. Nose: Nose normal. No congestion or rhinorrhea. Mouth/Throat:      Pharynx: No oropharyngeal exudate or posterior oropharyngeal erythema. Eyes:      General:         Right eye: No discharge. Left eye: No discharge. Conjunctiva/sclera: Conjunctivae normal.      Pupils: Pupils are equal, round, and reactive to light. Neck:      Musculoskeletal: Normal range of motion and neck supple. Thyroid: No thyromegaly. Vascular: No JVD. Trachea: No tracheal deviation. Cardiovascular:      Rate and Rhythm: Normal rate and regular rhythm. Heart sounds: Normal heart sounds. Comments: BLE +2   Pulmonary:      Effort: Pulmonary effort is normal. No respiratory distress. Breath sounds: No wheezing or rales.    Chest:      Chest wall: No tenderness. Abdominal:      General: Bowel sounds are normal. There is no distension. Palpations: Abdomen is soft. There is no mass. Tenderness: There is no abdominal tenderness. There is no guarding or rebound. Musculoskeletal:      Comments: Uses walker for ambulation    Bilateral lower leg weakness. .  Good sitting posture. No difficulty swallowing. Lymphadenopathy:      Cervical: No cervical adenopathy. Skin:     General: Skin is warm and dry. Findings: No erythema. Neurological:      Mental Status: She is alert and oriented to person, place, and time. Psychiatric:         Behavior: Behavior normal.         Thought Content: Thought content normal.         Judgment: Judgment normal.           Patient's complete Health Risk Assessment and screening values have been reviewed and are found in Flowsheets. The following problems were reviewed today and where indicated follow up appointments were made and/or referrals ordered. Positive Risk Factor Screenings with Interventions:     Fall Risk:  2 or more falls in past year?: no  Fall with injury in past year?: (!) yes  Fall Risk Interventions:    · Home safety tips provided    General Health and ACP:  General  In general, how would you say your health is?: Fair  In the past 7 days, have you experienced any of the following? New or Increased Pain, New or Increased Fatigue, Loneliness, Social Isolation, Stress or Anger?: None of These  Do you get the social and emotional support that you need?: Yes  Do you have a Living Will?: Yes  Advance Directives     Power of  Living Will ACP-Advance Directive ACP-Power of     Not on File Not on File Filed [address] [place] [address] Risk Interventions:  · no issues at this time.     Health Habits/Nutrition:  Health Habits/Nutrition  Do you exercise for at least 20 minutes 2-3 times per week?: (!) No  Have you lost any weight without trying in the past 3 months?: (!) Yes  Do you eat fewer than 2 meals per day?: No  Have you seen a dentist within the past year?: (!) No     Health Habits/Nutrition Interventions:  · Inadequate physical activity:  chair exercies 10 minutes daily. Hearing/Vision:  No exam data present  Hearing/Vision  Do you or your family notice any trouble with your hearing?: No  Do you have difficulty driving, watching TV, or doing any of your daily activities because of your eyesight?: No  Have you had an eye exam within the past year?: (!) No  Hearing/Vision Interventions:  · no vision or hearing concerns at this time. ADL:  ADLs  In the past 7 days, did you need help from others to perform any of the following everyday activities? Eating, dressing, grooming, bathing, toileting, or walking/balance?: (!) Dressing, Grooming, Walking/Balance  In the past 7 days, did you need help from others to take care of any of the following? Laundry, housekeeping, banking/finances, shopping, telephone use, food preparation, transportation, or taking medications?: [employer], Housekeeping, Shopping, [employer], Telephone Use, Food Preparation, Transportation, Taking Medications  ADL Interventions:  · Patient is cared for by her daughter and does not require additional assistance at this point.     Personalized Preventive Plan   Current Health Maintenance Status  Immunization History   Administered Date(s) Administered    Influenza Virus Vaccine 08/29/2012, 10/23/2013, 10/01/2014, 10/19/2015, 09/07/2016    Influenza, High Dose (Fluzone 65 yrs and older) 09/14/2011, 08/29/2012, 10/23/2013, 10/01/2014, 10/19/2015, 09/07/2016, 09/27/2017, 11/07/2018, 01/15/2020    Influenza, High-dose, Quadv, 65 yrs +, IM (Fluzone) 10/09/2020    Pneumococcal Conjugate 13-valent ([id number]) 11/04/2015    Pneumococcal Polysaccharide ([id number]) 09/27/2017    Tdap (Boostrix, Adacel) 02/08/2012    Zoster Live (Zostavax) 03/22/2013        Health Maintenance   Topic Date Due    Shingles Vaccine (2 of 3) 05/17/2013    Annual Wellness Visit (AWV)  05/29/2019    Lipid screen  01/15/2021    Potassium monitoring  10/19/2021    Creatinine monitoring  10/19/2021    DTaP/Tdap/Td vaccine (2 - Td) 02/08/2022    DEXA (modify frequency per FRAX score)  Completed    Flu vaccine  Completed    Pneumococcal 65+ years Vaccine  Completed    Hepatitis A vaccine  Aged Out    Hib vaccine  Aged Out    Meningococcal (ACWY) vaccine  Aged Out     Recommendations for [place] Due: see orders and patient instructions/AVS.  . Recommended screening schedule for the next 5-10 years is provided to the patient in written form: see Patient Instructions/AVS.    Nila Rosas was seen today for medicare awv. Diagnoses and all orders for this visit:    Routine general medical examination at a health care facility                  Cardiovascular Disease Risk Counseling: Assessed the patient's risk to develop cardiovascular disease and reviewed main risk factors. Reviewed steps to reduce disease risk including:   · Quitting tobacco use, reducing amount smoked, or not starting the habit  · Making healthy food choices  · Being physically active and gradualy increasing activity levels   · Reduce weight and determine a healthy BMI goal  · Monitor blood pressure and treat if higher than 140/90 mmHg  · Maintain blood total cholesterol levels under 5 mmol/l or 190 mg/dl  · Maintain LDL cholesterol levels under 3.0 mmol/l or 115 mg/dl   · Control blood glucose levels  · Consider taking aspirin (75 mg daily), once blood pressure is controlled   Provided a follow up plan. Time spent (minutes): 5 mintues.

## 2020-11-11 NOTE — PATIENT INSTRUCTIONS
Learning About Alessandro Roy  What is a living will? A living will, also called a declaration, is a legal form. It tells your family and your doctor your wishes when you can't speak for yourself. It's used by the health professionals who will treat you as you near the end of your life or if you get seriously hurt or ill. If you put your wishes in writing, your loved ones and others will know what kind of care you want. They won't need to guess. This can ease your mind and be helpful to others. And you can change or cancel your living will at any time. A living will is not the same as an estate or property will. An estate will explains what you want to happen with your money and property after you die. How do you use it? A living will is used to describe the kinds of treatment or life support you want as you near the end of your life or if you get seriously hurt or ill. Keep these facts in mind about living [place]. · Your living will is used only if you can't speak or make decisions for yourself. Most often, one or more doctors must certify that you can't speak or decide for yourself before your living will takes effect. · If you get better and can speak for yourself again, you can accept or refuse any treatment. It doesn't matter what you said in your living will. · Some states may limit your right to refuse treatment in certain cases. For example, you may need to clearly state in your living will that you don't want artificial hydration and nutrition, such as being fed through a tube. Is a living will a legal document? A living will is a legal document. Each state has its own laws about living [place]. And a living will may be called something else in your state. Here are some things to know about living [place]. · You don't need an  to complete a living will. But legal advice can be helpful if your state's laws are unclear.  It can also help if your health history is complicated or your family can't agree on what should be in your living will. · You can change your living will at any time. Some people find that their wishes about end-of-life care change as their health changes. If you make big changes to your living will, complete a new form. · If you move to another state, make sure that your living will is legal in the state where you now live. In most cases, doctors will respect your wishes even if you have a form from a different state. · You might use a universal form that has been approved by many states. This kind of form can sometimes be filled out and stored online. Your digital copy will then be available wherever you have a connection to the internet. The doctors and nurses who need to treat you can find it right away. · Your state may offer an online registry. This is another place where you can store your living will online. · It's a good idea to get your living will notarized. This means using a person called a  to watch two people sign, or witness, your living will. What should you know when you create a living will? Here are some questions to ask yourself as you make your living will:  · Do you know enough about life support methods that might be used? If not, talk to your doctor so you know what might be done if you can't breathe on your own, your heart stops, or you can't swallow. · What things would you still want to be able to do after you receive life-support methods? Would you want to be able to walk? To speak? To eat on your own? To live without the help of machines? · Do you want certain Taoist practices performed if you become very ill? · If you have a choice, where do you want to be cared for? In your home? At a hospital or nursing home? · If you have a choice at the end of your life, where would you prefer to die? At home? In a hospital or nursing home? Somewhere else? · Would you prefer to be buried or cremated?   · Do you want your organs to be donated after you die? What should you do with your living will? · Make sure that your family members and your health care agent have copies of your living will (also called a declaration). · Give your doctor a copy of your living will. Ask him or her to keep it as part of your medical record. If you have more than one doctor, make sure that each one has a copy. · Put a copy of your living will where it can be easily found. For example, some people may put a copy on their refrigerator door. If you are using a digital copy, be sure your doctor, family members, and health care agent know how to find and access it. Where can you learn more? Go to [email or web address] and sign in to your Kixer account. Enter C482 in the Doorbot box to learn more about \"Learning About Living [place]. \"     If you do not have an account, please click on the \"Sign Up Now\" link. Current as of: December 9, 2019               Content Version: 12.6  © 8781-4570 "2,10E+07", Incorporated. Care instructions adapted under license by [facility] ([facility]). If you have questions about a medical condition or this instruction, always ask your healthcare professional. [place] [address] any warranty or liability for your use of this information. Personalized Preventive Plan for Xavier Angeles - 11/11/2020  Medicare offers a range of preventive health benefits. Some of the tests and screenings are paid in full while other may be subject to a deductible, co-insurance, and/or copay. Some of these benefits include a comprehensive review of your medical history including lifestyle, illnesses that may run in your family, and various assessments and screenings as appropriate. After reviewing your medical record and screening and assessments performed today your provider may have ordered immunizations, labs, imaging, and/or referrals for you.   A list of these orders (if applicable) as well as

## 2020-11-13 RX ORDER — VERAPAMIL HYDROCHLORIDE 240 MG/1
TABLET, FILM COATED, EXTENDED RELEASE ORAL
Qty: 90 TABLET | Refills: 1 | Status: SHIPPED | OUTPATIENT
Start: 2020-11-13 | End: 2020-12-14 | Stop reason: SDUPTHER

## 2020-11-18 ENCOUNTER — TELEPHONE (OUTPATIENT)
Dept: PRIMARY CARE CLINIC | Age: 85
End: 2020-11-18

## 2020-11-18 RX ORDER — LOSARTAN POTASSIUM 25 MG/1
25 TABLET ORAL DAILY
Qty: 90 TABLET | Refills: 3 | Status: SHIPPED | OUTPATIENT
Start: 2020-11-18 | End: 2020-12-14 | Stop reason: SDUPTHER

## 2020-11-18 RX ORDER — FAMOTIDINE 20 MG/1
TABLET, FILM COATED ORAL
Qty: 180 TABLET | Refills: 1 | Status: SHIPPED | OUTPATIENT
Start: 2020-11-18 | End: 2020-12-14

## 2020-11-18 RX ORDER — HYDRALAZINE HYDROCHLORIDE 50 MG/1
75 TABLET, FILM COATED ORAL 3 TIMES DAILY
Qty: 405 TABLET | Refills: 3 | Status: SHIPPED | OUTPATIENT
Start: 2020-11-18 | End: 2020-12-14 | Stop reason: SDUPTHER

## 2020-11-18 NOTE — TELEPHONE ENCOUNTER
Daughter said pt was taking hydralazine 50 mg, twice a day, but was increased 3 times a day and will need more pills than she used to get. She now takes 1.5 tablets 3 times a day. ALSO:  Symbicort dose was dropped from 160 to [de-identified], daughter wants her to go back up to 160.     PHARMACY:  [facility] PHONE:  [phone]

## 2020-11-28 RX ORDER — FLASH GLUCOSE SENSOR
1 KIT MISCELLANEOUS
Qty: 2 EACH | Refills: 11 | Status: SHIPPED | OUTPATIENT
Start: 2020-11-28 | End: 2021-07-02

## 2020-11-29 ENCOUNTER — PATIENT MESSAGE (OUTPATIENT)
Dept: PRIMARY CARE CLINIC | Age: 85
End: 2020-11-29

## 2020-11-30 NOTE — TELEPHONE ENCOUNTER
From: Rui Tirado  To: Heather Llanos MD  Sent: 11/29/2020 7:30 PM EST  Subject: Prescription Question    Can you send prescription refills for the lidocaine ointment for the pain in her hand and ankle

## 2020-12-01 RX ORDER — LIDOCAINE 50 MG/G
OINTMENT TOPICAL
Qty: 50 G | Refills: 5 | Status: SHIPPED | OUTPATIENT
Start: 2020-12-01 | End: 2020-12-08

## 2020-12-02 RX ORDER — SODIUM BICARBONATE 650 MG/1
650 TABLET ORAL 2 TIMES DAILY
Qty: 60 TABLET | Refills: 11 | Status: SHIPPED | OUTPATIENT
Start: 2020-12-02 | End: 2020-12-04

## 2020-12-04 RX ORDER — SODIUM BICARBONATE 650 MG/1
650 TABLET ORAL 2 TIMES DAILY
Qty: 60 TABLET | Refills: 11 | Status: SHIPPED | OUTPATIENT
Start: 2020-12-04 | End: 2020-12-14

## 2020-12-08 RX ORDER — LIDOCAINE 50 MG/G
OINTMENT TOPICAL
Qty: 35.44 G | Refills: 5 | Status: SHIPPED | OUTPATIENT
Start: 2020-12-08 | End: 2021-04-22 | Stop reason: SDUPTHER

## 2020-12-16 RX ORDER — FLUCONAZOLE 150 MG/1
150 TABLET ORAL ONCE
Qty: 1 TABLET | Refills: 0 | Status: SHIPPED | OUTPATIENT
Start: 2020-12-16 | End: 2020-12-16

## 2020-12-19 LAB
ORGANISM: ABNORMAL
URINE CULTURE, ROUTINE: ABNORMAL
URINE CULTURE, ROUTINE: ABNORMAL

## 2020-12-20 RX ORDER — CEFUROXIME AXETIL 250 MG/1
250 TABLET ORAL 2 TIMES DAILY
Qty: 20 TABLET | Refills: 0 | Status: SHIPPED | OUTPATIENT
Start: 2020-12-20 | End: 2020-12-30

## 2021-01-06 ENCOUNTER — TELEPHONE (OUTPATIENT)
Dept: PRIMARY CARE CLINIC | Age: 86
End: 2021-01-06

## 2021-01-12 PROBLEM — R60.0 BILATERAL LEG EDEMA: Status: RESOLVED | Noted: 2020-07-26 | Resolved: 2021-01-12

## 2021-01-12 PROBLEM — M54.2 NECK PAIN: Status: RESOLVED | Noted: 2017-04-26 | Resolved: 2021-01-12

## 2021-01-14 ENCOUNTER — TELEPHONE (OUTPATIENT)
Dept: INTERNAL MEDICINE CLINIC | Age: 86
End: 2021-01-14

## 2021-01-14 NOTE — TELEPHONE ENCOUNTER
Cristal Armenta, patients daughter, calling to schedule a NP appt w/DR Josue Aquino. Cristal Armenta said she spoke w/Dr Josue Aquino and was told her mother could be seen prior to March. He mother has numerous complex medical issues that require her being seen soon and often.

## 2021-01-15 ENCOUNTER — PATIENT MESSAGE (OUTPATIENT)
Dept: PRIMARY CARE CLINIC | Age: 86
End: 2021-01-15

## 2021-01-18 ENCOUNTER — TELEPHONE (OUTPATIENT)
Dept: PRIMARY CARE CLINIC | Age: 86
End: 2021-01-18

## 2021-01-18 NOTE — TELEPHONE ENCOUNTER
Patient's daughter called to asked that we faxed lab orders for Dr Dirk Chapin to [facility] to faxed number [phone]

## 2021-01-18 NOTE — TELEPHONE ENCOUNTER
FYI:    Pt's daughter said they have not heard back from home care to come to their home for blood draw. They requested that we refax orders, which I did resend to [facility].

## 2021-01-21 ENCOUNTER — TELEPHONE (OUTPATIENT)
Dept: PRIMARY CARE CLINIC | Age: 86
End: 2021-01-21

## 2021-01-21 DIAGNOSIS — R30.0 DYSURIA: Primary | ICD-10-CM

## 2021-01-21 NOTE — TELEPHONE ENCOUNTER
Urine culture ordered. Explain to patient to call office for results , since I will not have computer access anymore.

## 2021-01-21 NOTE — TELEPHONE ENCOUNTER
----- Message from Rubi Staples sent at 1/21/2021  9:20 AM EST -----  Subject: Appointment Request    Reason for Call: Routine Existing Condition Follow Up    QUESTIONS  Type of Appointment? Established Patient  Reason for appointment request? No appointments available during search  Additional Information for Provider? patient daughter called requesting a   VV appt for her mother pt daughter stated she would like a urine culture   done for her mom screened green please advise   ---------------------------------------------------------------------------  --------------  CALL BACK INFO  What is the best way for the office to contact you? OK to leave message on   voicemail   OK to respond with electronic message via [employer] portal (only for   patients who have registered [employer] account)  Preferred Call Back Phone Number? [phone]  ---------------------------------------------------------------------------  --------------  SCRIPT ANSWERS  Relationship to Patient? Other  Representative Name? CADEN JERNIGAN  Additional information verified (besides Name and Date of Birth)? Address  Appointment reason? Well Care/Follow Ups  Select a Well Care/Follow Ups appointment reason? Adult Existing Condition   Follow Up [Diabetes   CHF   COPD   Hypertension/Blood Pressure Check]  (Is the patient requesting to be seen urgently for their symptoms?)? No  Is this follow up request related to routine Diabetes Management? No  Are you having any new concerns about your existing condition? No  Have you been diagnosed with   tested for   or told that you are suspected of having COVID-19 (Coronavirus)? No  Have you had a fever or taken medication to treat a fever within the past   3 days? No  Have you had a cough   shortness of breath or flu-like symptoms within the past 3 days? No  Do you currently have flu-like symptoms including fever or chills   cough   shortness of breath   or difficulty breathing   or new loss of taste or smell? No  (Service Expert  click yes below to proceed with [employer]ual   Scheduling)?  Yes

## 2021-01-21 NOTE — TELEPHONE ENCOUNTER
Would like a order for urine culture, she is having burning when she urinates and odor and cloudy. Please put into epic.      Please call Delbert Joya [phone]

## 2021-01-23 DIAGNOSIS — N30.00 ACUTE CYSTITIS WITHOUT HEMATURIA: Primary | ICD-10-CM

## 2021-01-23 RX ORDER — SULFAMETHOXAZOLE AND TRIMETHOPRIM 400; 80 MG/1; MG/1
1 TABLET ORAL 2 TIMES DAILY
Qty: 14 TABLET | Refills: 0 | Status: SHIPPED | OUTPATIENT
Start: 2021-01-23 | End: 2021-01-30

## 2021-01-23 RX ORDER — FLUCONAZOLE 150 MG/1
150 TABLET ORAL
Qty: 2 TABLET | Refills: 0 | Status: SHIPPED | OUTPATIENT
Start: 2021-01-23 | End: 2021-01-29

## 2021-01-25 LAB
ORGANISM: ABNORMAL
URINE CULTURE, ROUTINE: ABNORMAL

## 2021-02-10 ENCOUNTER — OFFICE VISIT (OUTPATIENT)
Dept: INTERNAL MEDICINE CLINIC | Age: 86
End: 2021-02-10
Payer: MEDICARE

## 2021-02-10 ENCOUNTER — PATIENT MESSAGE (OUTPATIENT)
Dept: INTERNAL MEDICINE CLINIC | Age: 86
End: 2021-02-10

## 2021-02-10 VITALS
HEIGHT: 63 IN | HEART RATE: 62 BPM | TEMPERATURE: 98.6 F | BODY MASS INDEX: 32.06 KG/M2 | DIASTOLIC BLOOD PRESSURE: 89 MMHG | SYSTOLIC BLOOD PRESSURE: 156 MMHG

## 2021-02-10 DIAGNOSIS — N39.0 RECURRENT UTI: ICD-10-CM

## 2021-02-10 DIAGNOSIS — N30.01 ACUTE CYSTITIS WITH HEMATURIA: Primary | ICD-10-CM

## 2021-02-10 DIAGNOSIS — I26.92 CHRONIC SADDLE PULMONARY EMBOLISM WITHOUT ACUTE COR PULMONALE (HCC): ICD-10-CM

## 2021-02-10 DIAGNOSIS — E21.0 PRIMARY HYPERPARATHYROIDISM (HCC): ICD-10-CM

## 2021-02-10 DIAGNOSIS — E87.1 HYPONATREMIA: ICD-10-CM

## 2021-02-10 DIAGNOSIS — E11.8 TYPE 2 DIABETES MELLITUS WITH COMPLICATION, WITH LONG-TERM CURRENT USE OF INSULIN (HCC): ICD-10-CM

## 2021-02-10 DIAGNOSIS — Z79.4 TYPE 2 DIABETES MELLITUS WITH COMPLICATION, WITH LONG-TERM CURRENT USE OF INSULIN (HCC): ICD-10-CM

## 2021-02-10 DIAGNOSIS — G35 MULTIPLE SCLEROSIS (HCC): ICD-10-CM

## 2021-02-10 DIAGNOSIS — I27.82 CHRONIC SADDLE PULMONARY EMBOLISM WITHOUT ACUTE COR PULMONALE (HCC): ICD-10-CM

## 2021-02-10 DIAGNOSIS — R79.9 ABNORMAL FINDING OF BLOOD CHEMISTRY, UNSPECIFIED: ICD-10-CM

## 2021-02-10 DIAGNOSIS — E46 PROTEIN MALNUTRITION (HCC): ICD-10-CM

## 2021-02-10 LAB — SODIUM URINE: 54 MMOL/L

## 2021-02-10 PROCEDURE — 99215 OFFICE O/P EST HI 40 MIN: CPT | Performed by: INTERNAL MEDICINE

## 2021-02-10 ASSESSMENT — PATIENT HEALTH QUESTIONNAIRE - PHQ9
2. FEELING DOWN, DEPRESSED OR HOPELESS: 0
SUM OF ALL RESPONSES TO PHQ9 QUESTIONS 1 & 2: 0
1. LITTLE INTEREST OR PLEASURE IN DOING THINGS: 0
SUM OF ALL RESPONSES TO PHQ QUESTIONS 1-9: 0

## 2021-02-10 NOTE — PROGRESS NOTES
Subjective:      Patient ID: Mercedes Salgado is a 82 y.o. female. Chief Complaint   Patient presents with   Barbie Clemente Doctor       HPI    Patient Active Problem List    Diagnosis Date Noted    Protein malnutrition ([facility] [address] 07/26/2020    Vitamin D deficiency 07/26/2020    Irritable bowel syndrome with constipation 03/05/2020    Candidiasis of skin - creams - from time 07/10/2018    Isolated proteinuria without specific morphologic lesion 05/17/2018    Mouth droop due to facial weakness- hx of bells palsy 09/27/2017    Diabetic nephropathy associated with type 2 diabetes mellitus ([address] 12/06/2016    Partial small bowel obstruction ([address] 12/04/2016    Type 2 diabetes mellitus with complication, with long-term current use of insulin ([address] 10/04/2016    Long term use of bisphosphonates - completed therapy due to bone pain 04/30/2016     Updating Deprecated Diagnoses      Primary osteoarthritis of both first carpometacarpal joints 03/29/2016    Hypothyroidism due to acquired atrophy of thyroid - levothyroixine 10/14/2015    Cough variant asthma 04/14/2015    Osteoporosis 01/12/2015    Primary hyperparathyroidism ([address] 04/02/2014    Eczema 07/27/2013    Microscopic hematuria 05/20/2013    Multiple sclerosis (HCC)-no immunotherapy 01/31/2013    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     Home blood sugar records: patient does not test Any episodes of hypoglycemia? no  Eye exam current (within one year): no  Tobacco history: She  reports that she has never smoked. She has never used smokeless tobacco.   Daily Aspirin? Yes    Hypertension:  Home blood pressure monitoring: No.  She is adherent to a low sodium diet. Patient denies headache, blurred vision, peripheral edema, palpitations, dry cough and fatigue. Antihypertensive medication side effects: no medication side effects noted. Use of agents associated with hypertension: none. Hyperlipidemia:  No new myalgias or GI upset on atorvastatin (Lipitor). Lab Results   Component Value Date    LABA1C 5.3 09/28/2020    LABA1C 4.9 07/24/2020    LABA1C 5.4 01/15/2020     Lab Results   Component Value Date    LABMICR YES 12/04/2020    CREATININE 0.63 10/19/2020     Lab Results   Component Value Date    ALT 12 02/26/2020    AST 16 02/26/2020     Lab Results   Component Value Date    CHOL 130 01/15/2020    TRIG 94 01/15/2020    HDL 51 01/15/2020    [address] 60 01/15/2020        Review of Systems   Musculoskeletal: Negative. Skin: Negative. All other systems reviewed and are negative. Allergies   Allergen Reactions    [place] Meal      Nausea, vomiting and diarrhea with almond milk.     Celexa [Citalopram Hydrobromide]     Famciclovir     Lactose     Metronidazole     Peanut-Containing Drug Products     P[place] Oil [Nutritional Supplements] Diarrhea    Zofran        Current Outpatient Medications   Medication Sig Dispense Refill    Lancets MISC 1 each by Does not apply route 4 times daily (after meals and at bedtime) 100 each 5    estradiol (ESTRACE) 0.1 MG/GM vaginal cream Twice a week 1 Tube 3    albuterol sulfate HFA (VENTOLIN HFA) 108 (90 Base) MCG/ACT inhaler Inhale 2 puffs into the lungs 4 times daily as needed for Wheezing 1 Inhaler 5    Continuous Blood Gluc Sensor (FREESTYLE SHIRLEY 2 SENSOR SYSTM) MISC 1 Device by Does not apply route every 14 days 2 each 5  sodium bicarbonate 650 MG tablet TAKE 1 TABLET BY MOUTH EVERY DAY 90 tablet 5    losartan (COZAAR) 25 MG tablet Take 1 tablet by mouth daily 90 tablet 3    hydrALAZINE (APRESOLINE) 50 MG tablet Take 1.5 tablets by mouth 3 times daily 405 tablet 3    famotidine (PEPCID) 20 MG tablet TAKE 1 TABLET BY MOUTH TWICE A  tablet 1    verapamil (CALAN SR) 240 MG extended release tablet TAKE 1 TABLET BY MOUTH EVERY DAY IN THE EVENING 90 tablet 1    divalproex (DEPAKOTE) 125 MG DR tablet Take one tablet in the am and two tablets in the pm. 270 tablet 1    levothyroxine (SYNTHROID) 25 MCG tablet TAKE 1 TABLET BY MOUTH EVERY DAY 90 tablet 3    atenolol (TENORMIN) 50 MG tablet TAKE 1 TABLET BY MOUTH EVERY DAY 90 tablet 3    ketoconazole (NIZORAL) 2 % cream APPLY TO AFFECTED AREA EVERY DAY 60 g 11    allopurinol (ZYLOPRIM) 300 MG tablet TAKE 1 TABLET BY MOUTH EVERY DAY 90 tablet 3    atorvastatin (LIPITOR) 40 MG tablet TAKE 1 TABLET BY MOUTH EVERY DAY 90 tablet 3    apixaban (ELIQUIS) 5 MG TABS tablet TAKE 1 TABLET BY MOUTH 2 TIMES DAILY STOP COUMADIN 180 tablet 3    montelukast (SINGULAIR) 10 MG tablet TAKE 1 TABLET BY MOUTH EVERYDAY AT BEDTIME 90 tablet 3    polyethylene glycol (MIRALAX) 17 g packet Take 17 g by mouth 2 times daily 527 g 5    SITagliptin (JANUVIA) 100 MG tablet TAKE 1 TABLET BY MOUTH EVERY DAY 90 tablet 3    Continuous Blood Gluc Sensor (FREESTYLE SHIRLEY 14 DAY SENSOR) MIS 1 each by Does not apply route every 14 days DX: E11.8 diabetes 2 each 11    famotidine (PEPCID) 20 MG tablet Take 1 tablet by mouth 2 times daily 180 tablet 3    lidocaine (XYLOCAINE) 5 % ointment APPLY TO AFFECTED AREA EVERY DAY AS NEEDED 35.44 g 5    Continuous Blood Gluc Sensor (FREESTYLE SHIRLEY SENSOR SYSTEM) MISC 1 Device by Does not apply route every 14 days 2 each 11    blood glucose test strips (ONETOUCH VERIO) strip USE TO TEST 4 TIMES A DAY BEFORE MEALS AND AT BEDTIME 300 strip 11  Blood Glucose Monitoring Suppl ([facility] IQ SYSTEM) w/Device KIT 1 kit by Does not apply route 4 times daily (before meals and nightly) 1 kit 11    Compression Stockings MISC by Does not apply route Knee high compression and thigh high stockings 30 mm Hg. Dispense two pairs with 1 year refill. 1 each 5    budesonide-formoterol (SYMBICORT) 80-4.5 MCG/ACT AERO Inhale 2 puffs into the lungs 2 times daily TAKE 2 PUFFS BY MOUTH TWICE A DAY 1 Inhaler 5    azelastine (ASTELIN) 0.1 % nasal spray 1 SPRAY BY NASAL ROUTE 2 TIMES DAILY USE IN EACH NOSTRIL AS DIRECTED 1 Bottle 5    insulin glargine (LANTUS SOLOSTAR) 100 UNIT/ML injection pen Inject 5 Units into the skin nightly If fasting glucose is above 160 5 pen 2    Compression Stockings MISC by Does not apply route Bilateral knee high  20-30 mm  Hg    Mail to patient. 2 each 5    Elastic Bandages & Supports (MEDICAL COMPRESSION STOCKINGS) MISC 2 each by Does not apply route daily 40 mm hg thigh high 2 each 5    Spacer/Aero-Holding Chambers (AEROCHAMBER MV) MISC 1 each by Does not apply route 2 times daily 1 each 3    Insulin Pen Needle (B-D UF III MINI PEN NEEDLES) 31G X 5 MM MISC USE AS DIRECTED NIGHTLY WITH LANTUS PEN 50 each 5    albuterol sulfate  (90 Base) MCG/ACT inhaler Inhale 2 puffs into the lungs every 6 hours as needed for Shortness of Breath      aspirin 81 MG tablet Take 81 mg by mouth daily      Lactobacillus (PROBIOTIC ACIDOPHILUS PO) Take 1 capsule by mouth daily      hydrocortisone 1 % ointment Apply topically 2 times daily as needed (dry skin) Apply topically 2 times daily.  lipase-protease-amylase (CREON) 88335-62494 units delayed release capsule Take 3 capsules by mouth 3 times daily as needed (diarrhea)      Lancets MISC 1 each by Does not apply route 4 times daily (before meals and nightly) 200 each 11    ammonium lactate (LAC-HYDRIN) 12 % lotion Apply topically daily.  567 g 5  ONETOUCH DELICA LANCETS 60P MISC USE AS DIRECTED FOUR TIMES A  each 11    Continuous Blood Gluc  (FREESTYLE SHIRLEY 15 DAY READER) JOCY 1 each by Does not apply route every 14 days DX: E11.8 diabetes 2 Device 5    Continuous Blood Gluc  (FREESTYLE SHIRLEY READER) JOCY 1 each by Does not apply route 4 times daily (before meals and nightly) DX: E11.8 diabetes 1 Device 5    Continuous Blood Gluc Sensor ([address] [place] [address]) MISC 1 each by Does not apply route 4 times daily (before meals and nightly) DX: E11.8 diabetes 1 each 5    Blood Glucose Monitoring Suppl (ONE TOUCH ULTRA 2) w/Device KIT 1 kit by Does not apply route 4 times daily (with meals and nightly) May substitute with covered brand. Ell.8 is ICD 10 code 1 kit 1    Blood Glucose Monitoring Suppl ([address]) w/Device KIT 1 kit by Does not apply route daily 1 kit 2    Lift Chair MISC by Does not apply route Needs a lift chair due to mutiple sclerosis. Needs assistance getting out of chair, but once out of chair can ambulate independently with walker or cane. 1 each 0    cholestyramine (QUESTRAN) 4 G packet Take 2 packets by mouth 2 times daily 180 packet 11    Incontinence Supply Disposable (PALLAVI SERENITY BRIEFS LARGE) MISC 1 each by Does not apply route three times daily Large to extra large. Give Pallavi underwear protections. Daughter will select the brand. 90 each 5    fluocinonide (LIDEX) 0.05 % cream Apply topically 2 times daily. 60 g 11    Incontinence Supplies (BEDPAN) MISC Sorry, but \"1 bottle\" is the only way it will allow us to order this bedpan. 1 Bottle 0    vitamin D (CHOLECALCIFEROL) 1000 UNIT TABS tablet Take 3 tablets by mouth daily. 90 tablet 5    loratadine (CLARITIN) 10 MG tablet Take 1 tablet by mouth daily.  30 tablet 5    Multiple Vitamins-Minerals (CENTRUM SILVER PO) Take 1 tablet by mouth daily  Lancet Devices (ACCU-CHEK SOFTCLIX LANCET DEV) by Does not apply route 2 times daily as needed. No current facility-administered medications for this visit. Vitals:    02/10/21 1148 02/10/21 1235   BP: (!) 162/88 (!) 156/89   Pulse: 62    Temp: 98.6 °F (37 °C)    TempSrc: Temporal    Height: 5' 3\" (1.6 m)      Body mass index is 32.06 kg/m². Wt Readings from Last 3 Encounters:   08/07/19 181 lb (82.1 kg)   05/08/19 176 lb 6.4 oz (80 kg)   02/06/19 181 lb (82.1 kg)     BP Readings from Last 3 Encounters:   02/10/21 (!) 156/89   12/22/20 (!) 149/76   11/11/20 138/65       Objective:   Physical Exam  Vitals signs and nursing note reviewed. Constitutional:       Appearance: She is well-developed. HENT:      Head: Normocephalic and atraumatic. Right Ear: Tympanic membrane normal.      Left Ear: Tympanic membrane normal.      Nose: Nose normal. No congestion. Mouth/Throat:      Mouth: Mucous membranes are moist.      Pharynx: Oropharynx is clear. No oropharyngeal exudate or posterior oropharyngeal erythema. Eyes:      Extraocular Movements: Extraocular movements intact. Conjunctiva/sclera: Conjunctivae normal.      Pupils: Pupils are equal, round, and reactive to light. Neck:      Musculoskeletal: Normal range of motion and neck supple. Cardiovascular:      Rate and Rhythm: Normal rate and regular rhythm. Pulses: Normal pulses. Heart sounds: Normal heart sounds. No murmur. Pulmonary:      Effort: Pulmonary effort is normal. No respiratory distress. Breath sounds: Normal breath sounds. Musculoskeletal: Normal range of motion. Skin:     General: Skin is warm. Findings: No erythema or rash. Neurological:      Mental Status: She is alert and oriented to person, place, and time. Cranial Nerves: No cranial nerve deficit.       Coordination: Coordination normal.   Psychiatric:         Behavior: Behavior normal. Thought Content: Thought content normal.         Judgment: Judgment normal.         Assessment/Plan:  Katelynn Villaseñor was seen today for established new doctor. 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)  - continued back pain    Primary hyperparathyroidism ([facility] [address]  - continue labs    Abnormal finding of blood chemistry, unspecified   -     Hemoglobin A1C; Future    Recurrent UTI  -     methenamine (HIPREX) 1 g tablet; Take 1 tablet by mouth 2 times daily (with meals)      Francis Mercer MD     Protein intake - 60 - 70 gms   Salt intake 4 gms per day   Potassium intake 2-3 gms per day   Fluid restriction 35 - 40 ounces per day     There are other unrelated non-urgent complaints, but due to the busy schedule and the amount of time I've already spent with her, time does not permit me to address these routine issues at today's visit. I've requested another appointment to review these additional issues. We have ordered labs to address her chronic medical issues. It is reported that there is an intolerance to ciprofloxacin and she gets hyponatremia with bactrim. We will treat with   This patient is new to me but already established in the [facility] system. I have reviewed the patient's medical history in detail and updated the computerized patient record.

## 2021-02-11 LAB
CREATININE URINE: 23 MG/DL (ref 28–259)
MICROALBUMIN UR-MCNC: 47.2 MG/DL
MICROALBUMIN/CREAT UR-RTO: 2052.2 MG/G (ref 0–30)
OSMOLALITY URINE: 346 MOSM/KG (ref 390–1070)

## 2021-02-11 RX ORDER — CEFACLOR 500 MG/1
500 TABLET, FILM COATED, EXTENDED RELEASE ORAL 2 TIMES DAILY
Qty: 20 TABLET | Refills: 0 | Status: SHIPPED | OUTPATIENT
Start: 2021-02-11 | End: 2021-02-21

## 2021-02-11 RX ORDER — METHENAMINE HIPPURATE 1000 MG/1
1 TABLET ORAL 2 TIMES DAILY WITH MEALS
Qty: 180 TABLET | Refills: 1 | Status: SHIPPED | OUTPATIENT
Start: 2021-02-11 | End: 2021-03-18

## 2021-02-11 NOTE — TELEPHONE ENCOUNTER
From: Shannan Frank  To: Phi Barrett MD  Sent: 2/10/2021 5:50 PM EST  Subject: Test Results [id number] to verify they got the order. They said the orders are pending and needed to be signed off before they can send the UA and UC. They were sending the urine orders from Dr Dejon Cifuentes and that Dr Arce's office would need to contact the [facility] lab to add the UA and UC.

## 2021-02-11 NOTE — PATIENT INSTRUCTIONS
Talk with your doctor before you eat citrus fruits, dairy products, and some vegetables while taking this drug. This drug may affect certain lab tests. Tell all of your health care providers and lab workers that you take this drug. Tell your doctor if you are pregnant, plan on getting pregnant, or are breast-feeding. You will need to talk about the benefits and risks to you and the baby. What are some side effects that I need to call my doctor about right away? WARNING/CAUTION: Even though it may be rare, some people may have very bad and sometimes deadly side effects when taking a drug. Tell your doctor or get medical help right away if you have any of the following signs or symptoms that may be related to a very bad side effect:    Signs of an allergic reaction, like rash; hives; itching; red, swollen, blistered, or peeling skin with or without fever; wheezing; tightness in the chest or throat; trouble breathing, swallowing, or talking; unusual hoarseness; or swelling of the mouth, face, lips, tongue, or throat. Bladder pain or pain when passing urine or change in how much urine is passed. Blood in the urine. What are some other side effects of this drug? All drugs may cause side effects. However, many people have no side effects or only have minor side effects. Call your doctor or get medical help if any of these side effects or any other side effects bother you or do not go away:    Upset stomach. These are not all of the side effects that may occur. If you have questions about side effects, call your doctor. Call your doctor for medical advice about side effects. You may report side effects to your national health agency. How is this drug best taken? Use this drug as ordered by your doctor. Read all information given to you. Follow all instructions closely. Take with or without food. Keep taking this drug as you have been told by your doctor or other health care provider, even if you feel well. What do I do if I miss a dose? Take a missed dose as soon as you think about it. If it is close to the time for your next dose, skip the missed dose and go back to your normal time. Do not take 2 doses at the same time or extra doses. How do I store and/or throw out this drug? Store at room temperature in a dry place. Do not store in a bathroom. Keep all drugs in a safe place. Keep all drugs out of the reach of children and pets. Throw away unused or  drugs. Do not flush down a toilet or pour down a drain unless you are told to do so. Check with your pharmacist if you have questions about the best way to throw out drugs. There may be drug take-back programs in your area. General drug facts    If your symptoms or health problems do not get better or if they become worse, call your doctor. Do not share your drugs with others and do not take anyone else's drugs. Some drugs may have another patient information leaflet. If you have any questions about this drug, please talk with your doctor, nurse, pharmacist, or other health care provider. If you think there has been an overdose, call your poison control center or get medical care right away. Be ready to tell or show what was taken, how much, and when it happened. Use of UpToDate is subject to the Subscription and License Agreement.   Topic 20023 Version 109.0

## 2021-02-23 ENCOUNTER — TELEPHONE (OUTPATIENT)
Dept: PRIMARY CARE CLINIC | Age: 86
End: 2021-02-23

## 2021-03-12 ENCOUNTER — TELEPHONE (OUTPATIENT)
Dept: INTERNAL MEDICINE CLINIC | Age: 86
End: 2021-03-12

## 2021-03-12 NOTE — TELEPHONE ENCOUNTER
Please Advise    Where would you like this Pt placed on your lunch break at for a in office visit she said you wanted to see her on her lunch break

## 2021-03-18 ENCOUNTER — OFFICE VISIT (OUTPATIENT)
Dept: INTERNAL MEDICINE CLINIC | Age: 86
End: 2021-03-18
Payer: MEDICARE

## 2021-03-18 VITALS
OXYGEN SATURATION: 98 % | DIASTOLIC BLOOD PRESSURE: 84 MMHG | SYSTOLIC BLOOD PRESSURE: 148 MMHG | HEART RATE: 69 BPM | TEMPERATURE: 97.7 F

## 2021-03-18 DIAGNOSIS — E87.1 HYPONATREMIA: ICD-10-CM

## 2021-03-18 DIAGNOSIS — E46 PROTEIN MALNUTRITION (HCC): ICD-10-CM

## 2021-03-18 DIAGNOSIS — Z79.4 TYPE 2 DIABETES MELLITUS WITH COMPLICATION, WITH LONG-TERM CURRENT USE OF INSULIN (HCC): ICD-10-CM

## 2021-03-18 DIAGNOSIS — E78.5 DYSLIPIDEMIA ASSOCIATED WITH TYPE 2 DIABETES MELLITUS (HCC): ICD-10-CM

## 2021-03-18 DIAGNOSIS — N81.10 FEMALE CYSTOCELE: ICD-10-CM

## 2021-03-18 DIAGNOSIS — I89.0 LYMPHEDEMA: ICD-10-CM

## 2021-03-18 DIAGNOSIS — M81.0 OSTEOPOROSIS, UNSPECIFIED OSTEOPOROSIS TYPE, UNSPECIFIED PATHOLOGICAL FRACTURE PRESENCE: ICD-10-CM

## 2021-03-18 DIAGNOSIS — B37.31 MONILIAL VAGINITIS: ICD-10-CM

## 2021-03-18 DIAGNOSIS — N39.0 RECURRENT UTI: ICD-10-CM

## 2021-03-18 DIAGNOSIS — N39.45 CONTINUOUS LEAKAGE OF URINE: ICD-10-CM

## 2021-03-18 DIAGNOSIS — E11.69 DYSLIPIDEMIA ASSOCIATED WITH TYPE 2 DIABETES MELLITUS (HCC): ICD-10-CM

## 2021-03-18 DIAGNOSIS — E11.21 DIABETIC NEPHROPATHY ASSOCIATED WITH TYPE 2 DIABETES MELLITUS (HCC): ICD-10-CM

## 2021-03-18 DIAGNOSIS — E55.9 VITAMIN D DEFICIENCY: ICD-10-CM

## 2021-03-18 DIAGNOSIS — R29.898 ANKLE WEAKNESS: ICD-10-CM

## 2021-03-18 DIAGNOSIS — R20.3 HYPERESTHESIA: ICD-10-CM

## 2021-03-18 DIAGNOSIS — R60.0 EDEMA, LOWER EXTREMITY: ICD-10-CM

## 2021-03-18 DIAGNOSIS — E11.8 TYPE 2 DIABETES MELLITUS WITH COMPLICATION, WITH LONG-TERM CURRENT USE OF INSULIN (HCC): ICD-10-CM

## 2021-03-18 DIAGNOSIS — E03.4 HYPOTHYROIDISM DUE TO ACQUIRED ATROPHY OF THYROID: ICD-10-CM

## 2021-03-18 DIAGNOSIS — L30.4 INTERTRIGO: ICD-10-CM

## 2021-03-18 DIAGNOSIS — N30.01 ACUTE CYSTITIS WITH HEMATURIA: ICD-10-CM

## 2021-03-18 DIAGNOSIS — N39.46 MIXED STRESS AND URGE URINARY INCONTINENCE: ICD-10-CM

## 2021-03-18 DIAGNOSIS — R80.0 ISOLATED PROTEINURIA WITHOUT SPECIFIC MORPHOLOGIC LESION: ICD-10-CM

## 2021-03-18 DIAGNOSIS — R79.9 ABNORMAL FINDING OF BLOOD CHEMISTRY, UNSPECIFIED: ICD-10-CM

## 2021-03-18 DIAGNOSIS — N30.00 ACUTE CYSTITIS WITHOUT HEMATURIA: Primary | ICD-10-CM

## 2021-03-18 DIAGNOSIS — L85.3 XEROSIS OF SKIN: ICD-10-CM

## 2021-03-18 DIAGNOSIS — Z91.81 AT HIGH RISK FOR FALLS: ICD-10-CM

## 2021-03-18 LAB
A/G RATIO: 1.2 (ref 1.1–2.2)
ALBUMIN SERPL-MCNC: 4 G/DL (ref 3.4–5)
ALP BLD-CCNC: 88 U/L (ref 40–129)
ALT SERPL-CCNC: 19 U/L (ref 10–40)
ANION GAP SERPL CALCULATED.3IONS-SCNC: 11 MMOL/L (ref 3–16)
AST SERPL-CCNC: 16 U/L (ref 15–37)
BACTERIA: ABNORMAL /HPF
BILIRUB SERPL-MCNC: 0.3 MG/DL (ref 0–1)
BILIRUBIN URINE: NEGATIVE
BILIRUBIN, POC: NORMAL
BLOOD URINE, POC: NORMAL
BLOOD, URINE: ABNORMAL
BUN BLDV-MCNC: 45 MG/DL (ref 7–20)
CALCIUM SERPL-MCNC: 11 MG/DL (ref 8.3–10.6)
CHLORIDE BLD-SCNC: 101 MMOL/L (ref 99–110)
CHOLESTEROL, TOTAL: 119 MG/DL (ref 0–199)
CLARITY, POC: NORMAL
CLARITY: ABNORMAL
CO2: 23 MMOL/L (ref 21–32)
COLOR, POC: YELLOW
COLOR: YELLOW
CREAT SERPL-MCNC: 1.1 MG/DL (ref 0.6–1.2)
EPITHELIAL CELLS, UA: 1 /HPF (ref 0–5)
FOLATE: >20 NG/ML (ref 4.78–24.2)
GFR AFRICAN AMERICAN: 57
GFR NON-AFRICAN AMERICAN: 47
GLOBULIN: 3.3 G/DL
GLUCOSE BLD-MCNC: 113 MG/DL (ref 70–99)
GLUCOSE URINE, POC: NORMAL
GLUCOSE URINE: NEGATIVE MG/DL
HDLC SERPL-MCNC: 53 MG/DL (ref 40–60)
HYALINE CASTS: 4 /LPF (ref 0–8)
KETONES, POC: NORMAL
KETONES, URINE: NEGATIVE MG/DL
LDL CHOLESTEROL CALCULATED: 53 MG/DL
LEUKOCYTE EST, POC: NORMAL
LEUKOCYTE ESTERASE, URINE: ABNORMAL
MAGNESIUM: 2.2 MG/DL (ref 1.8–2.4)
MICROSCOPIC EXAMINATION: YES
NITRITE, POC: NORMAL
NITRITE, URINE: NEGATIVE
PH UA: 7.5 (ref 5–8)
PH, POC: 7.5
PHOSPHORUS: 3.1 MG/DL (ref 2.5–4.9)
POTASSIUM SERPL-SCNC: 4.4 MMOL/L (ref 3.5–5.1)
PREALBUMIN: 29.9 MG/DL (ref 20–40)
PROTEIN UA: 100 MG/DL
PROTEIN, POC: NORMAL
RBC UA: 11 /HPF (ref 0–4)
SODIUM BLD-SCNC: 135 MMOL/L (ref 136–145)
SPECIFIC GRAVITY UA: 1.01 (ref 1–1.03)
SPECIFIC GRAVITY, POC: 1.02
TOTAL PROTEIN: 7.3 G/DL (ref 6.4–8.2)
TRIGL SERPL-MCNC: 65 MG/DL (ref 0–150)
TSH REFLEX FT4: 2.5 UIU/ML (ref 0.27–4.2)
URIC ACID, SERUM: 3.9 MG/DL (ref 2.6–6)
URINE REFLEX TO CULTURE: YES
URINE TYPE: ABNORMAL
UROBILINOGEN, POC: 0.2
UROBILINOGEN, URINE: 0.2 E.U./DL
VITAMIN B-12: >2000 PG/ML (ref 211–911)
VITAMIN D 25-HYDROXY: 48 NG/ML
VLDLC SERPL CALC-MCNC: 13 MG/DL
WBC UA: 514 /HPF (ref 0–5)

## 2021-03-18 PROCEDURE — 81003 URINALYSIS AUTO W/O SCOPE: CPT | Performed by: INTERNAL MEDICINE

## 2021-03-18 PROCEDURE — 99215 OFFICE O/P EST HI 40 MIN: CPT | Performed by: INTERNAL MEDICINE

## 2021-03-18 RX ORDER — NITROFURANTOIN 25; 75 MG/1; MG/1
100 CAPSULE ORAL 2 TIMES DAILY
Qty: 20 CAPSULE | Refills: 2 | Status: SHIPPED | OUTPATIENT
Start: 2021-03-18 | End: 2021-03-28

## 2021-03-18 RX ORDER — KETOCONAZOLE 20 MG/G
CREAM TOPICAL
Qty: 180 G | Refills: 3 | Status: SHIPPED | OUTPATIENT
Start: 2021-03-18 | End: 2021-07-02

## 2021-03-18 RX ORDER — PYRIDOXINE HCL (VITAMIN B6) 100 MG
500 TABLET ORAL 2 TIMES DAILY
Qty: 60 CAPSULE | Refills: 3 | Status: SHIPPED | OUTPATIENT
Start: 2021-03-18 | End: 2021-07-02

## 2021-03-18 RX ORDER — NITROFURANTOIN 25; 75 MG/1; MG/1
100 CAPSULE ORAL 2 TIMES DAILY
Qty: 20 CAPSULE | Refills: 0 | Status: SHIPPED | OUTPATIENT
Start: 2021-03-18 | End: 2021-03-28

## 2021-03-18 RX ORDER — NYSTATIN 100000 U/G
CREAM TOPICAL
Qty: 90 G | Refills: 3 | Status: SHIPPED | OUTPATIENT
Start: 2021-03-18 | End: 2022-04-07

## 2021-03-18 RX ORDER — AMMONIUM LACTATE 12 G/100G
LOTION TOPICAL
Qty: 567 G | Refills: 5 | Status: SHIPPED | OUTPATIENT
Start: 2021-03-18 | End: 2021-10-06

## 2021-03-18 RX ORDER — CLOTRIMAZOLE 1 %
CREAM WITH APPLICATOR VAGINAL
Qty: 90 G | Refills: 3 | Status: SHIPPED | OUTPATIENT
Start: 2021-03-18 | End: 2021-03-25

## 2021-03-18 ASSESSMENT — ENCOUNTER SYMPTOMS
BLURRED VISION: 0
EYES NEGATIVE: 1
RESPIRATORY NEGATIVE: 1
ORTHOPNEA: 0
NAUSEA: 1
SHORTNESS OF BREATH: 0
ALLERGIC/IMMUNOLOGIC NEGATIVE: 1

## 2021-03-18 ASSESSMENT — PATIENT HEALTH QUESTIONNAIRE - PHQ9
SUM OF ALL RESPONSES TO PHQ9 QUESTIONS 1 & 2: 0
1. LITTLE INTEREST OR PLEASURE IN DOING THINGS: 0

## 2021-03-18 NOTE — PROGRESS NOTES
Subjective:      Patient ID: Parag Barreto is a 82 y.o. female. Urinary Tract Infection   This is a recurrent problem. The current episode started more than 1 year ago. The problem has been waxing and waning. The quality of the pain is described as burning. The pain is at a severity of 3/10. The pain is moderate. There has been no fever. She is not sexually active. There is no history of pyelonephritis. Associated symptoms include frequency, nausea and urgency. Pertinent negatives include no chills, discharge, flank pain, hematuria, hesitancy, possible pregnancy or sweats. She has tried nothing for the symptoms. The treatment provided no relief. Her past medical history is significant for recurrent UTIs and urinary stasis. There is no history of catheterization, kidney stones, a single kidney or a urological procedure. Hypertension  This is a chronic problem. The current episode started more than 1 year ago. The problem is uncontrolled. Associated symptoms include peripheral edema. Pertinent negatives include no anxiety, blurred vision, chest pain, headaches, malaise/fatigue, neck pain, orthopnea, palpitations, PND, shortness of breath or sweats. There are no associated agents to hypertension. Risk factors for coronary artery disease include obesity, sedentary lifestyle, post-menopausal state and diabetes mellitus. Past treatments include central alpha agonists, calcium channel blockers and angiotensin blockers. There are no compliance problems. Hypertensive end-organ damage includes CVA. There is no history of angina, kidney disease, CAD/MI, left ventricular hypertrophy, PVD or retinopathy. There is no history of chronic renal disease, coarctation of the aorta, hyperaldosteronism, hypercortisolism, hyperparathyroidism, pheochromocytoma or sleep apnea. Incontinence:   Patient with incontinence and bladder spasms. She has been seen by urology several times. They recommended  Pessary . Patient complains of urinary incontinence. This has been present for several years. She leaks urine with standing, walking, laughing, with urge, with a full bladder, during the night. Patient describes the symptoms as frequent urination > 10x per day(~ 1 time every 45 minutes), sensation of incomplete emptying of bladder, the urge to urinate recurs again shortly following micturition, urge to urinate with little or no warning, urine leakage with coughing/heavy physical activity and urine leaking unpredictably. Factors associated with symptoms include associated with menopause, history of ANABELLA and BSO, hx of bowel obstruction as well as Multiple Sclerosis. Evaluation to date includes has had urodynamics > 5 years ago. Treatment to date includes oxybutinin, which was not effective, alpha blocker, which was not effective and hormone replacement therapy, which was not effective. She has topical estrogen which is currently not being used. Sugars 90's -120-'s with current UTI. Treatment Adherence:   Medication compliance:  compliant most of the time  Diet compliance:  compliant most of the time  Weight trend: stable  Current exercise: no regular exercise  Barriers: none    Diabetes Mellitus Type 2: Current symptoms/problems include none, nausea and diarrhea. Sugars 90's -120-'s with current UTI. Home blood sugar records: trend: flucuating a little bit. Any episodes of hypoglycemia? no  Eye exam current (within one year): no  Tobacco history: She  reports that she has never smoked. She has never used smokeless tobacco.   Daily Aspirin? No: on Eliquis    Hypertension:  Home blood pressure monitoring: Yes - 138/80. She is adherent to a low sodium diet. Patient denies headache, lightheadedness, dry cough and fatigue. Antihypertensive medication side effects: no medication side effects noted. Use of agents associated with hypertension: none. Hyperlipidemia:  No new myalgias or GI upset on atorvastatin (Lipitor).        Lab Results   Component Value Date    LABA1C 5.3 09/28/2020    LABA1C 4.9 07/24/2020    LABA1C 5.4 01/15/2020     Lab Results   Component Value Date    LABMICR 47.20 (H) 02/10/2021    CREATININE 0.63 10/19/2020     Lab Results   Component Value Date    ALT 12 02/26/2020    AST 16 02/26/2020     Lab Results   Component Value Date    CHOL 130 01/15/2020    TRIG 94 01/15/2020    HDL 51 01/15/2020    LDLCALC 60 01/15/2020          Ankle weakness due to fall. PAtient has been wearing boot for months due to instability. She stopped PT due to COVI. History of Falls: Has a 2 stair lifts at home.   Past Medical History:   Diagnosis Date    Anemia, unspecified     Asthma     Cholelithiasis     Chronic kidney disease (CKD), stage III (moderate)     Diabetes     Gout     Hyperlipidemia     Hypertension     Hypothyroidism 8/9/2015    Irritable bowel syndrome with constipation 3/5/2020    Kidney disease     Monoclonal paraproteinemia     Osteoporosis     Sclerosis of the skin     [facility] 40/42/2173     Past Surgical History:   Procedure Laterality Date    ABDOMINAL EXPLORATION SURGERY  01/04/13    **see [facility] scanned report (ER)    CHOLECYSTECTOMY       Family History   Problem Relation Age of Onset    Cancer Father     High Blood Pressure Mother     High Cholesterol Mother     Obesity Mother     Cancer Maternal Aunt         skin     Social History     Socioeconomic History    Marital status:      Spouse name: Not on file    Number of children: Not on file    Years of education: Not on file    Highest education level: Not on file   Occupational History    Not on file   Social Needs    Financial resource strain: Not on file    Food insecurity     Worry: Not on file     Inability: Not on file    Transportation needs     Medical: Not on file     Non-medical: Not on file   Tobacco Use    Smoking status: Never Smoker    Smokeless tobacco: Never Used Substance and Sexual Activity    Alcohol use: No    Drug use: No    Sexual activity: Not on file   Lifestyle    Physical activity     Days per week: Not on file     Minutes per session: Not on file    Stress: Not on file   Relationships    Social connections     Talks on phone: Not on file     Gets together: Not on file     Attends [employer] service: Not on file     Active member of club or organization: Not on file     Attends meetings of clubs or organizations: Not on file     Relationship status: Not on file    Intimate partner violence     Fear of current or ex partner: Not on file     Emotionally abused: Not on file     Physically abused: Not on file     Forced sexual activity: Not on file   Other Topics Concern    Not on file   Social History Narrative    Not on file       Review of Systems   Constitutional: Positive for fatigue. Negative for chills and malaise/fatigue. HENT: Negative. Eyes: Negative. Negative for blurred vision. Respiratory: Negative. Negative for shortness of breath. Cardiovascular: Positive for leg swelling. Negative for chest pain, palpitations, orthopnea and PND. Gastrointestinal: Positive for nausea. Endocrine: Negative. Genitourinary: Positive for difficulty urinating, dysuria, enuresis, frequency and urgency. Negative for decreased urine volume, flank pain, hematuria, hesitancy, menstrual problem and pelvic pain. Musculoskeletal: Positive for gait problem and myalgias. Negative for neck pain. Pain in ankle and left side, significant hypersthesia. Allergic/Immunologic: Negative. Neurological: Negative for headaches. Hematological: Negative. Psychiatric/Behavioral: Negative. All other systems reviewed and are negative. Allergies   Allergen Reactions    [place] Meal      Nausea, vomiting and diarrhea with almond milk.     Celexa [Citalopram Hydrobromide]     Famciclovir     Lactose     Metronidazole     Peanut-Containing Drug Products     [place] Oil [Nutritional Supplements] Diarrhea    Zofran     Bactrim [Sulfamethoxazole-Trimethoprim]      Causes hyonatremia to 120'a       Current Outpatient Medications   Medication Sig Dispense Refill    albuterol sulfate HFA (VENTOLIN HFA) 108 (90 Base) MCG/ACT inhaler Inhale 2 puffs into the lungs 4 times daily as needed for Wheezing 1 Inhaler 5    Continuous Blood Gluc Sensor (FREESTYLE SHIRLEY 2 SENSOR SYSTM) MISC 1 Device by Does not apply route every 14 days 2 each 5    sodium bicarbonate 650 MG tablet TAKE 1 TABLET BY MOUTH EVERY DAY 90 tablet 5    losartan (COZAAR) 25 MG tablet Take 1 tablet by mouth daily 90 tablet 3    hydrALAZINE (APRESOLINE) 50 MG tablet Take 1.5 tablets by mouth 3 times daily 405 tablet 3    famotidine (PEPCID) 20 MG tablet TAKE 1 TABLET BY MOUTH TWICE A  tablet 1    verapamil (CALAN SR) 240 MG extended release tablet TAKE 1 TABLET BY MOUTH EVERY DAY IN THE EVENING 90 tablet 1    divalproex (DEPAKOTE) 125 MG DR tablet Take one tablet in the am and two tablets in the pm. 270 tablet 1    levothyroxine (SYNTHROID) 25 MCG tablet TAKE 1 TABLET BY MOUTH EVERY DAY 90 tablet 3    atenolol (TENORMIN) 50 MG tablet TAKE 1 TABLET BY MOUTH EVERY DAY 90 tablet 3    ketoconazole (NIZORAL) 2 % cream APPLY TO AFFECTED AREA EVERY DAY 60 g 11    allopurinol (ZYLOPRIM) 300 MG tablet TAKE 1 TABLET BY MOUTH EVERY DAY 90 tablet 3    atorvastatin (LIPITOR) 40 MG tablet TAKE 1 TABLET BY MOUTH EVERY DAY 90 tablet 3    apixaban (ELIQUIS) 5 MG TABS tablet TAKE 1 TABLET BY MOUTH 2 TIMES DAILY STOP COUMADIN 180 tablet 3    montelukast (SINGULAIR) 10 MG tablet TAKE 1 TABLET BY MOUTH EVERYDAY AT BEDTIME 90 tablet 3    polyethylene glycol (MIRALAX) 17 g packet Take 17 g by mouth 2 times daily 527 g 5    SITagliptin (JANUVIA) 100 MG tablet TAKE 1 TABLET BY MOUTH EVERY DAY 90 tablet 3    Continuous Blood Gluc Sensor (FREESTYLE SHIRLEY 14 DAY SENSOR) MISC 1 each by Does not apply route every 14 days DX: E11.8 diabetes 2 each 11    famotidine (PEPCID) 20 MG tablet Take 1 tablet by mouth 2 times daily 180 tablet 3    lidocaine (XYLOCAINE) 5 % ointment APPLY TO AFFECTED AREA EVERY DAY AS NEEDED 35.44 g 5    Continuous Blood Gluc Sensor (FREESTYLE SHIRLEY SENSOR SYSTEM) MISC 1 Device by Does not apply route every 14 days 2 each 11    blood glucose test strips (ONETOUCH VERIO) strip USE TO TEST 4 TIMES A DAY BEFORE MEALS AND AT BEDTIME 300 strip 11    Blood Glucose Monitoring Suppl (ONETOUCH VERIO IQ SYSTEM) w/Device KIT 1 kit by Does not apply route 4 times daily (before meals and nightly) 1 kit 11    Compression Stockings MISC by Does not apply route Knee high compression and thigh high stockings 30 mm Hg. Dispense two pairs with 1 year refill. 1 each 5    budesonide-formoterol (SYMBICORT) 80-4.5 MCG/ACT AERO Inhale 2 puffs into the lungs 2 times daily TAKE 2 PUFFS BY MOUTH TWICE A DAY 1 Inhaler 5    azelastine (ASTELIN) 0.1 % nasal spray 1 SPRAY BY NASAL ROUTE 2 TIMES DAILY USE IN EACH NOSTRIL AS DIRECTED 1 Bottle 5    insulin glargine (LANTUS SOLOSTAR) 100 UNIT/ML injection pen Inject 5 Units into the skin nightly If fasting glucose is above 160 5 pen 2    Compression Stockings MISC by Does not apply route Bilateral knee high  20-30 mm  Hg    Mail to patient.  2 each 5    Elastic Bandages & Supports (MEDICAL COMPRESSION STOCKINGS) MISC 2 each by Does not apply route daily 40 mm hg thigh high 2 each 5    Spacer/Aero-Holding Chambers (AEROCHAMBER MV) MISC 1 each by Does not apply route 2 times daily 1 each 3    Insulin Pen Needle (B-D UF III MINI PEN NEEDLES) 31G X 5 MM MISC USE AS DIRECTED NIGHTLY WITH LANTUS PEN 50 each 5    albuterol sulfate  (90 Base) MCG/ACT inhaler Inhale 2 puffs into the lungs every 6 hours as needed for Shortness of Breath      aspirin 81 MG tablet Take 81 mg by mouth daily      Lactobacillus (PROBIOTIC ACIDOPHILUS PO) Take 1 capsule by mouth daily      Lancets MISC 1 each by Does not apply route 4 times daily (before meals and nightly) 200 each 11    ONETOUCH DELICA LANCETS 76J MISC USE AS DIRECTED FOUR TIMES A  each 11    Continuous Blood Gluc  (FREESTYLE SHIRLEY 14 DAY READER) JOCY 1 each by Does not apply route every 14 days DX: E11.8 diabetes 2 Device 5    Continuous Blood Gluc  (FREESTYLE SHIRLEY READER) JOCY 1 each by Does not apply route 4 times daily (before meals and nightly) DX: E11.8 diabetes 1 Device 5    Continuous Blood Gluc Sensor ([address]) MISC 1 each by Does not apply route 4 times daily (before meals and nightly) DX: E11.8 diabetes 1 each 5    Blood Glucose Monitoring Suppl (ONE TOUCH ULTRA 2) w/Device KIT 1 kit by Does not apply route 4 times daily (with meals and nightly) May substitute with covered brand. Ell.8 is ICD 10 code 1 kit 1    Blood Glucose Monitoring Suppl ([address]) w/Device KIT 1 kit by Does not apply route daily 1 kit 2    Lift Chair MISC by Does not apply route Needs a lift chair due to mutiple sclerosis. Needs assistance getting out of chair, but once out of chair can ambulate independently with walker or cane. 1 each 0    Incontinence Supply Disposable (PALLAVI SERENITY BRIEFS LARGE) MISC 1 each by Does not apply route three times daily Large to extra large. Give Pallavi underwear protections. Daughter will select the brand. 90 each 5    Incontinence Supplies (BEDPAN) MISC Sorry, but \"1 bottle\" is the only way it will allow us to order this bedpan. 1 Bottle 0    vitamin D (CHOLECALCIFEROL) 1000 UNIT TABS tablet Take 3 tablets by mouth daily. 90 tablet 5    loratadine (CLARITIN) 10 MG tablet Take 1 tablet by mouth daily. 30 tablet 5    Multiple Vitamins-Minerals (CENTRUM SILVER PO) Take 1 tablet by mouth daily       Lancet Devices (ACCU-CHEK SOFTCLIX LANCET DEV) by Does not apply route 2 times daily as needed.       methenamine (HIPREX) 1 g tablet Take 1 tablet by mouth 2 times daily (with meals) (Patient not taking: Reported on 3/18/2021) 180 tablet 1    Lancets MISC 1 each by Does not apply route 4 times daily (after meals and at bedtime) 100 each 5    estradiol (ESTRACE) 0.1 MG/GM vaginal cream Twice a week 1 Tube 3    hydrocortisone 1 % ointment Apply topically 2 times daily as needed (dry skin) Apply topically 2 times daily.  lipase-protease-amylase (CREON) 02892-99096 units delayed release capsule Take 3 capsules by mouth 3 times daily as needed (diarrhea)      ammonium lactate (LAC-HYDRIN) 12 % lotion Apply topically daily. (Patient not taking: Reported on 3/18/2021) 567 g 5    cholestyramine (QUESTRAN) 4 G packet Take 2 packets by mouth 2 times daily 180 packet 11    fluocinonide (LIDEX) 0.05 % cream Apply topically 2 times daily. 60 g 11     No current facility-administered medications for this visit. Vitals:    03/18/21 0949   BP: (!) 148/84   Pulse: 69   Temp: 97.7 °F (36.5 °C)   TempSrc: Temporal   SpO2: 98%     There is no height or weight on file to calculate BMI. Wt Readings from Last 3 Encounters:   08/07/19 181 lb (82.1 kg)   05/08/19 176 lb 6.4 oz (80 kg)   02/06/19 181 lb (82.1 kg)     BP Readings from Last 3 Encounters:   03/18/21 (!) 148/84   02/10/21 (!) 156/89   12/22/20 (!) 149/76       Objective:   Physical Exam  Vitals signs and nursing note reviewed. Constitutional:       General: She is not in acute distress. Appearance: She is well-developed. HENT:      Head: Normocephalic and atraumatic. Eyes:      Conjunctiva/sclera: Conjunctivae normal.      Pupils: Pupils are equal, round, and reactive to light. Neck:      Musculoskeletal: Normal range of motion. Cardiovascular:      Rate and Rhythm: Normal rate and regular rhythm. Heart sounds: Normal heart sounds. No murmur. Pulmonary:      Effort: Pulmonary effort is normal. No respiratory distress. Breath sounds: Normal breath sounds. Musculoskeletal: Normal range of motion. Skin:     General: Skin is warm. Neurological:      Mental Status: She is alert and oriented to person, place, and time. Psychiatric:         Behavior: Behavior normal.         Thought Content: Thought content normal.         Judgment: Judgment normal.         Assessment/Plan:  Phylicia Baker was seen today for urinary tract infection, diabetes, blood work, asthma, shortness of breath, tremors and other. Diagnoses and all orders for this visit:    Acute cystitis without hematuria  -     POCT Urinalysis no Micro  -     URINE RT REFLEX TO CULTURE  -     Cranberry 500 MG CAPS; Take 1 capsule by mouth 2 times daily    Intertrigo  -     ketoconazole (NIZORAL) 2 % cream; Apply topically qid to affected area as discussed. Monilial vaginitis  -     nystatin (MYCOSTATIN) 434229 UNIT/GM cream; Apply topically 2 times daily. -     clotrimazole (GYNE-LOTRIMIN) 1 % vaginal cream; Place vaginally 2 times daily as needed    Hyponatremia  -     COMPREHENSIVE METABOLIC PANEL; Future    Continuous leakage of urine    Recurrent UTI  -     nitrofurantoin, macrocrystal-monohydrate, (MACROBID) 100 MG capsule; Take 1 capsule by mouth 2 times daily for 10 days    Mixed stress and urge urinary incontinence  -     Ambulatory referral to Urogynecology  -     External Referral To Urology    Diabetic nephropathy associated with type 2 diabetes mellitus ([facility] [address]  -     Lipid Panel; Future  -     Hemoglobin A1C; Future  -     VITAMIN B12 & FOLATE; Future    Type 2 diabetes mellitus with complication, with long-term current use of insulin ([facility])  -     Lipid Panel; Future  -     Hemoglobin A1C; Future  -     VITAMIN B12 & FOLATE; Future    Osteoporosis, unspecified osteoporosis type, unspecified pathological fracture presence  -     Vitamin D 25 Hydroxy; Future    Xerosis of skin  -     ammonium lactate (LAC-HYDRIN) 12 % lotion; Apply topically daily.     Ankle weakness  - discussed importance of building strength  - advised wear boot as little as possible  - instructions given for ankle strengthening  - Family will resume home PT program    Hyperesthesia  -Patient with significant hyperesthesia            Other orders  -     nitrofurantoin, macrocrystal-monohydrate, (MACROBID) 100 MG capsule; Take 1 capsule by mouth 2 times daily for 10 days      No follow-ups on file. Tristen Silverio MD '  At total of 90 minutes was spent on date of services performing the following:  Preparing to see the patient (reviewing labs/imaging/previous records, etc.)- yes,  Obtaining and reviewing separately obtained history- yes, urology notes  Performing a medically appropriate examination and/or evaluation- yes  Counseling and educating the patient an or caregiver- yes,- discussing with daughter  Ordering medications, tests, and procedures- yes      Note:  [id number]- 40-54 minutes           On the basis of positive falls risk screening, assessment and plan is as follows: home safety tips provided, ADVISED IMPORTANCE OF EXERCISE.

## 2021-03-19 LAB
ESTIMATED AVERAGE GLUCOSE: 134.1 MG/DL
HBA1C MFR BLD: 6.3 %

## 2021-03-20 LAB
ORGANISM: ABNORMAL
URINE CULTURE, ROUTINE: ABNORMAL
URINE CULTURE, ROUTINE: ABNORMAL

## 2021-04-21 ENCOUNTER — PATIENT MESSAGE (OUTPATIENT)
Dept: INTERNAL MEDICINE CLINIC | Age: 86
End: 2021-04-21

## 2021-04-21 DIAGNOSIS — N39.0 RECURRENT UTI: Primary | ICD-10-CM

## 2021-04-21 NOTE — TELEPHONE ENCOUNTER
From: Joetta Sandhoff  To: Reagan Mar MD  Sent: 4/21/2021 1:22 PM EDT  Subject: Non-Urgent Medical Question    My mom is having urgency, frequent urination and burning with urination. Would like to have a urine culture sent to [facility]. We can drop off the specimen to the [facility] lab. She has a urology appointment on Monday. I am off on this Thursday for a PTO day if you need to see her. Otherwise I work everyday.   Thank You   Charis Aldana

## 2021-04-22 ENCOUNTER — PATIENT MESSAGE (OUTPATIENT)
Dept: INTERNAL MEDICINE CLINIC | Age: 86
End: 2021-04-22

## 2021-04-22 DIAGNOSIS — N30.01 ACUTE CYSTITIS WITH HEMATURIA: Primary | ICD-10-CM

## 2021-04-22 LAB
BACTERIA: ABNORMAL /HPF
BILIRUBIN URINE: NEGATIVE
BLOOD, URINE: ABNORMAL
CLARITY: ABNORMAL
COLOR: YELLOW
COMMENT UA: ABNORMAL
CRYSTALS, UA: ABNORMAL /HPF
EPITHELIAL CELLS, UA: 3 /HPF (ref 0–5)
GLUCOSE URINE: NEGATIVE MG/DL
HYALINE CASTS: 13 /LPF (ref 0–8)
KETONES, URINE: NEGATIVE MG/DL
LEUKOCYTE ESTERASE, URINE: ABNORMAL
MICROSCOPIC EXAMINATION: YES
NITRITE, URINE: NEGATIVE
PH UA: 8 (ref 5–8)
PROTEIN UA: 100 MG/DL
RBC UA: 3 /HPF (ref 0–4)
SPECIFIC GRAVITY UA: 1.01 (ref 1–1.03)
URINE REFLEX TO CULTURE: YES
URINE TYPE: ABNORMAL
UROBILINOGEN, URINE: 1 E.U./DL
WBC UA: >900 /HPF (ref 0–5)

## 2021-04-22 RX ORDER — CIPROFLOXACIN 250 MG/1
250 TABLET, FILM COATED ORAL 2 TIMES DAILY
Qty: 14 TABLET | Refills: 0 | Status: SHIPPED | OUTPATIENT
Start: 2021-04-22 | End: 2021-04-29

## 2021-04-22 RX ORDER — LIDOCAINE 50 MG/G
OINTMENT TOPICAL
Qty: 35.44 G | Refills: 5 | Status: SHIPPED | OUTPATIENT
Start: 2021-04-22 | End: 2021-10-06

## 2021-04-23 ENCOUNTER — TELEPHONE (OUTPATIENT)
Dept: INTERNAL MEDICINE CLINIC | Age: 86
End: 2021-04-23

## 2021-04-25 RX ORDER — LIDOCAINE 50 MG/G
OINTMENT TOPICAL
Qty: 35.44 G | Refills: 5 | Status: SHIPPED | OUTPATIENT
Start: 2021-04-25 | End: 2021-07-02

## 2021-05-04 ENCOUNTER — TELEPHONE (OUTPATIENT)
Dept: UROGYNECOLOGY | Age: 86
End: 2021-05-04

## 2021-05-04 NOTE — TELEPHONE ENCOUNTER
Received referral per Dr. Shavon Amin    Dx: Mixed stress and urge urinary incontinence    attempted to contact patient via telephone numbers on file. patient was unavailable to be reached.

## 2021-05-18 DIAGNOSIS — Z79.4 TYPE 2 DIABETES MELLITUS WITH COMPLICATION, WITH LONG-TERM CURRENT USE OF INSULIN (HCC): ICD-10-CM

## 2021-05-18 DIAGNOSIS — E11.8 TYPE 2 DIABETES MELLITUS WITH COMPLICATION, WITH LONG-TERM CURRENT USE OF INSULIN (HCC): ICD-10-CM

## 2021-05-19 RX ORDER — INSULIN GLARGINE 100 [IU]/ML
5 INJECTION, SOLUTION SUBCUTANEOUS NIGHTLY
Qty: 5 PEN | Refills: 2 | Status: SHIPPED | OUTPATIENT
Start: 2021-05-19 | End: 2021-10-06

## 2021-05-19 RX ORDER — PEN NEEDLE, DIABETIC 32GX 5/32"
NEEDLE, DISPOSABLE MISCELLANEOUS
Qty: 100 EACH | Refills: 3 | Status: SHIPPED | OUTPATIENT
Start: 2021-05-19

## 2021-05-21 ENCOUNTER — PATIENT MESSAGE (OUTPATIENT)
Dept: INTERNAL MEDICINE CLINIC | Age: 86
End: 2021-05-21

## 2021-05-21 DIAGNOSIS — R39.9 UTI SYMPTOMS: Primary | ICD-10-CM

## 2021-05-24 RX ORDER — NITROFURANTOIN 25; 75 MG/1; MG/1
100 CAPSULE ORAL 2 TIMES DAILY
Qty: 20 CAPSULE | Refills: 1 | Status: SHIPPED | OUTPATIENT
Start: 2021-05-24 | End: 2021-06-26 | Stop reason: SDUPTHER

## 2021-05-24 NOTE — TELEPHONE ENCOUNTER
From: Sj Mcfarland  To: Kat Rao MD  Sent: 5/21/2021 7:16 PM EDT  Subject: Non-Urgent Medical Question    Had trouble getting into MyChart this past week. Not sure why. My mom is having burning with urination, odor to her urine again and the urine is looking cloudy. Can you send a urine culture?   Will have a flexible cystoscopy on July 6, 2021

## 2021-05-25 LAB
AMORPHOUS: ABNORMAL /HPF
BACTERIA: ABNORMAL /HPF
BILIRUBIN URINE: NEGATIVE
BLOOD, URINE: NEGATIVE
CLARITY: ABNORMAL
COLOR: YELLOW
COMMENT UA: ABNORMAL
EPITHELIAL CELLS, UA: 2 /HPF (ref 0–5)
GLUCOSE URINE: NEGATIVE MG/DL
HYALINE CASTS: 3 /LPF (ref 0–8)
KETONES, URINE: NEGATIVE MG/DL
LEUKOCYTE ESTERASE, URINE: ABNORMAL
MICROSCOPIC EXAMINATION: YES
NITRITE, URINE: NEGATIVE
PH UA: 7 (ref 5–8)
PROTEIN UA: 100 MG/DL
RBC UA: 8 /HPF (ref 0–4)
SPECIFIC GRAVITY UA: 1.01 (ref 1–1.03)
URINE TYPE: ABNORMAL
UROBILINOGEN, URINE: 0.2 E.U./DL
WBC UA: 183 /HPF (ref 0–5)

## 2021-05-26 LAB
ORGANISM: ABNORMAL
URINE CULTURE, ROUTINE: ABNORMAL

## 2021-06-24 ENCOUNTER — PATIENT MESSAGE (OUTPATIENT)
Dept: INTERNAL MEDICINE CLINIC | Age: 86
End: 2021-06-24

## 2021-06-24 DIAGNOSIS — R39.9 UTI SYMPTOMS: Primary | ICD-10-CM

## 2021-06-25 NOTE — TELEPHONE ENCOUNTER
From: Luciano Gutierrez  To: Bill Zhou MD  Sent: 6/24/2021 6:47 PM EDT  Subject: Non-Urgent Medical Question    My mom is having burning with urinating and odor to the urine again. Can you send an order for a urine culture. The abdominal distention has resolved. She is continuing her full liquid diet and crackers with tofu cheese. She gets nausea after her meds and snacks on Goldfish crackers.

## 2021-06-26 DIAGNOSIS — R39.9 UTI SYMPTOMS: ICD-10-CM

## 2021-06-26 LAB
AMORPHOUS: ABNORMAL /HPF
BACTERIA: ABNORMAL /HPF
BILIRUBIN URINE: NEGATIVE
BLOOD, URINE: ABNORMAL
CELLULAR CASTS: ABNORMAL /LPF
CLARITY: ABNORMAL
COLOR: YELLOW
COMMENT UA: ABNORMAL
EPITHELIAL CELLS, UA: 2 /HPF (ref 0–5)
GLUCOSE URINE: NEGATIVE MG/DL
HYALINE CASTS: 8 /LPF (ref 0–8)
KETONES, URINE: NEGATIVE MG/DL
LEUKOCYTE ESTERASE, URINE: ABNORMAL
MICROSCOPIC EXAMINATION: YES
NITRITE, URINE: POSITIVE
PH UA: 7.5 (ref 5–8)
PROTEIN UA: 100 MG/DL
RBC UA: 18 /HPF (ref 0–4)
SPECIFIC GRAVITY UA: 1.01 (ref 1–1.03)
URINE REFLEX TO CULTURE: YES
URINE TYPE: ABNORMAL
UROBILINOGEN, URINE: 0.2 E.U./DL
WBC UA: >900 /HPF (ref 0–5)

## 2021-06-26 RX ORDER — NITROFURANTOIN 25; 75 MG/1; MG/1
100 CAPSULE ORAL 2 TIMES DAILY
Qty: 20 CAPSULE | Refills: 1 | Status: SHIPPED | OUTPATIENT
Start: 2021-06-26 | End: 2021-07-06

## 2021-06-28 LAB
ORGANISM: ABNORMAL
URINE CULTURE, ROUTINE: ABNORMAL

## 2021-07-02 ENCOUNTER — VIRTUAL VISIT (OUTPATIENT)
Dept: INTERNAL MEDICINE CLINIC | Age: 86
End: 2021-07-02
Payer: MEDICARE

## 2021-07-02 DIAGNOSIS — N81.10 FEMALE CYSTOCELE: ICD-10-CM

## 2021-07-02 DIAGNOSIS — R60.0 EDEMA, LOWER EXTREMITY: ICD-10-CM

## 2021-07-02 DIAGNOSIS — E03.4 HYPOTHYROIDISM DUE TO ACQUIRED ATROPHY OF THYROID: ICD-10-CM

## 2021-07-02 DIAGNOSIS — E79.0 HYPERURICEMIA: ICD-10-CM

## 2021-07-02 DIAGNOSIS — E11.21 DIABETIC NEPHROPATHY ASSOCIATED WITH TYPE 2 DIABETES MELLITUS (HCC): ICD-10-CM

## 2021-07-02 DIAGNOSIS — E11.8 TYPE 2 DIABETES MELLITUS WITH COMPLICATION, WITH LONG-TERM CURRENT USE OF INSULIN (HCC): ICD-10-CM

## 2021-07-02 DIAGNOSIS — R25.1 TREMORS OF NERVOUS SYSTEM: ICD-10-CM

## 2021-07-02 DIAGNOSIS — I10 ESSENTIAL HYPERTENSION: ICD-10-CM

## 2021-07-02 DIAGNOSIS — E87.1 HYPONATREMIA: ICD-10-CM

## 2021-07-02 DIAGNOSIS — Z86.711 HISTORY OF PULMONARY EMBOLISM: ICD-10-CM

## 2021-07-02 DIAGNOSIS — N39.0 RECURRENT UTI (URINARY TRACT INFECTION): ICD-10-CM

## 2021-07-02 DIAGNOSIS — N39.45 CONTINUOUS LEAKAGE OF URINE: Primary | ICD-10-CM

## 2021-07-02 DIAGNOSIS — Z79.4 TYPE 2 DIABETES MELLITUS WITH COMPLICATION, WITH LONG-TERM CURRENT USE OF INSULIN (HCC): ICD-10-CM

## 2021-07-02 DIAGNOSIS — G35 MULTIPLE SCLEROSIS (HCC): ICD-10-CM

## 2021-07-02 PROCEDURE — 99214 OFFICE O/P EST MOD 30 MIN: CPT | Performed by: INTERNAL MEDICINE

## 2021-07-02 RX ORDER — MONTELUKAST SODIUM 10 MG/1
TABLET ORAL
Qty: 90 TABLET | Refills: 3 | Status: SHIPPED | OUTPATIENT
Start: 2021-07-02 | End: 2021-10-06

## 2021-07-02 RX ORDER — ATORVASTATIN CALCIUM 40 MG/1
TABLET, FILM COATED ORAL
Qty: 90 TABLET | Refills: 3 | Status: SHIPPED | OUTPATIENT
Start: 2021-07-02 | End: 2021-10-06

## 2021-07-02 RX ORDER — BLOOD SUGAR DIAGNOSTIC
STRIP MISCELLANEOUS
Qty: 300 STRIP | Refills: 11 | Status: SHIPPED | OUTPATIENT
Start: 2021-07-02 | End: 2021-10-06

## 2021-07-02 RX ORDER — VERAPAMIL HYDROCHLORIDE 240 MG/1
TABLET, FILM COATED, EXTENDED RELEASE ORAL
Qty: 90 TABLET | Refills: 1 | Status: SHIPPED | OUTPATIENT
Start: 2021-07-02 | End: 2021-10-06

## 2021-07-02 RX ORDER — DIVALPROEX SODIUM 125 MG/1
TABLET, DELAYED RELEASE ORAL
Qty: 270 TABLET | Refills: 1 | Status: SHIPPED | OUTPATIENT
Start: 2021-07-02 | End: 2021-10-06

## 2021-07-02 RX ORDER — ATENOLOL 50 MG/1
TABLET ORAL
Qty: 90 TABLET | Refills: 3 | Status: SHIPPED | OUTPATIENT
Start: 2021-07-02 | End: 2021-10-06

## 2021-07-02 RX ORDER — PEN NEEDLE, DIABETIC 31 GX5/16"
NEEDLE, DISPOSABLE MISCELLANEOUS
Qty: 50 EACH | Refills: 5 | Status: SHIPPED | OUTPATIENT
Start: 2021-07-02 | End: 2021-10-06

## 2021-07-02 RX ORDER — LOSARTAN POTASSIUM 25 MG/1
25 TABLET ORAL DAILY
Qty: 90 TABLET | Refills: 3 | Status: SHIPPED | OUTPATIENT
Start: 2021-07-02 | End: 2021-10-06

## 2021-07-02 RX ORDER — HYDRALAZINE HYDROCHLORIDE 50 MG/1
75 TABLET, FILM COATED ORAL 3 TIMES DAILY
Qty: 405 TABLET | Refills: 3 | Status: SHIPPED | OUTPATIENT
Start: 2021-07-02 | End: 2021-10-06

## 2021-07-02 RX ORDER — AZELASTINE 1 MG/ML
1 SPRAY, METERED NASAL 2 TIMES DAILY
Qty: 1 BOTTLE | Refills: 5 | Status: SHIPPED | OUTPATIENT
Start: 2021-07-02 | End: 2021-10-06

## 2021-07-02 RX ORDER — KETOCONAZOLE 20 MG/G
CREAM TOPICAL
Qty: 60 G | Refills: 11 | Status: SHIPPED | OUTPATIENT
Start: 2021-07-02 | End: 2021-10-06

## 2021-07-02 RX ORDER — NITROFURANTOIN 25; 75 MG/1; MG/1
100 CAPSULE ORAL DAILY
Qty: 90 CAPSULE | Refills: 0 | Status: SHIPPED | OUTPATIENT
Start: 2021-07-02 | End: 2021-10-18

## 2021-07-02 RX ORDER — SODIUM BICARBONATE 650 MG/1
TABLET ORAL
Qty: 90 TABLET | Refills: 5 | Status: SHIPPED | OUTPATIENT
Start: 2021-07-02 | End: 2021-10-06

## 2021-07-02 RX ORDER — BUDESONIDE AND FORMOTEROL FUMARATE DIHYDRATE 80; 4.5 UG/1; UG/1
2 AEROSOL RESPIRATORY (INHALATION) 2 TIMES DAILY
Qty: 1 INHALER | Refills: 5 | Status: SHIPPED | OUTPATIENT
Start: 2021-07-02 | End: 2021-10-06

## 2021-07-02 RX ORDER — ALLOPURINOL 300 MG/1
TABLET ORAL
Qty: 90 TABLET | Refills: 3 | Status: SHIPPED | OUTPATIENT
Start: 2021-07-02 | End: 2021-10-06

## 2021-07-02 ASSESSMENT — ENCOUNTER SYMPTOMS
NAUSEA: 1
RESPIRATORY NEGATIVE: 1
ALLERGIC/IMMUNOLOGIC NEGATIVE: 1
EYES NEGATIVE: 1

## 2021-07-02 NOTE — PROGRESS NOTES
Subjective:      Patient ID: Vanessa Torres is a 82 y.o. female. Chief Complaint   Patient presents with    Follow-up     recurrent UTI, still burning.  Diabetes       Urinary Tract Infection   This is a recurrent problem. The current episode started more than 1 year ago. The problem has been waxing and waning. The quality of the pain is described as burning. The pain is at a severity of 3/10. The pain is moderate. There has been no fever. She is not sexually active. There is no history of pyelonephritis. Associated symptoms include frequency, nausea and urgency. Pertinent negatives include no chills, discharge, flank pain, hematuria, hesitancy or possible pregnancy. She has tried nothing for the symptoms. The treatment provided no relief. Her past medical history is significant for recurrent UTIs and urinary stasis. There is no history of catheterization, kidney stones, a single kidney or a urological procedure. Diabetes  Associated symptoms include fatigue. Her weight is increasing rapidly. Diabetic current diet: liquid diet. She has not had a previous visit with a dietitian. She never participates in exercise. Her dinner blood glucose range is generally 140-180 mg/dl. Her bedtime blood glucose is taken between 9-10 pm. An ACE inhibitor/angiotensin II receptor blocker is not being taken. She does not see a podiatrist.Eye exam is not current. Incontinence:   Patient with incontinence and bladder spasms. She has been seen by urology several times. They recommended  Pessary . Patient complains of urinary incontinence. This has been present for several years. She leaks urine with standing, walking, laughing, with urge, with a full bladder, during the night.  Patient describes the symptoms as frequent urination > 10x per day(~ 1 time every 45 minutes), sensation of incomplete emptying of bladder, the urge to urinate recurs again shortly following micturition, urge to urinate with little or no warning, urine leakage with coughing/heavy physical activity and urine leaking unpredictably. Factors associated with symptoms include associated with menopause, history of ANABELLA and BSO, hx of bowel obstruction as well as Multiple Sclerosis. Evaluation to date includes has had urodynamics > 5 years ago. Treatment to date includes oxybutinin, which was not effective, alpha blocker, which was not effective and hormone replacement therapy, which was not effective. She has topical estrogen which is currently not being used. Recommended external catheter. PAtient with 3 lab positive infections in the last week. Sugars 90's -120-'s with current UTI. Treatment Adherence:   Medication compliance:  compliant most of the time  Diet compliance:  compliant most of the time  Weight trend: stable  Current exercise: no regular exercise  Barriers: none    Diabetes Mellitus Type 2: Current symptoms/problems include none, nausea and diarrhea. Sugars 90's -120-'s with current UTI. Home blood sugar records: trend: flucuating a little bit. Any episodes of hypoglycemia? no  Eye exam current (within one year): no  Tobacco history: She  reports that she has never smoked. She has never used smokeless tobacco.   Daily Aspirin? No: on Eliquis    Hypertension:  Home blood pressure monitoring: Yes - 138/80. She is adherent to a low sodium diet. Patient denies headache, lightheadedness, dry cough and fatigue. Antihypertensive medication side effects: no medication side effects noted. Use of agents associated with hypertension: none. Hyperlipidemia:  No new myalgias or GI upset on atorvastatin (Lipitor).        Lab Results   Component Value Date    LABA1C 6.3 03/18/2021    LABA1C 5.3 09/28/2020    LABA1C 4.9 07/24/2020     Lab Results   Component Value Date    LABMICR YES 06/26/2021    CREATININE 1.1 03/18/2021     Lab Results   Component Value Date    ALT 19 03/18/2021    AST 16 03/18/2021     Lab Results   Component Value Date    CHOL 119 03/18/2021    TRIG 65 03/18/2021    HDL 53 03/18/2021    LDLCALC 53 03/18/2021          Ankle weakness due to fall. PAtient has been wearing boot for months due to instability. She stopped PT due to COVI. History of Falls: Has a 2 stair lifts at home.   Past Medical History:   Diagnosis Date    Adhesion of abdominal wall     Anemia, unspecified     Asthma     Cholelithiasis     Chronic kidney disease (CKD), stage III (moderate) (HCC)     Diabetes     Gout     History of uterine cancer     Hyperlipidemia     Hypertension     Hypothyroidism 08/09/2015    Irritable bowel syndrome with constipation 03/05/2020    Kidney disease     Monoclonal paraproteinemia     Osteoporosis     Sclerosis of the skin     Type 2 diabetes mellitus without complication ([facility] 59/82/6870     Past Surgical History:   Procedure Laterality Date    ABDOMINAL EXPLORATION SURGERY  01/04/2013    **see [address] scanned report (ER)    CHOLECYSTECTOMY      ANABELLA AND BSO       Family History   Problem Relation Age of Onset    Cancer Father     High Blood Pressure Mother     High Cholesterol Mother     Obesity Mother     Cancer Maternal Aunt         skin     Social History     Socioeconomic History    Marital status:      Spouse name: Not on file    Number of children: Not on file    Years of education: Not on file    Highest education level: Not on file   Occupational History    Not on file   Tobacco Use    Smoking status: Never Smoker    Smokeless tobacco: Never Used   Substance and Sexual Activity    Alcohol use: No    Drug use: No    Sexual activity: Not on file   Other Topics Concern    Not on file   Social History Narrative    Not on file     Social Determinants of Health     Financial Resource Strain: Low Risk     Difficulty of Paying Living Expenses: Not hard at all   Food Insecurity: No Food Insecurity    Worried About Running Out of Food in the Last Year: Never true    [address] in the Last Year: Never true   Transportation Needs:     Lack of Transportation (Medical):  Lack of Transportation (Non-Medical):    Physical Activity:     Days of Exercise per Week:     Minutes of Exercise per Session:    Stress:     Feeling of Stress :    Social Connections:     Frequency of Communication with Friends and Family:     Frequency of Social Gatherings with Friends and Family:     Attends Zoroastrian Services:     Active Member of Clubs or Organizations:     Attends Club or Organization Meetings:     Marital Status:    Intimate Partner Violence:     Fear of Current or Ex-Partner:     Emotionally Abused:     Physically Abused:     Sexually Abused:        Review of Systems   Constitutional: Positive for fatigue. Negative for chills. HENT: Negative. Eyes: Negative. Respiratory: Negative. Cardiovascular: Positive for leg swelling. Gastrointestinal: Positive for nausea. Endocrine: Negative. Genitourinary: Positive for difficulty urinating, dysuria, enuresis, frequency and urgency. Negative for decreased urine volume, flank pain, hematuria, hesitancy, menstrual problem and pelvic pain. Musculoskeletal: Positive for gait problem and myalgias. Pain in ankle and left side, significant hypersthesia. Allergic/Immunologic: Negative. Hematological: Negative. Psychiatric/Behavioral: Negative. All other systems reviewed and are negative. Allergies   Allergen Reactions    [employer]      Nausea, vomiting and diarrhea with almond milk.     Celexa [Citalopram Hydrobromide]     Famciclovir     Lactose     Metronidazole     Peanut-Containing Drug Products     [place] Oil [Nutritional Supplements] Diarrhea    Zofran     Bactrim [Sulfamethoxazole-Trimethoprim]      Causes hyonatremia to 120's       Current Outpatient Medications   Medication Sig Dispense Refill    nitrofurantoin, macrocrystal-monohydrate, (MACROBID) 100 MG capsule Take 1 capsule by mouth 2 times daily for 10 days 20 capsule 1    BD PEN NEEDLE ANNA U/F 32G X 4 MM MISC USE AS DIRECTEDDAILY 100 each 3    LANTUS SOLOSTAR 100 UNIT/ML injection pen INJECT 5 UNITS INTO THE SKIN NIGHTLY IF FASTING GLUCOSE IS ABOVE 160 (Patient taking differently: Inject 18 Units into the skin nightly If fasting glucose is above 160) 5 pen 2    lidocaine (XYLOCAINE) 5 % ointment APPLY TO AFFECTED AREA EVERY DAY AS NEEDED 35.44 g 5    ammonium lactate (LAC-HYDRIN) 12 % lotion Apply topically daily. 567 g 5    Compression Stockings MISC by Does not apply route Knee high compression and thigh high stockings 30 mm Hg. Dispense two pairs with 1 year refill.  1 each 5    estradiol (ESTRACE) 0.1 MG/GM vaginal cream Twice a week 1 Tube 3    albuterol sulfate HFA (VENTOLIN HFA) 108 (90 Base) MCG/ACT inhaler Inhale 2 puffs into the lungs 4 times daily as needed for Wheezing 1 Inhaler 5    sodium bicarbonate 650 MG tablet TAKE 1 TABLET BY MOUTH EVERY DAY 90 tablet 5    losartan (COZAAR) 25 MG tablet Take 1 tablet by mouth daily 90 tablet 3    hydrALAZINE (APRESOLINE) 50 MG tablet Take 1.5 tablets by mouth 3 times daily 405 tablet 3    famotidine (PEPCID) 20 MG tablet TAKE 1 TABLET BY MOUTH TWICE A  tablet 1    verapamil (CALAN SR) 240 MG extended release tablet TAKE 1 TABLET BY MOUTH EVERY DAY IN THE EVENING 90 tablet 1    divalproex (DEPAKOTE) 125 MG DR tablet Take one tablet in the am and two tablets in the pm. 270 tablet 1    levothyroxine (SYNTHROID) 25 MCG tablet TAKE 1 TABLET BY MOUTH EVERY DAY 90 tablet 3    atenolol (TENORMIN) 50 MG tablet TAKE 1 TABLET BY MOUTH EVERY DAY 90 tablet 3    ketoconazole (NIZORAL) 2 % cream APPLY TO AFFECTED AREA EVERY DAY 60 g 11    allopurinol (ZYLOPRIM) 300 MG tablet TAKE 1 TABLET BY MOUTH EVERY DAY 90 tablet 3    atorvastatin (LIPITOR) 40 MG tablet TAKE 1 TABLET BY MOUTH EVERY DAY 90 tablet 3    apixaban (ELIQUIS) 5 MG TABS tablet TAKE 1 TABLET BY MOUTH 2 TIMES DAILY STOP COUMADIN 180 tablet 3    montelukast (SINGULAIR) 10 MG tablet TAKE 1 TABLET BY MOUTH EVERYDAY AT BEDTIME 90 tablet 3    polyethylene glycol (MIRALAX) 17 g packet Take 17 g by mouth 2 times daily 527 g 5    SITagliptin (JANUVIA) 100 MG tablet TAKE 1 TABLET BY MOUTH EVERY DAY 90 tablet 3    blood glucose test strips (ONETOUCH VERIO) strip USE TO TEST 4 TIMES A DAY BEFORE MEALS AND AT BEDTIME 300 strip 11    Blood Glucose Monitoring Suppl (Deirdre Linen IQ SYSTEM) w/Device KIT 1 kit by Does not apply route 4 times daily (before meals and nightly) 1 kit 11    budesonide-formoterol (SYMBICORT) 80-4.5 MCG/ACT AERO Inhale 2 puffs into the lungs 2 times daily TAKE 2 PUFFS BY MOUTH TWICE A DAY 1 Inhaler 5    azelastine (ASTELIN) 0.1 % nasal spray 1 SPRAY BY NASAL ROUTE 2 TIMES DAILY USE IN EACH NOSTRIL AS DIRECTED 1 Bottle 5    Elastic Bandages & Supports (MEDICAL COMPRESSION STOCKINGS) MISC 2 each by Does not apply route daily 40 mm hg thigh high 2 each 5    Spacer/Aero-Holding Chambers (AEROCHAMBER MV) MISC 1 each by Does not apply route 2 times daily 1 each 3    Insulin Pen Needle (B-D UF III MINI PEN NEEDLES) 31G X 5 MM MISC USE AS DIRECTED NIGHTLY WITH LANTUS PEN 50 each 5    aspirin 81 MG tablet Take 81 mg by mouth daily      Lactobacillus (PROBIOTIC ACIDOPHILUS PO) Take 1 capsule by mouth daily      hydrocortisone 1 % ointment Apply topically 2 times daily as needed (dry skin) Apply topically 2 times daily.  ONETOUCH DELICA LANCETS 87G MISC USE AS DIRECTED FOUR TIMES A  each 11    Lift Chair MISC by Does not apply route Needs a lift chair due to mutiple sclerosis. Needs assistance getting out of chair, but once out of chair can ambulate independently with walker or cane. 1 each 0    Incontinence Supply Disposable (PALLAVI SERENITY BRIEFS LARGE) MISC 1 each by Does not apply route three times daily Large to extra large. Give Pallavi underwear protections. Daughter will select the brand. 90 each 5    Incontinence Supplies (BEDPAN) MISC Sorry, but \"1 bottle\" is the only way it will allow us to order this bedpan. 1 Bottle 0    loratadine (CLARITIN) 10 MG tablet Take 1 tablet by mouth daily. 30 tablet 5    Multiple Vitamins-Minerals (CENTRUM SILVER PO) Take 1 tablet by mouth daily       nystatin (MYCOSTATIN) 933531 UNIT/GM cream Apply topically 2 times daily. (Patient not taking: Reported on 6/11/2021) 90 g 3    lipase-protease-amylase (CREON) 36579-46238 units delayed release capsule Take 3 capsules by mouth 3 times daily as needed (diarrhea) (Patient not taking: Reported on 7/2/2021)      fluocinonide (LIDEX) 0.05 % cream Apply topically 2 times daily. (Patient not taking: Reported on 7/2/2021) 60 g 11    vitamin D (CHOLECALCIFEROL) 1000 UNIT TABS tablet Take 3 tablets by mouth daily. (Patient not taking: Reported on 6/11/2021) 90 tablet 5     No current facility-administered medications for this visit. There were no vitals filed for this visit. There is no height or weight on file to calculate BMI. Wt Readings from Last 3 Encounters:   08/07/19 181 lb (82.1 kg)   05/08/19 176 lb 6.4 oz (80 kg)   02/06/19 181 lb (82.1 kg)     BP Readings from Last 3 Encounters:   03/18/21 (!) 148/84   02/10/21 (!) 156/89   12/22/20 (!) 149/76       Objective:   Physical Exam  Vitals and nursing note reviewed. Constitutional:       General: She is not in acute distress. Appearance: She is well-developed. HENT:      Head: Normocephalic and atraumatic. Eyes:      Conjunctiva/sclera: Conjunctivae normal.      Pupils: Pupils are equal, round, and reactive to light. Cardiovascular:      Rate and Rhythm: Normal rate and regular rhythm. Heart sounds: Normal heart sounds. No murmur heard. Pulmonary:      Effort: Pulmonary effort is normal. No respiratory distress. Breath sounds: Normal breath sounds. Musculoskeletal:         General: Normal range of motion.       Cervical back: Normal range of motion. Skin:     General: Skin is warm. Neurological:      Mental Status: She is alert and oriented to person, place, and time. Psychiatric:         Behavior: Behavior normal.         Thought Content: Thought content normal.         Judgment: Judgment normal.       Interface, Results In - 05/15/2021 2:11 PM EDT    Formatting of this note might be different from the original.  EXAM: CT ABDOMEN AND PELVIS WO IV CONTRAST    INDICATION: UTI, recurrent/complicated (Female)    TECHNIQUE: CT of the abdomen and pelvis was performed without intravenous contrast. Axial images were obtained with coronal and sagittal reconstructions. CONTRAST: None    FIELD OF VIEW: 40 cm    COMPARISON: None available. FINDINGS:    Lower chest: No pleural effusion or focal consolidation. Severe coronary artery calcifications and/or stents. Liver: Noncirrhotic liver morphology. No focal hepatic lesions within the limitations of a noncontrast examination. Biliary tree/Gallbladder: Status post cholecystectomy with mild extrahepatic biliary ductal prominence. Spleen: Normal.    Pancreas: Grossly normal in contour. Adrenal glands: Normal.    Kidneys/Ureters/Bladder: Multiple fluid density cysts are present within the kidneys bilaterally, largest on the right arises from the upper pole measuring up to 4.2 cm and the largest on the left breast lower pole measuring up to 4.5 cm. 5 mm linear calcific density in the renal hilum (series 2 image 34) may represent a vascular calcification or nonobstructing calculus. No left-sided renal calculi. No hydronephrosis or hydroureter. Mild diffuse bladder wall thickening. Multiple phleboliths are present in the vicinity of the distal ureters. Gastrointestinal tract: Moderate colonic stool burden. Small knuckle of bowel extends into small bowel otherwise fat-containing left inguinal hernia. No evidence of obstruction. A normal appendix is visualized.     Lymphatics: No enlarged lymph nodes are identified by size criteria within the abdomen or pelvis. Vasculature: Moderate atherosclerotic ossification of the abdominal aorta and its branches without aneurysmal dilatation. Peritoneum/Retroperitoneum: No free fluid, free air, or loculated fluid collections. Abdominal wall/soft tissues: Irregular contour of the anterior abdominal wall wall with associated rectus diastases in the mid abdomen. Small fat-containing right inguinal hernia. Small knuckle of nonobstructed bowel extends into an otherwise fat-containing left inguinal hernia. Genital Organs: Status post hysterectomy. No adnexal masses. Osseous structures: No suspicious or acute osseous abnormality. IMPRESSION:    1. Mild diffuse bladder wall thickening may represent cystitis. 2. Small calcific density in the right renal hilum may be vascular, nonobstructing urinary calculus considered less likely. Approved by Flakito Wray MD on 5/15/2021 11:56 AM EDT    I have personally reviewed the images and I agree with this report. Report Verified by: Velia Chase MD at 5/15/2021 2:09 PM EDT      Assessment/Plan:  Ovidio Green was seen today for follow-up and diabetes. Diagnoses and all orders for this visit:    Continuous leakage of urine  -     Misc. Devices MISC; 1 each by Does not apply route once as needed (external catheter -)    Essential hypertension  -     verapamil (CALAN SR) 240 MG extended release tablet; TAKE 1 TABLET BY MOUTH EVERY DAY IN THE EVENING  -     losartan (COZAAR) 25 MG tablet; Take 1 tablet by mouth daily  -     hydrALAZINE (APRESOLINE) 50 MG tablet; Take 1.5 tablets by mouth 3 times daily  -     atenolol (TENORMIN) 50 MG tablet; TAKE 1 TABLET BY MOUTH EVERY DAY  -     Renal Function Panel;  Future    History of pulmonary embolism  -     apixaban (ELIQUIS) 5 MG TABS tablet; TAKE 1 TABLET BY MOUTH 2 TIMES DAILY STOP COUMADIN    Hyperuricemia  -     allopurinol (ZYLOPRIM) 300 MG tablet; TAKE 1 TABLET BY MOUTH EVERY DAY    Type 2 diabetes mellitus with complication, with long-term current use of insulin (HCC)  -     sodium bicarbonate 650 MG tablet; TAKE 1 TABLET BY MOUTH EVERY DAY  -     SITagliptin (JANUVIA) 100 MG tablet; TAKE 1 TABLET BY MOUTH EVERY DAY  -     atorvastatin (LIPITOR) 40 MG tablet; TAKE 1 TABLET BY MOUTH EVERY DAY  -     Insulin Pen Needle (B-D UF III MINI PEN NEEDLES) 31G X 5 MM MISC; USE AS DIRECTED NIGHTLY WITH LANTUS PEN  -     blood glucose test strips (ONETOUCH VERIO) strip; USE TO TEST 4 TIMES A DAY BEFORE MEALS AND AT BEDTIME  -     Hemoglobin A1C; Future    Tremors of nervous system  -     divalproex (DEPAKOTE) 125 MG DR tablet; Take one tablet in the am and two tablets in the pm.    Recurrent UTI (urinary tract infection)  -     Urinalysis Reflex to Culture; Future  -     nitrofurantoin, macrocrystal-monohydrate, (MACROBID) 100 MG capsule; Take 1 capsule by mouth daily for 10 days For recurrent UTI - use daily    Hyponatremia  -     Renal Function Panel; Future    Multiple sclerosis (HCC)    Female cystocele    Edema, lower extremity    Hypothyroidism due to acquired atrophy of thyroid    Diabetic nephropathy associated with type 2 diabetes mellitus ([facility] [address]    Other orders  -     montelukast (SINGULAIR) 10 MG tablet; TAKE 1 TABLET BY MOUTH EVERYDAY AT BEDTIME  -     ketoconazole (NIZORAL) 2 % cream; APPLY TO AFFECTED AREA EVERY DAY  -     budesonide-formoterol (SYMBICORT) 80-4.5 MCG/ACT AERO; Inhale 2 puffs into the lungs 2 times daily TAKE 2 PUFFS BY MOUTH TWICE A DAY  -     azelastine (ASTELIN) 0.1 % nasal spray; 1 spray by Nasal route 2 times daily Use in each nostril as directed      Return in about 3 months (around 10/2/2021). On the basis of positive falls risk screening, assessment and plan is as follows: home safety tips provided, ADVISED IMPORTANCE OF EXERCISE.

## 2021-08-06 ENCOUNTER — PATIENT MESSAGE (OUTPATIENT)
Dept: INTERNAL MEDICINE CLINIC | Age: 86
End: 2021-08-06

## 2021-08-06 DIAGNOSIS — E11.8 TYPE 2 DIABETES MELLITUS WITH COMPLICATION, WITH LONG-TERM CURRENT USE OF INSULIN (HCC): ICD-10-CM

## 2021-08-06 DIAGNOSIS — Z79.4 TYPE 2 DIABETES MELLITUS WITH COMPLICATION, WITH LONG-TERM CURRENT USE OF INSULIN (HCC): ICD-10-CM

## 2021-08-08 RX ORDER — LANCETS 30 GAUGE
1 EACH MISCELLANEOUS
Qty: 100 EACH | Refills: 5 | Status: SHIPPED | OUTPATIENT
Start: 2021-08-08 | End: 2021-10-06

## 2021-08-08 NOTE — TELEPHONE ENCOUNTER
From: Mandy Xiong  To: Peyton Nova MD  Sent: 8/6/2021 7:22 PM EDT  Subject: Prescription Question    Please send a new prescription to [facility] for One Touch Delica Plus lancets to [facility]. Did not get this prescription during the visit. Also the antibiotic prophylaxis against her UTIs is working well.

## 2021-08-16 ENCOUNTER — NURSE TRIAGE (OUTPATIENT)
Dept: OTHER | Facility: CLINIC | Age: 86
End: 2021-08-16

## 2021-08-16 ENCOUNTER — TELEPHONE (OUTPATIENT)
Dept: INTERNAL MEDICINE CLINIC | Age: 86
End: 2021-08-16

## 2021-08-16 NOTE — TELEPHONE ENCOUNTER
----- Message from Irasema Blankenship sent at 8/13/2021  6:14 PM EDT -----  Subject: Appointment Request    Reason for Call: Semi-Routine Eye Problem    QUESTIONS  Type of Appointment? Established Patient  Reason for appointment request? No appointments available during search  Additional Information for Provider? Patient would like to be seen   virtually ASAP for pink eye symptoms , blur vision when discharge gets in   her eye. Contact ([phone]) with VV information if available or   solution.  ---------------------------------------------------------------------------  --------------  CALL BACK INFO  What is the best way for the office to contact you? OK to leave message on   voicemail, OK to respond with electronic message via [employer] portal (only   for patients who have registered [employer] account)  Preferred Call Back Phone Number? [phone]  ---------------------------------------------------------------------------  --------------  SCRIPT ANSWERS  Relationship to Patient? Other  Representative Name? Bushraann Counter- Daughter  Additional information verified (besides Name and Date of Birth)? Address  Have you had an injury or trauma? No  Have you had sudden loss of vision? No  Are you having eye pain? No  Are you having a headache? No  Have you recently (14 days) seen a provider for this issue? No  Have you been diagnosed with, awaiting test results for, or told that you   are suspected of having COVID-19 (Coronavirus)? (If patient has tested   negative or was tested as a requirement for work, school, or travel and   not based on symptoms, answer no)? No  Do you currently have flu-like symptoms including fever or chills, cough,   shortness of breath, difficulty breathing, or new loss of taste or smell? No  Have you had close contact with someone with COVID-19 in the last 14 days? No  (Service Expert  click yes below to proceed with [employer] As Usual   Scheduling)?  Yes

## 2021-08-16 NOTE — TELEPHONE ENCOUNTER
Reason for Disposition   Eyelids are very swollen (shut or almost)    Answer Assessment - Initial Assessment Questions  1. EYE DISCHARGE: \"Is the discharge in one or both eyes? \" \"What color is it? \" \"How much is there? \" \"When did the discharge start? \"   R eye, eyelid is red. 1 week ago redness and discharged appeared. Purulent drainage that has increased in. This AM eye was crusted over and required washing before opening. 2. REDNESS OF SCLERA: \"Is the redness in one or both eyes? \" \"When did the redness start? \"       Denies. 3. EYELIDS: \"Are the eyelids red or swollen? \" If so, ask: \"How much? \"      R Eyelid is red, swollen, and droopy. 4. VISION: \"Is there any difficulty seeing clearly? \"       After wiping drainage, away no difficulties. 5. PAIN: \"Is there any pain? If so, ask: \"How bad is it? \" (Scale 1-10; or mild, moderate, severe)     - MILD (1-3): doesn't interfere with normal activities      - MODERATE (4-7): interferes with normal activities or awakens from sleep     - SEVERE (8-10): excruciating pain, unable to do any normal activities    Denies. 6. CONTACT LENS: \"Do you wear contacts? \"      Denies. 7. OTHER SYMPTOMS: \"Do you have any other symptoms? \" (e.g., fever, runny nose, cough)      R eyelid itchy, runny nose,    8. PREGNANCY: \"Is there any chance you are pregnant? \" \"When was your last menstrual period? \"      N/A    Protocols used: EYE - PUS OR DISCHARGE-ADULT-[place]    [facility] with Red Flag Complaint. Brief description of triage: Eye pus, see above. Triage indicates for patient to go to office now. If not in the office, UCC/ER within the next 4 hrs. Care advice provided, patient verbalizes understanding; denies any other questions or concerns; instructed to call back for any new or worsening symptoms. Writer provided warm transfer to [facility] for appointment scheduling. Attention Provider:   Thank you for allowing me to participate in the care of your patient. The patient was connected to triage in response to information provided to the ECC. Please do not respond through this encounter as the response is not directed to a shared pool.

## 2021-08-16 NOTE — TELEPHONE ENCOUNTER
Spoke with patient and offered appt today to be seen for possible pink eye with Dr. Gabriela Walton. Patient wanted a vv visit and refuses to leave the house. Patient refused in office appt.

## 2021-10-06 ENCOUNTER — OFFICE VISIT (OUTPATIENT)
Dept: INTERNAL MEDICINE CLINIC | Age: 86
End: 2021-10-06
Payer: MEDICARE

## 2021-10-06 VITALS
OXYGEN SATURATION: 99 % | WEIGHT: 176 LBS | SYSTOLIC BLOOD PRESSURE: 138 MMHG | BODY MASS INDEX: 31.18 KG/M2 | HEART RATE: 75 BPM | DIASTOLIC BLOOD PRESSURE: 74 MMHG

## 2021-10-06 DIAGNOSIS — E11.8 TYPE 2 DIABETES MELLITUS WITH COMPLICATION, WITH LONG-TERM CURRENT USE OF INSULIN (HCC): ICD-10-CM

## 2021-10-06 DIAGNOSIS — R25.1 TREMORS OF NERVOUS SYSTEM: ICD-10-CM

## 2021-10-06 DIAGNOSIS — E11.69 DYSLIPIDEMIA ASSOCIATED WITH TYPE 2 DIABETES MELLITUS (HCC): ICD-10-CM

## 2021-10-06 DIAGNOSIS — J45.40 MODERATE PERSISTENT ASTHMA WITHOUT COMPLICATION: ICD-10-CM

## 2021-10-06 DIAGNOSIS — N30.01 ACUTE CYSTITIS WITH HEMATURIA: ICD-10-CM

## 2021-10-06 DIAGNOSIS — K58.1 IRRITABLE BOWEL SYNDROME WITH CONSTIPATION: ICD-10-CM

## 2021-10-06 DIAGNOSIS — E78.5 DYSLIPIDEMIA ASSOCIATED WITH TYPE 2 DIABETES MELLITUS (HCC): ICD-10-CM

## 2021-10-06 DIAGNOSIS — I10 ESSENTIAL HYPERTENSION: ICD-10-CM

## 2021-10-06 DIAGNOSIS — Z79.4 TYPE 2 DIABETES MELLITUS WITH COMPLICATION, WITH LONG-TERM CURRENT USE OF INSULIN (HCC): ICD-10-CM

## 2021-10-06 DIAGNOSIS — Z23 NEEDS FLU SHOT: Primary | ICD-10-CM

## 2021-10-06 DIAGNOSIS — L85.3 XEROSIS OF SKIN: ICD-10-CM

## 2021-10-06 DIAGNOSIS — K21.9 GASTROESOPHAGEAL REFLUX DISEASE WITHOUT ESOPHAGITIS: ICD-10-CM

## 2021-10-06 DIAGNOSIS — E03.4 HYPOTHYROIDISM DUE TO ACQUIRED ATROPHY OF THYROID: ICD-10-CM

## 2021-10-06 DIAGNOSIS — Z86.711 HISTORY OF PULMONARY EMBOLISM: ICD-10-CM

## 2021-10-06 DIAGNOSIS — E79.0 HYPERURICEMIA: ICD-10-CM

## 2021-10-06 LAB — HBA1C MFR BLD: 6 %

## 2021-10-06 PROCEDURE — 83036 HEMOGLOBIN GLYCOSYLATED A1C: CPT | Performed by: INTERNAL MEDICINE

## 2021-10-06 PROCEDURE — G0008 ADMIN INFLUENZA VIRUS VAC: HCPCS | Performed by: INTERNAL MEDICINE

## 2021-10-06 PROCEDURE — 99215 OFFICE O/P EST HI 40 MIN: CPT | Performed by: INTERNAL MEDICINE

## 2021-10-06 PROCEDURE — 90694 VACC AIIV4 NO PRSRV 0.5ML IM: CPT | Performed by: INTERNAL MEDICINE

## 2021-10-06 RX ORDER — KETOCONAZOLE 20 MG/G
CREAM TOPICAL
Qty: 180 G | Refills: 3 | Status: SHIPPED | OUTPATIENT
Start: 2021-10-06 | End: 2022-02-22 | Stop reason: SDUPTHER

## 2021-10-06 RX ORDER — AZELASTINE 1 MG/ML
1 SPRAY, METERED NASAL 2 TIMES DAILY
Qty: 1 EACH | Refills: 1 | Status: SHIPPED | OUTPATIENT
Start: 2021-10-06 | End: 2022-02-15

## 2021-10-06 RX ORDER — ALLOPURINOL 300 MG/1
TABLET ORAL
Qty: 90 TABLET | Refills: 3 | Status: SHIPPED | OUTPATIENT
Start: 2021-10-06 | End: 2022-02-22 | Stop reason: SDUPTHER

## 2021-10-06 RX ORDER — LEVOTHYROXINE SODIUM 0.03 MG/1
TABLET ORAL
Qty: 90 TABLET | Refills: 1 | Status: SHIPPED | OUTPATIENT
Start: 2021-10-06 | End: 2022-02-22 | Stop reason: SDUPTHER

## 2021-10-06 RX ORDER — BLOOD-GLUCOSE METER
1 EACH MISCELLANEOUS
Qty: 1 KIT | Refills: 11 | Status: SHIPPED | OUTPATIENT
Start: 2021-10-06 | End: 2022-08-28 | Stop reason: SDUPTHER

## 2021-10-06 RX ORDER — LANCETS 33 GAUGE
EACH MISCELLANEOUS
COMMUNITY
End: 2022-02-10

## 2021-10-06 RX ORDER — BUDESONIDE AND FORMOTEROL FUMARATE DIHYDRATE 80; 4.5 UG/1; UG/1
2 AEROSOL RESPIRATORY (INHALATION) 2 TIMES DAILY
Qty: 1 EACH | Refills: 5 | Status: SHIPPED | OUTPATIENT
Start: 2021-10-06 | End: 2022-04-28

## 2021-10-06 RX ORDER — LIDOCAINE 50 MG/G
OINTMENT TOPICAL
Qty: 35.44 G | Refills: 5 | Status: SHIPPED | OUTPATIENT
Start: 2021-10-06

## 2021-10-06 RX ORDER — LORATADINE 10 MG/1
10 TABLET ORAL DAILY
Qty: 30 TABLET | Refills: 5 | Status: SHIPPED | OUTPATIENT
Start: 2021-10-06 | End: 2021-10-30

## 2021-10-06 RX ORDER — SODIUM BICARBONATE 650 MG/1
TABLET ORAL
Qty: 90 TABLET | Refills: 5 | Status: SHIPPED | OUTPATIENT
Start: 2021-10-06

## 2021-10-06 RX ORDER — ALBUTEROL SULFATE 90 UG/1
2 AEROSOL, METERED RESPIRATORY (INHALATION) 4 TIMES DAILY PRN
Qty: 1 EACH | Refills: 5 | Status: SHIPPED | OUTPATIENT
Start: 2021-10-06 | End: 2022-02-22 | Stop reason: SDUPTHER

## 2021-10-06 RX ORDER — HYDRALAZINE HYDROCHLORIDE 50 MG/1
75 TABLET, FILM COATED ORAL 3 TIMES DAILY
Qty: 405 TABLET | Refills: 3 | Status: SHIPPED | OUTPATIENT
Start: 2021-10-06 | End: 2022-01-13

## 2021-10-06 RX ORDER — ATENOLOL 50 MG/1
TABLET ORAL
Qty: 90 TABLET | Refills: 3 | Status: SHIPPED | OUTPATIENT
Start: 2021-10-06 | End: 2022-02-22 | Stop reason: SDUPTHER

## 2021-10-06 RX ORDER — MONTELUKAST SODIUM 10 MG/1
TABLET ORAL
Qty: 90 TABLET | Refills: 1 | Status: SHIPPED | OUTPATIENT
Start: 2021-10-06 | End: 2022-02-22 | Stop reason: SDUPTHER

## 2021-10-06 RX ORDER — LOSARTAN POTASSIUM 25 MG/1
25 TABLET ORAL DAILY
Qty: 90 TABLET | Refills: 3 | Status: SHIPPED | OUTPATIENT
Start: 2021-10-06 | End: 2022-02-22 | Stop reason: SDUPTHER

## 2021-10-06 RX ORDER — POLYETHYLENE GLYCOL 3350 17 G/17G
17 POWDER, FOR SOLUTION ORAL 2 TIMES DAILY
Qty: 527 G | Refills: 5 | Status: SHIPPED | OUTPATIENT
Start: 2021-10-06 | End: 2022-02-22 | Stop reason: SDUPTHER

## 2021-10-06 RX ORDER — DIVALPROEX SODIUM 125 MG/1
TABLET, DELAYED RELEASE ORAL
Qty: 270 TABLET | Refills: 1 | Status: SHIPPED | OUTPATIENT
Start: 2021-10-06 | End: 2022-02-22 | Stop reason: SDUPTHER

## 2021-10-06 RX ORDER — VERAPAMIL HYDROCHLORIDE 240 MG/1
120 TABLET, FILM COATED, EXTENDED RELEASE ORAL NIGHTLY
Qty: 45 TABLET | Refills: 1 | Status: SHIPPED | OUTPATIENT
Start: 2021-10-06 | End: 2022-02-22 | Stop reason: SDUPTHER

## 2021-10-06 RX ORDER — ATORVASTATIN CALCIUM 40 MG/1
TABLET, FILM COATED ORAL
Qty: 90 TABLET | Refills: 3 | Status: SHIPPED | OUTPATIENT
Start: 2021-10-06 | End: 2022-02-22 | Stop reason: SDUPTHER

## 2021-10-06 RX ORDER — FAMOTIDINE 20 MG/1
TABLET, FILM COATED ORAL
Qty: 180 TABLET | Refills: 1 | Status: SHIPPED | OUTPATIENT
Start: 2021-10-06 | End: 2022-02-22 | Stop reason: SDUPTHER

## 2021-10-06 RX ORDER — INSULIN GLARGINE 100 [IU]/ML
22 INJECTION, SOLUTION SUBCUTANEOUS NIGHTLY
Qty: 15 PEN | Refills: 0 | Status: SHIPPED | OUTPATIENT
Start: 2021-10-06 | End: 2021-12-06

## 2021-10-06 RX ORDER — AMMONIUM LACTATE 12 G/100G
LOTION TOPICAL
Qty: 567 G | Refills: 5 | Status: SHIPPED | OUTPATIENT
Start: 2021-10-06 | End: 2022-02-22 | Stop reason: SDUPTHER

## 2021-10-06 RX ORDER — BLOOD SUGAR DIAGNOSTIC
STRIP MISCELLANEOUS
Qty: 300 STRIP | Refills: 11 | Status: SHIPPED | OUTPATIENT
Start: 2021-10-06 | End: 2022-10-11

## 2021-10-06 NOTE — PROGRESS NOTES
Subjective:      Patient ID: J Luis Márquez is a 82 y.o. female. Diabetes  She presents for her follow-up diabetic visit. She has type 2 diabetes mellitus. Her disease course has been stable. Pertinent negatives for hypoglycemia include no dizziness. There are no diabetic associated symptoms. There are no hypoglycemic complications. Symptoms are stable. There are no diabetic complications. Risk factors for coronary artery disease include diabetes mellitus, dyslipidemia, obesity and sedentary lifestyle. She is compliant with treatment all of the time. Hypertension      POCT A1c = 6%    Treatment Adherence:   Medication compliance:  compliant most of the time  Diet compliance:  compliant most of the time  Weight trend: stable  Current exercise: no regular exercise  Barriers: impairment:  physical: has MS and a bowel obstruction risk which limit her diet    Diabetes Mellitus Type 2: Current symptoms/problems include none. Home blood sugar records: patient does not test  Any episodes of hypoglycemia? no  Eye exam current (within one year): no  Tobacco history: She  reports that she has never smoked. She has never used smokeless tobacco.   Daily Aspirin? Yes    Hypertension:  Home blood pressure monitoring: No.  She is adherent to a low sodium diet. Patient denies chest pain, shortness of breath, headache and lightheadedness. Antihypertensive medication side effects: no medication side effects noted. Use of agents associated with hypertension: none. Hyperlipidemia:  No new myalgias or GI upset on atorvastatin (Lipitor).        Lab Results   Component Value Date    LABA1C 6.3 03/18/2021    LABA1C 5.3 09/28/2020    LABA1C 4.9 07/24/2020     Lab Results   Component Value Date    LABMICR YES 06/26/2021    CREATININE 1.1 03/18/2021     Lab Results   Component Value Date    ALT 19 03/18/2021    AST 16 03/18/2021     Lab Results   Component Value Date    CHOL 119 03/18/2021    TRIG 65 03/18/2021    HDL 53 03/18/2021    [address] 53 03/18/2021        Review of Systems   Neurological: Negative for dizziness. @DOS@    Allergies   Allergen Reactions    [place] Meal      Nausea, vomiting and diarrhea with almond milk.     Celexa [Citalopram Hydrobromide]     Famciclovir     Lactose     Metronidazole     Peanut-Containing Drug Products     [place] Oil [Nutritional Supplements] Diarrhea    Zofran     Bactrim [Sulfamethoxazole-Trimethoprim]      Causes hyonatremia to 120's       Current Outpatient Medications   Medication Sig Dispense Refill    verapamil (CALAN SR) 240 MG extended release tablet TAKE 1 TABLET BY MOUTH EVERY DAY IN THE EVENING (Patient taking differently: Take 240 mg by mouth 2 times daily 1/2 tablet) 90 tablet 1    sodium bicarbonate 650 MG tablet TAKE 1 TABLET BY MOUTH EVERY DAY (Patient taking differently: Take 650 mg by mouth 2 times daily TAKE 1 TABLET BY MOUTH EVERY DAY) 90 tablet 5    SITagliptin (JANUVIA) 100 MG tablet TAKE 1 TABLET BY MOUTH EVERY DAY 90 tablet 3    montelukast (SINGULAIR) 10 MG tablet TAKE 1 TABLET BY MOUTH EVERYDAY AT BEDTIME 90 tablet 3    losartan (COZAAR) 25 MG tablet Take 1 tablet by mouth daily 90 tablet 3    ketoconazole (NIZORAL) 2 % cream APPLY TO AFFECTED AREA EVERY DAY 60 g 11    hydrALAZINE (APRESOLINE) 50 MG tablet Take 1.5 tablets by mouth 3 times daily 405 tablet 3    budesonide-formoterol (SYMBICORT) 80-4.5 MCG/ACT AERO Inhale 2 puffs into the lungs 2 times daily TAKE 2 PUFFS BY MOUTH TWICE A DAY 1 Inhaler 5    azelastine (ASTELIN) 0.1 % nasal spray 1 spray by Nasal route 2 times daily Use in each nostril as directed 1 Bottle 5    atorvastatin (LIPITOR) 40 MG tablet TAKE 1 TABLET BY MOUTH EVERY DAY 90 tablet 3    atenolol (TENORMIN) 50 MG tablet TAKE 1 TABLET BY MOUTH EVERY DAY 90 tablet 3    apixaban (ELIQUIS) 5 MG TABS tablet TAKE 1 TABLET BY MOUTH 2 TIMES DAILY STOP COUMADIN 180 tablet 3    allopurinol (ZYLOPRIM) 300 MG tablet TAKE 1 TABLET BY MOUTH EVERY DAY 90 tablet 3    divalproex (DEPAKOTE) 125 MG DR tablet Take one tablet in the am and two tablets in the pm. 270 tablet 1    blood glucose test strips (ONETOUCH VERIO) strip USE TO TEST 4 TIMES A DAY BEFORE MEALS AND AT BEDTIME 300 strip 11    BD PEN NEEDLE ANNA U/F 32G X 4 MM MISC USE AS DIRECTEDDAILY 100 each 3    LANTUS SOLOSTAR 100 UNIT/ML injection pen INJECT 5 UNITS INTO THE SKIN NIGHTLY IF FASTING GLUCOSE IS ABOVE 160 (Patient taking differently: Inject 22 Units into the skin nightly If fasting glucose is above 160) 5 pen 2    lidocaine (XYLOCAINE) 5 % ointment APPLY TO AFFECTED AREA EVERY DAY AS NEEDED 35.44 g 5    nystatin (MYCOSTATIN) 743768 UNIT/GM cream Apply topically 2 times daily. 90 g 3    ammonium lactate (LAC-HYDRIN) 12 % lotion Apply topically daily. 567 g 5    Compression Stockings MISC by Does not apply route Knee high compression and thigh high stockings 30 mm Hg. Dispense two pairs with 1 year refill. 1 each 5    albuterol sulfate HFA (VENTOLIN HFA) 108 (90 Base) MCG/ACT inhaler Inhale 2 puffs into the lungs 4 times daily as needed for Wheezing 1 Inhaler 5    famotidine (PEPCID) 20 MG tablet TAKE 1 TABLET BY MOUTH TWICE A  tablet 1    levothyroxine (SYNTHROID) 25 MCG tablet TAKE 1 TABLET BY MOUTH EVERY DAY 90 tablet 3    polyethylene glycol (MIRALAX) 17 g packet Take 17 g by mouth 2 times daily 527 g 5    Blood Glucose Monitoring Suppl ([employer] SYSTEM) w/Device KIT 1 kit by Does not apply route 4 times daily (before meals and nightly) 1 kit 11    Spacer/Aero-Holding Chambers (AEROCHAMBER MV) MISC 1 each by Does not apply route 2 times daily 1 each 3    aspirin 81 MG tablet Take 81 mg by mouth daily      Lactobacillus (PROBIOTIC ACIDOPHILUS PO) Take 1 capsule by mouth daily      hydrocortisone 1 % ointment Apply topically 2 times daily as needed (dry skin) Apply topically 2 times daily.       Lift Chair MISC by Does not apply route Needs a lift chair due to mutiple sclerosis. Needs assistance getting out of chair, but once out of chair can ambulate independently with walker or cane. 1 each 0    Incontinence Supply Disposable (PALLAVI SERENITY BRIEFS LARGE) MISC 1 each by Does not apply route three times daily Large to extra large. Give Pallavi underwear protections. Daughter will select the brand. 90 each 5    Incontinence Supplies (BEDPAN) MISC Sorry, but \"1 bottle\" is the only way it will allow us to order this bedpan. 1 Bottle 0    loratadine (CLARITIN) 10 MG tablet Take 1 tablet by mouth daily. 30 tablet 5    Multiple Vitamins-Minerals (CENTRUM SILVER PO) Take 1 tablet by mouth daily       estradiol (ESTRACE) 0.1 MG/GM vaginal cream Twice a week (Patient not taking: Reported on 10/6/2021) 1 Tube 3    fluocinonide (LIDEX) 0.05 % cream Apply topically 2 times daily. (Patient not taking: Reported on 7/2/2021) 60 g 11    vitamin D (CHOLECALCIFEROL) 1000 UNIT TABS tablet Take 3 tablets by mouth daily. (Patient not taking: Reported on 10/6/2021) 90 tablet 5     No current facility-administered medications for this visit. Vitals:    10/06/21 1057   Pulse: 75   SpO2: 99%   Weight: 176 lb (79.8 kg)     Body mass index is 31.18 kg/m². Wt Readings from Last 3 Encounters:   10/06/21 176 lb (79.8 kg)   08/07/19 181 lb (82.1 kg)   05/08/19 176 lb 6.4 oz (80 kg)     BP Readings from Last 3 Encounters:   10/06/21 138/74   03/18/21 (!) 148/84   02/10/21 (!) 156/89       Objective:   Physical Exam  Vitals and nursing note reviewed. Constitutional:       Appearance: She is obese. HENT:      Head: Normocephalic and atraumatic. Right Ear: Tympanic membrane normal.      Left Ear: Tympanic membrane normal.   Cardiovascular:      Rate and Rhythm: Normal rate and regular rhythm. Pulses: Normal pulses. Heart sounds: Normal heart sounds. No murmur heard. Pulmonary:      Effort: Pulmonary effort is normal. No respiratory distress. Breath sounds: Normal breath sounds. No stridor. No wheezing or rhonchi. Musculoskeletal:      Cervical back: Normal range of motion and neck supple. Skin:     General: Skin is warm. Capillary Refill: Capillary refill takes less than 2 seconds. Neurological:      General: No focal deficit present. Mental Status: She is alert and oriented to person, place, and time. Sensory: Sensory deficit present. Motor: Weakness present. Coordination: Coordination abnormal.      Comments: Baseline weakness from MS, foot in a boot   Psychiatric:         Mood and Affect: Mood normal.         Behavior: Behavior normal.         Thought Content: Thought content normal.         Judgment: Judgment normal.       Assessment/Plan:  Harris Barton was seen today for diabetes and hypertension. Diagnoses and all orders for this visit:    Needs flu shot  -     INFLUENZA, QUADV, ADJUVANTED, 65 YRS =, IM, PF, PREFILL SYR, 0.5ML (FLUAD)    Essential hypertension  -     hydrALAZINE (APRESOLINE) 50 MG tablet; Take 1.5 tablets by mouth 3 times daily  -     losartan (COZAAR) 25 MG tablet; Take 1 tablet by mouth daily  -     atenolol (TENORMIN) 50 MG tablet; TAKE 1 TABLET BY MOUTH EVERY DAY  -     verapamil (CALAN SR) 240 MG extended release tablet; Take 0.5 tablets by mouth nightly  -     External Referral to Dietitian    Type 2 diabetes mellitus with complication, with long-term current use of insulin ([facility])  -     insulin glargine (LANTUS SOLOSTAR) 100 UNIT/ML injection pen; Inject 22 Units into the skin nightly  -     Blood Glucose Monitoring Suppl ([employer] IQ SYSTEM) w/Device KIT; 1 kit by Does not apply route 4 times daily (before meals and nightly)  -     atorvastatin (LIPITOR) 40 MG tablet; TAKE 1 TABLET BY MOUTH EVERY DAY  -     blood glucose test strips (ONETOUCH VERIO) strip; USE TO TEST 4 TIMES A DAY BEFORE MEALS AND AT BEDTIME  -     SITagliptin (JANUVIA) 100 MG tablet;  Take 1 tablet by mouth daily TAKE 1 TABLET BY MOUTH EVERY DAY  -     sodium bicarbonate 650 MG tablet; TAKE 1 TABLET BY MOUTH EVERY DAY  -     External Referral to Dietitian  -     POCT glycosylated hemoglobin (Hb A1C)    Moderate persistent asthma without complication  -     albuterol sulfate HFA (VENTOLIN HFA) 108 (90 Base) MCG/ACT inhaler; Inhale 2 puffs into the lungs 4 times daily as needed for Wheezing    Irritable bowel syndrome with constipation  -     polyethylene glycol (MIRALAX) 17 g packet; Take 17 g by mouth 2 times daily    Hyperuricemia  -     allopurinol (ZYLOPRIM) 300 MG tablet; TAKE 1 TABLET BY MOUTH EVERY DAY    Xerosis of skin  -     ammonium lactate (LAC-HYDRIN) 12 % lotion; Apply topically daily. History of pulmonary embolism  -     apixaban (ELIQUIS) 5 MG TABS tablet; TAKE 1 TABLET BY MOUTH 2 TIMES DAILY STOP COUMADIN    Tremors of nervous system  -     divalproex (DEPAKOTE) 125 MG DR tablet; Take one tablet in the am and two tablets in the pm.    Gastroesophageal reflux disease without esophagitis  -     famotidine (PEPCID) 20 MG tablet; TAKE 1 TABLET BY MOUTH TWICE A DAY  -     External Referral to Dietitian    Hypothyroidism due to acquired atrophy of thyroid  -     levothyroxine (SYNTHROID) 25 MCG tablet; TAKE 1 TABLET BY MOUTH EVERY DAY    Acute cystitis with hematuria  -     lidocaine (XYLOCAINE) 5 % ointment; APPLY TO AFFECTED AREA EVERY DAY AS NEEDED    Dyslipidemia associated with type [facility] mellitus [facility]    Other orders  -     budesonide-formoterol (SYMBICORT) 80-4.5 MCG/ACT AERO; Inhale 2 puffs into the lungs 2 times daily TAKE 2 PUFFS BY MOUTH TWICE A DAY  -     loratadine (CLARITIN) 10 MG tablet;  Take 1 tablet by mouth daily  -     azelastine (ASTELIN) 0.1 % nasal spray; 1 spray by Nasal route 2 times daily Use in each nostril as directed  -     ketoconazole (NIZORAL) 2 % cream; APPLY TO AFFECTED AREA EVERY DAY  -     montelukast (SINGULAIR) 10 MG tablet; TAKE 1 TABLET BY MOUTH EVERYDAY AT BEDTIME      Return in about 4 weeks (around 11/3/2021) for 2 months - chronic problems. At total of45 minutes was spent on date of services performing the following:  Preparing to see the patient (reviewing labs/imaging/previous records, etc.)- yes,  Obtaining and reviewing separately obtained history- yes  Performing a medically appropriate examination and/or evaluation- yes,  Counseling and educating the patient an or caregiver- yes, her daughter is a pediatrician and we have discussed her diet in detail. She needs a dietician to help manage her macros on a liquid or soft diet.   Ordering medications, tests, and procedures- yes      Note:  Initial Visits   [id number]- 15-29 minutes  [id number]- 30-44 minutes  58285- 45-59 minutes  12333- 60-74 minutes    Follow- Up Visits  [phone]- 10-19 minutes  04090- 20-29 minutes  50202- 30-39 minutes  [id number]- 40-54 minutes           Mary Diaz MD

## 2021-10-06 NOTE — PROGRESS NOTES
Vaccine Information Sheet, \"Influenza - Inactivated\"  given to Matilda Dickey, or parent/legal guardian of  Matilda Dickey and verbalized understanding. Patient responses:    Have you ever had a reaction to a flu vaccine? No  Do you have any current illness? No  Have you ever had Guillian Cowarts Syndrome? No  Do you have a serious allergy to any of the follow: Neomycin, Polymyxin, Thimerosal, eggs or egg products? No    Flu vaccine given per order. Please see immunization tab. Risks and benefits explained. Current VIS given.       Immunizations Administered     Name Date Dose Route    Influenza, Quadv, adjuvanted, 65 yrs +, IM, PF (Fluad) 10/6/2021 0.5 mL Intramuscular    Site: Deltoid- Left    Lot: [id number]    NDC: 24903-055-13

## 2021-10-12 DIAGNOSIS — Z79.4 TYPE 2 DIABETES MELLITUS WITH COMPLICATION, WITH LONG-TERM CURRENT USE OF INSULIN (HCC): ICD-10-CM

## 2021-10-12 DIAGNOSIS — E11.8 TYPE 2 DIABETES MELLITUS WITH COMPLICATION, WITH LONG-TERM CURRENT USE OF INSULIN (HCC): ICD-10-CM

## 2021-10-12 RX ORDER — PEN NEEDLE, DIABETIC 31 GX5/16"
NEEDLE, DISPOSABLE MISCELLANEOUS
Qty: 100 EACH | Refills: 2 | Status: SHIPPED | OUTPATIENT
Start: 2021-10-12

## 2021-10-12 NOTE — TELEPHONE ENCOUNTER
Patient is requesting a refill of their prescription.     Requested Prescriptions     Pending Prescriptions Disp Refills    B-D UF III MINI PEN NEEDLES 31G X 5 MM MISC [Pharmacy Med Name: BD UF MINI PEN NEEDLE [id number]] 100 each 2     Sig: USE AS DIRECTED NIGHTLY WITH LANTUS PEN        Recent Visits  Date Type Provider Dept   10/06/21 Office Visit Pedro Cifuentes MD [facility]   03/18/21 Office Visit Pedro Cifuentes MD [facility]   02/10/21 Office Visit Pedro Cifuentes MD [facility]   11/11/20 Office Visit Niraj Machuca [facility]   10/09/20 Office Visit Niraj Machuca [facility]   09/09/20 Office Visit Niraj Machuca [facility]   Showing recent visits within past 540 days with a meds authorizing provider and meeting all other requirements  Future Appointments  Date Type Provider Dept   12/01/21 Appointment Pedro Cifuentes MD [facility]   02/02/22 Appointment Pedro Cifuentes MD [facility]   Showing future appointments within next 150 days with a meds authorizing provider and meeting all other requirements     10/6/2021

## 2021-10-17 DIAGNOSIS — N39.0 RECURRENT UTI (URINARY TRACT INFECTION): ICD-10-CM

## 2021-10-18 RX ORDER — NITROFURANTOIN 25; 75 MG/1; MG/1
CAPSULE ORAL
Qty: 10 CAPSULE | Refills: 8 | Status: SHIPPED | OUTPATIENT
Start: 2021-10-18 | End: 2021-12-01

## 2021-10-29 NOTE — TELEPHONE ENCOUNTER
Recent Visits  Date Type Provider Dept   10/06/21 Office Visit Sherie Patterson MD [facility]   03/18/21 Office Visit Sherie Patterson MD [facility]   02/10/21 Office Visit Sherie Patterson MD [facility]   11/11/20 Office Visit Aileen Stanford MD [facility]   10/09/20 Office Visit Aileen Stanford MD [facility]   09/09/20 Office Visit Aileen Stanford MD [facility]   Showing recent visits within past 540 days with a meds authorizing provider and meeting all other requirements  Future Appointments  Date Type Provider Dept   12/01/21 Appointment Sherie Patterson MD [facility]   02/02/22 Appointment Sherie Patterson MD [facility]   Showing future appointments within next 150 days with a meds authorizing provider and meeting all other requirements     10/6/2021

## 2021-10-30 RX ORDER — LORATADINE 10 MG/1
TABLET ORAL
Qty: 30 TABLET | Refills: 5 | Status: SHIPPED | OUTPATIENT
Start: 2021-10-30 | End: 2022-02-02 | Stop reason: SDUPTHER

## 2021-12-01 ENCOUNTER — OFFICE VISIT (OUTPATIENT)
Dept: INTERNAL MEDICINE CLINIC | Age: 86
End: 2021-12-01
Payer: MEDICARE

## 2021-12-01 VITALS
TEMPERATURE: 97 F | BODY MASS INDEX: 33.38 KG/M2 | HEART RATE: 71 BPM | DIASTOLIC BLOOD PRESSURE: 78 MMHG | WEIGHT: 188.4 LBS | HEIGHT: 63 IN | SYSTOLIC BLOOD PRESSURE: 140 MMHG | OXYGEN SATURATION: 99 %

## 2021-12-01 DIAGNOSIS — R06.09 DOE (DYSPNEA ON EXERTION): ICD-10-CM

## 2021-12-01 DIAGNOSIS — M62.81 LEFT-SIDED MUSCLE WEAKNESS: ICD-10-CM

## 2021-12-01 DIAGNOSIS — M25.372 ANKLE INSTABILITY, LEFT: ICD-10-CM

## 2021-12-01 DIAGNOSIS — Z91.81 AT HIGH RISK FOR FALLS: ICD-10-CM

## 2021-12-01 DIAGNOSIS — N39.0 RECURRENT UTI (URINARY TRACT INFECTION): ICD-10-CM

## 2021-12-01 DIAGNOSIS — G35 MS (MULTIPLE SCLEROSIS) (HCC): ICD-10-CM

## 2021-12-01 DIAGNOSIS — R29.898 SEVERE MUSCLE DECONDITIONING: ICD-10-CM

## 2021-12-01 LAB
ALBUMIN SERPL-MCNC: 4 G/DL (ref 3.4–5)
ANION GAP SERPL CALCULATED.3IONS-SCNC: 13 MMOL/L (ref 3–16)
BUN BLDV-MCNC: 46 MG/DL (ref 7–20)
CALCIUM SERPL-MCNC: 11.2 MG/DL (ref 8.3–10.6)
CHLORIDE BLD-SCNC: 102 MMOL/L (ref 99–110)
CO2: 20 MMOL/L (ref 21–32)
CREAT SERPL-MCNC: 1.1 MG/DL (ref 0.6–1.2)
GFR AFRICAN AMERICAN: 57
GFR NON-AFRICAN AMERICAN: 47
GLUCOSE BLD-MCNC: 137 MG/DL (ref 70–99)
MAGNESIUM: 2 MG/DL (ref 1.8–2.4)
PHOSPHORUS: 2.5 MG/DL (ref 2.5–4.9)
POTASSIUM SERPL-SCNC: 4.6 MMOL/L (ref 3.5–5.1)
SODIUM BLD-SCNC: 135 MMOL/L (ref 136–145)

## 2021-12-01 PROCEDURE — 99215 OFFICE O/P EST HI 40 MIN: CPT | Performed by: INTERNAL MEDICINE

## 2021-12-01 RX ORDER — NITROFURANTOIN 25; 75 MG/1; MG/1
100 CAPSULE ORAL DAILY
Qty: 10 CAPSULE | Refills: 8 | Status: SHIPPED | OUTPATIENT
Start: 2021-12-01 | End: 2022-03-01

## 2021-12-01 RX ORDER — CARBOXYMETHYLCELLULOSE/CITRIC 0.75 G
CAPSULE ORAL
Qty: 90 CAPSULE | Refills: 2 | Status: SHIPPED | OUTPATIENT
Start: 2021-12-01 | End: 2022-08-28

## 2021-12-01 RX ORDER — NITROFURANTOIN 25; 75 MG/1; MG/1
100 CAPSULE ORAL DAILY
Qty: 90 CAPSULE | Refills: 1 | Status: SHIPPED | OUTPATIENT
Start: 2021-12-01 | End: 2022-02-22 | Stop reason: SDUPTHER

## 2021-12-01 NOTE — PROGRESS NOTES
Subjective:      Patient ID: Shay Guevara is a 82 y.o. female. Diabetes  She presents for her follow-up diabetic visit. She has type 2 diabetes mellitus. Her disease course has been stable. Pertinent negatives for hypoglycemia include no dizziness. There are no diabetic associated symptoms. There are no hypoglycemic complications. Symptoms are stable. There are no diabetic complications. Risk factors for coronary artery disease include diabetes mellitus, dyslipidemia, obesity and sedentary lifestyle. She is compliant with treatment all of the time. Hypertension: Patient here for follow-up of elevated blood pressure. She is not exercising and is adherent to low salt diet. Blood pressure is well controlled at home. Cardiac symptoms none. Patient denies none. Cardiovascular risk factors: advanced age (older than 54 for men, 72 for women), diabetes mellitus, hypertension, obesity (BMI >= 30 kg/m2) and sedentary lifestyle. Use of agents associated with hypertension: none. History of target organ damage: chronic kidney disease     . Recurrent UTI: UTI's have been  Stable with the use of Macrobid. WEakness: Patient has MS on no medications. She can transfer but is mostly inactive and sedentary. She has been wearing a boot for > 4 months do to an ankle injury. She is not doing Ankle PT. She can walk but requires assistance of an walker. She sits most of the day and has an assistant. Her father assists in care. Hx of Bowel OBstruction: Patient struggles with solids. She has a significant abdominal hernia and does not tolerate anesthesia. She continues to use a ball to keep hernia from protruding. Falls: pATIENT HAS HAD NEAR FALLs. She has significant weakness.       [facility]  POCT A1c = 6%    Treatment Adherence:   Medication compliance:  compliant most of the time  Diet compliance:  compliant most of the time  Weight trend: stable  Current exercise: no regular exercise  Barriers: impairment:  physical: has MS and a bowel obstruction risk which limit her diet    Diabetes Mellitus Type 2: Current symptoms/problems include none. Home blood sugar records: patient does not test  Any episodes of hypoglycemia? no  Eye exam current (within one year): no  Tobacco history: She  reports that she has never smoked. She has never used smokeless tobacco.   Daily Aspirin? Yes    Hypertension:  Home blood pressure monitoring: No.  She is adherent to a low sodium diet. Patient denies chest pain, shortness of breath, headache and lightheadedness. Antihypertensive medication side effects: no medication side effects noted. Use of agents associated with hypertension: none. Hyperlipidemia:  No new myalgias or GI upset on atorvastatin (Lipitor). Lab Results   Component Value Date    LABA1C 6.0 10/06/2021    LABA1C 6.3 03/18/2021    LABA1C 5.3 09/28/2020     Lab Results   Component Value Date    LABMICR YES 10/06/2021    CREATININE 0.9 10/06/2021     Lab Results   Component Value Date    ALT 16 10/06/2021    AST 17 10/06/2021     Lab Results   Component Value Date    CHOL 137 10/06/2021    TRIG 87 10/06/2021    HDL 55 10/06/2021    LDLCALC 65 10/06/2021        Review of Systems   Neurological: Negative for dizziness. Allergies   Allergen Reactions    [place] Meal      Nausea, vomiting and diarrhea with almond milk.     Celexa [Citalopram Hydrobromide]     Famciclovir     Lactose     Metronidazole     Peanut-Containing Drug Products     [place] Oil [Nutritional Supplements] Diarrhea    Zofran     Bactrim [Sulfamethoxazole-Trimethoprim]      Causes hyonatremia to 120's       Current Outpatient Medications   Medication Sig Dispense Refill    nitrofurantoin, macrocrystal-monohydrate, (MACROBID) 100 MG capsule Take 1 capsule by mouth daily (for acute infection) 10 capsule 8    Carboxymeth-Cellulose-CitricAc (PLENITY WELCOME KIT) CAPS Use as directed 90 capsule 2    nitrofurantoin, macrocrystal-monohydrate, (MACROBID) 100 MG capsule Take 1 capsule by mouth daily 90 capsule 1    loratadine (CLARITIN) 10 MG tablet TAKE 1 TABLET BY MOUTH EVERY DAY 30 tablet 5    B-D UF III MINI PEN NEEDLES 31G X 5 MM MISC USE AS DIRECTED NIGHTLY WITH LANTUS  each 2    OneTouch Delica Lancets 06Z MISC by Does not apply route      hydrALAZINE (APRESOLINE) 50 MG tablet Take 1.5 tablets by mouth 3 times daily 405 tablet 3    insulin glargine (LANTUS SOLOSTAR) 100 UNIT/ML injection pen Inject 22 Units into the skin nightly 15 pen 0    losartan (COZAAR) 25 MG tablet Take 1 tablet by mouth daily 90 tablet 3    budesonide-formoterol (SYMBICORT) 80-4.5 MCG/ACT AERO Inhale 2 puffs into the lungs 2 times daily TAKE 2 PUFFS BY MOUTH TWICE A DAY 1 each 5    albuterol sulfate HFA (VENTOLIN HFA) 108 (90 Base) MCG/ACT inhaler Inhale 2 puffs into the lungs 4 times daily as needed for Wheezing 1 each 5    polyethylene glycol (MIRALAX) 17 g packet Take 17 g by mouth 2 times daily 527 g 5    azelastine (ASTELIN) 0.1 % nasal spray 1 spray by Nasal route 2 times daily Use in each nostril as directed 1 each 1    Blood Glucose Monitoring Suppl (ONETOUCH VERIO IQ SYSTEM) w/Device KIT 1 kit by Does not apply route 4 times daily (before meals and nightly) 1 kit 11    allopurinol (ZYLOPRIM) 300 MG tablet TAKE 1 TABLET BY MOUTH EVERY DAY 90 tablet 3    ammonium lactate (LAC-HYDRIN) 12 % lotion Apply topically daily.  567 g 5    apixaban (ELIQUIS) 5 MG TABS tablet TAKE 1 TABLET BY MOUTH 2 TIMES DAILY STOP COUMADIN 180 tablet 3    atenolol (TENORMIN) 50 MG tablet TAKE 1 TABLET BY MOUTH EVERY DAY 90 tablet 3    atorvastatin (LIPITOR) 40 MG tablet TAKE 1 TABLET BY MOUTH EVERY DAY 90 tablet 3    blood glucose test strips (ONETOUCH VERIO) strip USE TO TEST 4 TIMES A DAY BEFORE MEALS AND AT BEDTIME 300 strip 11    divalproex (DEPAKOTE) 125 MG DR tablet Take one tablet in the am and two tablets in the pm. 270 tablet 1    famotidine (PEPCID) 20 MG tablet TAKE 1 TABLET BY MOUTH TWICE A  tablet 1    ketoconazole (NIZORAL) 2 % cream APPLY TO AFFECTED AREA EVERY  g 3    levothyroxine (SYNTHROID) 25 MCG tablet TAKE 1 TABLET BY MOUTH EVERY DAY 90 tablet 1    lidocaine (XYLOCAINE) 5 % ointment APPLY TO AFFECTED AREA EVERY DAY AS NEEDED 35.44 g 5    montelukast (SINGULAIR) 10 MG tablet TAKE 1 TABLET BY MOUTH EVERYDAY AT BEDTIME 90 tablet 1    SITagliptin (JANUVIA) 100 MG tablet Take 1 tablet by mouth daily TAKE 1 TABLET BY MOUTH EVERY DAY 90 tablet 1    verapamil (CALAN SR) 240 MG extended release tablet Take 0.5 tablets by mouth nightly 45 tablet 1    sodium bicarbonate 650 MG tablet TAKE 1 TABLET BY MOUTH EVERY DAY 90 tablet 5    BD PEN NEEDLE ANNA U/F 32G X 4 MM MISC USE AS DIRECTEDDAILY 100 each 3    nystatin (MYCOSTATIN) 993596 UNIT/GM cream Apply topically 2 times daily. 90 g 3    Compression Stockings MISC by Does not apply route Knee high compression and thigh high stockings 30 mm Hg. Dispense two pairs with 1 year refill. 1 each 5    Spacer/Aero-Holding Chambers (AEROCHAMBER MV) MISC 1 each by Does not apply route 2 times daily 1 each 3    aspirin 81 MG tablet Take 81 mg by mouth daily      Lactobacillus (PROBIOTIC ACIDOPHILUS PO) Take 1 capsule by mouth daily      hydrocortisone 1 % ointment Apply topically 2 times daily as needed (dry skin) Apply topically 2 times daily.  Lift Chair MISC by Does not apply route Needs a lift chair due to mutiple sclerosis. Needs assistance getting out of chair, but once out of chair can ambulate independently with walker or cane. 1 each 0    Incontinence Supply Disposable (PALLAVI SERENITY BRIEFS LARGE) MISC 1 each by Does not apply route three times daily Large to extra large. Give Pallavi underwear protections. Daughter will select the brand. 90 each 5    Incontinence Supplies (BEDPAN) MISC Sorry, but \"1 bottle\" is the only way it will allow us to order this bedpan.  1 Bottle 0    Multiple Vitamins-Minerals (CENTRUM SILVER PO) Take 1 tablet by mouth daily        No current facility-administered medications for this visit. Vitals:    12/01/21 1043   BP: (!) 140/78   Site: Right Upper Arm   Position: Sitting   Pulse: 71   Temp: 97 °F (36.1 °C)   TempSrc: Temporal   SpO2: 99%   Weight: 188 lb 6.4 oz (85.5 kg)   Height: 5' 3\" (1.6 m)     Body mass index is 33.37 kg/m². Wt Readings from Last 3 Encounters:   12/01/21 188 lb 6.4 oz (85.5 kg)   10/06/21 176 lb (79.8 kg)   08/07/19 181 lb (82.1 kg)     BP Readings from Last 3 Encounters:   12/01/21 (!) 140/78   10/06/21 138/74   03/18/21 (!) 148/84       Objective:   Physical Exam  Vitals and nursing note reviewed. Constitutional:       Appearance: She is obese. HENT:      Head: Normocephalic and atraumatic. Right Ear: Tympanic membrane normal.      Left Ear: Tympanic membrane normal.   Cardiovascular:      Rate and Rhythm: Normal rate and regular rhythm. Pulses: Normal pulses. Heart sounds: Normal heart sounds. No murmur heard. Pulmonary:      Effort: Pulmonary effort is normal. No respiratory distress. Breath sounds: Normal breath sounds. No stridor. No wheezing or rhonchi. Musculoskeletal:      Cervical back: Normal range of motion and neck supple. Skin:     General: Skin is warm. Capillary Refill: Capillary refill takes less than 2 seconds. Neurological:      General: No focal deficit present. Mental Status: She is alert and oriented to person, place, and time. Sensory: Sensory deficit present. Motor: Weakness present. Coordination: Coordination abnormal.      Comments: Baseline weakness from MS, foot in a boot   Psychiatric:         Mood and Affect: Mood normal.         Behavior: Behavior normal.         Thought Content: Thought content normal.         Judgment: Judgment normal.       Assessment/Plan:  Amalia Saucedo was seen today for 1 month follow-up.     Diagnoses and all orders for this visit:    BMI 33.0-33.9,adult  -     Carboxymeth-Cellulose-CitricAc (PLENITY WELCOME KIT) CAPS; Use as directed    At high risk for falls  -     External Referral To Physical Therapy    Ankle instability, left  -     External Referral To Physical Therapy    MS (multiple sclerosis) ([facility] [address]  -     External Referral To Physical Therapy    Severe muscle deconditioning  -     External Referral To Physical Therapy    Left-sided muscle weakness  -     External Referral To Physical Therapy    [facility] (dyspnea on exertion)  -     Renal Function Panel; Future  -     MAGNESIUM; Future    Recurrent UTI (urinary tract infection)  -     nitrofurantoin, macrocrystal-monohydrate, (MACROBID) 100 MG capsule; Take 1 capsule by mouth daily    Other orders  -     nitrofurantoin, macrocrystal-monohydrate, (MACROBID) 100 MG capsule; Take 1 capsule by mouth daily (for acute infection)      At total of45 minutes was spent on date of services performing the following:  Preparing to see the patient (reviewing labs/imaging/previous records, etc.)- yes,  Obtaining and reviewing separately obtained history- yes  Performing a medically appropriate examination and/or evaluation- yes,  Counseling and educating the patient an or caregiver- yes, her daughter is a pediatrician and we have discussed her diet in detail. She needs a dietician to help manage her macros on a liquid or soft diet.   Ordering medications, tests, and procedures- yes      Note:  Initial Visits   [id number]- 15-29 minutes  [id number]- 30-44 minutes  [id number]- 45-59 minutes  [id number]- 60-74 minutes    Follow- Up Visits  [phone]- 10-19 minutes  [id number]- 20-29 minutes  [id number]- 30-39 minutes  72621- 40-54 minutes           [address]ley

## 2021-12-04 DIAGNOSIS — E11.8 TYPE 2 DIABETES MELLITUS WITH COMPLICATION, WITH LONG-TERM CURRENT USE OF INSULIN (HCC): ICD-10-CM

## 2021-12-04 DIAGNOSIS — Z79.4 TYPE 2 DIABETES MELLITUS WITH COMPLICATION, WITH LONG-TERM CURRENT USE OF INSULIN (HCC): ICD-10-CM

## 2021-12-06 RX ORDER — INSULIN GLARGINE 100 [IU]/ML
22 INJECTION, SOLUTION SUBCUTANEOUS NIGHTLY
Qty: 5 PEN | Refills: 1 | Status: SHIPPED | OUTPATIENT
Start: 2021-12-06 | End: 2022-02-22 | Stop reason: SDUPTHER

## 2021-12-16 NOTE — TELEPHONE ENCOUNTER
Med pended Patient is scheduled for a Pulmonary Function Test, and needs to have a PRE-PROCEDURE COVID TEST prior to the Pulmonary Function Test.    If you could please place an order for a COIVD test to be performed 48-72 hours prior to their PFT it would be greatly appreciated.     Order to Enter: 2019 NOVEL CORONAVIRUS (SARS-COV-2) [[id number]]    Patient will be informed of new protocol for COVID Test prior to Pulmonary Function Test, and will be scheduled for the COVID Test when we call them to schedule the PFT.     Please advise,  Thank you!

## 2022-01-13 ENCOUNTER — PATIENT MESSAGE (OUTPATIENT)
Dept: INTERNAL MEDICINE CLINIC | Age: 87
End: 2022-01-13

## 2022-01-13 ENCOUNTER — TELEPHONE (OUTPATIENT)
Dept: INTERNAL MEDICINE CLINIC | Age: 87
End: 2022-01-13

## 2022-01-13 DIAGNOSIS — I10 ESSENTIAL HYPERTENSION: ICD-10-CM

## 2022-01-13 RX ORDER — HYDRALAZINE HYDROCHLORIDE 50 MG/1
75 TABLET, FILM COATED ORAL 3 TIMES DAILY
Qty: 405 TABLET | Refills: 3 | Status: SHIPPED | OUTPATIENT
Start: 2022-01-13 | End: 2022-02-22 | Stop reason: SDUPTHER

## 2022-01-13 NOTE — TELEPHONE ENCOUNTER
From: King Ken  To: Dr. Robyn Simon: 1/13/2022 1:27 PM EST  Subject: Medication     My mom is almost out of her hydralazine and needs a refill sent to [address] [place] [address]. My dad says he went to get her med and the pharmacy says it was dropped from her med profile. I think this was done in error.

## 2022-01-13 NOTE — TELEPHONE ENCOUNTER
Requested Prescriptions     Pending Prescriptions Disp Refills    hydrALAZINE (APRESOLINE) 50 MG tablet [Pharmacy Med Name: HYDRALAZINE 50 MG TABLET] 405 tablet 3     Sig: TAKE 1.5 TABLETS BY MOUTH 3 TIMES DAILY     Please review the pending medication refill.     Patient was last seen 12/01/2021    Next office visit is 02/02/2022

## 2022-02-02 ENCOUNTER — VIRTUAL VISIT (OUTPATIENT)
Dept: INTERNAL MEDICINE CLINIC | Age: 87
End: 2022-02-02
Payer: MEDICARE

## 2022-02-02 ENCOUNTER — ANTI-COAG VISIT (OUTPATIENT)
Dept: PRIMARY CARE CLINIC | Age: 87
End: 2022-02-02

## 2022-02-02 DIAGNOSIS — E03.4 HYPOTHYROIDISM DUE TO ACQUIRED ATROPHY OF THYROID: ICD-10-CM

## 2022-02-02 DIAGNOSIS — N39.0 RECURRENT UTI (URINARY TRACT INFECTION): ICD-10-CM

## 2022-02-02 DIAGNOSIS — R35.0 FREQUENCY OF URINATION AND POLYURIA: ICD-10-CM

## 2022-02-02 DIAGNOSIS — R25.1 TREMORS OF NERVOUS SYSTEM: ICD-10-CM

## 2022-02-02 DIAGNOSIS — I10 ESSENTIAL HYPERTENSION: ICD-10-CM

## 2022-02-02 DIAGNOSIS — R35.89 FREQUENCY OF URINATION AND POLYURIA: ICD-10-CM

## 2022-02-02 DIAGNOSIS — K21.9 GASTROESOPHAGEAL REFLUX DISEASE WITHOUT ESOPHAGITIS: ICD-10-CM

## 2022-02-02 DIAGNOSIS — J45.40 MODERATE PERSISTENT ASTHMA WITHOUT COMPLICATION: ICD-10-CM

## 2022-02-02 DIAGNOSIS — Z86.711 HISTORY OF PULMONARY EMBOLISM: ICD-10-CM

## 2022-02-02 DIAGNOSIS — K58.1 IRRITABLE BOWEL SYNDROME WITH CONSTIPATION: ICD-10-CM

## 2022-02-02 DIAGNOSIS — E11.8 TYPE 2 DIABETES MELLITUS WITH COMPLICATION, WITH LONG-TERM CURRENT USE OF INSULIN (HCC): ICD-10-CM

## 2022-02-02 DIAGNOSIS — R73.9 HYPERGLYCEMIA: Primary | ICD-10-CM

## 2022-02-02 DIAGNOSIS — E79.0 HYPERURICEMIA: ICD-10-CM

## 2022-02-02 DIAGNOSIS — Z79.4 TYPE 2 DIABETES MELLITUS WITH COMPLICATION, WITH LONG-TERM CURRENT USE OF INSULIN (HCC): ICD-10-CM

## 2022-02-02 DIAGNOSIS — L85.3 XEROSIS OF SKIN: ICD-10-CM

## 2022-02-02 PROCEDURE — 99214 OFFICE O/P EST MOD 30 MIN: CPT | Performed by: INTERNAL MEDICINE

## 2022-02-08 DIAGNOSIS — R39.9 UTI SYMPTOMS: ICD-10-CM

## 2022-02-08 LAB
BACTERIA: ABNORMAL /HPF
BILIRUBIN URINE: NEGATIVE
BLOOD, URINE: NEGATIVE
CLARITY: ABNORMAL
COLOR: YELLOW
COMMENT UA: ABNORMAL
EPITHELIAL CELLS, UA: 2 /HPF (ref 0–5)
GLUCOSE URINE: NEGATIVE MG/DL
HYALINE CASTS: 2 /LPF (ref 0–8)
KETONES, URINE: NEGATIVE MG/DL
LEUKOCYTE ESTERASE, URINE: ABNORMAL
MICROSCOPIC EXAMINATION: YES
NITRITE, URINE: NEGATIVE
PH UA: 7 (ref 5–8)
PROTEIN UA: 100 MG/DL
RBC UA: ABNORMAL /HPF (ref 0–4)
SPECIFIC GRAVITY UA: 1.02 (ref 1–1.03)
URINE REFLEX TO CULTURE: YES
URINE TYPE: ABNORMAL
UROBILINOGEN, URINE: 0.2 E.U./DL
WBC UA: >900 /HPF (ref 0–5)

## 2022-02-10 DIAGNOSIS — E11.8 TYPE 2 DIABETES MELLITUS WITH UNSPECIFIED COMPLICATIONS (HCC): ICD-10-CM

## 2022-02-10 LAB
ORGANISM: ABNORMAL
URINE CULTURE, ROUTINE: ABNORMAL

## 2022-02-10 RX ORDER — LANCETS 33 GAUGE
EACH MISCELLANEOUS
Qty: 100 EACH | Refills: 5 | Status: SHIPPED | OUTPATIENT
Start: 2022-02-10 | End: 2022-03-16 | Stop reason: SDUPTHER

## 2022-02-11 ENCOUNTER — PATIENT MESSAGE (OUTPATIENT)
Dept: INTERNAL MEDICINE CLINIC | Age: 87
End: 2022-02-11

## 2022-02-12 DIAGNOSIS — N39.0 UTI DUE TO KLEBSIELLA SPECIES: ICD-10-CM

## 2022-02-12 DIAGNOSIS — R39.9 UTI SYMPTOMS: Primary | ICD-10-CM

## 2022-02-12 DIAGNOSIS — B96.89 UTI DUE TO KLEBSIELLA SPECIES: ICD-10-CM

## 2022-02-12 RX ORDER — LEVOFLOXACIN 250 MG/1
375 TABLET ORAL DAILY
Qty: 15 TABLET | Refills: 0 | Status: SHIPPED | OUTPATIENT
Start: 2022-02-12 | End: 2022-02-22

## 2022-02-14 ENCOUNTER — PATIENT MESSAGE (OUTPATIENT)
Dept: INTERNAL MEDICINE CLINIC | Age: 87
End: 2022-02-14

## 2022-02-14 NOTE — TELEPHONE ENCOUNTER
From: Minor German  To: Dr. Blanchard Drop: 2/14/2022 12:00 PM EST  Subject: UTI    Picked up the med Saturday.  Thank You

## 2022-02-14 NOTE — TELEPHONE ENCOUNTER
From: Jessica Appiah  To: Dr. Dorota Lacey: 2/11/2022 6:30 PM EST  Subject: Question regarding URINE RT REFLEX TO CULTURE    She has not been feeling well. Burning with urination and nausea.  Are you going to send an antibiotic

## 2022-02-15 RX ORDER — AZELASTINE 1 MG/ML
SPRAY, METERED NASAL
Qty: 1 EACH | Refills: 1 | Status: SHIPPED | OUTPATIENT
Start: 2022-02-15 | End: 2022-03-13

## 2022-02-22 RX ORDER — AMMONIUM LACTATE 12 G/100G
LOTION TOPICAL
Qty: 567 G | Refills: 5 | Status: SHIPPED | OUTPATIENT
Start: 2022-02-22 | End: 2022-10-05

## 2022-02-22 RX ORDER — INSULIN GLARGINE 100 [IU]/ML
22 INJECTION, SOLUTION SUBCUTANEOUS NIGHTLY
Qty: 5 PEN | Refills: 1 | Status: SHIPPED | OUTPATIENT
Start: 2022-02-22 | End: 2022-08-29

## 2022-02-22 RX ORDER — ATORVASTATIN CALCIUM 40 MG/1
TABLET, FILM COATED ORAL
Qty: 90 TABLET | Refills: 3 | Status: SHIPPED | OUTPATIENT
Start: 2022-02-22

## 2022-02-22 RX ORDER — ALLOPURINOL 300 MG/1
TABLET ORAL
Qty: 90 TABLET | Refills: 3 | Status: SHIPPED | OUTPATIENT
Start: 2022-02-22

## 2022-02-22 RX ORDER — LORATADINE 10 MG/1
10 TABLET ORAL DAILY
Qty: 30 TABLET | Refills: 5 | Status: SHIPPED | OUTPATIENT
Start: 2022-02-22

## 2022-02-22 RX ORDER — MONTELUKAST SODIUM 10 MG/1
TABLET ORAL
Qty: 90 TABLET | Refills: 1 | Status: SHIPPED | OUTPATIENT
Start: 2022-02-22 | End: 2022-10-05 | Stop reason: SDUPTHER

## 2022-02-22 RX ORDER — ATENOLOL 50 MG/1
TABLET ORAL
Qty: 90 TABLET | Refills: 3 | Status: SHIPPED | OUTPATIENT
Start: 2022-02-22

## 2022-02-22 RX ORDER — ALBUTEROL SULFATE 90 UG/1
2 AEROSOL, METERED RESPIRATORY (INHALATION) 4 TIMES DAILY PRN
Qty: 1 EACH | Refills: 5 | Status: SHIPPED | OUTPATIENT
Start: 2022-02-22

## 2022-02-22 RX ORDER — KETOCONAZOLE 20 MG/G
CREAM TOPICAL
Qty: 180 G | Refills: 3 | Status: SHIPPED | OUTPATIENT
Start: 2022-02-22

## 2022-02-22 RX ORDER — LEVOTHYROXINE SODIUM 0.03 MG/1
TABLET ORAL
Qty: 90 TABLET | Refills: 1 | Status: SHIPPED | OUTPATIENT
Start: 2022-02-22 | End: 2022-08-31

## 2022-02-22 RX ORDER — POLYETHYLENE GLYCOL 3350 17 G/17G
17 POWDER, FOR SOLUTION ORAL 2 TIMES DAILY
Qty: 527 G | Refills: 5 | Status: SHIPPED | OUTPATIENT
Start: 2022-02-22 | End: 2022-03-24

## 2022-02-22 RX ORDER — FAMOTIDINE 20 MG/1
TABLET, FILM COATED ORAL
Qty: 180 TABLET | Refills: 1 | Status: SHIPPED | OUTPATIENT
Start: 2022-02-22 | End: 2022-09-05

## 2022-02-22 RX ORDER — NITROFURANTOIN 25; 75 MG/1; MG/1
100 CAPSULE ORAL DAILY
Qty: 90 CAPSULE | Refills: 1 | Status: SHIPPED | OUTPATIENT
Start: 2022-02-22

## 2022-02-22 RX ORDER — DIVALPROEX SODIUM 125 MG/1
TABLET, DELAYED RELEASE ORAL
Qty: 270 TABLET | Refills: 1 | Status: SHIPPED | OUTPATIENT
Start: 2022-02-22 | End: 2022-03-03

## 2022-02-22 RX ORDER — VERAPAMIL HYDROCHLORIDE 240 MG/1
120 TABLET, FILM COATED, EXTENDED RELEASE ORAL NIGHTLY
Qty: 45 TABLET | Refills: 1 | Status: SHIPPED | OUTPATIENT
Start: 2022-02-22 | End: 2022-02-28 | Stop reason: SDUPTHER

## 2022-02-22 RX ORDER — AMOXICILLIN AND CLAVULANATE POTASSIUM 875; 125 MG/1; MG/1
1 TABLET, FILM COATED ORAL 2 TIMES DAILY
Qty: 20 TABLET | Refills: 0 | Status: SHIPPED | OUTPATIENT
Start: 2022-02-22 | End: 2022-03-08

## 2022-02-22 RX ORDER — HYDRALAZINE HYDROCHLORIDE 50 MG/1
75 TABLET, FILM COATED ORAL 3 TIMES DAILY
Qty: 405 TABLET | Refills: 3 | Status: SHIPPED | OUTPATIENT
Start: 2022-02-22

## 2022-02-22 RX ORDER — LOSARTAN POTASSIUM 25 MG/1
25 TABLET ORAL DAILY
Qty: 90 TABLET | Refills: 3 | Status: SHIPPED | OUTPATIENT
Start: 2022-02-22

## 2022-02-22 NOTE — PROGRESS NOTES
Melany Knowles (:  1935) is a Established patient, here for evaluation of the following:    Assessment & Plan   Below is the assessment and plan developed based on review of pertinent history, physical exam, labs, studies, and medications. 1. Hyperglycemia - Notes an elevation in blood sugars  - likely due to urinary tract infection  - discussed importance of maintaining sugars and diet. There is room for mild elevation  - last a1c was excelent  -     URINE RT REFLEX TO CULTURE; Standing    Essential hypertension  -     verapamil (CALAN SR) 240 MG extended release tablet; Take 0.5 tablets by mouth nightly  -     losartan (COZAAR) 25 MG tablet; Take 1 tablet by mouth daily  -     hydrALAZINE (APRESOLINE) 50 MG tablet; Take 1.5 tablets by mouth 3 times daily  -     atenolol (TENORMIN) 50 MG tablet; TAKE 1 TABLET BY MOUTH EVERY DAY    Type 2 diabetes mellitus with complication, with long-term current use of insulin (HCC)  -     SITagliptin (JANUVIA) 100 MG tablet; Take 1 tablet by mouth daily TAKE 1 TABLET BY MOUTH EVERY DAY  -     insulin glargine (LANTUS SOLOSTAR) 100 UNIT/ML injection pen; Inject 22 Units into the skin nightly  -     atorvastatin (LIPITOR) 40 MG tablet; TAKE 1 TABLET BY MOUTH EVERY DAY    Irritable bowel syndrome with constipation  -     polyethylene glycol (MIRALAX) 17 g packet; Take 17 g by mouth 2 times daily    Recurrent UTI (urinary tract infection)  -     nitrofurantoin, macrocrystal-monohydrate, (MACROBID) 100 MG capsule; Take 1 capsule by mouth daily    Hypothyroidism due to acquired atrophy of thyroid  -     levothyroxine (SYNTHROID) 25 MCG tablet; TAKE 1 TABLET BY MOUTH EVERY DAY    Tremors of nervous system  -     divalproex (DEPAKOTE) 125 MG DR tablet;  Take one tablet in the am and two tablets in the pm.    Gastroesophageal reflux disease without esophagitis  -     famotidine (PEPCID) 20 MG tablet; TAKE 1 TABLET BY MOUTH TWICE A DAY    History of pulmonary embolism  - apixaban (ELIQUIS) 5 MG TABS tablet; TAKE 1 TABLET BY MOUTH 2 TIMES DAILY STOP COUMADIN    Xerosis of skin  -     ammonium lactate (LAC-HYDRIN) 12 % lotion; Apply topically daily. Hyperuricemia  -     allopurinol (ZYLOPRIM) 300 MG tablet; TAKE 1 TABLET BY MOUTH EVERY DAY    Moderate persistent asthma without complication  -     albuterol sulfate HFA (VENTOLIN HFA) 108 (90 Base) MCG/ACT inhaler; Inhale 2 puffs into the lungs 4 times daily as needed for Wheezing    Frequency of urination and polyuria  -     URINE RT REFLEX TO CULTURE; Standing    Other orders  -     montelukast (SINGULAIR) 10 MG tablet; TAKE 1 TABLET BY MOUTH EVERYDAY AT BEDTIME  -     loratadine (CLARITIN) 10 MG tablet; Take 1 tablet by mouth daily  -     ketoconazole (NIZORAL) 2 % cream; APPLY TO AFFECTED AREA EVERY DAY  -     amoxicillin-clavulanate (AUGMENTIN) 875-125 MG per tablet; Take 1 tablet by mouth 2 times daily for 14 days      Subjective   HPI   83 yo female  has a past medical history of Adhesion of abdominal wall, Anemia, unspecified, Asthma, Cholelithiasis, Chronic kidney disease (CKD), stage III (moderate) ([address] Diabetes, Gout, History of uterine cancer, Hyperlipidemia, Hypertension, Hypothyroidism, Irritable bowel syndrome with constipation, Kidney disease, Monoclonal paraproteinemia, Osteoporosis, Sclerosis of the skin, and Type 2 diabetes mellitus without complication ([address] She presents in follow up of chronic illness. She continues to struggle with meal choices and worries about her blood sugars She does have elevated blood sugars. She reports mild frequency. Her Asthma is doing well. Hypertension:  Home blood pressure monitoring: Yes. She is adherent to a low sodium diet. Patient denies blurred vision, peripheral edema, palpitations and dry cough. Antihypertensive medication side effects: no medication side effects noted. Use of agents associated with hypertension: none. Sodium (mmol/L)   Date Value   12/01/2021 135 (L)    BUN (mg/dL)   Date Value   12/01/2021 46 (H)    Glucose   Date Value   12/01/2021 137 mg/dL (H)   06/02/2017 95 MG/DL      Potassium (mmol/L)   Date Value   12/01/2021 4.6    CREATININE (mg/dL)   Date Value   12/01/2021 1.1         SHe continues to struggle with abdominal discomfort and constipation. SHe has a history of SBO. Her Asthma is doing well. Review of Systems   All other systems reviewed and are negative. Allergies   Allergen Reactions    [place] Meal      Nausea, vomiting and diarrhea with almond milk.     Celexa [Citalopram Hydrobromide]     Famciclovir     Lactose     Metronidazole     Peanut-Containing Drug Products     [place] Oil [Nutritional Supplements] Diarrhea    Zofran     Bactrim [Sulfamethoxazole-Trimethoprim]      Causes hyonatremia to 120's       Current Outpatient Medications   Medication Sig Dispense Refill    hydrALAZINE (APRESOLINE) 50 MG tablet TAKE 1.5 TABLETS BY MOUTH 3 TIMES DAILY 405 tablet 3    LANTUS SOLOSTAR 100 UNIT/ML injection pen INJECT 22 UNITS INTO THE SKIN NIGHTLY 5 pen 1    nitrofurantoin, macrocrystal-monohydrate, (MACROBID) 100 MG capsule Take 1 capsule by mouth daily (for acute infection) 10 capsule 8    Carboxymeth-Cellulose-CitricAc (PLENITY WELCOME KIT) CAPS Use as directed 90 capsule 2    loratadine (CLARITIN) 10 MG tablet TAKE 1 TABLET BY MOUTH EVERY DAY 30 tablet 5    B-D UF III MINI PEN NEEDLES 31G X 5 MM MISC USE AS DIRECTED NIGHTLY WITH LANTUS  each 2    losartan (COZAAR) 25 MG tablet Take 1 tablet by mouth daily 90 tablet 3    budesonide-formoterol (SYMBICORT) 80-4.5 MCG/ACT AERO Inhale 2 puffs into the lungs 2 times daily TAKE 2 PUFFS BY MOUTH TWICE A DAY 1 each 5    albuterol sulfate HFA (VENTOLIN HFA) 108 (90 Base) MCG/ACT inhaler Inhale 2 puffs into the lungs 4 times daily as needed for Wheezing 1 each 5    Blood Glucose Monitoring Suppl Cj MANN SYSTEM) w/Device KIT 1 kit by Does not apply route 4 times daily (before meals and nightly) 1 kit 11    allopurinol (ZYLOPRIM) 300 MG tablet TAKE 1 TABLET BY MOUTH EVERY DAY 90 tablet 3    ammonium lactate (LAC-HYDRIN) 12 % lotion Apply topically daily. 567 g 5    apixaban (ELIQUIS) 5 MG TABS tablet TAKE 1 TABLET BY MOUTH 2 TIMES DAILY STOP COUMADIN 180 tablet 3    atenolol (TENORMIN) 50 MG tablet TAKE 1 TABLET BY MOUTH EVERY DAY 90 tablet 3    atorvastatin (LIPITOR) 40 MG tablet TAKE 1 TABLET BY MOUTH EVERY DAY 90 tablet 3    blood glucose test strips (ONETOUCH VERIO) strip USE TO TEST 4 TIMES A DAY BEFORE MEALS AND AT BEDTIME 300 strip 11    divalproex (DEPAKOTE) 125 MG DR tablet Take one tablet in the am and two tablets in the pm. 270 tablet 1    famotidine (PEPCID) 20 MG tablet TAKE 1 TABLET BY MOUTH TWICE A  tablet 1    ketoconazole (NIZORAL) 2 % cream APPLY TO AFFECTED AREA EVERY  g 3    levothyroxine (SYNTHROID) 25 MCG tablet TAKE 1 TABLET BY MOUTH EVERY DAY 90 tablet 1    lidocaine (XYLOCAINE) 5 % ointment APPLY TO AFFECTED AREA EVERY DAY AS NEEDED 35.44 g 5    montelukast (SINGULAIR) 10 MG tablet TAKE 1 TABLET BY MOUTH EVERYDAY AT BEDTIME 90 tablet 1    sodium bicarbonate 650 MG tablet TAKE 1 TABLET BY MOUTH EVERY DAY 90 tablet 5    BD PEN NEEDLE ANNA U/F 32G X 4 MM MISC USE AS DIRECTEDDAILY 100 each 3    Compression Stockings MISC by Does not apply route Knee high compression and thigh high stockings 30 mm Hg. Dispense two pairs with 1 year refill. 1 each 5    Spacer/Aero-Holding Chambers (AEROCHAMBER MV) MISC 1 each by Does not apply route 2 times daily 1 each 3    aspirin 81 MG tablet Take 81 mg by mouth daily      Lactobacillus (PROBIOTIC ACIDOPHILUS PO) Take 1 capsule by mouth daily      hydrocortisone 1 % ointment Apply topically 2 times daily as needed (dry skin) Apply topically 2 times daily.       Lift Chair MISC by Does not apply route Needs a lift chair due to mutiple sclerosis. Needs assistance getting out of chair, but once out of chair can ambulate independently with walker or cane. 1 each 0    Incontinence Supply Disposable (PALLAVI SERENITY BRIEFS LARGE) MISC 1 each by Does not apply route three times daily Large to extra large. Give Pallavi underwear protections. Daughter will select the brand. 90 each 5    Incontinence Supplies (BEDPAN) MISC Sorry, but \"1 bottle\" is the only way it will allow us to order this bedpan. 1 Bottle 0    Multiple Vitamins-Minerals (CENTRUM SILVER PO) Take 1 tablet by mouth daily       azelastine (ASTELIN) 0.1 % nasal spray INSTILL 1 SPRAY BY NASAL ROUTE 2 TIMES DAILY USE IN EACH NOSTRIL AS DIRECTED 1 each 1    levoFLOXacin (LEVAQUIN) 250 MG tablet Take 1.5 tablets by mouth daily for 10 days 15 tablet 0    Lancets (ONETOUCH DELICA PLUS [id number]) MISC 1 EACH BY DOES NOT APPLY ROUTE 4 TIMES DAILY (AFTER MEALS AND AT BEDTIME) 100 each 5    nitrofurantoin, macrocrystal-monohydrate, (MACROBID) 100 MG capsule Take 1 capsule by mouth daily 90 capsule 1    polyethylene glycol (MIRALAX) 17 g packet Take 17 g by mouth 2 times daily 527 g 5    SITagliptin (JANUVIA) 100 MG tablet Take 1 tablet by mouth daily TAKE 1 TABLET BY MOUTH EVERY DAY 90 tablet 1    verapamil (CALAN SR) 240 MG extended release tablet Take 0.5 tablets by mouth nightly 45 tablet 1    nystatin (MYCOSTATIN) 848482 UNIT/GM cream Apply topically 2 times daily. (Patient not taking: Reported on 2/2/2022) 90 g 3     No current facility-administered medications for this visit. There were no vitals filed for this visit. There is no height or weight on file to calculate BMI.      Wt Readings from Last 3 Encounters:   12/01/21 188 lb 6.4 oz (85.5 kg)   10/06/21 176 lb (79.8 kg)   08/07/19 181 lb (82.1 kg)     BP Readings from Last 3 Encounters:   12/01/21 (!) 140/78   10/06/21 138/74   03/18/21 (!) 148/84       Objective   Patient-Reported Vitals  No data recorded Physical Exam  [INSTRUCTIONS:  \"[x]\" Indicates a positive item  \"[]\" Indicates a negative item  -- DELETE ALL ITEMS NOT EXAMINED]    Constitutional: [x] Appears well-developed and well-nourished [x] No apparent distress      [] Abnormal -     Mental status: [x] Alert and awake  [x] Oriented to person/place/time [x] Able to follow commands    [] Abnormal -     Eyes:   EOM    [x]  Normal    [] Abnormal -   Sclera  [x]  Normal    [] Abnormal -          Discharge [x]  None visible   [] Abnormal -     HENT: [x] Normocephalic, atraumatic  [] Abnormal -   [x] Mouth/Throat: Mucous membranes are moist    External Ears [x] Normal  [] Abnormal -    Neck: [x] No visualized mass [] Abnormal -     Pulmonary/Chest: [x] Respiratory effort normal   [x] No visualized signs of difficulty breathing or respiratory distress        [] Abnormal -      Musculoskeletal:   [x] Normal gait with no signs of ataxia         [x] Normal range of motion of neck        [x] Abnormal - patient has her baseline weakness and is sitting in her chair  Neurological:        [x] No Facial Asymmetry (Cranial nerve 7 motor function) (limited exam due to video visit)          [x] No gaze palsy        [] Abnormal -          Skin:        [x] No significant exanthematous lesions or discoloration noted on facial skin         [] Abnormal -            Psychiatric:       [x] Normal Affect [] Abnormal -        [x] No Hallucinations    Other pertinent observable physical exam findings:-           Jonatan Moore, was evaluated through a synchronous (real-time) audio-video encounter. The patient (or guardian if applicable) is aware that this is a billable service, which includes applicable co-pays. This Virtual Visit was conducted with patient's (and/or legal guardian's) consent.  The visit was conducted pursuant to the emergency declaration under the [facility] waiver authority and the [place] [employer] Supplemental Appropriations Act. Patient identification was verified, and a caregiver was present when appropriate. The patient was located at home in a state where the provider was licensed to provide care.        --Zulema Hampton MD

## 2022-03-03 DIAGNOSIS — R25.1 TREMORS OF NERVOUS SYSTEM: ICD-10-CM

## 2022-03-03 RX ORDER — DIVALPROEX SODIUM 125 MG/1
TABLET, DELAYED RELEASE ORAL
Qty: 270 TABLET | Refills: 1 | Status: SHIPPED | OUTPATIENT
Start: 2022-03-03

## 2022-03-11 ENCOUNTER — TELEPHONE (OUTPATIENT)
Dept: INTERNAL MEDICINE CLINIC | Age: 87
End: 2022-03-11

## 2022-03-11 DIAGNOSIS — R35.89 FREQUENCY OF URINATION AND POLYURIA: ICD-10-CM

## 2022-03-11 DIAGNOSIS — R35.0 FREQUENCY OF URINATION AND POLYURIA: ICD-10-CM

## 2022-03-11 DIAGNOSIS — R73.9 HYPERGLYCEMIA: ICD-10-CM

## 2022-03-11 LAB
BACTERIA: ABNORMAL /HPF
BILIRUBIN URINE: NEGATIVE
BLOOD, URINE: ABNORMAL
CLARITY: ABNORMAL
COLOR: YELLOW
EPITHELIAL CELLS, UA: 3 /HPF (ref 0–5)
GLUCOSE URINE: NEGATIVE MG/DL
HYALINE CASTS: 2 /LPF (ref 0–8)
KETONES, URINE: NEGATIVE MG/DL
LEUKOCYTE ESTERASE, URINE: ABNORMAL
MICROSCOPIC EXAMINATION: YES
NITRITE, URINE: NEGATIVE
PH UA: 6.5 (ref 5–8)
PROTEIN UA: 100 MG/DL
RBC UA: 6 /HPF (ref 0–4)
SPECIFIC GRAVITY UA: 1.02 (ref 1–1.03)
URINE REFLEX TO CULTURE: YES
URINE TYPE: ABNORMAL
UROBILINOGEN, URINE: 0.2 E.U./DL
WBC UA: 773 /HPF (ref 0–5)

## 2022-03-11 NOTE — TELEPHONE ENCOUNTER
----- Message from Jared sent at 3/11/2022 12:47 PM EST -----  Subject: Message to Provider    QUESTIONS  Information for Provider? daughter calling bc mom, Andreea Trevino, is having UTI   symptoms still. she was in a week and a half ago for UTI and finished meds   but us still having symptoms. daughter collected a urine sample and asked   for dmo triana to put in orders for a urine culture. send it before 5   today.   ---------------------------------------------------------------------------  --------------  CALL BACK INFO  What is the best way for the office to contact you? Do not leave any   message, patient will call back for answer  Preferred Call Back Phone Number? [phone]  ---------------------------------------------------------------------------  --------------  SCRIPT ANSWERS  Relationship to Patient? Other  Representative Name? Ameena Cifuentes daughter   Is the Representative on the appropriate HIPAA document in Epic?  Yes

## 2022-03-11 NOTE — TELEPHONE ENCOUNTER
daughter calling bc mom, jamee, is having UTI   symptoms still. she was in a week and a half ago for UTI and finished meds   but us still having symptoms. daughter collected a urine sample and asked   for dom triana to put in orders for a urine culture. send it before 5   today. See other from [employer]. Please advise if this it okay.

## 2022-03-11 NOTE — TELEPHONE ENCOUNTER
Patient is requesting a refill of their prescription.     Requested Prescriptions     Pending Prescriptions Disp Refills    azelastine (ASTELIN) 0.1 % nasal spray [Pharmacy Med Name: AZELASTINE 0.1% (137 MCG) SPRY]  1     Si SPRAY BY NASAL ROUTE 2 TIMES DAILY USE IN EACH NOSTRIL AS DIRECTED        Recent Visits  Date Type Provider Dept   21 Office Visit Lakshmi Monaco MD [facility]   10/06/21 Office Visit Lakshmi Monaco MD [facility]   21 Office Visit Lakshmi Monaco MD [facility]   02/10/21 Office Visit Lakshmi Monaco MD [facility]   20 Office Visit Wero Cevallos MD [facility]   10/09/20 Office Visit Wero Cevallos MD [facility]   Showing recent visits within past 540 days with a meds authorizing provider and meeting all other requirements  Future Appointments  Date Type Provider Dept   22 Appointment Lakshmi Monaco MD [facility]   Showing future appointments within next 150 days with a meds authorizing provider and meeting all other requirements     2022

## 2022-03-13 RX ORDER — AZELASTINE 1 MG/ML
SPRAY, METERED NASAL
Qty: 1 EACH | Refills: 1 | Status: SHIPPED | OUTPATIENT
Start: 2022-03-13

## 2022-03-15 ENCOUNTER — PATIENT MESSAGE (OUTPATIENT)
Dept: INTERNAL MEDICINE CLINIC | Age: 87
End: 2022-03-15

## 2022-03-15 DIAGNOSIS — E11.8 TYPE 2 DIABETES MELLITUS WITH UNSPECIFIED COMPLICATIONS (HCC): ICD-10-CM

## 2022-03-15 LAB
ORGANISM: ABNORMAL
URINE CULTURE, ROUTINE: ABNORMAL

## 2022-03-16 RX ORDER — LANCETS 33 GAUGE
EACH MISCELLANEOUS
Qty: 100 EACH | Refills: 5 | Status: SHIPPED | OUTPATIENT
Start: 2022-03-16

## 2022-03-16 NOTE — TELEPHONE ENCOUNTER
From: Juan Shi  To: Dr. Trevino Patient: 3/15/2022 7:14 PM EDT  Subject: Lancets     Need a a prescription for One Touch Delica plus Lancets 33 guage

## 2022-03-22 NOTE — TELEPHONE ENCOUNTER
Called and spoke with patient. She said her daughter takes care of doing virtual visits, attempted to reach patients daughter with no answer. I tried all three phone numbers on file for patients daughter.  Will try again later

## 2022-03-23 ENCOUNTER — TELEMEDICINE (OUTPATIENT)
Dept: INTERNAL MEDICINE CLINIC | Age: 87
End: 2022-03-23
Payer: MEDICARE

## 2022-03-23 DIAGNOSIS — N76.1 SUBACUTE VAGINITIS: ICD-10-CM

## 2022-03-23 DIAGNOSIS — I26.92 CHRONIC SADDLE PULMONARY EMBOLISM WITHOUT ACUTE COR PULMONALE (HCC): ICD-10-CM

## 2022-03-23 DIAGNOSIS — G35 MS (MULTIPLE SCLEROSIS) (HCC): ICD-10-CM

## 2022-03-23 DIAGNOSIS — E46 PROTEIN MALNUTRITION (HCC): ICD-10-CM

## 2022-03-23 DIAGNOSIS — E21.0 PRIMARY HYPERPARATHYROIDISM (HCC): ICD-10-CM

## 2022-03-23 DIAGNOSIS — R06.02 SHORTNESS OF BREATH: ICD-10-CM

## 2022-03-23 DIAGNOSIS — R60.0 LOWER EXTREMITY EDEMA: Primary | ICD-10-CM

## 2022-03-23 DIAGNOSIS — I27.82 CHRONIC SADDLE PULMONARY EMBOLISM WITHOUT ACUTE COR PULMONALE (HCC): ICD-10-CM

## 2022-03-23 PROCEDURE — 99214 OFFICE O/P EST MOD 30 MIN: CPT | Performed by: INTERNAL MEDICINE

## 2022-03-23 RX ORDER — AMOXICILLIN AND CLAVULANATE POTASSIUM 875; 125 MG/1; MG/1
1 TABLET, FILM COATED ORAL 2 TIMES DAILY
Qty: 20 TABLET | Refills: 0 | Status: SHIPPED | OUTPATIENT
Start: 2022-03-23 | End: 2022-04-02

## 2022-03-23 RX ORDER — FLUCONAZOLE 100 MG/1
100 TABLET ORAL DAILY
Qty: 7 TABLET | Refills: 0 | Status: SHIPPED | OUTPATIENT
Start: 2022-03-23 | End: 2022-03-30

## 2022-03-23 NOTE — PROGRESS NOTES
Darryn Mazariegos (:  1935) is a Established patient, here for evaluation of the following:  Lower extremity swelling  Abscess  Frequency of urination - recurrent UTI - improved with levofloxacin and the augmentin  Complains of vaginitis -  Assessment & Plan   Below is the assessment and plan developed based on review of pertinent history, physical exam, labs, studies, and medications. 1. Lower extremity edema  -     Renal Function Panel; Future  -     Comprehensive Metabolic Panel; Future  -     Microalbumin / Creatinine Urine Ratio; Future  2. Protein malnutrition (HCC)  - stable  3. MS (multiple sclerosis) ([facility]  - patient with weakness and inability to easily become mobile    4. 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 Primary hyperparathyroidism ([facility]  - follow closeluy    6. Subacute vaginitis  -     fluconazole (DIFLUCAN) 100 MG tablet; Take 1 tablet by mouth daily for 7 days, Disp-7 tablet, R-0Normal  7. Shortness of breath  -     Microalbumin / Creatinine Urine Ratio; Future  -     ECHO (Dobutamine) Pharmacological Stress Test; Future    No follow-ups on file. Subjective       Chief Complaint   Patient presents with    Follow-up     has been having issues with sores    Shortness of Breath    Leg Swelling       HPI   Abscess  Patient presents for evaluation of a cutaneous abscess. Lesion is located in the right buttock. Onset was several weeks ago. Symptoms have improved, beginning 7 days ago when she started the antibiotics Abscess has associated symptoms of spontaneous drainage, pain. Patient does have previous history of cutaneous abscesses. Patient does have diabetes. Elevated BP -  Daughter notes elevated blood pressure        Review of Systems   Constitutional: Positive for chills. Gastrointestinal:        Abscess   Neurological: Positive for weakness. Hematological: Positive for adenopathy.    All other systems reviewed and are negative. 152/67  180 lb. - not an accurrate weight      Wt Readings from Last 3 Encounters:   12/01/21 188 lb 6.4 oz (85.5 kg)   10/06/21 176 lb (79.8 kg)   08/07/19 181 lb (82.1 kg)     Objective   Patient-Reported Vitals  Patient-Reported Weight: 188lb  Patient-Reported Height: 5 3       Physical Exam  [INSTRUCTIONS:  \"[x]\" Indicates a positive item  \"[]\" Indicates a negative item  -- DELETE ALL ITEMS NOT EXAMINED]    Constitutional: [x] Appears well-developed and well-nourished [x] No apparent distress      [] Abnormal -     Mental status: [x] Alert and awake  [x] Oriented to person/place/time [x] Able to follow commands    [] Abnormal -     Eyes:   EOM    [x]  Normal    [] Abnormal -   Sclera  [x]  Normal    [] Abnormal -          Discharge [x]  None visible   [] Abnormal -     HENT: [x] Normocephalic, atraumatic  [] Abnormal -   [x] Mouth/Throat: Mucous membranes are moist    External Ears [x] Normal  [] Abnormal -    Neck: [x] No visualized mass [] Abnormal -     Pulmonary/Chest: [x] Respiratory effort normal   [x] No visualized signs of difficulty breathing or respiratory distress        [] Abnormal -      Musculoskeletal:   [x] Normal gait with no signs of ataxia         [x] Normal range of motion of neck        [] Abnormal -     Neurological:        [x] No Facial Asymmetry (Cranial nerve 7 motor function) (limited exam due to video visit)          [x] No gaze palsy        [x] Abnormal - baseline weakness and lordosis         Skin:        [x] No significant exanthematous lesions or discoloration noted on facial skin         [] Abnormal -            Psychiatric:       [x] Normal Affect [] Abnormal -        [x] No Hallucinations    Other pertinent observable physical exam findings:-           Norma Vazquez, was evaluated through a synchronous (real-time) audio-video encounter. The patient (or guardian if applicable) is aware that this is a billable service, which includes applicable co-pays.  This Virtual Visit was conducted with patient's (and/or legal guardian's) consent. The visit was conducted pursuant to the emergency declaration under the [address] [place] [address] and the [employer] Act. Patient identification was verified, and a caregiver was present when appropriate. The patient was located at home in a state where the provider was licensed to provide care.        --Ne Carlson MD

## 2022-03-30 ENCOUNTER — TELEPHONE (OUTPATIENT)
Dept: INTERNAL MEDICINE CLINIC | Age: 87
End: 2022-03-30

## 2022-03-30 NOTE — TELEPHONE ENCOUNTER
Patient is calling stating that she is on a 10 of pain with a UTI, burning and frequency and is having vaginal pain. She did start the Augmentin again today. Please advise for appointment or next sides. Patient if needs to be seen will need to be before noon.

## 2022-03-31 NOTE — TELEPHONE ENCOUNTER
Called and spoke with patient's daughter. [facility] did home evaluation on Monday and sent out urine for culture. Waiting for results to come back.  Please advise if anything else is needed

## 2022-03-31 NOTE — TELEPHONE ENCOUNTER
[facility] and ask for note and labs

## 2022-04-14 DIAGNOSIS — Z79.4 TYPE 2 DIABETES MELLITUS WITH COMPLICATION, WITH LONG-TERM CURRENT USE OF INSULIN (HCC): ICD-10-CM

## 2022-04-14 DIAGNOSIS — E11.8 TYPE 2 DIABETES MELLITUS WITH COMPLICATION, WITH LONG-TERM CURRENT USE OF INSULIN (HCC): ICD-10-CM

## 2022-04-14 RX ORDER — SODIUM BICARBONATE 650 MG/1
TABLET ORAL
Qty: 90 TABLET | Refills: 5 | OUTPATIENT
Start: 2022-04-14

## 2022-04-28 RX ORDER — DILTIAZEM HYDROCHLORIDE 60 MG/1
TABLET, FILM COATED ORAL
Qty: 30.6 EACH | Refills: 1 | Status: SHIPPED | OUTPATIENT
Start: 2022-04-28 | End: 2022-10-19

## 2022-04-28 NOTE — TELEPHONE ENCOUNTER
Requested Prescriptions     Pending Prescriptions Disp Refills    SYMBICORT 80-4.5 MCG/ACT AERO [Pharmacy Med Name: Tyron Mer[employer] 80-4.5 MCG INHALER] 30.6 each 1     Sig: INHALE 2 PUFFS INTO THE LUNGS 2 TIMES DAILY TAKE 2 PUFFS BY MOUTH TWICE A DAY     Recent Visits  Date Type Provider Dept   12/01/21 Office Visit Jackie Luna MD [facility]   10/06/21 Office Visit Jackie Luna MD [facility]   03/18/21 Office Visit Jackie Luna MD [facility]   02/10/21 Office Visit Jackie Luna MD [facility]   11/11/20 Office Visit Jeanine Alvarez MD [facility]   Showing recent visits within past 540 days with a meds authorizing provider and meeting all other requirements  Future Appointments  No visits were found meeting these conditions.   Showing future appointments within next 150 days with a meds authorizing provider and meeting all other requirements       03/23/2022 - LAST VISIT

## 2022-07-28 ENCOUNTER — TELEMEDICINE (OUTPATIENT)
Dept: INTERNAL MEDICINE CLINIC | Age: 87
End: 2022-07-28
Payer: MEDICARE

## 2022-07-28 DIAGNOSIS — E11.8 TYPE 2 DIABETES MELLITUS WITH COMPLICATION, WITH LONG-TERM CURRENT USE OF INSULIN (HCC): ICD-10-CM

## 2022-07-28 DIAGNOSIS — Z79.4 TYPE 2 DIABETES MELLITUS WITH COMPLICATION, WITH LONG-TERM CURRENT USE OF INSULIN (HCC): ICD-10-CM

## 2022-07-28 PROCEDURE — 99214 OFFICE O/P EST MOD 30 MIN: CPT | Performed by: INTERNAL MEDICINE

## 2022-07-28 PROCEDURE — 1123F ACP DISCUSS/DSCN MKR DOCD: CPT | Performed by: INTERNAL MEDICINE

## 2022-07-28 SDOH — ECONOMIC STABILITY: FOOD INSECURITY: WITHIN THE PAST 12 MONTHS, THE FOOD YOU BOUGHT JUST DIDN'T LAST AND YOU DIDN'T HAVE MONEY TO GET MORE.: NEVER TRUE

## 2022-07-28 SDOH — ECONOMIC STABILITY: FOOD INSECURITY: WITHIN THE PAST 12 MONTHS, YOU WORRIED THAT YOUR FOOD WOULD RUN OUT BEFORE YOU GOT MONEY TO BUY MORE.: NEVER TRUE

## 2022-07-28 ASSESSMENT — SOCIAL DETERMINANTS OF HEALTH (SDOH): HOW HARD IS IT FOR YOU TO PAY FOR THE VERY BASICS LIKE FOOD, HOUSING, MEDICAL CARE, AND HEATING?: NOT HARD AT ALL

## 2022-08-06 DIAGNOSIS — I10 ESSENTIAL HYPERTENSION: ICD-10-CM

## 2022-08-08 RX ORDER — VERAPAMIL HYDROCHLORIDE 240 MG/1
TABLET, FILM COATED, EXTENDED RELEASE ORAL
Qty: 45 TABLET | Refills: 1 | Status: SHIPPED | OUTPATIENT
Start: 2022-08-08 | End: 2022-08-24

## 2022-08-24 DIAGNOSIS — I10 ESSENTIAL HYPERTENSION: ICD-10-CM

## 2022-08-24 RX ORDER — VERAPAMIL HYDROCHLORIDE 240 MG/1
TABLET, FILM COATED, EXTENDED RELEASE ORAL
Qty: 90 TABLET | Refills: 1 | Status: SHIPPED | OUTPATIENT
Start: 2022-08-24

## 2022-08-24 NOTE — TELEPHONE ENCOUNTER
Recent Visits  Date Type Provider Dept   12/01/21 Office Visit Jerson Josue MD [facility]   10/06/21 Office Visit Jerson Josue MD [facility]   03/18/21 Office Visit Jerson Josue MD [facility]   Showing recent visits within past 540 days with a meds authorizing provider and meeting all other requirements  Future Appointments  Date Type Provider Dept   10/12/22 Appointment Jerson Josue MD [facility]   Showing future appointments within next 150 days with a meds authorizing provider and meeting all other requirements     7/28/2022

## 2022-08-26 DIAGNOSIS — Z79.4 TYPE 2 DIABETES MELLITUS WITH COMPLICATION, WITH LONG-TERM CURRENT USE OF INSULIN (HCC): ICD-10-CM

## 2022-08-26 DIAGNOSIS — E11.8 TYPE 2 DIABETES MELLITUS WITH COMPLICATION, WITH LONG-TERM CURRENT USE OF INSULIN (HCC): ICD-10-CM

## 2022-08-28 RX ORDER — INSULIN LISPRO 100 [IU]/ML
INJECTION, SOLUTION INTRAVENOUS; SUBCUTANEOUS
Qty: 15 EACH | Refills: 3 | Status: SHIPPED | OUTPATIENT
Start: 2022-08-28

## 2022-08-28 RX ORDER — LANCETS 30 GAUGE
1 EACH MISCELLANEOUS DAILY
Qty: 300 EACH | Refills: 1 | Status: SHIPPED | OUTPATIENT
Start: 2022-08-28

## 2022-08-28 RX ORDER — BLOOD-GLUCOSE METER
1 EACH MISCELLANEOUS
Qty: 1 KIT | Refills: 11 | Status: SHIPPED | OUTPATIENT
Start: 2022-08-28

## 2022-08-28 NOTE — PROGRESS NOTES
2022     Karel Horn (:  1935) is a 82 y.o. female, here for evaluation of the following medical concerns:    Diabetes    Treatment Adherence:   Medication compliance:  compliant all of the time  Diet compliance:  compliant most of the time  Weight trend: stable  Current exercise: no regular exercise  Barriers: none    Diabetes Mellitus Type 2: Current symptoms/problems include none. Home blood sugar records: trend: stable  Any episodes of hypoglycemia? no  Eye exam current (within one year): unknown  Tobacco history: She  reports that she has never smoked. She has never used smokeless tobacco.   Daily Aspirin? Yes    Hypertension:  Home blood pressure monitoring: No.  She is adherent to a low sodium diet. Patient denies headache, lightheadedness, blurred vision, and peripheral edema. Antihypertensive medication side effects: no medication side effects noted. Use of agents associated with hypertension: decongestants, estrogens, and NSAIDS. Hyperlipidemia:  No new myalgias or GI upset on atorvastatin (Lipitor). Lab Results   Component Value Date    LABA1C 6.0 10/06/2021    LABA1C 6.3 2021    LABA1C 5.3 2020     Lab Results   Component Value Date    LABMICR YES 2022    CREATININE 1.1 2021     Lab Results   Component Value Date    ALT 16 10/06/2021    AST 17 10/06/2021     Lab Results   Component Value Date    CHOL 137 10/06/2021    TRIG 87 10/06/2021    HDL 55 10/06/2021    LDLCALC 65 10/06/2021          Review of Systems    Prior to Visit Medications    Medication Sig Taking?  Authorizing Provider   Blood Glucose Monitoring Suppl ([facility] SYSTEM) w/Device KIT 1 kit by Does not apply route 4 times daily (before meals and nightly) Yes Chriss Fermin MD   insulin lispro (HUMALOG) 100 UNIT/ML injection cartridge Inject into the skin 3 times daily (before meals) Yes Chriss Fermin MD   Insulin Pen Needle 32G X 4 MM MISC 1 each by Does not apply route daily Yes MD Jadon Mcdonald MISC 1 each by Does not apply route daily Indications: Type 2 Diabetes Yes Kun Olmedo MD   SYMBICORT 80-4.5 MCG/ACT AERO INHALE 2 PUFFS INTO THE LUNGS 2 TIMES DAILY TAKE 2 PUFFS BY MOUTH TWICE A DAY Yes Kun Olmedo MD   Lancets (Arlys Satchel PLUS [id number]) [address] [facility] 1 EACH BY DOES NOT APPLY ROUTE 4 TIMES DAILY (AFTER MEALS AND AT BEDTIME) Yes Kun Olmedo MD   azelastine (ASTELIN) 0.1 % nasal spray 1 SPRAY BY NASAL ROUTE 2 TIMES DAILY USE IN EACH NOSTRIL AS DIRECTED Yes Kun Olmedo MD   divalproex (DEPAKOTE) 125 MG DR tablet TAKE ONE TABLET IN THE AM AND TWO TABLETS IN THE PM. Yes Kun Olmedo MD   nitrofurantoin, macrocrystal-monohydrate, (MACROBID) 100 MG capsule Take 1 capsule by mouth daily Yes Kun Olmedo MD   montelukast (SINGULAIR) 10 MG tablet TAKE 1 TABLET BY MOUTH EVERYDAY AT BEDTIME Yes Kun Olmedo MD   losartan (COZAAR) 25 MG tablet Take 1 tablet by mouth daily Yes Kun Olmedo MD   loratadine (CLARITIN) 10 MG tablet Take 1 tablet by mouth daily Yes Kun Olmedo MD   levothyroxine (SYNTHROID) 25 MCG tablet TAKE 1 TABLET BY MOUTH EVERY DAY Yes Kun Olmedo MD   ketoconazole (NIZORAL) 2 % cream APPLY TO AFFECTED AREA EVERY DAY Yes Kun Olmedo MD   famotidine (PEPCID) 20 MG tablet TAKE 1 TABLET BY MOUTH TWICE A DAY Yes Kun Olmedo MD   hydrALAZINE (APRESOLINE) 50 MG tablet Take 1.5 tablets by mouth 3 times daily Yes Kun Olmedo MD   atenolol (TENORMIN) 50 MG tablet TAKE 1 TABLET BY MOUTH EVERY DAY Yes Kun Olmedo MD   apixaban (ELIQUIS) 5 MG TABS tablet TAKE 1 TABLET BY MOUTH 2 TIMES DAILY STOP COUMADIN Yes Kun Olmedo MD   ammonium lactate (LAC-HYDRIN) 12 % lotion Apply topically daily.  Yes Kun Olmedo MD   allopurinol (ZYLOPRIM) 300 MG tablet TAKE 1 TABLET BY Alberta Wiseman MD   atorvastatin (LIPITOR) 40 MG tablet TAKE 1 TABLET BY MOUTH EVERY DAY Yes Sherie Patterson MD   albuterol sulfate HFA (VENTOLIN HFA) 108 (90 Base) MCG/ACT inhaler Inhale 2 puffs into the lungs 4 times daily as needed for Wheezing Yes Sherie Patterson MD   B-D UF III MINI PEN NEEDLES 31G X 5 MM MISC USE AS DIRECTED NIGHTLY WITH LANTUS PEN Yes Sherie Patterson MD   blood glucose test strips (ONETOUCH VERIO) strip USE TO TEST 4 TIMES A DAY BEFORE MEALS AND AT BEDTIME Yes Sherie Patterson MD   lidocaine (XYLOCAINE) 5 % ointment APPLY TO AFFECTED AREA EVERY DAY AS NEEDED Yes Sherie Patterson MD   sodium bicarbonate 650 MG tablet TAKE 1 TABLET BY MOUTH EVERY DAY Yes Sherie Patterson MD   BD PEN NEEDLE ANNA U/F 32G X 4 MM MISC USE AS DIRECTEDDAILY Yes Sherie Patterson MD   Compression Stockings MISC by Does not apply route Knee high compression and thigh high stockings 30 mm Hg. Dispense two pairs with 1 year refill. Yes Sherie Patterson MD   Spacer/Aero-Holding Chambers (AEROCHAMBER MV) MISC 1 each by Does not apply route 2 times daily Yes Aileen Stanford MD   aspirin 81 MG tablet Take 81 mg by mouth daily Yes Historical Provider, MD   Lactobacillus (PROBIOTIC ACIDOPHILUS PO) Take 1 capsule by mouth daily Yes Historical Provider, MD   hydrocortisone 1 % ointment Apply topically 2 times daily as needed (dry skin) Apply topically 2 times daily. 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 Needs assistance getting out of chair, but once out of chair can ambulate independently with walker or cane. Yes Aileen Stanford MD   Incontinence Supply Disposable (PALLAVI SERENITY BRIEFS LARGE) MISC 1 each by Does not apply route three times daily Large to extra large. Give Pallavi underwear protections. Daughter will select the brand.  Yes Jeremy Carlisle Ivett Sosa MD   Incontinence Supplies (BEDPAN) MISC Sorry, but \"6 bottle\" is the only way it will allow us to order this bedpan. Yes Teressa Hatch MD   Multiple Vitamins-Minerals (CENTRUM SILVER PO) Take 1 tablet by mouth daily  Yes Historical Provider, MD   verapamil (CALAN SR) 240 MG extended release tablet TAKE 1/2 TABLET BY MOUTH TWICE A DAY  Chriss Fermin MD   SITagliptin (JANUVIA) 100 MG tablet Take 1 tablet by mouth daily TAKE 1 TABLET BY MOUTH EVERY DAY  Chriss Fermin MD   polyethylene glycol (MIRALAX) 17 g packet Take 17 g by mouth 2 times daily  Chriss Fermin MD   insulin glargine (LANTUS SOLOSTAR) 100 UNIT/ML injection pen Inject 22 Units into the skin nightly  Chriss Fermin MD   Carboxymeth-Cellulose-CitricAc (PLENITY WELCOME KIT) CAPS Use as directed  Chriss Fermin MD        Social History     Tobacco Use    Smoking status: Never    Smokeless tobacco: Never   Substance Use Topics    Alcohol use: No        There were no vitals filed for this visit. Estimated body mass index is 33.37 kg/m² as calculated from the following:    Height as of 12/1/21: 5' 3\" (1.6 m). Weight as of 12/1/21: 188 lb 6.4 oz (85.5 kg). Physical Exam  Vitals and nursing note reviewed. Constitutional:       General: She is not in acute distress. Appearance: Normal appearance. She is well-developed. She is obese. She is not ill-appearing, toxic-appearing or diaphoretic. HENT:      Head: Normocephalic and atraumatic. Right Ear: External ear normal.      Left Ear: External ear normal.      Nose: Nose normal.      Mouth/Throat:      Mouth: Mucous membranes are moist.   Eyes:      General:         Right eye: No discharge. Left eye: No discharge. Conjunctiva/sclera: Conjunctivae normal.      Pupils: Pupils are equal, round, and reactive to light. Cardiovascular:      Rate and Rhythm: Normal rate and regular rhythm.       Heart sounds: Normal heart sounds. Pulmonary:      Effort: Pulmonary effort is normal. No respiratory distress. Breath sounds: Normal breath sounds. Musculoskeletal:         General: Normal range of motion. Cervical back: Normal range of motion. Skin:     General: Skin is warm. Capillary Refill: Capillary refill takes less than 2 seconds. Neurological:      General: No focal deficit present. Mental Status: She is alert and oriented to person, place, and time. Psychiatric:         Mood and Affect: Mood normal.         Behavior: Behavior normal.         Thought Content: Thought content normal.         Judgment: Judgment normal.       ASSESSMENT/PLAN:  1. Type 2 diabetes mellitus with complication, with long-term current use of insulin ([facility])  - Blood Glucose Monitoring Suppl ([employer] SYSTEM) w/Device KIT; 1 kit by Does not apply route 4 times daily (before meals and nightly)  Dispense: 1 kit; Refill: 11  - Insulin Pen Needle 32G X 4 MM MISC; 1 each by Does not apply route daily  Dispense: 100 each; Refill: 3  - TSH with Reflex to FT4; Future  - Hemoglobin A1C; Future    Assessment/Plan:  Cassandralazaro Liz was seen today for diabetes. Diagnoses and all orders for this visit:    Type 2 diabetes mellitus with complication, with long-term current use of insulin ([facility]): patient with diabetes  -     Blood Glucose Monitoring Suppl ([employer] IQ SYSTEM) w/Device KIT; 1 kit by Does not apply route 4 times daily (before meals and nightly)  -     insulin lispro (HUMALOG) 100 UNIT/ML injection cartridge; Inject into the skin 3 times daily (before meals)  -     Insulin Pen Needle 32G X 4 MM MISC; 1 each by Does not apply route daily  -     Lancets MISC; 1 each by Does not apply route daily Indications: Type 2 Diabetes  -     TSH with Reflex to FT4; Future  -     Hemoglobin A1C; Future    A total of 30 minutes spent with the patient today greater than 50% in counseling regarding these issues.       An electronic signature was used to authenticate this note.     --Cathleen Mckeon MD on 8/28/2022 at 5:57 PM

## 2022-08-29 RX ORDER — INSULIN GLARGINE 100 [IU]/ML
22 INJECTION, SOLUTION SUBCUTANEOUS NIGHTLY
Qty: 19.8 ML | Refills: 1 | Status: SHIPPED | OUTPATIENT
Start: 2022-08-29 | End: 2022-11-27

## 2022-08-31 DIAGNOSIS — E03.4 HYPOTHYROIDISM DUE TO ACQUIRED ATROPHY OF THYROID: ICD-10-CM

## 2022-08-31 RX ORDER — LEVOTHYROXINE SODIUM 0.03 MG/1
TABLET ORAL
Qty: 90 TABLET | Refills: 1 | Status: SHIPPED | OUTPATIENT
Start: 2022-08-31

## 2022-08-31 NOTE — TELEPHONE ENCOUNTER
Recent Visits  Date Type Provider Dept   12/01/21 Office Visit Sherie Patterson MD [facility]   10/06/21 Office Visit Sherie Patterson MD [facility]   03/18/21 Office Visit Sherie Patterson MD [facility]   Showing recent visits within past 540 days with a meds authorizing provider and meeting all other requirements  Future Appointments  Date Type Provider Dept   10/12/22 Appointment Sherie Patterson MD [facility]   Showing future appointments within next 150 days with a meds authorizing provider and meeting all other requirements     7/28/2022

## 2022-09-05 DIAGNOSIS — K21.9 GASTROESOPHAGEAL REFLUX DISEASE WITHOUT ESOPHAGITIS: ICD-10-CM

## 2022-09-05 RX ORDER — FAMOTIDINE 20 MG/1
TABLET, FILM COATED ORAL
Qty: 180 TABLET | Refills: 1 | Status: SHIPPED | OUTPATIENT
Start: 2022-09-05

## 2022-10-04 DIAGNOSIS — L85.3 XEROSIS OF SKIN: ICD-10-CM

## 2022-10-05 RX ORDER — MONTELUKAST SODIUM 10 MG/1
TABLET ORAL
Qty: 90 TABLET | Refills: 1 | Status: SHIPPED | OUTPATIENT
Start: 2022-10-05

## 2022-10-05 RX ORDER — AMMONIUM LACTATE 12 G/100G
LOTION TOPICAL
Qty: 450 ML | Refills: 5 | Status: SHIPPED | OUTPATIENT
Start: 2022-10-05

## 2022-10-05 NOTE — TELEPHONE ENCOUNTER
Recent Visits  Date Type Provider Dept   12/01/21 Office Visit Candido Guillermo MD [facility]   10/06/21 Office Visit Candido Guillermo MD [facility]   Showing recent visits within past 540 days with a meds authorizing provider and meeting all other requirements  Future Appointments  Date Type Provider Dept   10/12/22 Appointment Candido Guillermo MD [facility]   Showing future appointments within next 150 days with a meds authorizing provider and meeting all other requirements     7/28/2022

## 2022-10-10 DIAGNOSIS — Z79.4 TYPE 2 DIABETES MELLITUS WITH COMPLICATION, WITH LONG-TERM CURRENT USE OF INSULIN (HCC): ICD-10-CM

## 2022-10-10 DIAGNOSIS — E11.8 TYPE 2 DIABETES MELLITUS WITH COMPLICATION, WITH LONG-TERM CURRENT USE OF INSULIN (HCC): ICD-10-CM

## 2022-10-11 RX ORDER — BLOOD SUGAR DIAGNOSTIC
STRIP MISCELLANEOUS
Qty: 300 STRIP | Refills: 11 | Status: SHIPPED | OUTPATIENT
Start: 2022-10-11

## 2022-10-12 ENCOUNTER — OFFICE VISIT (OUTPATIENT)
Dept: INTERNAL MEDICINE CLINIC | Age: 87
End: 2022-10-12
Payer: MEDICARE

## 2022-10-12 VITALS
DIASTOLIC BLOOD PRESSURE: 72 MMHG | HEART RATE: 71 BPM | OXYGEN SATURATION: 99 % | TEMPERATURE: 97.6 F | SYSTOLIC BLOOD PRESSURE: 136 MMHG

## 2022-10-12 DIAGNOSIS — E11.8 TYPE 2 DIABETES MELLITUS WITH COMPLICATION, WITH LONG-TERM CURRENT USE OF INSULIN (HCC): Primary | ICD-10-CM

## 2022-10-12 DIAGNOSIS — R60.0 EDEMA, LOWER EXTREMITY: ICD-10-CM

## 2022-10-12 DIAGNOSIS — N18.32 STAGE 3B CHRONIC KIDNEY DISEASE (HCC): ICD-10-CM

## 2022-10-12 DIAGNOSIS — Z79.4 TYPE 2 DIABETES MELLITUS WITH COMPLICATION, WITH LONG-TERM CURRENT USE OF INSULIN (HCC): ICD-10-CM

## 2022-10-12 DIAGNOSIS — G35 MULTIPLE SCLEROSIS (HCC): ICD-10-CM

## 2022-10-12 DIAGNOSIS — Z79.4 TYPE 2 DIABETES MELLITUS WITH COMPLICATION, WITH LONG-TERM CURRENT USE OF INSULIN (HCC): Primary | ICD-10-CM

## 2022-10-12 DIAGNOSIS — M18.0 PRIMARY OSTEOARTHRITIS OF BOTH FIRST CARPOMETACARPAL JOINTS: ICD-10-CM

## 2022-10-12 DIAGNOSIS — I89.0 LYMPHEDEMA: ICD-10-CM

## 2022-10-12 DIAGNOSIS — E11.8 TYPE 2 DIABETES MELLITUS WITH COMPLICATION, WITH LONG-TERM CURRENT USE OF INSULIN (HCC): ICD-10-CM

## 2022-10-12 LAB
A/G RATIO: 1.3 (ref 1.1–2.2)
ALBUMIN SERPL-MCNC: 4 G/DL (ref 3.4–5)
ALP BLD-CCNC: 72 U/L (ref 40–129)
ALT SERPL-CCNC: 14 U/L (ref 10–40)
ANION GAP SERPL CALCULATED.3IONS-SCNC: 14 MMOL/L (ref 3–16)
AST SERPL-CCNC: 16 U/L (ref 15–37)
BILIRUB SERPL-MCNC: <0.2 MG/DL (ref 0–1)
BUN BLDV-MCNC: 50 MG/DL (ref 7–20)
CALCIUM SERPL-MCNC: 11.7 MG/DL (ref 8.3–10.6)
CHLORIDE BLD-SCNC: 104 MMOL/L (ref 99–110)
CHOLESTEROL, TOTAL: 118 MG/DL (ref 0–199)
CO2: 21 MMOL/L (ref 21–32)
CREAT SERPL-MCNC: 1.1 MG/DL (ref 0.6–1.2)
CREATININE URINE: 37 MG/DL (ref 28–259)
GFR AFRICAN AMERICAN: 57
GFR NON-AFRICAN AMERICAN: 47
GLUCOSE BLD-MCNC: 60 MG/DL (ref 70–99)
HDLC SERPL-MCNC: 43 MG/DL (ref 40–60)
LDL CHOLESTEROL CALCULATED: 59 MG/DL
MICROALBUMIN UR-MCNC: 31 MG/DL
MICROALBUMIN/CREAT UR-RTO: 837.8 MG/G (ref 0–30)
POTASSIUM SERPL-SCNC: 4.1 MMOL/L (ref 3.5–5.1)
SODIUM BLD-SCNC: 139 MMOL/L (ref 136–145)
TOTAL PROTEIN: 7.1 G/DL (ref 6.4–8.2)
TRIGL SERPL-MCNC: 79 MG/DL (ref 0–150)
VLDLC SERPL CALC-MCNC: 16 MG/DL

## 2022-10-12 PROCEDURE — 99214 OFFICE O/P EST MOD 30 MIN: CPT | Performed by: INTERNAL MEDICINE

## 2022-10-12 PROCEDURE — 3044F HG A1C LEVEL LT 7.0%: CPT | Performed by: INTERNAL MEDICINE

## 2022-10-12 PROCEDURE — 1123F ACP DISCUSS/DSCN MKR DOCD: CPT | Performed by: INTERNAL MEDICINE

## 2022-10-12 PROCEDURE — 3078F DIAST BP <80 MM HG: CPT | Performed by: INTERNAL MEDICINE

## 2022-10-12 PROCEDURE — 3074F SYST BP LT 130 MM HG: CPT | Performed by: INTERNAL MEDICINE

## 2022-10-12 RX ORDER — INSULIN GLARGINE 100 [IU]/ML
36 INJECTION, SOLUTION SUBCUTANEOUS NIGHTLY
Qty: 45 ML | Refills: 3 | Status: SHIPPED | OUTPATIENT
Start: 2022-10-12 | End: 2023-01-10

## 2022-10-12 ASSESSMENT — PATIENT HEALTH QUESTIONNAIRE - PHQ9
SUM OF ALL RESPONSES TO PHQ9 QUESTIONS 1 & 2: 0
SUM OF ALL RESPONSES TO PHQ QUESTIONS 1-9: 0
2. FEELING DOWN, DEPRESSED OR HOPELESS: 0
SUM OF ALL RESPONSES TO PHQ QUESTIONS 1-9: 0
1. LITTLE INTEREST OR PLEASURE IN DOING THINGS: 0

## 2022-10-13 ENCOUNTER — CLINICAL DOCUMENTATION (OUTPATIENT)
Dept: SPIRITUAL SERVICES | Age: 87
End: 2022-10-13

## 2022-10-13 LAB
ESTIMATED AVERAGE GLUCOSE: 134.1 MG/DL
HBA1C MFR BLD: 6.3 %

## 2022-10-13 NOTE — ACP (ADVANCE CARE PLANNING)
Advance Care Planning   Ambulatory ACP Specialist Patient Outreach    Date:  10/13/2022  ACP Specialist:  Jeni Quiroz    Outreach call to patient in follow-up to ACP Specialist referral from: Telma Hood MD    [x] PCP  [] Provider   [] Ambulatory Care Management [] Other for Reason:    [x] Advance Directive Assistance  [] Code Status Discussion  [] Complete Portable DNR Order  [] Discuss Goals of Care  [] Complete POST/MOST  [] Early ACP Decision-Making  [] Other    Date Referral Received:10/12/22    Today's Outreach:  [x] First   [] Second  [] Third                               First outreach made by [x]  phone  [] email []   [employer]     Intervention:  [x] Spoke with Patient  [] Left VM requesting return call      Outcome: Scheduled ACP Conversation Call for Wednesday October 26th at [address] to be Mailed per Solomon. Patient does not have access to [employer]. Next Step:   [x] ACP scheduled conversation  [] Outreach again in one week               [x] Email / Mail ACP Info Sheets  [x] Email / Mail Advance Directive            [] Close Referral. Routing closure to referring provider/staff and to ACP Specialist . [] Closure Letter mailed to Patient with Invitation to Contact ACP Specialist if/when ready.     Thank you for this referral.

## 2022-10-19 ENCOUNTER — TELEPHONE (OUTPATIENT)
Dept: INTERNAL MEDICINE CLINIC | Age: 87
End: 2022-10-19

## 2022-10-19 RX ORDER — DILTIAZEM HYDROCHLORIDE 60 MG/1
TABLET, FILM COATED ORAL
Qty: 10.2 EACH | Refills: 6 | Status: SHIPPED | OUTPATIENT
Start: 2022-10-19

## 2022-10-19 NOTE — TELEPHONE ENCOUNTER
Fly (daughter)   Called requesting urine test.  -patient will be bring urine into office for testing    Patient is having the following  -frequency and urgency    Phone: [phone]

## 2022-10-22 RX ORDER — CIPROFLOXACIN 250 MG/1
250 TABLET, FILM COATED ORAL 2 TIMES DAILY
Qty: 20 TABLET | Refills: 0 | Status: SHIPPED | OUTPATIENT
Start: 2022-10-22 | End: 2022-11-01

## 2022-10-23 DIAGNOSIS — E11.8 TYPE 2 DIABETES MELLITUS WITH COMPLICATION, WITH LONG-TERM CURRENT USE OF INSULIN (HCC): ICD-10-CM

## 2022-10-23 DIAGNOSIS — Z79.4 TYPE 2 DIABETES MELLITUS WITH COMPLICATION, WITH LONG-TERM CURRENT USE OF INSULIN (HCC): ICD-10-CM

## 2022-10-24 RX ORDER — SITAGLIPTIN 100 MG/1
TABLET, FILM COATED ORAL
Qty: 90 TABLET | Refills: 1 | Status: SHIPPED | OUTPATIENT
Start: 2022-10-24

## 2022-10-25 ENCOUNTER — CLINICAL DOCUMENTATION (OUTPATIENT)
Dept: SPIRITUAL SERVICES | Age: 87
End: 2022-10-25

## 2022-10-25 NOTE — ACP (ADVANCE CARE PLANNING)
Advance Care Planning   Ambulatory ACP Specialist Patient Outreach    Date:  10/25/2022  ACP Specialist:  SYLVAIN Lozano    Outreach call to patient in follow-up to ACP Specialist referral from: Calisat Chandler MD    [x] PCP  [] Provider   [] Ambulatory Care Management [] Other for Reason:    [x] Advance Directive Assistance  [] Code Status Discussion  [] Complete Portable DNR Order  [] Discuss Goals of Care  [] Complete POST/MOST  [] Early ACP Decision-Making  [] Other    Date Referral Received: 10/12/2022    Today's Outreach:  [] First   [] Second  [] Third [x] Reminder call                               Third outreach made by []  phone  [] email []   MyChart     Intervention:  [x] Spoke with Patient  [] Left VM requesting return call      Outcome: ACP specialist made a reminder call to pt for her scheduled ACP conversation appt for tomorrow 10/26 at 10am. Pt did confirm this appt. 10/26: ACP specialist made scheduled 10am appt to pt. Pt states that she is not feeling well today & would like to reschedule this appt for another time. Pt has a new appt scheduled for Weds 11/23 at 11am.     11/22: ACP Specialist made a reminder call for pt scheduled ACP conversation appt for tomorrow 11/22 at 11am. This appt has been confirmed. 11/23: Pt dtr called this sw and requested that the pt appt be rescheduled. Pt has a new ACP conversation appt set for 12/7 at 11am.     12/07: ACP specialist made scheduled 11am appt. Pt is asking that this appt be rescheduled to after the holidays. Pt has a new appt scheduled for Weds 1/04 at 11am.     01/03: ACP specialist made an appt reminder to pt to confirm her scheduled ACP conversation appt for tomorrow 01/04 at 11am. Pt answered the call, but disconnected the phone once sw introduced herself; she did not confirm/decline this appt. SW will attempt to contact pt tomorrow for her scheduled appt. 01/03: Return call was rcvd from pt dtr Christiano Cuadra.  Christiano Cuadra is asking that this appt be rescheduled to [facility] 02/01 at 11am.     Next Step:   [x] ACP scheduled conversation  [] Outreach again in one week               [] Email / Mail ACP Info Sheets  [] Email / Mail Advance Directive            [] Close Referral. Routing closure to referring provider/staff and to ACP Specialist . [] Closure Letter mailed to Patient with Invitation to Contact ACP Specialist if/when ready.     Thank you for this referral.

## 2022-11-01 NOTE — PROGRESS NOTES
10/12/2022     Holly Garcia (:  1935) is a 82 y.o. female, here for evaluation of the following medical concerns:    Diabetes    Treatment Adherence:   Medication compliance:  compliant all of the time  Diet compliance:  compliant most of the time  Weight trend: stable  Current exercise: no regular exercise  Barriers: none    Diabetes Mellitus Type 2: Current symptoms/problems include none. Home blood sugar records: trend: stable  Any episodes of hypoglycemia? no  Eye exam current (within one year): unknown  Tobacco history: She  reports that she has never smoked. She has never used smokeless tobacco.   Daily Aspirin? Yes    Hypertension:  Home blood pressure monitoring: No.  She is adherent to a low sodium diet. Patient denies headache, lightheadedness, blurred vision, and peripheral edema. Antihypertensive medication side effects: no medication side effects noted. Use of agents associated with hypertension: decongestants, estrogens, and NSAIDS. Hyperlipidemia:  No new myalgias or GI upset on atorvastatin (Lipitor). Lab Results   Component Value Date    LABA1C 6.3 10/12/2022    LABA1C 6.0 10/06/2021    LABA1C 6.3 2021     Lab Results   Component Value Date    LABMICR 31.00 (H) 10/12/2022    CREATININE 1.1 10/12/2022     Lab Results   Component Value Date    ALT 14 10/12/2022    AST 16 10/12/2022     Lab Results   Component Value Date    CHOL 118 10/12/2022    TRIG 79 10/12/2022    HDL 43 10/12/2022    LDLCALC 59 10/12/2022          Review of Systems    Prior to Visit Medications    Medication Sig Taking? Authorizing Provider   insulin glargine (LANTUS SOLOSTAR) 100 UNIT/ML injection pen Inject 36 Units into the skin nightly Max dose 60 units plan titration Yes Sheyla Villasenor MD   Compression Stockings MISC by Does not apply route Knee high compression and thigh high stockings 30 mm Hg. Dispense two pairs with 1 year refill.  Yes Sheyla Villasenor MD   blood glucose test strips (ONETOUCH VERIO) strip USE TO TEST 3 TIMES A DAY BEFORE MEALS AND AT BEDTIME Yes Abran Mcguire MD   ammonium lactate (LAC-HYDRIN) 12 % lotion APPLY TO AFFECTED AREA TOPICALLY EVERY DAY Yes Abran Mcguire MD   montelukast (SINGULAIR) 10 MG tablet TAKE 1 TABLET BY MOUTH EVERYDAY AT BEDTIME Yes Abran Mcguire MD   famotidine (PEPCID) 20 MG tablet TAKE 1 TABLET BY MOUTH TWICE A DAY Yes Abran Mcguire MD   levothyroxine (SYNTHROID) 25 MCG tablet TAKE 1 TABLET BY MOUTH EVERY DAY Yes Abran Mcguire MD   LANTUS SOLOSTAR 100 UNIT/ML injection pen INJECT 22 UNITS INTO THE SKIN NIGHTLY  Patient taking differently: Inject 36 Units into the skin nightly Yes Abran Mcguire MD   Blood Glucose Monitoring Suppl ([facility] [employer]UM[employer] SYSTEM) w/Device KIT 1 kit by Does not apply route 4 times daily (before meals and nightly) Yes Abran Mcguire MD   Insulin Pen Needle 32G X 4 MM MISC 1 each by Does not apply route daily Yes Abran Mcguire MD   Lancets MISC 1 each by Does not apply route daily Indications: Type 2 Diabetes Yes Abran Mcguire MD   verapamil (CALAN SR) 240 MG extended release tablet TAKE 1/2 TABLET BY MOUTH TWICE A DAY Yes Abran Mcguire MD   Lancets ([facility] [id number]) [address] [facility] 1 EACH BY DOES NOT APPLY ROUTE 4 TIMES DAILY (AFTER MEALS AND AT BEDTIME) Yes Abran Mcguire MD   azelastine (ASTELIN) 0.1 % nasal spray 1 SPRAY BY NASAL ROUTE 2 TIMES DAILY USE IN EACH NOSTRIL AS DIRECTED Yes Abran Mcguire MD   divalproex (DEPAKOTE) 125 MG DR tablet TAKE ONE TABLET IN THE AM AND TWO TABLETS IN THE PM. Yes Abran Mcguire MD   nitrofurantoin, macrocrystal-monohydrate, (MACROBID) 100 MG capsule Take 1 capsule by mouth daily Yes Abran Mcguire MD   losartan (COZAAR) 25 MG tablet Take 1 tablet by mouth daily Yes Abran Mcguire MD   loratadine (CLARITIN) 10 MG tablet Take 1 tablet by mouth daily Yes Abran Mcguire MD   ketoconazole (NIZORAL) 2 % cream APPLY TO AFFECTED AREA EVERY DAY Yes Abran Mcguire MD   hydrALAZINE (APRESOLINE) 50 MG tablet Take 1.5 tablets by mouth 3 times daily Yes Abran Mcguire MD   atenolol (TENORMIN) 50 MG tablet TAKE 1 TABLET BY MOUTH EVERY DAY Yes Abran Mcguire MD   apixaban (ELIQUIS) 5 MG TABS tablet TAKE 1 TABLET BY MOUTH 2 TIMES DAILY STOP COUMADIN Yes Abran Mcguire MD   allopurinol (ZYLOPRIM) 300 MG tablet TAKE 1 TABLET BY MOUTH EVERY DAY Yes Abran Mcguire MD   atorvastatin (LIPITOR) 40 MG tablet TAKE 1 TABLET BY MOUTH EVERY DAY Yes Abran Mcguire MD   albuterol sulfate HFA (VENTOLIN HFA) 108 (90 Base) MCG/ACT inhaler Inhale 2 puffs into the lungs 4 times daily as needed for Wheezing Yes Abran Mcguire MD   B-D UF III MINI PEN NEEDLES 31G X 5 MM MISC USE AS DIRECTED NIGHTLY WITH LANTUS PEN Yes Abran Mcguire MD   lidocaine (XYLOCAINE) 5 % ointment APPLY TO AFFECTED AREA EVERY DAY AS NEEDED Yes Abran Mcguire MD   sodium bicarbonate 650 MG tablet TAKE 1 TABLET BY MOUTH EVERY DAY Yes Abran Mcguire MD   BD PEN NEEDLE ANAN U/F 32G X 4 MM MISC USE AS DIRECTEDDAILY Yes Abran Mcguire MD   Spacer/Aero-Holding Chambers (AEROCHAMBER MV) MISC 1 each by Does not apply route 2 times daily Yes Kaushik Manuel MD   aspirin 81 MG tablet Take 81 mg by mouth daily Yes Historical Provider, MD   Lactobacillus (PROBIOTIC ACIDOPHILUS PO) Take 1 capsule by mouth daily Yes Historical Provider, MD   hydrocortisone 1 % ointment Apply topically 2 times daily as needed (dry skin) Apply topically 2 times daily. 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 Needs assistance getting out of chair, but once out of chair can ambulate independently with walker or cane.  Yes Kaushik Manuel MD Incontinence Supply Disposable (PALLAVI SERENITY BRIEFS LARGE) MISC 1 each by Does not apply route three times daily Large to extra large. Give Pallavi underwear protections. Daughter will select the brand. Yes Nidhi Craven MD   Incontinence Supplies (BEDPAN) [address], but \"3 bottle\" is the only way it will allow us to order this bedpan. Yes Nidhi Craven MD   Multiple Vitamins-Minerals (CENTRUM SILVER PO) Take 1 tablet by mouth daily  Yes Historical Provider, MD   JANUVIA 100 MG tablet TAKE 1 TABLET BY MOUTH EVERY DAY  Sumeet Mckeon MD   ciprofloxacin (CIPRO) 250 MG tablet Take 1 tablet by mouth 2 times daily for 10 days  Sumeet Mckeon MD   SYMBICORT 80-4.5 MCG/ACT AERO INHALE 2 PUFFS INTO THE LUNGS 2 TIMES DAILY TAKE 2 PUFFS BY MOUTH TWICE A DAY  Sumeet Mckeon MD   insulin lispro (HUMALOG) 100 UNIT/ML injection cartridge Inject into the skin 3 times daily (before meals)  Patient not taking: Reported on 10/12/2022  Sumeet Mckeon MD   polyethylene glycol (MIRALAX) 17 g packet Take 17 g by mouth 2 times daily  Sumeet Mckeon MD        Social History     Tobacco Use    Smoking status: Never    Smokeless tobacco: Never   Substance Use Topics    Alcohol use: No        Vitals:    10/12/22 1130   BP: 136/72   Pulse: 71   Temp: 97.6 °F (36.4 °C)   TempSrc: Infrared   SpO2: 99%   Weight: Comment: not able to stand on scale     Estimated body mass index is 33.37 kg/m² as calculated from the following:    Height as of 12/1/21: 5' 3\" (1.6 m). Weight as of 12/1/21: 188 lb 6.4 oz (85.5 kg). Physical Exam  Vitals and nursing note reviewed. Constitutional:       General: She is not in acute distress. Appearance: Normal appearance. She is well-developed. She is obese. She is not ill-appearing, toxic-appearing or diaphoretic. HENT:      Head: Normocephalic and atraumatic.       Right Ear: External ear normal.      Left Ear: External ear normal. Nose: Nose normal.      Mouth/Throat:      Mouth: Mucous membranes are moist.   Eyes:      General:         Right eye: No discharge. Left eye: No discharge. Conjunctiva/sclera: Conjunctivae normal.      Pupils: Pupils are equal, round, and reactive to light. Cardiovascular:      Rate and Rhythm: Normal rate and regular rhythm. Heart sounds: Normal heart sounds. Pulmonary:      Effort: Pulmonary effort is normal. No respiratory distress. Breath sounds: Normal breath sounds. Musculoskeletal:         General: Normal range of motion. Cervical back: Normal range of motion. Skin:     General: Skin is warm. Capillary Refill: Capillary refill takes less than 2 seconds. Neurological:      General: No focal deficit present. Mental Status: She is alert and oriented to person, place, and time. Psychiatric:         Mood and Affect: Mood normal.         Behavior: Behavior normal.         Thought Content: Thought content normal.         Judgment: Judgment normal.       ASSESSMENT/PLAN:  1. Type 2 diabetes mellitus with complication, with long-term current use of insulin ([facility])  - Blood Glucose Monitoring Suppl ([employer] SYSTEM) w/Device KIT; 1 kit by Does not apply route 4 times daily (before meals and nightly)  Dispense: 1 kit; Refill: 11  - Insulin Pen Needle 32G X 4 MM MISC; 1 each by Does not apply route daily  Dispense: 100 each; Refill: 3  - TSH with Reflex to FT4; Future  - Hemoglobin A1C; Future    Assessment/Plan:  Jim Mckay was seen today for diabetes. Diagnoses and all orders for this visit:    Type 2 diabetes mellitus with complication, with long-term current use of insulin ([facility].): patient with diabetes  -     Blood Glucose Monitoring Suppl ([employer] IQ SYSTEM) w/Device KIT; 1 kit by Does not apply route 4 times daily (before meals and nightly)  -     insulin lispro (HUMALOG) 100 UNIT/ML injection cartridge;  Inject into the skin 3 times daily (before meals)  -     Insulin Pen Needle 32G X 4 MM MISC; 1 each by Does not apply route daily  -     Lancets MISC; 1 each by Does not apply route daily Indications: Type 2 Diabetes  -     TSH with Reflex to FT4; Future  -     Hemoglobin A1C; Future  Assessment/Plan:  Chai Thorpe was seen today for diabetes. Diagnoses and all orders for this visit:    Type 2 diabetes mellitus with complication, with long-term current use of insulin ([facility]  -     Comprehensive Metabolic Panel; Future  -     Lipid Panel; Future  -     Hemoglobin A1C; Future  -     Microalbumin / Creatinine Urine Ratio; Future  -     insulin glargine (LANTUS SOLOSTAR) 100 UNIT/ML injection pen; Inject 36 Units into the skin nightly Max dose 60 units plan titration    Primary osteoarthritis of both first carpometacarpal joints    Multiple sclerosis ([facility]  -     Ambulatory Referral to [facility] Clinical Specialist    Stage 3b chronic kidney disease ([facility]  -     Microalbumin / Creatinine Urine Ratio; Future  -     Ambulatory Referral to [facility] Clinical Specialist      Edema, lower extremity  -     Compression Stockings MISC; by Does not apply route Knee high compression and thigh high stockings 30 mm Hg. Dispense two pairs with 1 year refill. Lymphedema  -     Compression Stockings MISC; by Does not apply route Knee high compression and thigh high stockings 30 mm Hg. Dispense two pairs with 1 year refill. Return for AWV on tuesday at 5:30. Patient with persistent incontinence and recurrent UTI's but she is not a good candidate for surgery. She has been evaluated by urology and has declined intervention    A total of 30 minutes spent with the patient today greater than 50% in counseling regarding these issues. There are other unrelated non-urgent complaints, but due to the busy schedule and the amount of time I've already spent with her, time does not permit me to address these routine issues at today's visit.  I've requested another appointment to review these additional issues. An electronic signature was used to authenticate this note.     --Alfredo Lott MD on 11/1/2022 at 12:42 AM

## 2022-11-08 ENCOUNTER — TELEMEDICINE (OUTPATIENT)
Dept: INTERNAL MEDICINE CLINIC | Age: 87
End: 2022-11-08
Payer: MEDICARE

## 2022-11-08 DIAGNOSIS — N39.0 RECURRENT UTI (URINARY TRACT INFECTION): ICD-10-CM

## 2022-11-08 DIAGNOSIS — Z00.00 MEDICARE ANNUAL WELLNESS VISIT, SUBSEQUENT: ICD-10-CM

## 2022-11-08 DIAGNOSIS — M81.0 OSTEOPOROSIS WITHOUT CURRENT PATHOLOGICAL FRACTURE, UNSPECIFIED OSTEOPOROSIS TYPE: Primary | ICD-10-CM

## 2022-11-08 PROCEDURE — 1123F ACP DISCUSS/DSCN MKR DOCD: CPT | Performed by: INTERNAL MEDICINE

## 2022-11-08 PROCEDURE — G0439 PPPS, SUBSEQ VISIT: HCPCS | Performed by: INTERNAL MEDICINE

## 2022-11-08 RX ORDER — CIPROFLOXACIN 250 MG/1
250 TABLET, FILM COATED ORAL 2 TIMES DAILY
Qty: 20 TABLET | Refills: 0 | Status: SHIPPED | OUTPATIENT
Start: 2022-11-08 | End: 2022-11-18

## 2022-11-08 RX ORDER — NITROFURANTOIN 25; 75 MG/1; MG/1
100 CAPSULE ORAL DAILY
Qty: 90 CAPSULE | Refills: 1 | Status: SHIPPED | OUTPATIENT
Start: 2022-11-08

## 2022-11-08 SDOH — HEALTH STABILITY: PHYSICAL HEALTH: ON AVERAGE, HOW MANY DAYS PER WEEK DO YOU ENGAGE IN MODERATE TO STRENUOUS EXERCISE (LIKE A BRISK WALK)?: 0 DAYS

## 2022-11-08 ASSESSMENT — LIFESTYLE VARIABLES
HOW OFTEN DO YOU HAVE SIX OR MORE DRINKS ON ONE OCCASION: 1
HOW MANY STANDARD DRINKS CONTAINING ALCOHOL DO YOU HAVE ON A TYPICAL DAY: 0
HOW OFTEN DO YOU HAVE A DRINK CONTAINING ALCOHOL: 1

## 2022-11-08 NOTE — PROGRESS NOTES
Medicare Annual Wellness Visit    Jerry Simpson is here for Medicare AWV    Assessment & Plan   Osteoporosis without current pathological fracture, unspecified osteoporosis type  -     DEXA BONE DENSITY AXIAL SKELETON; Future  Recurrent UTI (urinary tract infection)  -     nitrofurantoin, macrocrystal-monohydrate, (MACROBID) 100 MG capsule; Take 1 capsule by mouth daily, Disp-90 capsule, R-1Normal  -     ciprofloxacin (CIPRO) 250 MG tablet; Take 1 tablet by mouth 2 times daily for 10 days, Disp-20 tablet, R-0Normal  -     Urinalysis with Microscopic; Standing    Recommendations for Preventive Services Due: see orders and patient instructions/AVS.  Recommended screening schedule for the next 5-10 years is provided to the patient in written form: see Patient Instructions/AVS.     No follow-ups on file. Subjective   The following acute and/or chronic problems were also addressed today:  Recurrent UTI and osteoporosis    Patient's complete Health Risk Assessment and screening values have been reviewed and are found in Flowsheets. The following problems were reviewed today and where indicated follow up appointments were made and/or referrals ordered.     Positive Risk Factor Screenings with Interventions:    Fall Risk:  Do you feel unsteady or are you worried about falling? : (!) yes  2 or more falls in past year?: no  Fall with injury in past year?: no   Fall Risk Interventions:    Patient advised to follow-up in this office for further evaluation and treatment within 2 month(s)  Patient declines any further evaluation/treatment for this issue            General Health and ACP:  General  In general, how would you say your health is?: Fair  In the past 7 days, have you experienced any of the following: New or Increased Pain, New or Increased Fatigue, Loneliness, Social Isolation, Stress or Anger?: (!) Yes  Select all that apply: (!) Loneliness  Do you get the social and emotional support that you need?: Yes  Do you Allergen Reactions    Fish Allergy Rash     Other reaction(s): Rash    Wheat Bran      Other reaction(s): Itching    [place] Meal      Nausea, vomiting and diarrhea with almond milk. Celexa [Citalopram Hydrobromide]     Famciclovir     Lactose     Metronidazole     Peanut-Containing Drug Products     [place] Oil [Nutritional Supplements] Diarrhea    Zofran     Bactrim [Sulfamethoxazole-Trimethoprim]      Causes hyonatremia to 120's     Prior to Visit Medications    Medication Sig Taking? Authorizing Provider   JANUVIA 100 MG tablet TAKE 1 TABLET BY MOUTH EVERY DAY Yes Trisha Swift MD   SYMBICORT 80-4.5 MCG/ACT AERO INHALE 2 PUFFS INTO THE LUNGS 2 TIMES DAILY TAKE 2 PUFFS BY MOUTH TWICE A DAY Yes Trisha Swift MD   insulin glargine (LANTUS SOLOSTAR) 100 UNIT/ML injection pen Inject 36 Units into the skin nightly Max dose 60 units plan titration Yes Trisha Swift MD   Compression Stockings MISC by Does not apply route Knee high compression and thigh high stockings 30 mm Hg. Dispense two pairs with 1 year refill.  Yes Trisha Swift MD   blood glucose test strips (ONETOUCH VERIO) strip USE TO TEST 3 TIMES A DAY BEFORE MEALS AND AT BEDTIME Yes Trisha Swift MD   ammonium lactate (LAC-HYDRIN) 12 % lotion APPLY TO AFFECTED AREA TOPICALLY EVERY DAY Yes Trisha Swift MD   montelukast (SINGULAIR) 10 MG tablet TAKE 1 TABLET BY MOUTH EVERYDAY AT BEDTIME Yes Trisha Swift MD   famotidine (PEPCID) 20 MG tablet TAKE 1 TABLET BY MOUTH TWICE A DAY Yes Trisha Swift MD   levothyroxine (SYNTHROID) 25 MCG tablet TAKE 1 TABLET BY MOUTH EVERY DAY Yes Trisha Swift MD   Blood Glucose Monitoring Suppl ([employer] SYSTEM) w/Device KIT 1 kit by Does not apply route 4 times daily (before meals and nightly) Yes Trisha Swift MD   Insulin Pen Needle 32G X 4 MM MISC 1 each by Does not apply route daily Yes Trisha Swift MD Lancets MISC 1 each by Does not apply route daily Indications: Type 2 Diabetes Yes Jean Rebolledo MD   verapamil (CALAN SR) 240 MG extended release tablet TAKE 1/2 TABLET BY MOUTH TWICE A DAY Yes Jean Rebolledo MD   Lancets ([address] [place]) [address] [facility] 1 EACH BY DOES NOT APPLY ROUTE 4 TIMES DAILY (AFTER MEALS AND AT BEDTIME) Yes Jean Rebolledo MD   azelastine (ASTELIN) 0.1 % nasal spray 1 SPRAY BY NASAL ROUTE 2 TIMES DAILY USE IN EACH NOSTRIL AS DIRECTED Yes Jean Rebolledo MD   divalproex (DEPAKOTE) 125 MG DR tablet TAKE ONE TABLET IN THE AM AND TWO TABLETS IN THE PM. Yes Jean Rebolledo MD   polyethylene glycol (MIRALAX) 17 g packet Take 17 g by mouth 2 times daily Yes Jean Rebolledo MD   nitrofurantoin, macrocrystal-monohydrate, (MACROBID) 100 MG capsule Take 1 capsule by mouth daily Yes Jean Rebolledo MD   losartan (COZAAR) 25 MG tablet Take 1 tablet by mouth daily Yes Jean Rebolledo MD   loratadine (CLARITIN) 10 MG tablet Take 1 tablet by mouth daily Yes Jean Rebolledo MD   ketoconazole (NIZORAL) 2 % cream APPLY TO AFFECTED AREA EVERY DAY Yes Jean Rebolledo MD   hydrALAZINE (APRESOLINE) 50 MG tablet Take 1.5 tablets by mouth 3 times daily Yes Jean Rebolledo MD   atenolol (TENORMIN) 50 MG tablet TAKE 1 TABLET BY MOUTH EVERY DAY Yes Jean Rebolledo MD   apixaban (ELIQUIS) 5 MG TABS tablet TAKE 1 TABLET BY MOUTH 2 TIMES DAILY STOP COUMADIN Yes Jean Rebolledo MD   allopurinol (ZYLOPRIM) 300 MG tablet TAKE 1 TABLET BY MOUTH EVERY DAY Yes Jean Rebolledo MD   atorvastatin (LIPITOR) 40 MG tablet TAKE 1 TABLET BY MOUTH EVERY DAY Yes Jean Rebolledo MD   albuterol sulfate HFA (VENTOLIN HFA) 108 (90 Base) MCG/ACT inhaler Inhale 2 puffs into the lungs 4 times daily as needed for Wheezing Yes Jean Rebolledo MD   B-SOPHIA UF III MINI PEN NEEDLES 31G X 5 MM MISC USE AS DIRECTED NIGHTLY WITH LANTUS PEN Yes Tracie Mcdaniel MD   lidocaine (XYLOCAINE) 5 % ointment APPLY TO AFFECTED AREA EVERY DAY AS NEEDED Yes Tracie Mcdaniel MD   sodium bicarbonate 650 MG tablet TAKE 1 TABLET BY MOUTH EVERY DAY Yes Tracie Mcdaniel MD   BD PEN NEEDLE ANNA U/F 32G X 4 MM MISC USE AS DIRECTEDDAILY Yes Tracie Mcdaniel MD   Spacer/Aero-Holding Chambers (AEROCHAMBER MV) MISC 1 each by Does not apply route 2 times daily Yes Brookie Koyanagi, MD   aspirin 81 MG tablet Take 81 mg by mouth daily Yes Historical Provider, MD   Lactobacillus (PROBIOTIC ACIDOPHILUS PO) Take 1 capsule by mouth daily Yes Historical Provider, MD   hydrocortisone 1 % ointment Apply topically 2 times daily as needed (dry skin) Apply topically 2 times daily. Yes Historical Provider, MD   Incontinence Supply Disposable (PALLAVI SERENITY BRIEFS LARGE) MISC 1 each by Does not apply route three times daily Large to extra large. Give Pallavi underwear protections. Daughter will select the brand. Yes Brookie Koyanagi, MD   Incontinence Supplies (BEDPAN) [address], but \"5 bottle\" is the only way it will allow us to order this bedpan. Yes Brookie Koyanagi, MD   Multiple Vitamins-Minerals (CENTRUM SILVER PO) Take 1 tablet by mouth daily  Yes Historical Provider, MD   insulin lispro (HUMALOG) 100 UNIT/ML injection cartridge Inject into the skin 3 times daily (before meals)  Patient not taking: No sig reported  Tracie Mcdaniel MD       CareTe (Including outside providers/suppliers regularly involved in providing care):   Patient Care Team:  Tracie Mcdaniel MD as PCP - General (Internal Medicine/Pediatrics)  Tracie Mcdaniel MD as [facility] Empaneled [facility]  Eliana Mejias MD as Consulting Physician (General Surgery)     Reviewed and updated this visit:  Allergies  Meds           Mariana Litten, was evaluated through a synchronous (real-time) audio-video encounter.  The patient (or guardian if applicable) is aware that this is a billable service, which includes applicable co-pays. This Virtual Visit was conducted with patient's (and/or legal guardian's) consent. The visit was conducted pursuant to the emergency declaration under the [facility], [address] [place] [address] authority and the [employer] General Act. Patient identification was verified, and a caregiver was present when appropriate. The patient was located at Home: [address] [address]. Provider was located at [facility] (Appt Dept): [address] [facility],  [address] [facility].

## 2022-11-08 NOTE — PATIENT INSTRUCTIONS
Visiting dexa scan  Follow up flor for virtual   Personalized Preventive Plan for Ulysses Don - 11/8/2022  Medicare offers a range of preventive health benefits. Some of the tests and screenings are paid in full while other may be subject to a deductible, co-insurance, and/or copay. Some of these benefits include a comprehensive review of your medical history including lifestyle, illnesses that may run in your family, and various assessments and screenings as appropriate. After reviewing your medical record and screening and assessments performed today your provider may have ordered immunizations, labs, imaging, and/or referrals for you. A list of these orders (if applicable) as well as your Preventive Care list are included within your After Visit Summary for your review. Other Preventive Recommendations:    A preventive eye exam performed by an eye specialist is recommended every 1-2 years to screen for glaucoma; cataracts, macular degeneration, and other eye disorders. A preventive dental visit is recommended every 6 months. Try to get at least 150 minutes of exercise per week or 10,000 steps per day on a pedometer . Order or download the FREE \"Exercise & Physical Activity: Your Everyday Guide\" from [employer]. Call 2-761.334.3817 or search [employer] online. You need 9175-8485 mg of calcium and 5806-2144 IU of vitamin D per day. It is possible to meet your calcium requirement with diet alone, but a vitamin D supplement is usually necessary to meet this goal.  When exposed to the sun, use a sunscreen that protects against both UVA and UVB radiation with an SPF of 30 or greater. Reapply every 2 to 3 hours or after sweating, drying off with a towel, or swimming. Always wear a seat belt when traveling in a car. Always wear a helmet when riding a bicycle or motorcycle.

## 2022-11-18 RX ORDER — LORATADINE 10 MG/1
TABLET ORAL
Qty: 90 TABLET | Refills: 1 | Status: SHIPPED | OUTPATIENT
Start: 2022-11-18

## 2022-11-18 NOTE — TELEPHONE ENCOUNTER
Recent Visits  Date Type Provider Dept   10/12/22 Office Visit William Yin MD [facility]   12/01/21 Office Visit William Yin MD [facility]   10/06/21 Office Visit William Yin MD [facility]   Showing recent visits within past 540 days with a meds authorizing provider and meeting all other requirements  Future Appointments  No visits were found meeting these conditions.   Showing future appointments within next 150 days with a meds authorizing provider and meeting all other requirements     11/8/2022

## 2022-12-12 DIAGNOSIS — Z79.4 TYPE 2 DIABETES MELLITUS WITH COMPLICATION, WITH LONG-TERM CURRENT USE OF INSULIN (HCC): ICD-10-CM

## 2022-12-12 DIAGNOSIS — E11.8 TYPE 2 DIABETES MELLITUS WITH COMPLICATION, WITH LONG-TERM CURRENT USE OF INSULIN (HCC): ICD-10-CM

## 2022-12-12 RX ORDER — SODIUM BICARBONATE 650 MG/1
TABLET ORAL
Qty: 90 TABLET | Refills: 5 | Status: SHIPPED | OUTPATIENT
Start: 2022-12-12

## 2022-12-12 NOTE — TELEPHONE ENCOUNTER
Medication:   Requested Prescriptions     Pending Prescriptions Disp Refills    sodium bicarbonate 650 MG tablet [Pharmacy Med Name: SODIUM BICARB 650 MG TABLET] 90 tablet 5     Sig: TAKE 1 TABLET BY MOUTH EVERY DAY        Last Filled:  9/13/2022    Patient Phone Number: [phone] (home)     Last appt: 11/8/2022   Next appt: Visit date not found    Last OARRS: No flowsheet data found.

## 2023-02-12 DIAGNOSIS — R25.1 TREMORS OF NERVOUS SYSTEM: ICD-10-CM

## 2023-02-12 DIAGNOSIS — R60.0 EDEMA, LOWER EXTREMITY: ICD-10-CM

## 2023-02-12 DIAGNOSIS — E11.8 TYPE 2 DIABETES MELLITUS WITH COMPLICATION, WITH LONG-TERM CURRENT USE OF INSULIN (HCC): ICD-10-CM

## 2023-02-12 DIAGNOSIS — E79.0 HYPERURICEMIA: ICD-10-CM

## 2023-02-12 DIAGNOSIS — N39.0 RECURRENT UTI (URINARY TRACT INFECTION): ICD-10-CM

## 2023-02-12 DIAGNOSIS — I10 ESSENTIAL HYPERTENSION: ICD-10-CM

## 2023-02-12 DIAGNOSIS — J45.40 MODERATE PERSISTENT ASTHMA WITHOUT COMPLICATION: ICD-10-CM

## 2023-02-12 DIAGNOSIS — Z79.4 TYPE 2 DIABETES MELLITUS WITH COMPLICATION, WITH LONG-TERM CURRENT USE OF INSULIN (HCC): ICD-10-CM

## 2023-02-12 DIAGNOSIS — E11.8 TYPE 2 DIABETES MELLITUS WITH UNSPECIFIED COMPLICATIONS (HCC): ICD-10-CM

## 2023-02-12 DIAGNOSIS — E03.4 HYPOTHYROIDISM DUE TO ACQUIRED ATROPHY OF THYROID: ICD-10-CM

## 2023-02-12 DIAGNOSIS — I89.0 LYMPHEDEMA: ICD-10-CM

## 2023-02-12 DIAGNOSIS — K21.9 GASTROESOPHAGEAL REFLUX DISEASE WITHOUT ESOPHAGITIS: ICD-10-CM

## 2023-02-12 DIAGNOSIS — Z86.711 HISTORY OF PULMONARY EMBOLISM: ICD-10-CM

## 2023-02-12 NOTE — TELEPHONE ENCOUNTER
Recent Visits  Date Type Provider Dept   10/12/22 Office Visit Beti Chairez MD [facility]   12/01/21 Office Visit Beti Chairez MD [facility]   10/06/21 Office Visit Beti Chairez MD [facility]   Showing recent visits within past 540 days with a meds authorizing provider and meeting all other requirements  Future Appointments  Date Type Provider Dept   04/05/23 Appointment Beti Chairez MD [facility]   Showing future appointments within next 150 days with a meds authorizing provider and meeting all other requirements    1/11/2021

## 2023-02-13 RX ORDER — INSULIN GLARGINE 100 [IU]/ML
36 INJECTION, SOLUTION SUBCUTANEOUS NIGHTLY
Qty: 45 ML | Refills: 3 | Status: SHIPPED | OUTPATIENT
Start: 2023-02-13 | End: 2023-05-14

## 2023-02-13 RX ORDER — ALBUTEROL SULFATE 90 UG/1
2 AEROSOL, METERED RESPIRATORY (INHALATION) 4 TIMES DAILY PRN
Qty: 1 EACH | Refills: 5 | Status: SHIPPED | OUTPATIENT
Start: 2023-02-13

## 2023-02-13 RX ORDER — ATORVASTATIN CALCIUM 40 MG/1
TABLET, FILM COATED ORAL
Qty: 90 TABLET | Refills: 3 | Status: SHIPPED | OUTPATIENT
Start: 2023-02-13

## 2023-02-13 RX ORDER — LOSARTAN POTASSIUM 25 MG/1
25 TABLET ORAL DAILY
Qty: 90 TABLET | Refills: 3 | Status: SHIPPED | OUTPATIENT
Start: 2023-02-13

## 2023-02-13 RX ORDER — BUDESONIDE AND FORMOTEROL FUMARATE DIHYDRATE 80; 4.5 UG/1; UG/1
2 AEROSOL RESPIRATORY (INHALATION) 2 TIMES DAILY
Qty: 10.2 EACH | Refills: 6 | Status: SHIPPED | OUTPATIENT
Start: 2023-02-13

## 2023-02-13 RX ORDER — BLOOD SUGAR DIAGNOSTIC
STRIP MISCELLANEOUS
Qty: 300 STRIP | Refills: 11 | Status: SHIPPED | OUTPATIENT
Start: 2023-02-13

## 2023-02-13 RX ORDER — FAMOTIDINE 20 MG/1
TABLET, FILM COATED ORAL
Qty: 180 TABLET | Refills: 1 | Status: SHIPPED | OUTPATIENT
Start: 2023-02-13

## 2023-02-13 RX ORDER — ALLOPURINOL 300 MG/1
TABLET ORAL
Qty: 90 TABLET | Refills: 3 | Status: SHIPPED | OUTPATIENT
Start: 2023-02-13

## 2023-02-13 RX ORDER — KETOCONAZOLE 20 MG/G
CREAM TOPICAL
Qty: 180 G | Refills: 3 | Status: SHIPPED | OUTPATIENT
Start: 2023-02-13

## 2023-02-13 RX ORDER — PEN NEEDLE, DIABETIC 32GX 5/32"
1 NEEDLE, DISPOSABLE MISCELLANEOUS DAILY
Qty: 100 EACH | Refills: 3 | Status: SHIPPED | OUTPATIENT
Start: 2023-02-13

## 2023-02-13 RX ORDER — LORATADINE 10 MG/1
10 TABLET ORAL DAILY
Qty: 90 TABLET | Refills: 1 | Status: SHIPPED | OUTPATIENT
Start: 2023-02-13

## 2023-02-13 RX ORDER — VERAPAMIL HYDROCHLORIDE 240 MG/1
240 TABLET, FILM COATED, EXTENDED RELEASE ORAL NIGHTLY
Qty: 90 TABLET | Refills: 1 | Status: SHIPPED | OUTPATIENT
Start: 2023-02-13

## 2023-02-13 RX ORDER — ATENOLOL 50 MG/1
TABLET ORAL
Qty: 90 TABLET | Refills: 3 | Status: SHIPPED | OUTPATIENT
Start: 2023-02-13

## 2023-02-13 RX ORDER — HYDRALAZINE HYDROCHLORIDE 50 MG/1
75 TABLET, FILM COATED ORAL 3 TIMES DAILY
Qty: 405 TABLET | Refills: 3 | Status: SHIPPED | OUTPATIENT
Start: 2023-02-13

## 2023-02-13 RX ORDER — LANCETS 30 GAUGE
1 EACH MISCELLANEOUS DAILY
Qty: 300 EACH | Refills: 1 | Status: SHIPPED | OUTPATIENT
Start: 2023-02-13

## 2023-02-13 RX ORDER — AZELASTINE 1 MG/ML
1 SPRAY, METERED NASAL 2 TIMES DAILY
Qty: 1 EACH | Refills: 5 | Status: SHIPPED | OUTPATIENT
Start: 2023-02-13

## 2023-02-13 RX ORDER — MONTELUKAST SODIUM 10 MG/1
TABLET ORAL
Qty: 90 TABLET | Refills: 1 | Status: SHIPPED | OUTPATIENT
Start: 2023-02-13

## 2023-02-13 RX ORDER — LANCETS 33 GAUGE
EACH MISCELLANEOUS
Qty: 100 EACH | Refills: 5 | Status: SHIPPED | OUTPATIENT
Start: 2023-02-13

## 2023-02-13 RX ORDER — NITROFURANTOIN 25; 75 MG/1; MG/1
100 CAPSULE ORAL DAILY
Qty: 90 CAPSULE | Refills: 1 | Status: SHIPPED | OUTPATIENT
Start: 2023-02-13

## 2023-02-13 RX ORDER — LEVOTHYROXINE SODIUM 0.03 MG/1
TABLET ORAL
Qty: 90 TABLET | Refills: 1 | Status: SHIPPED | OUTPATIENT
Start: 2023-02-13

## 2023-02-13 RX ORDER — BLOOD-GLUCOSE METER
1 EACH MISCELLANEOUS
Qty: 1 KIT | Refills: 11 | Status: SHIPPED | OUTPATIENT
Start: 2023-02-13

## 2023-02-13 RX ORDER — DIVALPROEX SODIUM 125 MG/1
TABLET, DELAYED RELEASE ORAL
Qty: 270 TABLET | Refills: 1 | Status: SHIPPED | OUTPATIENT
Start: 2023-02-13

## 2023-03-01 DIAGNOSIS — N39.0 RECURRENT UTI (URINARY TRACT INFECTION): ICD-10-CM

## 2023-03-01 LAB
AMORPHOUS: ABNORMAL /HPF
BACTERIA: ABNORMAL /HPF
BILIRUBIN URINE: NEGATIVE
BLOOD, URINE: ABNORMAL
CLARITY: ABNORMAL
COLOR: YELLOW
COMMENT UA: ABNORMAL
CRYSTALS, UA: ABNORMAL /HPF
EPITHELIAL CELLS, UA: 3 /HPF (ref 0–5)
GLUCOSE URINE: NEGATIVE MG/DL
HYALINE CASTS: 1 /LPF (ref 0–8)
KETONES, URINE: NEGATIVE MG/DL
LEUKOCYTE ESTERASE, URINE: ABNORMAL
MICROSCOPIC EXAMINATION: YES
MUCUS: ABNORMAL /LPF
NITRITE, URINE: NEGATIVE
PH UA: >=9 (ref 5–8)
PROTEIN UA: 100 MG/DL
RBC UA: ABNORMAL /HPF (ref 0–4)
SPECIFIC GRAVITY UA: 1.02 (ref 1–1.03)
URINE TYPE: ABNORMAL
UROBILINOGEN, URINE: 0.2 E.U./DL
WBC UA: ABNORMAL /HPF (ref 0–5)

## 2023-03-11 DIAGNOSIS — I10 ESSENTIAL HYPERTENSION: ICD-10-CM

## 2023-03-13 NOTE — TELEPHONE ENCOUNTER
Recent Visits  Date Type Provider Dept   10/12/22 Office Visit Ivonne Benson MD [facility]   12/01/21 Office Visit Ivonne Benson MD [facility]   10/06/21 Office Visit Ivonne Benson MD [facility]   Showing recent visits within past 540 days with a meds authorizing provider and meeting all other requirements  Future Appointments  Date Type Provider Dept   04/05/23 Appointment Ivonne Benson MD [facility]   Showing future appointments within next 150 days with a meds authorizing provider and meeting all other requirements     11/8/2022

## 2023-03-14 RX ORDER — VERAPAMIL HYDROCHLORIDE 240 MG/1
TABLET, FILM COATED, EXTENDED RELEASE ORAL
Qty: 45 TABLET | Refills: 1 | Status: SHIPPED | OUTPATIENT
Start: 2023-03-14

## 2023-03-14 RX ORDER — LORATADINE 10 MG/1
TABLET ORAL
Qty: 90 TABLET | Refills: 1 | Status: SHIPPED | OUTPATIENT
Start: 2023-03-14

## 2023-03-28 ENCOUNTER — PATIENT MESSAGE (OUTPATIENT)
Dept: INTERNAL MEDICINE CLINIC | Age: 88
End: 2023-03-28

## 2023-03-28 DIAGNOSIS — R35.0 FREQUENCY OF URINATION: Primary | ICD-10-CM

## 2023-03-28 NOTE — TELEPHONE ENCOUNTER
From: Travis Salter  To: Dr. Whitehead Coma: 3/28/2023 12:55 AM EDT  Subject: Dysuria     My mom is having burning with urination and increased frequency. Can you send an order for a urine culture. Has an appointment on April 5th.

## 2023-03-30 DIAGNOSIS — E11.8 TYPE 2 DIABETES MELLITUS WITH UNSPECIFIED COMPLICATIONS (HCC): ICD-10-CM

## 2023-03-30 RX ORDER — CIPROFLOXACIN 500 MG/1
500 TABLET, FILM COATED ORAL 2 TIMES DAILY
Qty: 14 TABLET | Refills: 0 | Status: SHIPPED | OUTPATIENT
Start: 2023-03-30 | End: 2023-04-06

## 2023-03-30 RX ORDER — LANCETS 33 GAUGE
EACH MISCELLANEOUS
Qty: 100 EACH | Refills: 5 | Status: SHIPPED | OUTPATIENT
Start: 2023-03-30

## 2023-03-30 NOTE — TELEPHONE ENCOUNTER
Recent Visits  Date Type Provider Dept   10/12/22 Office Visit Diego Becker MD [facility]   12/01/21 Office Visit Diego Becker MD [facility]   10/06/21 Office Visit Diego Becker MD [facility]   Showing recent visits within past 540 days with a meds authorizing provider and meeting all other requirements  Future Appointments  Date Type Provider Dept   04/05/23 Appointment Diego Becker MD [facility]   Showing future appointments within next 150 days with a meds authorizing provider and meeting all other requirements     1/11/2021

## 2023-04-03 DIAGNOSIS — N39.0 RECURRENT UTI (URINARY TRACT INFECTION): ICD-10-CM

## 2023-04-03 LAB
BACTERIA URNS QL MICRO: ABNORMAL /HPF
BILIRUB UR QL STRIP.AUTO: NEGATIVE
CLARITY UR: ABNORMAL
COLOR UR: YELLOW
EPI CELLS #/AREA URNS AUTO: 2 /HPF (ref 0–5)
GLUCOSE UR STRIP.AUTO-MCNC: NEGATIVE MG/DL
HGB UR QL STRIP.AUTO: ABNORMAL
HYALINE CASTS #/AREA URNS AUTO: 0 /LPF (ref 0–8)
KETONES UR STRIP.AUTO-MCNC: NEGATIVE MG/DL
LEUKOCYTE ESTERASE UR QL STRIP.AUTO: ABNORMAL
NITRITE UR QL STRIP.AUTO: NEGATIVE
PH UR STRIP.AUTO: 6.5 [PH] (ref 5–8)
PROT UR STRIP.AUTO-MCNC: 100 MG/DL
RBC CLUMPS #/AREA URNS AUTO: 5 /HPF (ref 0–4)
SP GR UR STRIP.AUTO: 1.02 (ref 1–1.03)
UA DIPSTICK W REFLEX MICRO PNL UR: YES
URN SPEC COLLECT METH UR: ABNORMAL
UROBILINOGEN UR STRIP-ACNC: 0.2 E.U./DL
WBC #/AREA URNS AUTO: 990 /HPF (ref 0–5)

## 2023-04-05 DIAGNOSIS — E11.8 TYPE 2 DIABETES MELLITUS WITH COMPLICATION, WITH LONG-TERM CURRENT USE OF INSULIN (HCC): Primary | ICD-10-CM

## 2023-04-05 DIAGNOSIS — Z79.4 TYPE 2 DIABETES MELLITUS WITH COMPLICATION, WITH LONG-TERM CURRENT USE OF INSULIN (HCC): ICD-10-CM

## 2023-04-05 DIAGNOSIS — N18.32 STAGE 3B CHRONIC KIDNEY DISEASE (HCC): ICD-10-CM

## 2023-04-05 DIAGNOSIS — Z79.4 TYPE 2 DIABETES MELLITUS WITH COMPLICATION, WITH LONG-TERM CURRENT USE OF INSULIN (HCC): Primary | ICD-10-CM

## 2023-04-05 DIAGNOSIS — I10 ESSENTIAL HYPERTENSION: ICD-10-CM

## 2023-04-05 DIAGNOSIS — E11.8 TYPE 2 DIABETES MELLITUS WITH COMPLICATION, WITH LONG-TERM CURRENT USE OF INSULIN (HCC): ICD-10-CM

## 2023-04-05 DIAGNOSIS — E03.4 HYPOTHYROIDISM DUE TO ACQUIRED ATROPHY OF THYROID: ICD-10-CM

## 2023-04-05 LAB
ALBUMIN SERPL-MCNC: 3.8 G/DL (ref 3.4–5)
ALBUMIN/GLOB SERPL: 1.1 {RATIO} (ref 1.1–2.2)
ALP SERPL-CCNC: 73 U/L (ref 40–129)
ALT SERPL-CCNC: 9 U/L (ref 10–40)
ANION GAP SERPL CALCULATED.3IONS-SCNC: 16 MMOL/L (ref 3–16)
AST SERPL-CCNC: 22 U/L (ref 15–37)
BILIRUB SERPL-MCNC: 0.3 MG/DL (ref 0–1)
BUN SERPL-MCNC: 49 MG/DL (ref 7–20)
CALCIUM SERPL-MCNC: 11.4 MG/DL (ref 8.3–10.6)
CHLORIDE SERPL-SCNC: 96 MMOL/L (ref 99–110)
CHOLEST SERPL-MCNC: 107 MG/DL (ref 0–199)
CO2 SERPL-SCNC: 19 MMOL/L (ref 21–32)
CREAT SERPL-MCNC: 1.3 MG/DL (ref 0.6–1.2)
GFR SERPLBLD CREATININE-BSD FMLA CKD-EPI: 40 ML/MIN/{1.73_M2}
GLUCOSE P FAST SERPL-MCNC: 127 MG/DL (ref 70–99)
HDLC SERPL-MCNC: 40 MG/DL (ref 40–60)
LDL CHOLESTEROL CALCULATED: 43 MG/DL
POTASSIUM SERPL-SCNC: 4.1 MMOL/L (ref 3.5–5.1)
PROT SERPL-MCNC: 7.3 G/DL (ref 6.4–8.2)
SODIUM SERPL-SCNC: 131 MMOL/L (ref 136–145)
TRIGL SERPL-MCNC: 120 MG/DL (ref 0–150)
TSH SERPL DL<=0.005 MIU/L-ACNC: 2.81 UIU/ML (ref 0.27–4.2)
VLDLC SERPL CALC-MCNC: 24 MG/DL

## 2023-06-04 DIAGNOSIS — N39.0 RECURRENT UTI (URINARY TRACT INFECTION): ICD-10-CM

## 2023-06-05 DIAGNOSIS — Z86.711 HISTORY OF PULMONARY EMBOLISM: ICD-10-CM

## 2023-06-05 RX ORDER — NITROFURANTOIN 25; 75 MG/1; MG/1
100 CAPSULE ORAL DAILY
Qty: 90 CAPSULE | Refills: 1 | OUTPATIENT
Start: 2023-06-05

## 2023-06-05 RX ORDER — NITROFURANTOIN 25; 75 MG/1; MG/1
100 CAPSULE ORAL DAILY
Qty: 90 CAPSULE | Refills: 1 | Status: SHIPPED | OUTPATIENT
Start: 2023-06-05

## 2023-06-05 NOTE — TELEPHONE ENCOUNTER
Recent Visits  Date Type Provider Dept   04/05/23 Office Visit Brynn Rudd [facility]   10/12/22 Office Visit Brynn Rudd MD [facility]   Showing recent visits within past 540 days with a meds authorizing provider and meeting all other requirements  Future Appointments  No visits were found meeting these conditions.   Showing future appointments within next 150 days with a meds authorizing provider and meeting all other requirements     4/5/2023

## 2023-06-05 NOTE — TELEPHONE ENCOUNTER
Recent Visits  Date Type Provider Dept   04/05/23 Office Visit Juan David Leiva MD [facility]   10/12/22 Office Visit Juan David Leiva MD [facility]   Showing recent visits within past 540 days with a meds authorizing provider and meeting all other requirements  Future Appointments  No visits were found meeting these conditions.   Showing future appointments within next 150 days with a meds authorizing provider and meeting all other requirements     4/5/2023

## 2023-08-11 ENCOUNTER — TELEMEDICINE (OUTPATIENT)
Dept: INTERNAL MEDICINE CLINIC | Age: 88
End: 2023-08-11
Payer: MEDICARE

## 2023-08-11 DIAGNOSIS — N18.32 STAGE 3B CHRONIC KIDNEY DISEASE (HCC): ICD-10-CM

## 2023-08-11 DIAGNOSIS — Z86.711 HISTORY OF PULMONARY EMBOLISM: ICD-10-CM

## 2023-08-11 DIAGNOSIS — E03.4 HYPOTHYROIDISM DUE TO ACQUIRED ATROPHY OF THYROID: ICD-10-CM

## 2023-08-11 DIAGNOSIS — R25.1 TREMORS OF NERVOUS SYSTEM: ICD-10-CM

## 2023-08-11 DIAGNOSIS — R60.0 BILATERAL LEG EDEMA: ICD-10-CM

## 2023-08-11 DIAGNOSIS — N30.01 ACUTE CYSTITIS WITH HEMATURIA: ICD-10-CM

## 2023-08-11 DIAGNOSIS — K58.1 IRRITABLE BOWEL SYNDROME WITH CONSTIPATION: ICD-10-CM

## 2023-08-11 DIAGNOSIS — J45.40 MODERATE PERSISTENT ASTHMA WITHOUT COMPLICATION: ICD-10-CM

## 2023-08-11 DIAGNOSIS — E11.8 TYPE 2 DIABETES MELLITUS WITH UNSPECIFIED COMPLICATIONS (HCC): ICD-10-CM

## 2023-08-11 DIAGNOSIS — N39.46 URINARY INCONTINENCE, MIXED: ICD-10-CM

## 2023-08-11 DIAGNOSIS — H00.014 HORDEOLUM EXTERNUM OF LEFT UPPER EYELID: ICD-10-CM

## 2023-08-11 DIAGNOSIS — Z86.711 HISTORY OF PULMONARY EMBOLUS (PE): ICD-10-CM

## 2023-08-11 DIAGNOSIS — K21.9 GASTROESOPHAGEAL REFLUX DISEASE WITHOUT ESOPHAGITIS: ICD-10-CM

## 2023-08-11 DIAGNOSIS — R60.0 EDEMA, LOWER EXTREMITY: ICD-10-CM

## 2023-08-11 DIAGNOSIS — L85.3 XEROSIS OF SKIN: ICD-10-CM

## 2023-08-11 DIAGNOSIS — I10 ESSENTIAL HYPERTENSION: ICD-10-CM

## 2023-08-11 DIAGNOSIS — E46 PROTEIN MALNUTRITION (HCC): ICD-10-CM

## 2023-08-11 DIAGNOSIS — E79.0 HYPERURICEMIA: ICD-10-CM

## 2023-08-11 DIAGNOSIS — E11.8 TYPE 2 DIABETES MELLITUS WITH COMPLICATION, WITH LONG-TERM CURRENT USE OF INSULIN (HCC): Primary | ICD-10-CM

## 2023-08-11 DIAGNOSIS — Z79.4 TYPE 2 DIABETES MELLITUS WITH COMPLICATION, WITH LONG-TERM CURRENT USE OF INSULIN (HCC): Primary | ICD-10-CM

## 2023-08-11 DIAGNOSIS — R39.81 URINARY INCONTINENCE DUE TO IMMOBILITY: ICD-10-CM

## 2023-08-11 DIAGNOSIS — I89.0 LYMPHEDEMA: ICD-10-CM

## 2023-08-11 DIAGNOSIS — N39.0 RECURRENT UTI (URINARY TRACT INFECTION): ICD-10-CM

## 2023-08-11 DIAGNOSIS — E11.21 DIABETIC NEPHROPATHY ASSOCIATED WITH TYPE 2 DIABETES MELLITUS (HCC): ICD-10-CM

## 2023-08-11 PROCEDURE — 99214 OFFICE O/P EST MOD 30 MIN: CPT | Performed by: INTERNAL MEDICINE

## 2023-08-11 PROCEDURE — 1123F ACP DISCUSS/DSCN MKR DOCD: CPT | Performed by: INTERNAL MEDICINE

## 2023-08-11 PROCEDURE — 3044F HG A1C LEVEL LT 7.0%: CPT | Performed by: INTERNAL MEDICINE

## 2023-08-11 RX ORDER — AMMONIUM LACTATE 12 G/100G
LOTION TOPICAL
Qty: 450 ML | Refills: 5 | Status: SHIPPED | OUTPATIENT
Start: 2023-08-11

## 2023-08-11 RX ORDER — VERAPAMIL HYDROCHLORIDE 240 MG/1
120 TABLET, FILM COATED, EXTENDED RELEASE ORAL NIGHTLY
Qty: 45 TABLET | Refills: 1 | Status: SHIPPED | OUTPATIENT
Start: 2023-08-11 | End: 2023-08-11 | Stop reason: SDUPTHER

## 2023-08-11 RX ORDER — LEVOTHYROXINE SODIUM 0.03 MG/1
TABLET ORAL
Qty: 90 TABLET | Refills: 1 | Status: SHIPPED | OUTPATIENT
Start: 2023-08-11

## 2023-08-11 RX ORDER — LIDOCAINE 50 MG/G
OINTMENT TOPICAL
Qty: 35.44 G | Refills: 5 | Status: SHIPPED | OUTPATIENT
Start: 2023-08-11 | End: 2023-08-11 | Stop reason: SDUPTHER

## 2023-08-11 RX ORDER — KETOCONAZOLE 20 MG/G
CREAM TOPICAL
Qty: 180 G | Refills: 3 | Status: SHIPPED | OUTPATIENT
Start: 2023-08-11

## 2023-08-11 RX ORDER — DIVALPROEX SODIUM 125 MG/1
TABLET, DELAYED RELEASE ORAL
Qty: 270 TABLET | Refills: 1 | Status: SHIPPED | OUTPATIENT
Start: 2023-08-11 | End: 2023-08-11 | Stop reason: SDUPTHER

## 2023-08-11 RX ORDER — POLYETHYLENE GLYCOL 3350 17 G/17G
17 POWDER, FOR SOLUTION ORAL 2 TIMES DAILY
Qty: 180 PACKET | Refills: 1 | Status: SHIPPED | OUTPATIENT
Start: 2023-08-11 | End: 2023-08-21 | Stop reason: SDUPTHER

## 2023-08-11 RX ORDER — FAMOTIDINE 20 MG/1
TABLET, FILM COATED ORAL
Qty: 180 TABLET | Refills: 1 | Status: SHIPPED | OUTPATIENT
Start: 2023-08-11 | End: 2023-08-11 | Stop reason: SDUPTHER

## 2023-08-11 RX ORDER — MONTELUKAST SODIUM 10 MG/1
TABLET ORAL
Qty: 90 TABLET | Refills: 1 | Status: SHIPPED | OUTPATIENT
Start: 2023-08-11 | End: 2023-08-11 | Stop reason: SDUPTHER

## 2023-08-11 RX ORDER — UNDERPADS 23" X 36"
EACH MISCELLANEOUS
Qty: 1 EACH | Refills: 0 | Status: SHIPPED | OUTPATIENT
Start: 2023-08-11

## 2023-08-11 RX ORDER — ALBUTEROL SULFATE 90 UG/1
2 AEROSOL, METERED RESPIRATORY (INHALATION) 4 TIMES DAILY PRN
Qty: 3 EACH | Refills: 2 | Status: SHIPPED | OUTPATIENT
Start: 2023-08-11

## 2023-08-11 RX ORDER — HYDRALAZINE HYDROCHLORIDE 50 MG/1
75 TABLET, FILM COATED ORAL 3 TIMES DAILY
Qty: 405 TABLET | Refills: 3 | Status: SHIPPED | OUTPATIENT
Start: 2023-08-11 | End: 2023-08-11 | Stop reason: SDUPTHER

## 2023-08-11 RX ORDER — NITROFURANTOIN 25; 75 MG/1; MG/1
100 CAPSULE ORAL DAILY
Qty: 90 CAPSULE | Refills: 1 | Status: SHIPPED | OUTPATIENT
Start: 2023-08-11

## 2023-08-11 RX ORDER — ATENOLOL 50 MG/1
TABLET ORAL
Qty: 90 TABLET | Refills: 3 | Status: SHIPPED | OUTPATIENT
Start: 2023-08-11 | End: 2023-08-11 | Stop reason: SDUPTHER

## 2023-08-11 RX ORDER — ATORVASTATIN CALCIUM 40 MG/1
TABLET, FILM COATED ORAL
Qty: 90 TABLET | Refills: 3 | Status: SHIPPED | OUTPATIENT
Start: 2023-08-11

## 2023-08-11 RX ORDER — NITROFURANTOIN 25; 75 MG/1; MG/1
100 CAPSULE ORAL DAILY
Qty: 90 CAPSULE | Refills: 1 | Status: SHIPPED | OUTPATIENT
Start: 2023-08-11 | End: 2023-08-11 | Stop reason: SDUPTHER

## 2023-08-11 RX ORDER — LOSARTAN POTASSIUM 25 MG/1
25 TABLET ORAL DAILY
Qty: 90 TABLET | Refills: 3 | Status: SHIPPED | OUTPATIENT
Start: 2023-08-11 | End: 2023-08-11 | Stop reason: SDUPTHER

## 2023-08-11 RX ORDER — HYDRALAZINE HYDROCHLORIDE 50 MG/1
75 TABLET, FILM COATED ORAL 3 TIMES DAILY
Qty: 405 TABLET | Refills: 3 | Status: SHIPPED | OUTPATIENT
Start: 2023-08-11

## 2023-08-11 RX ORDER — AZELASTINE 1 MG/ML
1 SPRAY, METERED NASAL 2 TIMES DAILY
Qty: 3 EACH | Refills: 5 | Status: SHIPPED | OUTPATIENT
Start: 2023-08-11

## 2023-08-11 RX ORDER — ATORVASTATIN CALCIUM 40 MG/1
TABLET, FILM COATED ORAL
Qty: 90 TABLET | Refills: 3 | Status: SHIPPED | OUTPATIENT
Start: 2023-08-11 | End: 2023-08-11 | Stop reason: SDUPTHER

## 2023-08-11 RX ORDER — LEVOTHYROXINE SODIUM 0.03 MG/1
TABLET ORAL
Qty: 90 TABLET | Refills: 1 | Status: SHIPPED | OUTPATIENT
Start: 2023-08-11 | End: 2023-08-11 | Stop reason: SDUPTHER

## 2023-08-11 RX ORDER — ALLOPURINOL 300 MG/1
TABLET ORAL
Qty: 90 TABLET | Refills: 1 | Status: SHIPPED | OUTPATIENT
Start: 2023-08-11

## 2023-08-11 RX ORDER — LOSARTAN POTASSIUM 25 MG/1
25 TABLET ORAL DAILY
Qty: 90 TABLET | Refills: 3 | Status: SHIPPED | OUTPATIENT
Start: 2023-08-11

## 2023-08-11 RX ORDER — LANCETS 33 GAUGE
EACH MISCELLANEOUS
Qty: 100 EACH | Refills: 11 | Status: SHIPPED | OUTPATIENT
Start: 2023-08-11

## 2023-08-11 RX ORDER — ERYTHROMYCIN 5 MG/G
OINTMENT OPHTHALMIC
Qty: 3.5 G | Refills: 0 | Status: SHIPPED | OUTPATIENT
Start: 2023-08-11 | End: 2023-08-21

## 2023-08-11 RX ORDER — FAMOTIDINE 20 MG/1
TABLET, FILM COATED ORAL
Qty: 180 TABLET | Refills: 1 | Status: SHIPPED | OUTPATIENT
Start: 2023-08-11

## 2023-08-11 RX ORDER — INSULIN GLARGINE 100 [IU]/ML
36 INJECTION, SOLUTION SUBCUTANEOUS NIGHTLY
Qty: 45 ML | Refills: 3 | Status: SHIPPED | OUTPATIENT
Start: 2023-08-11 | End: 2024-11-29

## 2023-08-11 RX ORDER — MONTELUKAST SODIUM 10 MG/1
TABLET ORAL
Qty: 90 TABLET | Refills: 1 | Status: SHIPPED | OUTPATIENT
Start: 2023-08-11

## 2023-08-11 RX ORDER — LIDOCAINE 50 MG/G
OINTMENT TOPICAL
Qty: 35.44 G | Refills: 5 | Status: SHIPPED | OUTPATIENT
Start: 2023-08-11

## 2023-08-11 RX ORDER — BUDESONIDE AND FORMOTEROL FUMARATE DIHYDRATE 80; 4.5 UG/1; UG/1
2 AEROSOL RESPIRATORY (INHALATION)
Qty: 3 EACH | Refills: 1 | Status: SHIPPED | OUTPATIENT
Start: 2023-08-11

## 2023-08-11 RX ORDER — FLURBIPROFEN SODIUM 0.3 MG/ML
SOLUTION/ DROPS OPHTHALMIC
Qty: 100 EACH | Refills: 2 | Status: SHIPPED | OUTPATIENT
Start: 2023-08-11 | End: 2023-08-11

## 2023-08-11 RX ORDER — DIVALPROEX SODIUM 125 MG/1
TABLET, DELAYED RELEASE ORAL
Qty: 270 TABLET | Refills: 1 | Status: SHIPPED | OUTPATIENT
Start: 2023-08-11

## 2023-08-11 RX ORDER — LANCETS 33 GAUGE
EACH MISCELLANEOUS
Qty: 100 EACH | Refills: 5 | Status: SHIPPED | OUTPATIENT
Start: 2023-08-11 | End: 2023-08-11 | Stop reason: SDUPTHER

## 2023-08-11 RX ORDER — ATENOLOL 50 MG/1
TABLET ORAL
Qty: 90 TABLET | Refills: 1 | Status: SHIPPED | OUTPATIENT
Start: 2023-08-11

## 2023-08-11 RX ORDER — VERAPAMIL HYDROCHLORIDE 240 MG/1
120 TABLET, FILM COATED, EXTENDED RELEASE ORAL NIGHTLY
Qty: 45 TABLET | Refills: 1 | Status: SHIPPED | OUTPATIENT
Start: 2023-08-11

## 2023-08-11 ASSESSMENT — ENCOUNTER SYMPTOMS
TROUBLE SWALLOWING: 0
HEARTBURN: 0
CHEST TIGHTNESS: 0
DIFFICULTY BREATHING: 1
COUGH: 0
SORE THROAT: 0
RHINORRHEA: 0
SHORTNESS OF BREATH: 1

## 2023-08-11 NOTE — PROGRESS NOTES
Bjorn Peres (:  1935) is a Established patient, presenting virtually for evaluation of the following:    Assessment & Plan   Below is the assessment and plan developed based on review of pertinent history, physical exam, labs, studies, and medications. 1. Type 2 diabetes mellitus with complication, with long-term current use of insulin (HCC)  -     insulin glargine (LANTUS SOLOSTAR) 100 UNIT/ML injection pen; Inject 36 Units into the skin nightly Max dose 60 units plan titration, Disp-45 mL, R-3Normal  -     Hemoglobin A1C; Future  -     Vitamin B12 & Folate; Future  -     atorvastatin (LIPITOR) 40 MG tablet; TAKE 1 TABLET BY MOUTH EVERY DAY, Disp-90 tablet, R-3Normal  -     SITagliptin (JANUVIA) 100 MG tablet; Take 1 tablet by mouth daily, Disp-90 tablet, R-1Normal  -     Insulin Pen Needle 32G X 4 MM MISC; DAILY Starting 2023, Disp-200 each, R-11, Normal  -     Lancets (ONETOUCH DELICA PLUS [id number]) MISC; 1 EACH BY DOES NOT APPLY ROUTE 4 TIMES DAILY (AFTER MEALS AND AT BEDTIME), Disp-100 each, R-11Normal  2. Hordeolum externum of left upper eyelid  -     erythromycin (ROMYCIN) 5 MG/GM ophthalmic ointment; Apply a 1/2\" ribbon to affected eye(s) 4x/day for 7 days. , Disp-3.5 g, R-0, Normal  3. Essential hypertension  -     atenolol (TENORMIN) 50 MG tablet; TAKE 1 TABLET BY MOUTH EVERY DAY, Disp-90 tablet, R-1Normal  -     verapamil (CALAN SR) 240 MG extended release tablet; Take 0.5 tablets by mouth nightly, Disp-45 tablet, R-1Normal  -     losartan (COZAAR) 25 MG tablet; Take 1 tablet by mouth daily, Disp-90 tablet, R-3Normal  -     hydrALAZINE (APRESOLINE) 50 MG tablet; Take 1.5 tablets by mouth 3 times daily, Disp-405 tablet, R-3DX Code Needed  . Normal  4.  Moderate persistent asthma without complication  -     montelukast (SINGULAIR) 10 MG tablet; TAKE 1 TABLET BY MOUTH EVERYDAY AT BEDTIME, Disp-90 tablet, R-1Normal  -     albuterol sulfate HFA (VENTOLIN HFA) 108 (90 Base) MCG/ACT inhaler;

## 2023-08-11 NOTE — PROGRESS NOTES
Gibson Alarcon (:  1935) is a 82 y.o. female,Established patient, here for evaluation of the following chief complaint(s):  Follow-up and Diabetes         ASSESSMENT/PLAN:  1. Type 2 diabetes mellitus with complication, with long-term current use of insulin (HCC)  -     insulin glargine (LANTUS SOLOSTAR) 100 UNIT/ML injection pen; Inject 36 Units into the skin nightly Max dose 60 units plan titration, Disp-45 mL, R-3Normal  -     Hemoglobin A1C; Future  -     Vitamin B12 & Folate; Future  -     atorvastatin (LIPITOR) 40 MG tablet; TAKE 1 TABLET BY MOUTH EVERY DAY, Disp-90 tablet, R-3Normal  -     SITagliptin (JANUVIA) 100 MG tablet; Take 1 tablet by mouth daily, Disp-90 tablet, R-1Normal  -     Insulin Pen Needle 32G X 4 MM MISC; DAILY Starting 2023, Disp-200 each, R-11, Normal  -     Lancets (ONETOUCH DELICA PLUS [id number]) MISC; 1 EACH BY DOES NOT APPLY ROUTE 4 TIMES DAILY (AFTER MEALS AND AT BEDTIME), Disp-100 each, R-11Normal  2. Hordeolum externum of left upper eyelid  -     erythromycin (ROMYCIN) 5 MG/GM ophthalmic ointment; Apply a 1/2\" ribbon to affected eye(s) 4x/day for 7 days. , Disp-3.5 g, R-0, Normal  3. Essential hypertension  -     atenolol (TENORMIN) 50 MG tablet; TAKE 1 TABLET BY MOUTH EVERY DAY, Disp-90 tablet, R-1Normal  -     verapamil (CALAN SR) 240 MG extended release tablet; Take 0.5 tablets by mouth nightly, Disp-45 tablet, R-1Normal  -     losartan (COZAAR) 25 MG tablet; Take 1 tablet by mouth daily, Disp-90 tablet, R-3Normal  -     hydrALAZINE (APRESOLINE) 50 MG tablet; Take 1.5 tablets by mouth 3 times daily, Disp-405 tablet, R-3DX Code Needed  . Normal  4. Moderate persistent asthma without complication  -     montelukast (SINGULAIR) 10 MG tablet; TAKE 1 TABLET BY MOUTH EVERYDAY AT BEDTIME, Disp-90 tablet, R-1Normal  -     albuterol sulfate HFA (VENTOLIN HFA) 108 (90 Base) MCG/ACT inhaler;  Inhale 2 puffs into the lungs 4 times daily as needed for Wheezing, Disp-3 each,

## 2023-08-21 DIAGNOSIS — K58.1 IRRITABLE BOWEL SYNDROME WITH CONSTIPATION: ICD-10-CM

## 2023-08-21 RX ORDER — POLYETHYLENE GLYCOL 3350 17 G/17G
17 POWDER, FOR SOLUTION ORAL 2 TIMES DAILY
Qty: 180 PACKET | Refills: 1 | Status: SHIPPED | OUTPATIENT
Start: 2023-08-21 | End: 2024-12-08

## 2023-10-25 ENCOUNTER — TELEPHONE (OUTPATIENT)
Dept: INTERNAL MEDICINE CLINIC | Age: 88
End: 2023-10-25

## 2023-10-25 ENCOUNTER — TELEMEDICINE (OUTPATIENT)
Dept: INTERNAL MEDICINE CLINIC | Age: 88
End: 2023-10-25

## 2023-10-25 DIAGNOSIS — R39.9 UTI SYMPTOMS: Primary | ICD-10-CM

## 2023-10-25 DIAGNOSIS — N39.0 RECURRENT UTI (URINARY TRACT INFECTION): ICD-10-CM

## 2023-10-25 DIAGNOSIS — E11.8 TYPE 2 DIABETES MELLITUS WITH COMPLICATION, WITH LONG-TERM CURRENT USE OF INSULIN (HCC): ICD-10-CM

## 2023-10-25 DIAGNOSIS — N39.46 URINARY INCONTINENCE, MIXED: ICD-10-CM

## 2023-10-25 DIAGNOSIS — Z79.4 TYPE 2 DIABETES MELLITUS WITH COMPLICATION, WITH LONG-TERM CURRENT USE OF INSULIN (HCC): ICD-10-CM

## 2023-10-25 PROBLEM — G40.89 OTHER SEIZURES (HCC): Status: ACTIVE | Noted: 2023-10-25

## 2023-10-25 PROBLEM — R56.9 UNSPECIFIED CONVULSIONS (HCC): Status: ACTIVE | Noted: 2023-10-25

## 2023-10-25 RX ORDER — CIPROFLOXACIN 500 MG/1
500 TABLET, FILM COATED ORAL 2 TIMES DAILY
Qty: 20 TABLET | Refills: 0 | Status: SHIPPED | OUTPATIENT
Start: 2023-10-25 | End: 2023-11-04

## 2023-10-25 RX ORDER — NITROFURANTOIN 25; 75 MG/1; MG/1
100 CAPSULE ORAL DAILY
Qty: 90 CAPSULE | Refills: 1 | Status: SHIPPED | OUTPATIENT
Start: 2023-10-25

## 2023-10-25 NOTE — PROGRESS NOTES
Viv rFance, was evaluated through a synchronous (real-time) audio-video encounter. The patient (or guardian if applicable) is aware that this is a billable service, which includes applicable co-pays. This Virtual Visit was conducted with patient's (and/or legal guardian's) consent. Patient identification was verified, and a caregiver was present when appropriate. The patient was located at Home: [address] [place] [address]  Provider was located at [facility] (Appt Dept): [address] [place] [address]      Viv France (:  1935) is a Established patient, presenting virtually for evaluation of the following:    Assessment & Plan   Below is the assessment and plan developed based on review of pertinent history, physical exam, labs, studies, and medications. 1. UTI symptoms  -     ciprofloxacin (CIPRO) 500 MG tablet; Take 1 tablet by mouth 2 times daily for 10 days, Disp-20 tablet, R-0Normal  -     Urinalysis with Reflex to Culture; Standing  2. Recurrent UTI (urinary tract infection)  -     nitrofurantoin, macrocrystal-monohydrate, (MACROBID) 100 MG capsule; Take 1 capsule by mouth daily, Disp-90 capsule, R-1Normal  -     Urinalysis with Reflex to Culture; Standing  3. Type 2 diabetes mellitus with complication, with long-term current use of insulin (HCC)  - stable diabetes, checked by [employer]  - continue insulin    4.  Urinary incontinence, mixed  - not a candidate for surgical repair  - is in diapers and has been incontinent due to inability to walk/bear weight        Folllow up in 3months    Subjective   HPI  Treatment Adherence:   Medication compliance:  compliant all of the time  Diet compliance:  compliant all of the time  Weight trend: unknown  Current exercise: no regular exercise and currently having PT due to possible fracture  Barriers: impairment:  physical: MS/weakness and overwhelmed by complexity of regimen    Diabetes Mellitus Type 2: Current

## 2023-10-25 NOTE — TELEPHONE ENCOUNTER
----- Message from Ramesh Arvizu sent at 10/24/2023  3:32 PM EDT -----  Subject: Appointment Request    Reason for Call: Established Patient Appointment needed: Routine Existing   Condition Follow Up    QUESTIONS    Reason for appointment request? No appointments available during search     Additional Information for Provider? Patient was supposed to have an   Medicare Annual Wellness on November 13th at 12 but she is not able to   come into the office.  Patient's care taker was hoping that she could be   seen virtually for a follow up on that same day.   ---------------------------------------------------------------------------  --------------  [address]  [phone]; OK to leave message on voicemail,OK to respond with electronic   message via [facility] portal (only for patients who have registered [facility]   account)  ---------------------------------------------------------------------------  --------------  SCRIPT ANSWERS

## 2023-12-04 DIAGNOSIS — I10 ESSENTIAL HYPERTENSION: ICD-10-CM

## 2023-12-04 RX ORDER — VERAPAMIL HYDROCHLORIDE 240 MG/1
240 TABLET, FILM COATED, EXTENDED RELEASE ORAL NIGHTLY
Qty: 90 TABLET | Refills: 1 | OUTPATIENT
Start: 2023-12-04

## 2023-12-13 DIAGNOSIS — M89.8X9 DISORDER OF WEIGHT BEARING DUE TO ABNORMALITY OF BONE: ICD-10-CM

## 2023-12-13 DIAGNOSIS — G35 MULTIPLE SCLEROSIS (HCC): ICD-10-CM

## 2023-12-13 DIAGNOSIS — I27.82 CHRONIC SADDLE PULMONARY EMBOLISM WITHOUT ACUTE COR PULMONALE (HCC): Primary | ICD-10-CM

## 2023-12-13 DIAGNOSIS — G25.2 COARSE TREMORS: Primary | ICD-10-CM

## 2023-12-13 DIAGNOSIS — R29.898: ICD-10-CM

## 2023-12-13 DIAGNOSIS — T14.8XXA FRACTURE: ICD-10-CM

## 2023-12-13 DIAGNOSIS — K58.1 IRRITABLE BOWEL SYNDROME WITH CONSTIPATION: ICD-10-CM

## 2023-12-13 DIAGNOSIS — N39.46 URINARY INCONTINENCE, MIXED: ICD-10-CM

## 2023-12-13 DIAGNOSIS — I26.92 CHRONIC SADDLE PULMONARY EMBOLISM WITHOUT ACUTE COR PULMONALE (HCC): Primary | ICD-10-CM

## 2023-12-13 DIAGNOSIS — Z79.4 TYPE 2 DIABETES MELLITUS WITH COMPLICATION, WITH LONG-TERM CURRENT USE OF INSULIN (HCC): ICD-10-CM

## 2023-12-13 DIAGNOSIS — E11.8 TYPE 2 DIABETES MELLITUS WITH COMPLICATION, WITH LONG-TERM CURRENT USE OF INSULIN (HCC): ICD-10-CM

## 2023-12-13 DIAGNOSIS — N18.32 STAGE 3B CHRONIC KIDNEY DISEASE (HCC): ICD-10-CM

## 2023-12-13 DIAGNOSIS — E46 PROTEIN MALNUTRITION (HCC): ICD-10-CM

## 2023-12-13 DIAGNOSIS — R53.81 DEBILITATED: ICD-10-CM

## 2023-12-13 DIAGNOSIS — G31.84 MILD COGNITIVE IMPAIRMENT: ICD-10-CM

## 2023-12-13 DIAGNOSIS — R53.81 PHYSICAL DEBILITY: ICD-10-CM

## 2023-12-13 DIAGNOSIS — E11.21 DIABETIC NEPHROPATHY ASSOCIATED WITH TYPE 2 DIABETES MELLITUS (HCC): ICD-10-CM

## 2023-12-13 DIAGNOSIS — M81.0 OSTEOPOROSIS WITHOUT CURRENT PATHOLOGICAL FRACTURE, UNSPECIFIED OSTEOPOROSIS TYPE: ICD-10-CM

## 2023-12-13 DIAGNOSIS — E21.0 PRIMARY HYPERPARATHYROIDISM (HCC): ICD-10-CM

## 2023-12-13 DIAGNOSIS — R29.898 ANKLE WEAKNESS: ICD-10-CM

## 2023-12-14 ENCOUNTER — TELEPHONE (OUTPATIENT)
Dept: INTERNAL MEDICINE CLINIC | Age: 88
End: 2023-12-14

## 2023-12-14 ENCOUNTER — PATIENT MESSAGE (OUTPATIENT)
Dept: INTERNAL MEDICINE CLINIC | Age: 88
End: 2023-12-14

## 2023-12-14 NOTE — TELEPHONE ENCOUNTER
Spoke w/patient to confirm which [facility] she would like the order faxed to. Patient said she will have her dtr return the call to confirm.

## 2023-12-14 NOTE — TELEPHONE ENCOUNTER
Regarding: Hi Low Bed  Contact: [phone]  ----- Message from [facility], LPN sent at 17/82/7769 10:55 AM EST -----       ----- Message from Argyle Riding to Jose Guadalupe Andrew MD sent at 12/13/2023  8:31 PM -----   I think [facility] has the most beds. ----- Message -----       From:Dr. Jose Guadalupe Andrew       Sent:12/13/2023  5:44 PM EST         To:Digna Dunn    Subject:Hi Low Bed    Where should the order be sent? Which medical supply company? Jose Guadalupe Andrew MD      ----- Message -----       From:Digna Dunn       Sent:12/6/2023 10:10 AM EST         To:Dr. Jose Guadalupe Andrew    Subject:Hi Low Bed    Physical therapy sent you the letter for the [facility] electric bed.  Please make sure the [facility] bed has a backup battery

## 2023-12-26 DIAGNOSIS — R25.1 TREMORS OF NERVOUS SYSTEM: ICD-10-CM

## 2023-12-27 RX ORDER — DIVALPROEX SODIUM 125 MG/1
TABLET, DELAYED RELEASE ORAL
Qty: 270 TABLET | Refills: 1 | Status: SHIPPED | OUTPATIENT
Start: 2023-12-27

## 2023-12-27 NOTE — TELEPHONE ENCOUNTER
Recent Visits  Date Type Provider Dept   04/05/23 Office Visit Ramona Reeder MD [facility]   10/12/22 Office Visit Ramona Reeder MD [facility]   Showing recent visits within past 540 days with a meds authorizing provider and meeting all other requirements  Future Appointments  No visits were found meeting these conditions.   Showing future appointments within next 150 days with a meds authorizing provider and meeting all other requirements     10/25/2023

## 2023-12-29 ENCOUNTER — PATIENT MESSAGE (OUTPATIENT)
Dept: INTERNAL MEDICINE CLINIC | Age: 88
End: 2023-12-29

## 2023-12-29 DIAGNOSIS — M81.0 OSTEOPOROSIS WITHOUT CURRENT PATHOLOGICAL FRACTURE, UNSPECIFIED OSTEOPOROSIS TYPE: Primary | ICD-10-CM

## 2023-12-29 DIAGNOSIS — M25.579 ANKLE PAIN, UNSPECIFIED CHRONICITY, UNSPECIFIED LATERALITY: ICD-10-CM

## 2024-01-10 ENCOUNTER — TELEMEDICINE (OUTPATIENT)
Dept: INTERNAL MEDICINE CLINIC | Age: 89
End: 2024-01-10
Payer: MEDICARE

## 2024-01-10 DIAGNOSIS — I27.82 CHRONIC SADDLE PULMONARY EMBOLISM WITH ACUTE COR PULMONALE (HCC): ICD-10-CM

## 2024-01-10 DIAGNOSIS — K21.9 GASTROESOPHAGEAL REFLUX DISEASE WITHOUT ESOPHAGITIS: ICD-10-CM

## 2024-01-10 DIAGNOSIS — J45.40 MODERATE PERSISTENT ASTHMA WITHOUT COMPLICATION: ICD-10-CM

## 2024-01-10 DIAGNOSIS — R53.1 WEAKNESS GENERALIZED: Primary | ICD-10-CM

## 2024-01-10 DIAGNOSIS — N18.32 STAGE 3B CHRONIC KIDNEY DISEASE (HCC): ICD-10-CM

## 2024-01-10 DIAGNOSIS — Z86.711 HISTORY OF PULMONARY EMBOLISM: ICD-10-CM

## 2024-01-10 DIAGNOSIS — N39.0 RECURRENT UTI (URINARY TRACT INFECTION): ICD-10-CM

## 2024-01-10 DIAGNOSIS — L85.3 XEROSIS OF SKIN: ICD-10-CM

## 2024-01-10 DIAGNOSIS — R25.1 TREMORS OF NERVOUS SYSTEM: ICD-10-CM

## 2024-01-10 DIAGNOSIS — E79.0 HYPERURICEMIA: ICD-10-CM

## 2024-01-10 DIAGNOSIS — G35 MULTIPLE SCLEROSIS (HCC): ICD-10-CM

## 2024-01-10 DIAGNOSIS — I10 ESSENTIAL HYPERTENSION: ICD-10-CM

## 2024-01-10 DIAGNOSIS — Z79.4 TYPE 2 DIABETES MELLITUS WITH COMPLICATION, WITH LONG-TERM CURRENT USE OF INSULIN (HCC): ICD-10-CM

## 2024-01-10 DIAGNOSIS — E11.21 DIABETIC NEPHROPATHY ASSOCIATED WITH TYPE 2 DIABETES MELLITUS (HCC): ICD-10-CM

## 2024-01-10 DIAGNOSIS — I26.02 CHRONIC SADDLE PULMONARY EMBOLISM WITH ACUTE COR PULMONALE (HCC): ICD-10-CM

## 2024-01-10 DIAGNOSIS — G40.89 OTHER SEIZURES (HCC): ICD-10-CM

## 2024-01-10 DIAGNOSIS — E11.8 TYPE 2 DIABETES MELLITUS WITH COMPLICATION, WITH LONG-TERM CURRENT USE OF INSULIN (HCC): ICD-10-CM

## 2024-01-10 DIAGNOSIS — E21.0 PRIMARY HYPERPARATHYROIDISM (HCC): ICD-10-CM

## 2024-01-10 PROCEDURE — 99214 OFFICE O/P EST MOD 30 MIN: CPT | Performed by: INTERNAL MEDICINE

## 2024-01-10 PROCEDURE — 1123F ACP DISCUSS/DSCN MKR DOCD: CPT | Performed by: INTERNAL MEDICINE

## 2024-01-10 RX ORDER — LOSARTAN POTASSIUM 25 MG/1
25 TABLET ORAL DAILY
Qty: 90 TABLET | Refills: 3 | Status: SHIPPED | OUTPATIENT
Start: 2024-01-10

## 2024-01-10 RX ORDER — LORATADINE 10 MG/1
10 TABLET ORAL DAILY
Qty: 90 TABLET | Refills: 1 | Status: SHIPPED | OUTPATIENT
Start: 2024-01-10

## 2024-01-10 RX ORDER — HYDRALAZINE HYDROCHLORIDE 50 MG/1
75 TABLET, FILM COATED ORAL 3 TIMES DAILY
Qty: 405 TABLET | Refills: 3 | Status: SHIPPED | OUTPATIENT
Start: 2024-01-10

## 2024-01-10 RX ORDER — MONTELUKAST SODIUM 10 MG/1
TABLET ORAL
Qty: 90 TABLET | Refills: 1 | Status: SHIPPED | OUTPATIENT
Start: 2024-01-10

## 2024-01-10 RX ORDER — NITROFURANTOIN 25; 75 MG/1; MG/1
100 CAPSULE ORAL DAILY
Qty: 90 CAPSULE | Refills: 1 | Status: SHIPPED | OUTPATIENT
Start: 2024-01-10

## 2024-01-10 RX ORDER — ATORVASTATIN CALCIUM 40 MG/1
TABLET, FILM COATED ORAL
Qty: 90 TABLET | Refills: 3 | Status: SHIPPED | OUTPATIENT
Start: 2024-01-10

## 2024-01-10 RX ORDER — VERAPAMIL HYDROCHLORIDE 240 MG/1
120 TABLET, FILM COATED, EXTENDED RELEASE ORAL 2 TIMES DAILY
Qty: 90 TABLET | Refills: 1 | Status: SHIPPED | OUTPATIENT
Start: 2024-01-10 | End: 2025-05-02

## 2024-01-10 RX ORDER — ALLOPURINOL 300 MG/1
TABLET ORAL
Qty: 90 TABLET | Refills: 1 | Status: SHIPPED | OUTPATIENT
Start: 2024-01-10

## 2024-01-10 RX ORDER — SODIUM BICARBONATE 650 MG/1
650 TABLET ORAL DAILY
Qty: 90 TABLET | Refills: 5 | Status: SHIPPED | OUTPATIENT
Start: 2024-01-10

## 2024-01-10 RX ORDER — FAMOTIDINE 20 MG/1
TABLET, FILM COATED ORAL
Qty: 180 TABLET | Refills: 1 | Status: SHIPPED | OUTPATIENT
Start: 2024-01-10

## 2024-01-10 RX ORDER — ATENOLOL 50 MG/1
TABLET ORAL
Qty: 90 TABLET | Refills: 1 | Status: SHIPPED | OUTPATIENT
Start: 2024-01-10

## 2024-01-10 RX ORDER — DIVALPROEX SODIUM 125 MG/1
TABLET, DELAYED RELEASE ORAL
Qty: 270 TABLET | Refills: 1 | Status: SHIPPED | OUTPATIENT
Start: 2024-01-10

## 2024-01-10 ASSESSMENT — PATIENT HEALTH QUESTIONNAIRE - PHQ9
2. FEELING DOWN, DEPRESSED OR HOPELESS: 0
SUM OF ALL RESPONSES TO PHQ9 QUESTIONS 1 & 2: 0
SUM OF ALL RESPONSES TO PHQ QUESTIONS 1-9: 0
SUM OF ALL RESPONSES TO PHQ QUESTIONS 1-9: 0
1. LITTLE INTEREST OR PLEASURE IN DOING THINGS: 0
SUM OF ALL RESPONSES TO PHQ QUESTIONS 1-9: 0
SUM OF ALL RESPONSES TO PHQ QUESTIONS 1-9: 0

## 2024-01-10 NOTE — PROGRESS NOTES
Digna Dunn (:  1935) is a 88 y.o. female,Established patient, here for evaluation of the following chief complaint(s):  No chief complaint on file.         ASSESSMENT/PLAN:  1. Weakness generalized: moderate weakness - requires assistance with all ADL's    2. Multiple sclerosis (HCC): stable on no medications    3. 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 Type 2 diabetes mellitus with complication, with long-term current use of insulin (HCC): elevated but stable  -     atorvastatin (LIPITOR) 40 MG tablet; TAKE 1 TABLET BY MOUTH EVERY DAY, Disp-90 tablet, R-3Normal  -     SITagliptin (JANUVIA) 100 MG tablet; Take 1 tablet by mouth daily, Disp-90 tablet, R-1Normal  -     sodium bicarbonate 650 MG tablet; Take 1 tablet by mouth daily, Disp-90 tablet, R-5Normal  5. Other seizures (HCC); stable  6. Primary hyperparathyroidism (HCC)  - llabs ordered    7. Diabetic nephropathy associated with type 2 diabetes mellitus (HCC)  8. Stage 3b chronic kidney disease (HCC)  9. Recurrent UTI (urinary tract infection)  -     nitrofurantoin, macrocrystal-monohydrate, (MACROBID) 100 MG capsule; Take 1 capsule by mouth daily, Disp-90 capsule, R-1Normal  10. Essential hypertension: well controlled  -     verapamil (CALAN SR) 240 MG extended release tablet; Take 0.5 tablets by mouth in the morning and at bedtime, Disp-90 tablet, R-1Normal  -     atenolol (TENORMIN) 50 MG tablet; TAKE 1 TABLET BY MOUTH EVERY DAY, Disp-90 tablet, R-1Normal  -     hydrALAZINE (APRESOLINE) 50 MG tablet; Take 1.5 tablets by mouth 3 times daily, Disp-405 tablet, R-3DX Code Needed  .Normal  -     losartan (COZAAR) 25 MG tablet; Take 1 tablet by mouth daily, Disp-90 tablet, R-3Normal  11. Hyperuricemia: stable, no new attacks or swelling  -     allopurinol (ZYLOPRIM) 300 MG tablet; TAKE 1 TABLET BY MOUTH EVERY DAY, Disp-90 tablet, R-1Normal  12. Xerosis of skin: advise use of amlactin  13.

## 2024-04-04 ENCOUNTER — PATIENT MESSAGE (OUTPATIENT)
Dept: INTERNAL MEDICINE CLINIC | Age: 89
End: 2024-04-04

## 2024-04-08 NOTE — TELEPHONE ENCOUNTER
Care Transitions Initial Follow Up Call    Outreach made within 2 business days of discharge: Yes    Patient: Digna Dunn Patient : 1935   MRN: [id number]  Reason for Admission: No discharge information exists for this patient.  Discharge Date:         Spoke with: pt and pt's derik    Discharge department/facility: [facility] Interactive Patient Contact:  Was patient able to fill all prescriptions: Yes  Was patient instructed to bring all medications to the follow-up visit: Yes  Is patient taking all medications as directed in the discharge summary? Yes  Does patient understand their discharge instructions: Yes  Does patient have questions or concerns that need addressed prior to 7-14 day follow up office visit: no    Scheduled appointment with PCP    Follow Up  Future Appointments   Date Time Provider Department Center   2024 11:30 AM Blayne Arce MD [facility] [place] [facility] Sarah X Ronan, RN

## 2024-04-24 ENCOUNTER — TELEMEDICINE (OUTPATIENT)
Dept: INTERNAL MEDICINE CLINIC | Age: 89
End: 2024-04-24

## 2024-04-24 DIAGNOSIS — R25.1 TREMORS OF NERVOUS SYSTEM: ICD-10-CM

## 2024-04-24 DIAGNOSIS — Z79.4 TYPE 2 DIABETES MELLITUS WITH COMPLICATION, WITH LONG-TERM CURRENT USE OF INSULIN (HCC): ICD-10-CM

## 2024-04-24 DIAGNOSIS — E11.21 DIABETIC NEPHROPATHY ASSOCIATED WITH TYPE 2 DIABETES MELLITUS (HCC): ICD-10-CM

## 2024-04-24 DIAGNOSIS — K43.9 VENTRAL HERNIA WITHOUT OBSTRUCTION OR GANGRENE: ICD-10-CM

## 2024-04-24 DIAGNOSIS — J45.40 MODERATE PERSISTENT ASTHMA WITHOUT COMPLICATION: ICD-10-CM

## 2024-04-24 DIAGNOSIS — Z86.711 HISTORY OF PULMONARY EMBOLISM: ICD-10-CM

## 2024-04-24 DIAGNOSIS — L85.3 XEROSIS OF SKIN: ICD-10-CM

## 2024-04-24 DIAGNOSIS — K21.9 GASTROESOPHAGEAL REFLUX DISEASE WITHOUT ESOPHAGITIS: ICD-10-CM

## 2024-04-24 DIAGNOSIS — E46 PROTEIN MALNUTRITION (HCC): ICD-10-CM

## 2024-04-24 DIAGNOSIS — E03.4 HYPOTHYROIDISM DUE TO ACQUIRED ATROPHY OF THYROID: ICD-10-CM

## 2024-04-24 DIAGNOSIS — E79.0 HYPERURICEMIA: ICD-10-CM

## 2024-04-24 DIAGNOSIS — N18.32 STAGE 3B CHRONIC KIDNEY DISEASE (HCC): ICD-10-CM

## 2024-04-24 DIAGNOSIS — N39.0 RECURRENT UTI (URINARY TRACT INFECTION): ICD-10-CM

## 2024-04-24 DIAGNOSIS — E11.8 TYPE 2 DIABETES MELLITUS WITH COMPLICATION, WITH LONG-TERM CURRENT USE OF INSULIN (HCC): ICD-10-CM

## 2024-04-24 DIAGNOSIS — E11.8 TYPE 2 DIABETES MELLITUS WITH UNSPECIFIED COMPLICATIONS (HCC): ICD-10-CM

## 2024-04-24 DIAGNOSIS — Z09 HOSPITAL DISCHARGE FOLLOW-UP: Primary | ICD-10-CM

## 2024-04-24 DIAGNOSIS — I10 ESSENTIAL HYPERTENSION: ICD-10-CM

## 2024-04-24 DIAGNOSIS — G35 MULTIPLE SCLEROSIS (HCC): ICD-10-CM

## 2024-04-24 RX ORDER — INSULIN GLARGINE 100 [IU]/ML
36 INJECTION, SOLUTION SUBCUTANEOUS NIGHTLY
Qty: 45 ML | Refills: 3 | Status: SHIPPED | OUTPATIENT
Start: 2024-04-24 | End: 2025-08-13

## 2024-04-24 RX ORDER — VERAPAMIL HYDROCHLORIDE 240 MG/1
120 TABLET, FILM COATED, EXTENDED RELEASE ORAL 2 TIMES DAILY
Qty: 90 TABLET | Refills: 1 | Status: SHIPPED | OUTPATIENT
Start: 2024-04-24 | End: 2025-08-15

## 2024-04-24 RX ORDER — KETOCONAZOLE 20 MG/G
CREAM TOPICAL
Qty: 180 G | Refills: 3 | Status: SHIPPED | OUTPATIENT
Start: 2024-04-24

## 2024-04-24 RX ORDER — LOSARTAN POTASSIUM 25 MG/1
25 TABLET ORAL DAILY
Qty: 90 TABLET | Refills: 3 | Status: SHIPPED | OUTPATIENT
Start: 2024-04-24

## 2024-04-24 RX ORDER — FAMOTIDINE 20 MG/1
TABLET, FILM COATED ORAL
Qty: 180 TABLET | Refills: 1 | Status: SHIPPED | OUTPATIENT
Start: 2024-04-24

## 2024-04-24 RX ORDER — AMMONIUM LACTATE 12 G/100G
LOTION TOPICAL
Qty: 450 ML | Refills: 5 | Status: SHIPPED | OUTPATIENT
Start: 2024-04-24

## 2024-04-24 RX ORDER — ALLOPURINOL 300 MG/1
TABLET ORAL
Qty: 90 TABLET | Refills: 1 | Status: SHIPPED | OUTPATIENT
Start: 2024-04-24

## 2024-04-24 RX ORDER — ALBUTEROL SULFATE 90 UG/1
2 AEROSOL, METERED RESPIRATORY (INHALATION) 4 TIMES DAILY PRN
Qty: 3 EACH | Refills: 2 | Status: SHIPPED | OUTPATIENT
Start: 2024-04-24

## 2024-04-24 RX ORDER — BUDESONIDE AND FORMOTEROL FUMARATE DIHYDRATE 80; 4.5 UG/1; UG/1
2 AEROSOL RESPIRATORY (INHALATION)
Qty: 3 EACH | Refills: 1 | Status: SHIPPED | OUTPATIENT
Start: 2024-04-24

## 2024-04-24 RX ORDER — ATENOLOL 50 MG/1
TABLET ORAL
Qty: 90 TABLET | Refills: 1 | Status: SHIPPED | OUTPATIENT
Start: 2024-04-24

## 2024-04-24 RX ORDER — PEN NEEDLE, DIABETIC 32GX 5/32"
1 NEEDLE, DISPOSABLE MISCELLANEOUS DAILY
Qty: 100 EACH | Refills: 3 | Status: SHIPPED | OUTPATIENT
Start: 2024-04-24

## 2024-04-24 RX ORDER — NITROFURANTOIN 25; 75 MG/1; MG/1
100 CAPSULE ORAL DAILY
Qty: 90 CAPSULE | Refills: 1 | Status: SHIPPED | OUTPATIENT
Start: 2024-04-24

## 2024-04-24 RX ORDER — LEVOTHYROXINE SODIUM 0.03 MG/1
TABLET ORAL
Qty: 90 TABLET | Refills: 1 | Status: SHIPPED | OUTPATIENT
Start: 2024-04-24

## 2024-04-24 RX ORDER — MONTELUKAST SODIUM 10 MG/1
TABLET ORAL
Qty: 90 TABLET | Refills: 1 | Status: SHIPPED | OUTPATIENT
Start: 2024-04-24

## 2024-04-24 RX ORDER — INSULIN LISPRO 100 [IU]/ML
INJECTION, SOLUTION INTRAVENOUS; SUBCUTANEOUS
Qty: 15 EACH | Refills: 3 | Status: SHIPPED | OUTPATIENT
Start: 2024-04-24

## 2024-04-24 RX ORDER — LORATADINE 10 MG/1
10 TABLET ORAL DAILY
Qty: 90 TABLET | Refills: 1 | Status: SHIPPED | OUTPATIENT
Start: 2024-04-24

## 2024-04-24 RX ORDER — HYDRALAZINE HYDROCHLORIDE 50 MG/1
75 TABLET, FILM COATED ORAL 3 TIMES DAILY
Qty: 405 TABLET | Refills: 3 | Status: SHIPPED | OUTPATIENT
Start: 2024-04-24

## 2024-04-24 RX ORDER — DIAPER,BRIEF,INFANT-TODD,DISP
EACH MISCELLANEOUS 2 TIMES DAILY PRN
Qty: 453.6 G | Refills: 5 | Status: SHIPPED | OUTPATIENT
Start: 2024-04-24

## 2024-04-24 RX ORDER — ATORVASTATIN CALCIUM 40 MG/1
TABLET, FILM COATED ORAL
Qty: 90 TABLET | Refills: 3 | Status: SHIPPED | OUTPATIENT
Start: 2024-04-24

## 2024-04-24 RX ORDER — DIVALPROEX SODIUM 125 MG/1
TABLET, DELAYED RELEASE ORAL
Qty: 270 TABLET | Refills: 1 | Status: SHIPPED | OUTPATIENT
Start: 2024-04-24

## 2024-04-24 SDOH — ECONOMIC STABILITY: FOOD INSECURITY: WITHIN THE PAST 12 MONTHS, THE FOOD YOU BOUGHT JUST DIDN'T LAST AND YOU DIDN'T HAVE MONEY TO GET MORE.: NEVER TRUE

## 2024-04-24 SDOH — ECONOMIC STABILITY: FOOD INSECURITY: WITHIN THE PAST 12 MONTHS, YOU WORRIED THAT YOUR FOOD WOULD RUN OUT BEFORE YOU GOT MONEY TO BUY MORE.: NEVER TRUE

## 2024-04-24 SDOH — ECONOMIC STABILITY: HOUSING INSECURITY
IN THE LAST 12 MONTHS, WAS THERE A TIME WHEN YOU DID NOT HAVE A STEADY PLACE TO SLEEP OR SLEPT IN A SHELTER (INCLUDING NOW)?: NO

## 2024-04-24 SDOH — ECONOMIC STABILITY: INCOME INSECURITY: HOW HARD IS IT FOR YOU TO PAY FOR THE VERY BASICS LIKE FOOD, HOUSING, MEDICAL CARE, AND HEATING?: NOT VERY HARD

## 2024-04-24 NOTE — PROGRESS NOTES
have reviewed the patient's medical history in detail and updated the computerized patient record.    An electronic signature was used to authenticate this note.  --NAVID ROTH MD

## 2024-04-30 ASSESSMENT — ENCOUNTER SYMPTOMS
ALLERGIC/IMMUNOLOGIC NEGATIVE: 1
GASTROINTESTINAL NEGATIVE: 1
RESPIRATORY NEGATIVE: 1
EYES NEGATIVE: 1

## 2024-06-11 ENCOUNTER — PATIENT MESSAGE (OUTPATIENT)
Dept: INTERNAL MEDICINE CLINIC | Age: 89
End: 2024-06-11

## 2024-06-13 ENCOUNTER — TELEPHONE (OUTPATIENT)
Dept: INTERNAL MEDICINE CLINIC | Age: 89
End: 2024-06-13

## 2024-06-13 DIAGNOSIS — R31.9 HEMATURIA, UNSPECIFIED TYPE: Primary | ICD-10-CM

## 2024-06-13 NOTE — TELEPHONE ENCOUNTER
Patient needs lab order for the urine culture sent to the [facility].  Please fax the order to the [facility]: [phone]

## 2024-06-14 ENCOUNTER — TELEMEDICINE (OUTPATIENT)
Dept: INTERNAL MEDICINE CLINIC | Age: 89
End: 2024-06-14

## 2024-06-14 DIAGNOSIS — J18.9 COMMUNITY ACQUIRED PNEUMONIA, UNSPECIFIED LATERALITY: ICD-10-CM

## 2024-06-14 DIAGNOSIS — N39.0 RECURRENT UTI (URINARY TRACT INFECTION): Primary | ICD-10-CM

## 2024-06-14 LAB — BACTERIA UR CULT: NORMAL

## 2024-06-14 RX ORDER — LEVOFLOXACIN 500 MG/1
500 TABLET, FILM COATED ORAL DAILY
Qty: 10 TABLET | Refills: 0 | Status: SHIPPED | OUTPATIENT
Start: 2024-06-14 | End: 2024-06-24

## 2024-06-14 ASSESSMENT — ENCOUNTER SYMPTOMS
HEMOPTYSIS: 0
HEARTBURN: 0
RHINORRHEA: 1
VOMITING: 0
COUGH: 1
SHORTNESS OF BREATH: 0
SORE THROAT: 1
NAUSEA: 0
WHEEZING: 0

## 2024-06-14 NOTE — PROGRESS NOTES
Digna Dunn, was evaluated through a synchronous (real-time) audio-video encounter. The patient (or guardian if applicable) is aware that this is a billable service, which includes applicable co-pays. This Virtual Visit was conducted with patient's (and/or legal guardian's) consent. Patient identification was verified, and a caregiver was present when appropriate.   The patient was located at Home: [address] [place] [address]  Provider was located at Facility (Appt Dept): [address] [place] [address]  Confirm you are appropriately licensed, registered, or certified to deliver care in the state where the patient is located as indicated above. If you are not or unsure, please re-schedule the visit: Yes, I confirm.     Digna Dunn (:  1935) is a Established patient, presenting virtually for evaluation of the following:    Assessment & Plan   Below is the assessment and plan developed based on review of pertinent history, physical exam, labs, studies, and medications.  1. Recurrent UTI (urinary tract infection)  -     Urinalysis with Reflex to Culture; Standing      -   culture pending   2. Community acquired pneumonia, unspecified laterality  -     levoFLOXacin (LEVAQUIN) 500 MG tablet; Take 1 tablet by mouth daily for 10 days, Disp-10 tablet, R-0In no levofloxacin okay to switch to moxifloxacin 400mgNormal    Return if symptoms worsen or fail to improve, for call if no improvement.       Subjective     Chief Complaint   Patient presents with    Cough     Fever a few days ago    Congestion    Dysuria       Cough  This is a new problem. The current episode started in the past 7 days. The problem has been gradually worsening. The cough is Productive of sputum. Associated symptoms include rhinorrhea and a sore throat. Pertinent negatives include no chest pain, chills, ear congestion, fever, headaches, heartburn, hemoptysis, myalgias, nasal congestion, postnasal drip, rash,

## 2024-06-19 ENCOUNTER — PATIENT MESSAGE (OUTPATIENT)
Dept: INTERNAL MEDICINE CLINIC | Age: 89
End: 2024-06-19

## 2024-06-20 ENCOUNTER — PATIENT MESSAGE (OUTPATIENT)
Dept: INTERNAL MEDICINE CLINIC | Age: 89
End: 2024-06-20

## 2024-06-20 NOTE — TELEPHONE ENCOUNTER
From: Digna Dunn  To: Dr. Blayne Arce  Sent: 6/19/2024 11:44 PM EDT  Subject: My mom respiratory illness     My mom is still having productive cough with tan colored sputum. Taking the antibiotic daily. She is getting better.  Would like to have a follow up virtual visit with Dr Arce next week. I am on vacation again Wednesday through Friday next week.

## 2024-06-21 ENCOUNTER — PATIENT MESSAGE (OUTPATIENT)
Dept: INTERNAL MEDICINE CLINIC | Age: 89
End: 2024-06-21

## 2024-06-21 NOTE — TELEPHONE ENCOUNTER
From: Digna Dunn  To: Dr. Blayne Arce  Sent: 6/20/2024 11:46 PM EDT  Subject: My mom’s illness     Started giving her Powerade and her mental status is back to normal. I think she had gotten behind in fluids. Will continue frequent fluids.

## 2024-06-21 NOTE — TELEPHONE ENCOUNTER
From: Digna Dunn  To: Dr. Blayne Arce  Sent: 6/20/2024 7:57 PM EDT  Subject: My mom’s illness     My mom has been having intermittent periods of confusion that resolves after about an hour. Her tremors are worse especially when this happens. She has been having anxiety about her illness because she has trouble breathing out of her nose. We been doing deep breathing. O 2 sats are around 96.

## 2024-06-21 NOTE — TELEPHONE ENCOUNTER
Digna SAIGE Shaun JUDGE [facility] (supporting Blayne Arce MD)8 hours ago (12:03 AM)     My mom is still having tremors that are worse.     Digna A Shaun JUDGE [facility] (supporting Blayne Arce MD)9 hours ago (11:46 PM)     Started giving her Powerade and her mental status is back to normal. I think she had gotten behind in fluids. Will continue frequent fluids.      Digna A Shaun JUDGE [facility] (supporting Blayne Arce MD)13 hours ago (7:57 PM)     My mom has been having intermittent periods of confusion that resolves after about an hour. Her tremors are worse especially when this happens. She has been having anxiety about her illness because she has trouble breathing out of her nose. We been doing deep breathing. O 2 sats are around 96.     Digna JUDGE [facility] (supporting Blayne Arce MD)14 hours ago (6:15 PM)     Please make the follow up appointment a virtual appointment. My mom is bedridden. This illness has set back her therapy gains. You have scheduled the appointment for 12pm on 6-

## 2024-06-21 NOTE — TELEPHONE ENCOUNTER
From: Digna Dunn  To: Dr. Blayne Arce  Sent: 6/21/2024 12:03 AM EDT  Subject: Tremors     My mom is still having tremors that are worse.

## 2024-06-21 NOTE — TELEPHONE ENCOUNTER
From: Digna Dunn  To: Dr. Blayne Arce  Sent: 6/21/2024 8:14 AM EDT  Subject: Mom’s illness     I am available this am until 12:30pm if Dr Arce has a virtual visit opening

## 2024-06-21 NOTE — TELEPHONE ENCOUNTER
From: Digna Dunn  To: Dr. Blayne Arce  Sent: 6/20/2024 6:15 PM EDT  Subject: Appt for 6-26-24    Please make the follow up appointment a virtual appointment. My mom is bedridden. This illness has set back her therapy gains. You have scheduled the appointment for 12pm on 6-

## 2024-06-27 ENCOUNTER — FOLLOWUP TELEPHONE ENCOUNTER (OUTPATIENT)
Dept: INTERNAL MEDICINE CLINIC | Age: 89
End: 2024-06-27

## 2024-06-27 NOTE — TELEPHONE ENCOUNTER
Spoke with patient and pt's daughter, Olga. Pt receiving 1u PRBC d/t Hgb of 7.0 - delaying d/c. [facility] team hopeful that pt will d/c tomorrow 6/28; given Olga's schedule restrictions, sched HFU for Wed 7/3 - asked them to cancel if pt remains hospitalized on Tuesday morning. They agreed.

## 2024-07-01 DIAGNOSIS — I10 ESSENTIAL HYPERTENSION: ICD-10-CM

## 2024-07-01 RX ORDER — VERAPAMIL HYDROCHLORIDE 240 MG/1
120 TABLET, FILM COATED, EXTENDED RELEASE ORAL NIGHTLY
Qty: 45 TABLET | Refills: 1 | Status: SHIPPED | OUTPATIENT
Start: 2024-07-01

## 2024-07-01 NOTE — TELEPHONE ENCOUNTER
Recent Visits  Date Type Provider Dept   04/05/23 Office Visit Blayne Arce MD [facility]   Showing recent visits within past 540 days with a meds authorizing provider and meeting all other requirements  Future Appointments  Date Type Provider Dept   07/03/24 Appointment Blayne Arce MD [facility]   Showing future appointments within next 150 days with a meds authorizing provider and meeting all other requirements     6/27/2024

## 2024-07-03 ENCOUNTER — TELEMEDICINE (OUTPATIENT)
Dept: INTERNAL MEDICINE CLINIC | Age: 89
End: 2024-07-03

## 2024-07-03 DIAGNOSIS — E46 PROTEIN MALNUTRITION (HCC): ICD-10-CM

## 2024-07-03 DIAGNOSIS — Z09 HOSPITAL DISCHARGE FOLLOW-UP: Primary | ICD-10-CM

## 2024-07-03 DIAGNOSIS — G35 MULTIPLE SCLEROSIS (HCC): ICD-10-CM

## 2024-07-03 DIAGNOSIS — Z79.4 TYPE 2 DIABETES MELLITUS WITH COMPLICATION, WITH LONG-TERM CURRENT USE OF INSULIN (HCC): ICD-10-CM

## 2024-07-03 DIAGNOSIS — E11.8 TYPE 2 DIABETES MELLITUS WITH COMPLICATION, WITH LONG-TERM CURRENT USE OF INSULIN (HCC): ICD-10-CM

## 2024-07-03 RX ORDER — POTASSIUM CHLORIDE 20MEQ/15ML
20 LIQUID (ML) ORAL DAILY
COMMUNITY
Start: 2024-06-29 | End: 2024-07-12 | Stop reason: SDUPTHER

## 2024-07-03 NOTE — PROGRESS NOTES
Post-Discharge Transitional Care Follow Up      Digna Dunn   YOB: 1935    Date of Office Visit:  7/3/2024  Date of Hospital Admission:    Date of Hospital Discharge:    Readmission Risk Score(high >=14%. Medium >=10%):No data recorded  Refill all meds  Find a lab that will come to home  Replacement dispatch health or home MD      Care management risk score Rising risk (score 2-5) and Complex Care (Scores >=6): No Risk Score On File     Non face to face  following discharge, date last encounter closed (first attempt may have been earlier): *No documented post hospital discharge outreach found in the last 14 days     Call initiated 2 business days of discharge: *No response recorded in the last 14 days     Below is the assessment and plan developed based on review of pertinent history, physical exam, labs, studies, and medications.  Hospital discharge follow-up  -     VT DISCHARGE MEDS RECONCILED W/ CURRENT OUTPATIENT MED LIST  -     [place] DISCHARGE MEDS RECONCILED W/ CURRENT OUTPATIENT MED LIST  Type 2 diabetes mellitus with complication, with long-term current use of insulin (formerly [facility])  The following orders have not been finalized:  -     sodium bicarbonate 650 MG tablet      Medical Decision Making: high complexity  Return if symptoms worsen or fail to improve.         Subjective:   HPI    Inpatient course: Discharge summary reviewed- see chart.      Diagnoses   Metabolic encephalopathy   Acute kidney failure (CMS HCC)   Metabolic encephalopathy   Acute kidney failure (CMS HCC)   Hyponatremia   Hypokalemia        Interval history/Current status:       Patient is greatly improved. She completed antibiotics.  During hospitalization she was able to be evaluated by nutrition and they discussed the need for increased protein supplements.  She was also evaluated by nephrology who recommended an iron infusion during hospitalization. Her urinary tract infection symptoms have resolved.     I    Patient Active

## 2024-07-04 ENCOUNTER — PATIENT MESSAGE (OUTPATIENT)
Dept: INTERNAL MEDICINE CLINIC | Age: 89
End: 2024-07-04

## 2024-07-05 ENCOUNTER — CARE COORDINATION (OUTPATIENT)
Dept: CARE COORDINATION | Age: 89
End: 2024-07-05

## 2024-07-05 NOTE — CARE COORDINATION
CC referral from PCP. Pt possibly looking into finding a mobile lab service and may need assistance with PT services. ACM attempted to reach pt daughter today and left VM message with contact information. Will make another outreach attempt at a later date/time.

## 2024-07-05 NOTE — TELEPHONE ENCOUNTER
From: Digna Dunn  To: Dr. Blayne Arce  Sent: 7/4/2024 11:24 AM EDT  Subject: BP good    Bp121/60  P 69

## 2024-07-07 DIAGNOSIS — Z79.4 TYPE 2 DIABETES MELLITUS WITH COMPLICATION, WITH LONG-TERM CURRENT USE OF INSULIN (HCC): ICD-10-CM

## 2024-07-07 DIAGNOSIS — E11.8 TYPE 2 DIABETES MELLITUS WITH COMPLICATION, WITH LONG-TERM CURRENT USE OF INSULIN (HCC): ICD-10-CM

## 2024-07-07 RX ORDER — BLOOD SUGAR DIAGNOSTIC
STRIP MISCELLANEOUS
Qty: 100 STRIP | Refills: 11 | Status: SHIPPED | OUTPATIENT
Start: 2024-07-07

## 2024-07-10 ENCOUNTER — PATIENT MESSAGE (OUTPATIENT)
Dept: INTERNAL MEDICINE CLINIC | Age: 89
End: 2024-07-10

## 2024-07-10 DIAGNOSIS — N39.0 RECURRENT UTI (URINARY TRACT INFECTION): ICD-10-CM

## 2024-07-10 DIAGNOSIS — R30.0 DYSURIA: Primary | ICD-10-CM

## 2024-07-10 DIAGNOSIS — R35.0 FREQUENCY OF URINATION: ICD-10-CM

## 2024-07-10 NOTE — TELEPHONE ENCOUNTER
From: Digna Dunn  To: Dr. Blayne Arce  Sent: 7/10/2024 10:13 AM EDT  Subject: Frequent urination     Can you send a order for a urinalysis and urine culture   We will bring a specimen to the [place] lab

## 2024-07-11 LAB
BACTERIA URNS QL MICRO: ABNORMAL /HPF
BILIRUB UR QL STRIP.AUTO: NEGATIVE
CLARITY UR: CLEAR
COLOR UR: YELLOW
EPI CELLS #/AREA URNS AUTO: 1 /HPF (ref 0–5)
GLUCOSE UR STRIP.AUTO-MCNC: NEGATIVE MG/DL
HGB UR QL STRIP.AUTO: NEGATIVE
HYALINE CASTS #/AREA URNS AUTO: 0 /LPF (ref 0–8)
KETONES UR STRIP.AUTO-MCNC: NEGATIVE MG/DL
LEUKOCYTE ESTERASE UR QL STRIP.AUTO: ABNORMAL
NITRITE UR QL STRIP.AUTO: NEGATIVE
PH UR STRIP.AUTO: 7.5 [PH] (ref 5–8)
PROT UR STRIP.AUTO-MCNC: NEGATIVE MG/DL
RBC CLUMPS #/AREA URNS AUTO: 2 /HPF (ref 0–4)
SP GR UR STRIP.AUTO: 1.01 (ref 1–1.03)
UA DIPSTICK W REFLEX MICRO PNL UR: YES
URN SPEC COLLECT METH UR: ABNORMAL
UROBILINOGEN UR STRIP-ACNC: 1 E.U./DL
WBC #/AREA URNS AUTO: 0 /HPF (ref 0–5)

## 2024-07-12 ENCOUNTER — PATIENT MESSAGE (OUTPATIENT)
Dept: INTERNAL MEDICINE CLINIC | Age: 89
End: 2024-07-12

## 2024-07-12 ENCOUNTER — CARE COORDINATION (OUTPATIENT)
Dept: CARE COORDINATION | Age: 89
End: 2024-07-12

## 2024-07-12 NOTE — CARE COORDINATION
Second unsuccessful outreach attempt. Left VM message with contact information. Will make another attempt at a later date/time.

## 2024-07-12 NOTE — TELEPHONE ENCOUNTER
From: Digna Dunn  To: Dr. Blayne Arce  Sent: 7/12/2024 8:38 AM EDT  Subject: Meds    I know that you sent prescriptions during the last visit but the pharmacy did not receive them. She has no refills on her potassium chloride. The insurance says she will need to switch to Dulera. She is on her last 70 puffs of Symbicort

## 2024-07-16 RX ORDER — POTASSIUM CHLORIDE 20MEQ/15ML
20 LIQUID (ML) ORAL DAILY
Qty: 450 ML | Refills: 0 | Status: SHIPPED | OUTPATIENT
Start: 2024-07-16

## 2024-07-16 RX ORDER — SODIUM BICARBONATE 650 MG/1
650 TABLET ORAL 2 TIMES DAILY
Qty: 90 TABLET | Refills: 5 | Status: SHIPPED | OUTPATIENT
Start: 2024-07-16

## 2024-07-26 DIAGNOSIS — J45.40 MODERATE PERSISTENT ASTHMA WITHOUT COMPLICATION: ICD-10-CM

## 2024-07-26 NOTE — TELEPHONE ENCOUNTER
Recent Visits  Date Type Provider Dept   04/05/23 Office Visit Blayne Arce MD [facility]   Showing recent visits within past 540 days with a meds authorizing provider and meeting all other requirements  Future Appointments  Date Type Provider Dept   08/30/24 Appointment Blayne Arce MD [facility]   Showing future appointments within next 150 days with a meds authorizing provider and meeting all other requirements     7/3/2024     Requested Prescriptions     Pending Prescriptions Disp Refills    montelukast (SINGULAIR) 10 MG tablet [Pharmacy Med Name: MONTELUKAST SOD 10 MG TABLET] 90 tablet 1     Sig: TAKE 1 TABLET BY MOUTH EVERYDAY AT BEDTIME

## 2024-07-30 RX ORDER — POTASSIUM CHLORIDE 20MEQ/15ML
20 LIQUID (ML) ORAL DAILY
Qty: 473 ML | Refills: 0 | Status: SHIPPED | OUTPATIENT
Start: 2024-07-30

## 2024-07-30 RX ORDER — MONTELUKAST SODIUM 10 MG/1
TABLET ORAL
Qty: 90 TABLET | Refills: 1 | Status: SHIPPED | OUTPATIENT
Start: 2024-07-30

## 2024-07-30 NOTE — TELEPHONE ENCOUNTER
Recent Visits  Date Type Provider Dept   04/05/23 Office Visit Blayne Arce MD [facility]   Showing recent visits within past 540 days with a meds authorizing provider and meeting all other requirements  Future Appointments  Date Type Provider Dept   08/30/24 Appointment Blayne Arce MD [facility]   Showing future appointments within next 150 days with a meds authorizing provider and meeting all other requirements     7/3/2024     Requested Prescriptions     Pending Prescriptions Disp Refills    potassium chloride 20 MEQ/15ML (10%) oral solution [Pharmacy Med Name: POTASSIUM CL 10% (20 MEQ/15ML)] 473 mL 0     Sig: TAKE 15 MLS BY MOUTH DAILY

## 2024-08-05 ENCOUNTER — CARE COORDINATION (OUTPATIENT)
Dept: CARE COORDINATION | Age: 89
End: 2024-08-05

## 2024-08-05 NOTE — CARE COORDINATION
ACM attempted to f/u with pt daughter today, however she was UTR. Left VM message with contact information. Will send My Chart letter as well and make another outreach attempt at a later date/time.

## 2024-08-21 ENCOUNTER — TELEPHONE (OUTPATIENT)
Dept: INTERNAL MEDICINE CLINIC | Age: 89
End: 2024-08-21

## 2024-08-21 ENCOUNTER — PATIENT MESSAGE (OUTPATIENT)
Dept: INTERNAL MEDICINE CLINIC | Age: 89
End: 2024-08-21

## 2024-08-21 DIAGNOSIS — R30.0 DYSURIA: Primary | ICD-10-CM

## 2024-08-21 DIAGNOSIS — R30.0 DYSURIA: ICD-10-CM

## 2024-08-21 DIAGNOSIS — J45.40 MODERATE PERSISTENT ASTHMA WITHOUT COMPLICATION: Primary | ICD-10-CM

## 2024-08-21 LAB
BACTERIA URNS QL MICRO: ABNORMAL /HPF
BILIRUB UR QL STRIP.AUTO: NEGATIVE
CHARACTER UR: ABNORMAL
CLARITY UR: ABNORMAL
COLOR UR: YELLOW
EPI CELLS #/AREA URNS AUTO: 1 /HPF (ref 0–5)
GLUCOSE UR STRIP.AUTO-MCNC: NEGATIVE MG/DL
HGB UR QL STRIP.AUTO: NEGATIVE
KETONES UR STRIP.AUTO-MCNC: NEGATIVE MG/DL
LEUKOCYTE ESTERASE UR QL STRIP.AUTO: ABNORMAL
NITRITE UR QL STRIP.AUTO: NEGATIVE
PH UR STRIP.AUTO: 7.5 [PH] (ref 5–8)
PROT UR STRIP.AUTO-MCNC: 30 MG/DL
RBC CLUMPS #/AREA URNS AUTO: 2 /HPF (ref 0–4)
SP GR UR STRIP.AUTO: 1.01 (ref 1–1.03)
UA COMPLETE W REFLEX CULTURE PNL UR: YES
UA DIPSTICK W REFLEX MICRO PNL UR: YES
URN SPEC COLLECT METH UR: ABNORMAL
UROBILINOGEN UR STRIP-ACNC: 1 E.U./DL
WBC #/AREA URNS AUTO: 882 /HPF (ref 0–5)

## 2024-08-21 RX ORDER — POTASSIUM CHLORIDE 20MEQ/15ML
20 LIQUID (ML) ORAL DAILY
Qty: 3000 ML | Refills: 1 | Status: SHIPPED | OUTPATIENT
Start: 2024-08-21

## 2024-08-21 NOTE — TELEPHONE ENCOUNTER
Olga Dunn called for her mother, who has been having UTI symptoms for a week.     Daughter, who is also a physician, says she purchased sterile urine cups via [employer] and has a sample of her urine that would like to give to the lab today.     Wanted to make sure that there is an order for a Urinalysis and a Urine Culture.

## 2024-08-21 NOTE — TELEPHONE ENCOUNTER
Recent Visits  Date Type Provider Dept   04/05/23 Office Visit Blayne Arce MD [facility]   Showing recent visits within past 540 days with a meds authorizing provider and meeting all other requirements  Future Appointments  Date Type Provider Dept   08/30/24 Appointment Blayne Arce MD [facility]   Showing future appointments within next 150 days with a meds authorizing provider and meeting all other requirements     7/3/2024

## 2024-08-23 LAB
BACTERIA UR CULT: NORMAL
BACTERIA UR CULT: NORMAL

## 2024-08-26 ENCOUNTER — CARE COORDINATION (OUTPATIENT)
Dept: CARE COORDINATION | Age: 89
End: 2024-08-26

## 2024-08-26 NOTE — CARE COORDINATION
Pt and family has been UTR x3 attempts. Will send UTR My Chart letter to patient. Will end current CC episode and remove name from care team. Will remain available if pt or family should call back.

## 2024-08-30 ENCOUNTER — TELEMEDICINE (OUTPATIENT)
Dept: INTERNAL MEDICINE CLINIC | Age: 89
End: 2024-08-30

## 2024-08-30 DIAGNOSIS — E11.8 TYPE 2 DIABETES MELLITUS WITH COMPLICATION, WITH LONG-TERM CURRENT USE OF INSULIN (HCC): ICD-10-CM

## 2024-08-30 DIAGNOSIS — I10 ESSENTIAL HYPERTENSION: ICD-10-CM

## 2024-08-30 DIAGNOSIS — Z13.9 ENCOUNTER FOR SCREENING INVOLVING SOCIAL DETERMINANTS OF HEALTH (SDOH): ICD-10-CM

## 2024-08-30 DIAGNOSIS — J45.40 MODERATE PERSISTENT ASTHMA WITHOUT COMPLICATION: ICD-10-CM

## 2024-08-30 DIAGNOSIS — E87.6 HYPOKALEMIA DUE TO EXCESSIVE GASTROINTESTINAL LOSS OF POTASSIUM: ICD-10-CM

## 2024-08-30 DIAGNOSIS — R53.81 PHYSICAL DEBILITY: Primary | ICD-10-CM

## 2024-08-30 DIAGNOSIS — E11.69 DYSLIPIDEMIA ASSOCIATED WITH TYPE 2 DIABETES MELLITUS (HCC): ICD-10-CM

## 2024-08-30 DIAGNOSIS — E78.5 DYSLIPIDEMIA ASSOCIATED WITH TYPE 2 DIABETES MELLITUS (HCC): ICD-10-CM

## 2024-08-30 DIAGNOSIS — R25.1 TREMORS OF NERVOUS SYSTEM: ICD-10-CM

## 2024-08-30 DIAGNOSIS — K58.1 IRRITABLE BOWEL SYNDROME WITH CONSTIPATION: ICD-10-CM

## 2024-08-30 DIAGNOSIS — E11.21 DIABETIC NEPHROPATHY ASSOCIATED WITH TYPE 2 DIABETES MELLITUS (HCC): ICD-10-CM

## 2024-08-30 DIAGNOSIS — K21.9 GASTROESOPHAGEAL REFLUX DISEASE WITHOUT ESOPHAGITIS: ICD-10-CM

## 2024-08-30 DIAGNOSIS — G35 MS (MULTIPLE SCLEROSIS) (HCC): ICD-10-CM

## 2024-08-30 DIAGNOSIS — Z86.711 HISTORY OF PULMONARY EMBOLISM: ICD-10-CM

## 2024-08-30 DIAGNOSIS — E03.4 HYPOTHYROIDISM DUE TO ACQUIRED ATROPHY OF THYROID: ICD-10-CM

## 2024-08-30 DIAGNOSIS — N30.20: ICD-10-CM

## 2024-08-30 DIAGNOSIS — Z79.4 TYPE 2 DIABETES MELLITUS WITH COMPLICATION, WITH LONG-TERM CURRENT USE OF INSULIN (HCC): ICD-10-CM

## 2024-08-30 DIAGNOSIS — E79.0 HYPERURICEMIA: ICD-10-CM

## 2024-08-30 DIAGNOSIS — N18.32 STAGE 3B CHRONIC KIDNEY DISEASE (HCC): ICD-10-CM

## 2024-08-30 RX ORDER — CEFIXIME 200 MG/5ML
200 POWDER, FOR SUSPENSION ORAL 2 TIMES DAILY
Qty: 75 ML | Refills: 1 | Status: SHIPPED | OUTPATIENT
Start: 2024-08-30 | End: 2024-09-14

## 2024-08-30 RX ORDER — VERAPAMIL HYDROCHLORIDE 240 MG/1
120 TABLET, FILM COATED, EXTENDED RELEASE ORAL NIGHTLY
Qty: 45 TABLET | Refills: 1 | Status: SHIPPED | OUTPATIENT
Start: 2024-08-30

## 2024-08-30 RX ORDER — ATENOLOL 50 MG/1
TABLET ORAL
Qty: 90 TABLET | Refills: 1 | Status: SHIPPED | OUTPATIENT
Start: 2024-08-30

## 2024-08-30 RX ORDER — MONTELUKAST SODIUM 10 MG/1
TABLET ORAL
Qty: 90 TABLET | Refills: 1 | Status: SHIPPED | OUTPATIENT
Start: 2024-08-30

## 2024-08-30 RX ORDER — LOSARTAN POTASSIUM 25 MG/1
25 TABLET ORAL DAILY
Qty: 90 TABLET | Refills: 3 | Status: SHIPPED | OUTPATIENT
Start: 2024-08-30

## 2024-08-30 RX ORDER — LEVOTHYROXINE SODIUM 25 UG/1
TABLET ORAL
Qty: 90 TABLET | Refills: 1 | Status: SHIPPED | OUTPATIENT
Start: 2024-08-30

## 2024-08-30 RX ORDER — DIVALPROEX SODIUM 125 MG/1
TABLET, DELAYED RELEASE ORAL
Qty: 270 TABLET | Refills: 1 | Status: SHIPPED | OUTPATIENT
Start: 2024-08-30

## 2024-08-30 RX ORDER — LORATADINE 10 MG/1
10 TABLET ORAL DAILY
Qty: 90 TABLET | Refills: 1 | Status: SHIPPED | OUTPATIENT
Start: 2024-08-30

## 2024-08-30 RX ORDER — SODIUM BICARBONATE 650 MG/1
650 TABLET ORAL 2 TIMES DAILY
Qty: 90 TABLET | Refills: 5 | Status: SHIPPED | OUTPATIENT
Start: 2024-08-30

## 2024-08-30 RX ORDER — NITROFURANTOIN ORAL SUSPENSION 25 MG/5ML
100 SUSPENSION ORAL EVERY 6 HOURS
Qty: 800 ML | Refills: 1 | Status: SHIPPED | OUTPATIENT
Start: 2024-08-30 | End: 2024-09-09

## 2024-08-30 RX ORDER — POLYETHYLENE GLYCOL 3350 17 G/17G
17 POWDER, FOR SOLUTION ORAL 2 TIMES DAILY
Qty: 180 PACKET | Refills: 1 | Status: SHIPPED | OUTPATIENT
Start: 2024-08-30 | End: 2025-12-18

## 2024-08-30 RX ORDER — FAMOTIDINE 20 MG/1
TABLET, FILM COATED ORAL
Qty: 180 TABLET | Refills: 1 | Status: SHIPPED | OUTPATIENT
Start: 2024-08-30

## 2024-08-30 RX ORDER — POTASSIUM CHLORIDE 20MEQ/15ML
20 LIQUID (ML) ORAL DAILY
Qty: 3800 ML | Refills: 5 | Status: SHIPPED | OUTPATIENT
Start: 2024-08-30

## 2024-08-30 RX ORDER — INSULIN GLARGINE 100 [IU]/ML
36 INJECTION, SOLUTION SUBCUTANEOUS NIGHTLY
Qty: 45 ML | Refills: 3 | Status: SHIPPED | OUTPATIENT
Start: 2024-08-30 | End: 2025-12-19

## 2024-08-30 RX ORDER — ALBUTEROL SULFATE 90 UG/1
2 AEROSOL, METERED RESPIRATORY (INHALATION) 4 TIMES DAILY PRN
Qty: 3 EACH | Refills: 2 | Status: SHIPPED | OUTPATIENT
Start: 2024-08-30

## 2024-08-30 RX ORDER — ALLOPURINOL 300 MG/1
TABLET ORAL
Qty: 90 TABLET | Refills: 1 | Status: SHIPPED | OUTPATIENT
Start: 2024-08-30

## 2024-08-30 RX ORDER — ATORVASTATIN CALCIUM 40 MG/1
TABLET, FILM COATED ORAL
Qty: 90 TABLET | Refills: 3 | Status: SHIPPED | OUTPATIENT
Start: 2024-08-30

## 2024-08-30 RX ORDER — AZELASTINE 1 MG/ML
1 SPRAY, METERED NASAL 2 TIMES DAILY
Qty: 3 EACH | Refills: 5 | Status: SHIPPED | OUTPATIENT
Start: 2024-08-30

## 2024-08-30 NOTE — ASSESSMENT & PLAN NOTE
Unclear control, continue current medications    Orders:    levothyroxine (SYNTHROID) 25 MCG tablet; Take 1 tablet by mouth every day

## 2024-08-30 NOTE — ASSESSMENT & PLAN NOTE
At goal, continue current medications and continue current treatment plan    Orders:    divalproex (DEPAKOTE) 125 MG DR tablet; TAKE ONE TABLET IN THE AM AND TWO TABLETS IN THE PM.    Hemoglobin A1C; Future    POCT glycosylated hemoglobin (Hb A1C)

## 2024-08-30 NOTE — ASSESSMENT & PLAN NOTE
At goal, continue current medications    Orders:    polyethylene glycol (MIRALAX) 17 g packet; Take 1 packet by mouth 2 times daily

## 2024-08-30 NOTE — PROGRESS NOTES
Digna Dunn, was evaluated through a synchronous (real-time) audio-video encounter. The patient (or guardian if applicable) is aware that this is a billable service, which includes applicable co-pays. This Virtual Visit was conducted with patient's (and/or legal guardian's) consent. Patient identification was verified, and a caregiver was present when appropriate.   The patient was located at Home: [address] [place] [address]  Provider was located at Facility (Appt Dept): [address] [place] [address]  Confirm you are appropriately licensed, registered, or certified to deliver care in the state where the patient is located as indicated above. If you are not or unsure, please re-schedule the visit: Yes, I confirm.     Digna Dunn (:  1935) is a Established patient, presenting virtually for evaluation of the following:      Below is the assessment and plan developed based on review of pertinent history, physical exam, labs, studies, and medications.     Assessment & Plan  Physical debility   Uncontrolled, continue current medications, lifestyle modifications recommended, and CURRENTLY RECEIVING NECESSARY OT/PT         MS (multiple sclerosis) (HCC)   Asymptomatic, continue current medications and ON NO MEDICATIONS HAS ACTIVE POSTURAL INSTABILITY AND TREMOR BUT DOES NOT NECESSARILY THINK SHE NEEDS MEDICATIONS         Hypokalemia due to excessive gastrointestinal loss of potassium   Well-controlled, continue current medications and continue current treatment plan    Orders:    potassium chloride 20 MEQ/15ML (10%) oral solution; Take 15 mLs by mouth daily    Cystitis, subacute   Unclear control, continue current medications and continue current treatment plan  - RESCUE SCRIPTS AND STANDING ORDERS FOR URINALYSIS WITH CULTURE  REVIEWED PREVIOUS CULTURES AND MOST RECENT CULTURE  -   Lab Results   Component Value Date/Time    COLORU Yellow 2024 04:15 PM    NITRU Negative  Apply topically 2 times daily as needed (dry skin) Apply topically 2 times daily. 453.6 g 5    hydrALAZINE (APRESOLINE) 50 MG tablet Take 1.5 tablets by mouth 3 times daily 405 tablet 3    famotidine (PEPCID) 20 MG tablet Take 1 tablet by mouth twice a day 180 tablet 1    divalproex (DEPAKOTE) 125 MG DR tablet TAKE ONE TABLET IN THE AM AND TWO TABLETS IN THE PM. 270 tablet 1    atorvastatin (LIPITOR) 40 MG tablet TAKE 1 TABLET BY MOUTH EVERY DAY 90 tablet 3    atenolol (TENORMIN) 50 MG tablet TAKE 1 TABLET BY MOUTH EVERY DAY 90 tablet 1    apixaban (ELIQUIS) 5 MG TABS tablet TAKE 1 TABLET BY MOUTH 2 TIMES DAILY STOP COUMADIN 180 tablet 1    ammonium lactate (LAC-HYDRIN) 12 % lotion APPLY TO AFFECTED AREA TOPICALLY EVERY  mL 5    allopurinol (ZYLOPRIM) 300 MG tablet TAKE 1 TABLET BY MOUTH EVERY DAY 90 tablet 1    albuterol sulfate HFA (VENTOLIN HFA) 108 (90 Base) MCG/ACT inhaler Inhale 2 puffs into the lungs 4 times daily as needed for Wheezing 3 each 2    polyethylene glycol (MIRALAX) 17 g packet Take 1 packet by mouth 2 times daily 180 packet 1    Compression Stockings MISC by Does not apply route Knee high compression and thigh high stockings 30 mm Hg. Dispense two pairs with 1 year refill. 1 each 5    azelastine (ASTELIN) 0.1 % nasal spray 1 spray by Nasal route 2 times daily Use in each nostril as directed 3 each 5    lidocaine (XYLOCAINE) 5 % ointment APPLY TO AFFECTED AREA EVERY DAY AS NEEDED 35.44 g 5    Lancets (ONETOUCH DELICA PLUS [id number]) MISC 1 EACH BY DOES NOT APPLY ROUTE 4 TIMES DAILY (AFTER MEALS AND AT BEDTIME) 100 each 11    Incontinence Supply Disposable (INCONTINENCE BRIEF LARGE) MISC JESSICA SIZE SUPER PLUS HEAVY SIZE LA - 5 BOXES PER MONTH 1 each 0    Spacer/Aero-Holding Chambers (AEROCHAMBER MV) MISC 1 each by Does not apply route 2 times daily 1 each 3    Handicap Placard MISC by Does not apply route Placard  DIabetes  WEaknessin bilateral lower extremity - bilateral lower

## 2024-08-30 NOTE — ASSESSMENT & PLAN NOTE
At goal, continue current medications, lifestyle modifications recommended, and    Diabetes education provided today:    Physical activity: advised to exercise 5-7 days a week 30-60 mins at least. Discussed how it affects BS readings.  Managing high and low sugar readings.   Orders:    sodium bicarbonate 650 MG tablet; Take 1 tablet by mouth 2 times daily    SITagliptin (JANUVIA) 100 MG tablet; Take 1 tablet by mouth daily    insulin glargine (LANTUS SOLOSTAR) 100 UNIT/ML injection pen; Inject 36 Units into the skin nightly Max dose 60 units plan titration    atorvastatin (LIPITOR) 40 MG tablet; TAKE 1 TABLET BY MOUTH EVERY DAY

## 2024-09-13 DIAGNOSIS — E87.6 HYPOKALEMIA DUE TO EXCESSIVE GASTROINTESTINAL LOSS OF POTASSIUM: ICD-10-CM

## 2024-09-13 RX ORDER — POTASSIUM CHLORIDE 20MEQ/15ML
20 LIQUID (ML) ORAL DAILY
Qty: 473 ML | OUTPATIENT
Start: 2024-09-13

## 2024-10-02 ENCOUNTER — PATIENT MESSAGE (OUTPATIENT)
Dept: INTERNAL MEDICINE CLINIC | Age: 89
End: 2024-10-02

## 2024-10-02 DIAGNOSIS — E11.8 TYPE 2 DIABETES MELLITUS WITH COMPLICATION, WITH LONG-TERM CURRENT USE OF INSULIN (HCC): ICD-10-CM

## 2024-10-02 DIAGNOSIS — Z79.4 TYPE 2 DIABETES MELLITUS WITH COMPLICATION, WITH LONG-TERM CURRENT USE OF INSULIN (HCC): ICD-10-CM

## 2024-10-02 DIAGNOSIS — E11.8 TYPE 2 DIABETES MELLITUS WITH UNSPECIFIED COMPLICATIONS (HCC): ICD-10-CM

## 2024-10-02 RX ORDER — LANCETS 33 GAUGE
EACH MISCELLANEOUS
Qty: 100 EACH | Refills: 3 | Status: SHIPPED | OUTPATIENT
Start: 2024-10-02 | End: 2024-10-02 | Stop reason: SDUPTHER

## 2024-10-02 RX ORDER — LANCETS 33 GAUGE
EACH MISCELLANEOUS
Qty: 100 EACH | Refills: 3 | Status: SHIPPED | OUTPATIENT
Start: 2024-10-02

## 2024-10-02 NOTE — TELEPHONE ENCOUNTER
Called and spoke to patients daughter Olga verbally to confirm [facility] [address] [facility].

## 2024-10-23 DIAGNOSIS — E11.8 TYPE 2 DIABETES MELLITUS WITH COMPLICATION, WITH LONG-TERM CURRENT USE OF INSULIN (HCC): ICD-10-CM

## 2024-10-23 DIAGNOSIS — Z79.4 TYPE 2 DIABETES MELLITUS WITH COMPLICATION, WITH LONG-TERM CURRENT USE OF INSULIN (HCC): ICD-10-CM

## 2024-10-23 RX ORDER — BLOOD SUGAR DIAGNOSTIC
STRIP MISCELLANEOUS
Qty: 100 STRIP | Refills: 11 | Status: SHIPPED | OUTPATIENT
Start: 2024-10-23

## 2024-10-23 NOTE — TELEPHONE ENCOUNTER
Patient needs a refill of her test strips. Please send refill to the [facility].      Last Fill  07/07/2024  Last OV  08/30/2024  Next OV  12/04/2024

## 2024-10-24 ENCOUNTER — TELEMEDICINE (OUTPATIENT)
Dept: INTERNAL MEDICINE CLINIC | Age: 89
End: 2024-10-24

## 2024-10-24 DIAGNOSIS — E03.4 HYPOTHYROIDISM DUE TO ACQUIRED ATROPHY OF THYROID: ICD-10-CM

## 2024-10-24 DIAGNOSIS — U07.1 COVID: Primary | ICD-10-CM

## 2024-10-24 RX ORDER — LEVOTHYROXINE SODIUM 25 UG/1
TABLET ORAL
Qty: 90 TABLET | Refills: 1 | Status: SHIPPED | OUTPATIENT
Start: 2024-10-24

## 2024-10-24 SDOH — ECONOMIC STABILITY: TRANSPORTATION INSECURITY
IN THE PAST 12 MONTHS, HAS THE LACK OF TRANSPORTATION KEPT YOU FROM MEDICAL APPOINTMENTS OR FROM GETTING MEDICATIONS?: NO

## 2024-10-24 SDOH — ECONOMIC STABILITY: TRANSPORTATION INSECURITY
IN THE PAST 12 MONTHS, HAS LACK OF TRANSPORTATION KEPT YOU FROM MEETINGS, WORK, OR FROM GETTING THINGS NEEDED FOR DAILY LIVING?: NO

## 2024-10-24 SDOH — ECONOMIC STABILITY: INCOME INSECURITY: IN THE LAST 12 MONTHS, WAS THERE A TIME WHEN YOU WERE NOT ABLE TO PAY THE MORTGAGE OR RENT ON TIME?: NO

## 2024-10-24 SDOH — HEALTH STABILITY: PHYSICAL HEALTH: ON AVERAGE, HOW MANY DAYS PER WEEK DO YOU ENGAGE IN MODERATE TO STRENUOUS EXERCISE (LIKE A BRISK WALK)?: 0 DAYS

## 2024-10-24 SDOH — HEALTH STABILITY: PHYSICAL HEALTH: ON AVERAGE, HOW MANY MINUTES DO YOU ENGAGE IN EXERCISE AT THIS LEVEL?: 0 MIN

## 2024-10-24 ASSESSMENT — SOCIAL DETERMINANTS OF HEALTH (SDOH)
WITHIN THE LAST YEAR, HAVE YOU BEEN HUMILIATED OR EMOTIONALLY ABUSED IN OTHER WAYS BY YOUR PARTNER OR EX-PARTNER?: NO
IN A TYPICAL WEEK, HOW MANY TIMES DO YOU TALK ON THE PHONE WITH FAMILY, FRIENDS, OR NEIGHBORS?: MORE THAN THREE TIMES A WEEK
HOW OFTEN DO YOU ATTENT MEETINGS OF THE CLUB OR ORGANIZATION YOU BELONG TO?: NEVER
WITHIN THE LAST YEAR, HAVE YOU BEEN AFRAID OF YOUR PARTNER OR EX-PARTNER?: NO
WITHIN THE LAST YEAR, HAVE YOU BEEN KICKED, HIT, SLAPPED, OR OTHERWISE PHYSICALLY HURT BY YOUR PARTNER OR EX-PARTNER?: NO
DO YOU BELONG TO ANY CLUBS OR ORGANIZATIONS SUCH AS CHURCH GROUPS UNIONS, FRATERNAL OR ATHLETIC GROUPS, OR SCHOOL GROUPS?: NO
HOW OFTEN DO YOU GET TOGETHER WITH FRIENDS OR RELATIVES?: MORE THAN THREE TIMES A WEEK
WITHIN THE LAST YEAR, HAVE TO BEEN RAPED OR FORCED TO HAVE ANY KIND OF SEXUAL ACTIVITY BY YOUR PARTNER OR EX-PARTNER?: NO
HOW OFTEN DO YOU ATTEND CHURCH OR RELIGIOUS SERVICES?: 1 TO 4 TIMES PER YEAR

## 2024-10-24 ASSESSMENT — LIFESTYLE VARIABLES
HOW MANY STANDARD DRINKS CONTAINING ALCOHOL DO YOU HAVE ON A TYPICAL DAY: PATIENT DOES NOT DRINK
HOW OFTEN DO YOU HAVE A DRINK CONTAINING ALCOHOL: NEVER

## 2024-10-24 NOTE — PROGRESS NOTES
Digna Dunn, was evaluated through a synchronous (real-time) audio-video encounter. The patient (or guardian if applicable) is aware that this is a billable service, which includes applicable co-pays. This Virtual Visit was conducted with patient's (and/or legal guardian's) consent. Patient identification was verified, and a caregiver was present when appropriate.   The patient was located at Home: [address] [place] [address]  Provider was located at Facility (Appt Dept): [address] [place] [address]  Confirm you are appropriately licensed, registered, or certified to deliver care in the state where the patient is located as indicated above. If you are not or unsure, please re-schedule the visit: Yes, I confirm.     Digna Dunn (:  1935) is a Established patient, presenting virtually for evaluation of the following:      Below is the assessment and plan developed based on review of pertinent history, physical exam, labs, studies, and medications.     Assessment & Plan  Hypothyroidism due to acquired atrophy of thyroid   Chronic, at goal (stable), continue current treatment plan    Orders:    levothyroxine (SYNTHROID) 25 MCG tablet; Take 1 tablet by mouth every day    COVID   New, uncertain prognosis, doing well with mild symptom and lifestyle modifications recommended  - supportive care, try mucinex         Return if symptoms worsen or fail to improve, for as scheduled.       Subjective   HPI  Digna Dunn is currently Confirmed for COVID-19.  Presenting symptoms: cough, nasal congestion, and sputum production.  she denies  fever, chills, sore throat, shortness of breath, chest pain, diarrhea, nausea and vomiting, headache, muscle pain, loss of smell, and loss of taste  Max temperature in last 24 hours: none  Symptoms began on 3 days ago, .   Exposure source: member of household- spouse and daughter        Completed/Pending Covid-19 Lab Orders  Review of Systems

## 2024-10-24 NOTE — ASSESSMENT & PLAN NOTE
Chronic, at goal (stable), continue current treatment plan    Orders:    levothyroxine (SYNTHROID) 25 MCG tablet; Take 1 tablet by mouth every day

## 2024-11-05 ENCOUNTER — TELEMEDICINE (OUTPATIENT)
Dept: INTERNAL MEDICINE CLINIC | Age: 89
End: 2024-11-05

## 2024-11-05 ENCOUNTER — TELEPHONE (OUTPATIENT)
Dept: INTERNAL MEDICINE CLINIC | Age: 89
End: 2024-11-05

## 2024-11-05 DIAGNOSIS — R05.9 COUGH IN ADULT: Primary | ICD-10-CM

## 2024-11-05 NOTE — TELEPHONE ENCOUNTER
----- Message from Diallo QUACH sent at 11/5/2024 12:14 PM EST -----  Regarding: ECC Escalation To Practice  ECC Escalation To Practice      Type of Escalation: Acute Care Symptom  --------------------------------------------------------------------------------------------------------------------------    Information for Provider:  Patient is looking for appointment for: Symptom Abdominal Pain  Reasons for Message: Unable to reach practice     Additional Information Patient is having nausea, experience vomiting, and has cold symptoms. The patient can't walk they need a virtual sick appointment tomorrow morning  --------------------------------------------------------------------------------------------------------------------------    Relationship to Patient: Other Olga (daughter)     Call Back Info: OK to leave message on voicemail  Preferred Call Back Number: Phone [phone] (mobile)

## 2024-11-05 NOTE — TELEPHONE ENCOUNTER
We can see her virtually today. Please call patient and schedule. She can be virtual seen this evening 5 cal - please schedule.

## 2024-11-05 NOTE — PROGRESS NOTES
So message that    Digna Dunn, was evaluated through a synchronous (real-time) audio-video encounter. The patient (or guardian if applicable) is aware that this is a billable service, which includes applicable co-pays. This Virtual Visit was conducted with patient's (and/or legal guardian's) consent. Patient identification was verified, and a caregiver was present when appropriate.   The patient was located at Home: [address] [place] [address]  Provider was located at Facility (Appt Dept): [address] [place] [address]  Confirm you are appropriately licensed, registered, or certified to deliver care in the state where the patient is located as indicated above. If you are not or unsure, please re-schedule the visit: Yes, I confirm.     Digna Dunn (:  1935) is a Established patient, presenting virtually for evaluation of the following:      Below is the assessment and plan developed based on review of pertinent history, physical exam, labs, studies, and medications.     Assessment & Plan  Cough in adult   Chronic, at goal (stable), continue current treatment plan           Return if symptoms worsen or fail to improve.       Subjective   HPI    88 yo female with an episode of nausea, distention and non-bilious vomiting. The symptoms have since resolved. And she is feeling much better. She was seen by PT and her    Cough Okay so it says that you have not some nausea call home she that I do not there that should therapist that that well and have by okay this going but it sounds where you did you use the Mucinex like the kids Mucinex okay to have any Zofran in case okay so no Zofran will if like she is okay I was worried if it sounds like you are doing just fine but is breathing so I what I list that I feel all the way you you are doing well I am glad to hear you talking and coughing in between respirations so as far as I am concerned you are stable from a respiratory standpoint

## 2024-12-04 ENCOUNTER — TELEMEDICINE (OUTPATIENT)
Dept: INTERNAL MEDICINE CLINIC | Age: 89
End: 2024-12-04

## 2024-12-04 DIAGNOSIS — R53.81 PHYSICAL DEBILITY: ICD-10-CM

## 2024-12-04 DIAGNOSIS — E11.21 DIABETIC NEPHROPATHY ASSOCIATED WITH TYPE 2 DIABETES MELLITUS (HCC): ICD-10-CM

## 2024-12-04 DIAGNOSIS — J45.40 MODERATE PERSISTENT ASTHMA WITHOUT COMPLICATION: ICD-10-CM

## 2024-12-04 DIAGNOSIS — N39.46 MIXED STRESS AND URGE URINARY INCONTINENCE: ICD-10-CM

## 2024-12-04 DIAGNOSIS — E11.8 TYPE 2 DIABETES MELLITUS WITH UNSPECIFIED COMPLICATIONS (HCC): ICD-10-CM

## 2024-12-04 DIAGNOSIS — E11.8 TYPE 2 DIABETES MELLITUS WITH COMPLICATION, WITH LONG-TERM CURRENT USE OF INSULIN (HCC): ICD-10-CM

## 2024-12-04 DIAGNOSIS — I10 ESSENTIAL HYPERTENSION: ICD-10-CM

## 2024-12-04 DIAGNOSIS — K21.9 GASTROESOPHAGEAL REFLUX DISEASE WITHOUT ESOPHAGITIS: ICD-10-CM

## 2024-12-04 DIAGNOSIS — R25.1 TREMORS OF NERVOUS SYSTEM: ICD-10-CM

## 2024-12-04 DIAGNOSIS — G35 MULTIPLE SCLEROSIS (HCC): ICD-10-CM

## 2024-12-04 DIAGNOSIS — K58.1 IRRITABLE BOWEL SYNDROME WITH CONSTIPATION: Primary | ICD-10-CM

## 2024-12-04 DIAGNOSIS — Z79.4 TYPE 2 DIABETES MELLITUS WITH COMPLICATION, WITH LONG-TERM CURRENT USE OF INSULIN (HCC): ICD-10-CM

## 2024-12-04 RX ORDER — HYDRALAZINE HYDROCHLORIDE 50 MG/1
75 TABLET, FILM COATED ORAL 3 TIMES DAILY
Qty: 405 TABLET | Refills: 3 | Status: SHIPPED | OUTPATIENT
Start: 2024-12-04

## 2024-12-04 RX ORDER — ALBUTEROL SULFATE 90 UG/1
2 INHALANT RESPIRATORY (INHALATION) 4 TIMES DAILY PRN
Qty: 3 EACH | Refills: 2 | Status: SHIPPED | OUTPATIENT
Start: 2024-12-04

## 2024-12-04 RX ORDER — INSULIN GLARGINE 100 [IU]/ML
36 INJECTION, SOLUTION SUBCUTANEOUS NIGHTLY
Qty: 45 ML | Refills: 3 | Status: SHIPPED | OUTPATIENT
Start: 2024-12-04 | End: 2026-03-25

## 2024-12-04 RX ORDER — VERAPAMIL HYDROCHLORIDE 240 MG/1
120 TABLET, FILM COATED, EXTENDED RELEASE ORAL NIGHTLY
Qty: 45 TABLET | Refills: 1 | Status: SHIPPED | OUTPATIENT
Start: 2024-12-04

## 2024-12-04 RX ORDER — INSULIN LISPRO 100 [IU]/ML
INJECTION, SOLUTION INTRAVENOUS; SUBCUTANEOUS
Qty: 15 EACH | Refills: 3 | Status: SHIPPED | OUTPATIENT
Start: 2024-12-04

## 2024-12-04 RX ORDER — DIVALPROEX SODIUM 125 MG/1
TABLET, DELAYED RELEASE ORAL
Qty: 270 TABLET | Refills: 1 | Status: SHIPPED | OUTPATIENT
Start: 2024-12-04

## 2024-12-04 RX ORDER — PEN NEEDLE, DIABETIC 32GX 5/32"
1 NEEDLE, DISPOSABLE MISCELLANEOUS DAILY
Qty: 100 EACH | Refills: 3 | Status: SHIPPED | OUTPATIENT
Start: 2024-12-04

## 2024-12-04 RX ORDER — LANCETS 33 GAUGE
EACH MISCELLANEOUS
Qty: 400 EACH | Refills: 5 | Status: SHIPPED | OUTPATIENT
Start: 2024-12-04

## 2024-12-04 RX ORDER — LOSARTAN POTASSIUM 25 MG/1
25 TABLET ORAL DAILY
Qty: 90 TABLET | Refills: 3 | Status: SHIPPED | OUTPATIENT
Start: 2024-12-04

## 2024-12-04 RX ORDER — MONTELUKAST SODIUM 10 MG/1
TABLET ORAL
Qty: 90 TABLET | Refills: 1 | Status: SHIPPED | OUTPATIENT
Start: 2024-12-04

## 2024-12-04 RX ORDER — FAMOTIDINE 20 MG/1
TABLET, FILM COATED ORAL
Qty: 180 TABLET | Refills: 1 | Status: SHIPPED | OUTPATIENT
Start: 2024-12-04

## 2024-12-04 NOTE — ASSESSMENT & PLAN NOTE
Chronic, at goal (stable), continue current treatment plan    Orders:    divalproex (DEPAKOTE) 125 MG DR tablet; TAKE ONE TABLET IN THE AM AND TWO TABLETS IN THE PM.

## 2024-12-04 NOTE — PROGRESS NOTES
specific garments to order.  Orders:    Incontinence Supplies KIT; XXXL Incontinence garment - Tranquility high rise -bariatric 3XL - DIAPER  JESSICA - PULL-UPS  - XL SUPER PLUS WHITE BLOCK UNDERWEAR    Physical debility   Chronic, at goal (stable), continue current treatment plan    Orders:    Incontinence Supplies KIT; XXXL Incontinence garment - Tranquility high rise -bariatric 3XL - DIAPER  JESSICA - PULL-UPS  - XL SUPER PLUS WHITE BLOCK UNDERWEAR    Mixed stress and urge urinary incontinence   Chronic, not at goal (unstable), continue current treatment plan    Orders:    Incontinence Supplies KIT; XXXL Incontinence garment - Tranquility high rise -bariatric 3XL - DIAPER  JESSICA - PULL-UPS  - XL SUPER PLUS WHITE BLOCK UNDERWEAR           Subjective     Treatment Adherence:   Medication compliance:  compliant most of the time  Diet compliance:  compliant most of the time  Weight trend: stable  Current exercise: currently getting Pt and OT to build core strength for transfers  Barriers: impairment:  physical: weakness    Diabetes Mellitus Type 2: Current symptoms/problems include none.    Home blood sugar records: trend: fluctuating a bit  Any episodes of hypoglycemia? no  Eye exam current (within one year): no  Tobacco history: She  reports that she has never smoked. She has never used smokeless tobacco.   Daily Aspirin? Yes    Hypertension:  Home blood pressure monitoring: No.  She is adherent to a low sodium diet. Patient denies shortness of breath, headache, lightheadedness, and palpitations.  Antihypertensive medication side effects: no medication side effects noted.  Use of agents associated with hypertension: none.     Hyperlipidemia:  No new myalgias or GI upset on atorvastatin (Lipitor).       Lab Results   Component Value Date    LABA1C 6.8 04/05/2023    LABA1C 6.3 10/12/2022    LABA1C 6.0 10/06/2021     Lab Results   Component Value Date    CREATININE 1.3 (H) 04/05/2023     Lab Results   Component Value Date

## 2024-12-04 NOTE — ASSESSMENT & PLAN NOTE
Chronic, at goal (stable), continue current treatment plan  Family to contact us with medical supply company name and specific garments to order.  Orders:    Incontinence Supplies KIT; XXXL Incontinence garment - Tranquility high rise -bariatric 3XL - DIAPER  JESSICA - PULL-UPS  - XL SUPER PLUS WHITE BLOCK UNDERWEAR

## 2024-12-04 NOTE — PATIENT INSTRUCTIONS
Support & [phone]  Expand search     Adult diapers, briefs, and pads  Adult diapers come in a variety of brands, with variations on fit, absorbency, and other options. Call to chat with a specialist about options.  Speak with a specialist:  [phone]

## 2024-12-04 NOTE — ASSESSMENT & PLAN NOTE
Chronic, at goal (stable), continue current treatment plan    Orders:    Lancets (ONETOUCH DELICA PLUS [id number]) MISC; TEST 4 TIMES A DAY AFTER MEALS AND AT BEDTIME    insulin lispro (HUMALOG) 100 UNIT/ML injection cartridge; Inject into the skin 3 times daily (before meals)    insulin glargine (LANTUS SOLOSTAR) 100 UNIT/ML injection pen; Inject 36 Units into the skin nightly Max dose 60 units plan titration    SITagliptin (JANUVIA) 100 MG tablet; Take 1 tablet by mouth daily    blood glucose test strips (ASCENSIA AUTODISC VI;ONE TOUCH ULTRA TEST VI) strip; 1 each by In Vitro route daily As needed.    DME Order for Glucometer as OP  taking 27 units of Lantus and 5 Units of Novolog

## 2024-12-23 DIAGNOSIS — E11.8 TYPE 2 DIABETES MELLITUS WITH UNSPECIFIED COMPLICATIONS (HCC): ICD-10-CM

## 2024-12-23 RX ORDER — PEN NEEDLE, DIABETIC 32GX 5/32"
1 NEEDLE, DISPOSABLE MISCELLANEOUS DAILY
Qty: 100 EACH | Refills: 3 | Status: SHIPPED | OUTPATIENT
Start: 2024-12-23

## 2024-12-23 RX ORDER — PEN NEEDLE, DIABETIC 32GX 5/32"
NEEDLE, DISPOSABLE MISCELLANEOUS
Refills: 11 | OUTPATIENT
Start: 2024-12-23

## 2024-12-23 NOTE — TELEPHONE ENCOUNTER
Recent Visits  No visits were found meeting these conditions.  Showing recent visits within past 540 days with a meds authorizing provider and meeting all other requirements  Future Appointments  Date Type Provider Dept   01/29/25 Appointment Blayne Arce MD [facility]   Showing future appointments within next 150 days with a meds authorizing provider and meeting all other requirements     12/4/2024

## 2025-01-10 ENCOUNTER — PATIENT MESSAGE (OUTPATIENT)
Dept: INTERNAL MEDICINE CLINIC | Age: 89
End: 2025-01-10

## 2025-01-10 DIAGNOSIS — H10.233 SEROUS CONJUNCTIVITIS OF BOTH EYES: Primary | ICD-10-CM

## 2025-01-10 RX ORDER — MOXIFLOXACIN 5 MG/ML
1 SOLUTION/ DROPS OPHTHALMIC 3 TIMES DAILY
Qty: 3 ML | Refills: 0 | Status: SHIPPED | OUTPATIENT
Start: 2025-01-10

## 2025-01-11 ENCOUNTER — PATIENT MESSAGE (OUTPATIENT)
Dept: INTERNAL MEDICINE CLINIC | Age: 89
End: 2025-01-11

## 2025-01-11 DIAGNOSIS — I10 ESSENTIAL HYPERTENSION: ICD-10-CM

## 2025-01-13 RX ORDER — VERAPAMIL HYDROCHLORIDE 240 MG/1
120 TABLET, FILM COATED, EXTENDED RELEASE ORAL 2 TIMES DAILY
Qty: 15 TABLET | Refills: 3 | Status: SHIPPED | OUTPATIENT
Start: 2025-01-13

## 2025-01-13 NOTE — TELEPHONE ENCOUNTER
Recent Visits  No visits were found meeting these conditions.  Showing recent visits within past 540 days with a meds authorizing provider and meeting all other requirements  Future Appointments  Date Type Provider Dept   01/29/25 Appointment Blayne Arce MD [facility]   Showing future appointments within next 150 days with a meds authorizing provider and meeting all other requirements     12/4/2024

## 2025-01-17 DIAGNOSIS — E11.8 TYPE 2 DIABETES MELLITUS WITH COMPLICATION, WITH LONG-TERM CURRENT USE OF INSULIN (HCC): ICD-10-CM

## 2025-01-17 DIAGNOSIS — Z79.4 TYPE 2 DIABETES MELLITUS WITH COMPLICATION, WITH LONG-TERM CURRENT USE OF INSULIN (HCC): ICD-10-CM

## 2025-01-17 NOTE — TELEPHONE ENCOUNTER
Recent Visits  No visits were found meeting these conditions.  Showing recent visits within past 540 days with a meds authorizing provider and meeting all other requirements  Future Appointments  Date Type Provider Dept   01/29/25 Appointment Blayne Arce MD [facility]   Showing future appointments within next 150 days with a meds authorizing provider and meeting all other requirements     12/4/2024

## 2025-01-18 RX ORDER — SODIUM BICARBONATE 650 MG/1
650 TABLET ORAL DAILY
Qty: 90 TABLET | Refills: 5 | Status: SHIPPED | OUTPATIENT
Start: 2025-01-18

## 2025-01-20 ENCOUNTER — PATIENT MESSAGE (OUTPATIENT)
Dept: INTERNAL MEDICINE CLINIC | Age: 89
End: 2025-01-20

## 2025-01-20 DIAGNOSIS — E11.8 TYPE 2 DIABETES MELLITUS WITH COMPLICATION, WITH LONG-TERM CURRENT USE OF INSULIN (HCC): ICD-10-CM

## 2025-01-20 DIAGNOSIS — E11.8 TYPE 2 DIABETES MELLITUS WITH UNSPECIFIED COMPLICATIONS (HCC): ICD-10-CM

## 2025-01-20 DIAGNOSIS — H10.233 SEROUS CONJUNCTIVITIS OF BOTH EYES: ICD-10-CM

## 2025-01-20 DIAGNOSIS — Z79.4 TYPE 2 DIABETES MELLITUS WITH COMPLICATION, WITH LONG-TERM CURRENT USE OF INSULIN (HCC): ICD-10-CM

## 2025-01-20 RX ORDER — LANCETS 33 GAUGE
EACH MISCELLANEOUS
Qty: 400 EACH | Refills: 5 | Status: SHIPPED | OUTPATIENT
Start: 2025-01-20

## 2025-01-20 RX ORDER — MOXIFLOXACIN 5 MG/ML
1 SOLUTION/ DROPS OPHTHALMIC 3 TIMES DAILY
Qty: 3 ML | Refills: 0 | Status: SHIPPED | OUTPATIENT
Start: 2025-01-20

## 2025-01-20 RX ORDER — PEN NEEDLE, DIABETIC 32GX 5/32"
1 NEEDLE, DISPOSABLE MISCELLANEOUS DAILY
Qty: 100 EACH | Refills: 3 | Status: SHIPPED | OUTPATIENT
Start: 2025-01-20 | End: 2025-01-21 | Stop reason: SDUPTHER

## 2025-01-20 NOTE — TELEPHONE ENCOUNTER
Can we get more eye drops and an oral antibiotic for her eyes. Still having yellow eye discharge. Maybe Amoxicillin, Augmentin or Cefdinir. No Bactrim, Cipro or Levofloxacin. She has a virtual appointment Wednesday      Virtual appointment is on1/29/2025    Insulin demands have increased since she had COVID last month. Blood sugar is usually normal in the am but goes up to 180-220 by her next meal and is still high about 200 before dinner   Giving Lantus 30 Units and about 6 units of Humalog at breakfast and at dinner   Need more 4 mm BD Megan Ultra-Fine pen    Need enough of the pen needles for both short acting and Lantus. As well as plenty of testing supplies for 4 time a day testing

## 2025-01-22 ENCOUNTER — PATIENT MESSAGE (OUTPATIENT)
Dept: INTERNAL MEDICINE CLINIC | Age: 89
End: 2025-01-22

## 2025-01-22 DIAGNOSIS — H01.009 BLEPHARITIS OF BOTH UPPER AND LOWER EYELID: Primary | ICD-10-CM

## 2025-01-22 RX ORDER — PEN NEEDLE, DIABETIC 32GX 5/32"
1 NEEDLE, DISPOSABLE MISCELLANEOUS DAILY
Qty: 200 EACH | Refills: 3 | Status: SHIPPED | OUTPATIENT
Start: 2025-01-22 | End: 2025-04-22

## 2025-01-23 NOTE — TELEPHONE ENCOUNTER
Spoke with caregiver and tried to offer a VV for tomorrow however she doesn't get off work until 5 and there wasn't anytime open for that. She did state that the eye is looking better than the message but there is a stye starting to form.Can drops be prescribed for the itchiness and redness

## 2025-01-27 RX ORDER — SILICONES/ADHESIVE TAPE
1 COMBINATION PACKAGE (EA) TOPICAL EVERY 6 HOURS
Qty: 14.2 G | Refills: 0 | Status: SHIPPED | OUTPATIENT
Start: 2025-01-27

## 2025-01-30 ENCOUNTER — PATIENT MESSAGE (OUTPATIENT)
Dept: INTERNAL MEDICINE CLINIC | Age: 89
End: 2025-01-30

## 2025-01-30 DIAGNOSIS — Z79.4 TYPE 2 DIABETES MELLITUS WITH COMPLICATION, WITH LONG-TERM CURRENT USE OF INSULIN (HCC): Primary | ICD-10-CM

## 2025-01-30 DIAGNOSIS — E11.8 TYPE 2 DIABETES MELLITUS WITH COMPLICATION, WITH LONG-TERM CURRENT USE OF INSULIN (HCC): Primary | ICD-10-CM

## 2025-01-31 NOTE — TELEPHONE ENCOUNTER
Recent Visits  No visits were found meeting these conditions.  Showing recent visits within past 540 days with a meds authorizing provider and meeting all other requirements  Future Appointments  No visits were found meeting these conditions.  Showing future appointments within next 150 days with a meds authorizing provider and meeting all other requirements     1/29/2025

## 2025-02-03 RX ORDER — GLUCOSAMINE HCL/CHONDROITIN SU 500-400 MG
CAPSULE ORAL
Qty: 100 STRIP | Refills: 11 | Status: SHIPPED | OUTPATIENT
Start: 2025-02-03

## 2025-02-04 RX ORDER — BLOOD-GLUCOSE METER
1 KIT MISCELLANEOUS DAILY
Qty: 100 EACH | Refills: 1 | OUTPATIENT
Start: 2025-02-04

## 2025-02-04 RX ORDER — GLUCOSAMINE HCL/CHONDROITIN SU 500-400 MG
CAPSULE ORAL
Qty: 600 STRIP | Refills: 5 | Status: SHIPPED | OUTPATIENT
Start: 2025-02-04

## 2025-03-11 ENCOUNTER — TELEMEDICINE (OUTPATIENT)
Dept: INTERNAL MEDICINE CLINIC | Age: 89
End: 2025-03-11

## 2025-03-11 DIAGNOSIS — N30.01 ACUTE CYSTITIS WITH HEMATURIA: Primary | ICD-10-CM

## 2025-03-11 LAB
BILIRUBIN, POC: NEGATIVE
BLOOD URINE, POC: NORMAL
CLARITY, POC: NORMAL
COLOR, POC: NORMAL
GLUCOSE URINE, POC: NEGATIVE MG/DL
KETONES, POC: NEGATIVE MG/DL
LEUKOCYTE EST, POC: NORMAL
NITRITE, POC: NEGATIVE
PH, POC: 6.5
PROTEIN, POC: NORMAL MG/DL
SPECIFIC GRAVITY, POC: 1.01
UROBILINOGEN, POC: 0.2 MG/DL

## 2025-03-11 RX ORDER — AMOXICILLIN AND CLAVULANATE POTASSIUM 250; 62.5 MG/5ML; MG/5ML
500 POWDER, FOR SUSPENSION ORAL 2 TIMES DAILY
Qty: 200 ML | Refills: 0 | Status: SHIPPED | OUTPATIENT
Start: 2025-03-11 | End: 2025-03-21

## 2025-03-11 NOTE — PROGRESS NOTES
Digna Dunn (: 1935) is a 89 y.o. female is here for evaluation of the following chief complaint(s): Urinary Frequency (Patient reports today for urinary concernsSymptoms include pain during urination, frequent urination, burning, shakes, and chillsstarted 4 days ago.)    Assessment/Plan:       Assessment/Plan:  Digna was seen today for urinary frequency.    Diagnoses and all orders for this visit:    Acute cystitis with hematuria  -     amoxicillin-clavulanate (AUGMENTIN) 250-62.5 MG/5ML suspension; Take 10 mLs by mouth 2 times daily for 10 days    Other orders  -     POCT Urinalysis no Micro  -     Urinalysis with Reflex to Culture  -     Microscopic Urinalysis  -     Culture, Urine    I have reviewed the patient's medical history in detail and updated the computerized patient record.   Return if symptoms worsen or fail to improve.        The above diagnosis is a new problem.  We discussed expected course, resolution, and complications of diagnosis in detail.  I advised her to call back or return to office if symptoms worsen/change/persist.      Return if symptoms worsen or fail to improve.    Subjective/Objective:   She complains of dysuria, frequency, foul smelling urine for a few days.  She denies erythema of vaginal area, nausea, vomiting, diarrhea.  Since starting she reports her symptoms are worsened.  Treatments tried: none.    Pertinent items are noted in HPI.      Patient-Reported Vitals  Patient-Reported Systolic (Top): 120 mmHg  Patient-Reported Diastolic (Bottom): 56 mmHg  Patient-Reported Pulse: 60  Patient-Reported Temperature: 97.7  Patient-Reported Weight: 180  Patient-Reported Height: 5'2  Patient-Reported Pulse Oximetry: 98     Lab Review   Urine dipstick  abnormal as per labs  ponent  Ref Range & Units (hover) 3/11/25 1711 24 1615 7/10/24 1504 10/28/23 1204 4/3/23 1737 3/1/23 1232 10/19/22 1602   Color, UA       YELLOW   Clarity, UA       CLOUDY   Glucose, UA POC Negative

## 2025-03-12 ENCOUNTER — TELEPHONE (OUTPATIENT)
Dept: INTERNAL MEDICINE CLINIC | Age: 89
End: 2025-03-12

## 2025-03-12 LAB
BACTERIA URNS QL MICRO: ABNORMAL /HPF
BILIRUB UR QL STRIP.AUTO: NEGATIVE
CLARITY UR: ABNORMAL
COLOR UR: YELLOW
EPI CELLS #/AREA URNS AUTO: 2 /HPF (ref 0–5)
GLUCOSE UR STRIP.AUTO-MCNC: NEGATIVE MG/DL
HGB UR QL STRIP.AUTO: ABNORMAL
HYALINE CASTS #/AREA URNS LPF: ABNORMAL /LPF (ref 0–2)
KETONES UR STRIP.AUTO-MCNC: NEGATIVE MG/DL
LEUKOCYTE ESTERASE UR QL STRIP.AUTO: ABNORMAL
NITRITE UR QL STRIP.AUTO: NEGATIVE
PH UR STRIP.AUTO: 7 [PH] (ref 5–8)
PROT UR STRIP.AUTO-MCNC: 100 MG/DL
RBC CLUMPS #/AREA URNS AUTO: 3 /HPF (ref 0–4)
SP GR UR STRIP.AUTO: 1.01 (ref 1–1.03)
UA COMPLETE W REFLEX CULTURE PNL UR: YES
UA DIPSTICK W REFLEX MICRO PNL UR: YES
URN SPEC COLLECT METH UR: ABNORMAL
UROBILINOGEN UR STRIP-ACNC: 1 E.U./DL
WBC #/AREA URNS AUTO: 3298 /HPF (ref 0–5)

## 2025-03-12 NOTE — TELEPHONE ENCOUNTER
Called and spoke to patients daughter to confirm if video visit for this afternoon was needed. She stated she did not believe so due to having video visit on 3/11/25 and urine culture results not being finalized yet. Appt canceled.

## 2025-03-14 LAB
BACTERIA UR CULT: ABNORMAL
ORGANISM: ABNORMAL

## 2025-03-15 ENCOUNTER — RESULTS FOLLOW-UP (OUTPATIENT)
Dept: INTERNAL MEDICINE CLINIC | Age: 89
End: 2025-03-15

## 2025-03-25 ENCOUNTER — TELEPHONE (OUTPATIENT)
Dept: INTERNAL MEDICINE CLINIC | Age: 89
End: 2025-03-25

## 2025-03-25 DIAGNOSIS — B96.89 UTI DUE TO KLEBSIELLA SPECIES: Primary | ICD-10-CM

## 2025-03-25 DIAGNOSIS — N39.0 UTI DUE TO KLEBSIELLA SPECIES: Primary | ICD-10-CM

## 2025-03-25 RX ORDER — CIPROFLOXACIN 250 MG/1
250 TABLET, FILM COATED ORAL 2 TIMES DAILY
Qty: 14 TABLET | Refills: 0 | Status: SHIPPED | OUTPATIENT
Start: 2025-03-25 | End: 2025-04-01

## 2025-03-25 NOTE — PROGRESS NOTES
Patient with persistent UTI. Initially treated with Augmentin due to rx intolerances. Cultures reviewed.     Assessment/Plan:  Diagnoses and all orders for this visit:    UTI due to Klebsiella species  -     ciprofloxacin (CIPRO) 250 MG tablet; Take 1 tablet by mouth 2 times daily for 7 days

## 2025-03-25 NOTE — TELEPHONE ENCOUNTER
Patient UTI is still there and would like to take the Cipro.  She feels pretty bad. She is [facility] Dr [facility]     Please advise

## 2025-04-08 DIAGNOSIS — J45.40 MODERATE PERSISTENT ASTHMA WITHOUT COMPLICATION: ICD-10-CM

## 2025-04-08 NOTE — TELEPHONE ENCOUNTER
Recent Visits  No visits were found meeting these conditions.  Showing recent visits within past 540 days with a meds authorizing provider and meeting all other requirements  Future Appointments  No visits were found meeting these conditions.  Showing future appointments within next 150 days with a meds authorizing provider and meeting all other requirements     3/11/2025     Requested Prescriptions     Pending Prescriptions Disp Refills    albuterol sulfate HFA (PROVENTIL;VENTOLIN;PROAIR) 108 (90 Base) MCG/ACT inhaler [Pharmacy Med Name: ALBUTEROL HFA (VENTOLIN) INH] 54 each 2     Sig: INHALE 2 PUFFS INTO THE LUNGS 4 TIMES DAILY AS NEEDED FOR WHEEZING.

## 2025-04-09 ENCOUNTER — PATIENT MESSAGE (OUTPATIENT)
Dept: INTERNAL MEDICINE CLINIC | Age: 89
End: 2025-04-09

## 2025-04-09 DIAGNOSIS — B37.2 YEAST DERMATITIS: Primary | ICD-10-CM

## 2025-04-09 RX ORDER — FLUCONAZOLE 100 MG/1
100 TABLET ORAL DAILY
Qty: 7 TABLET | Refills: 0 | Status: SHIPPED | OUTPATIENT
Start: 2025-04-09 | End: 2025-04-16

## 2025-04-09 RX ORDER — FLUCONAZOLE 100 MG/1
100 TABLET ORAL DAILY
Qty: 7 TABLET | Refills: 0 | Status: SHIPPED | OUTPATIENT
Start: 2025-04-09 | End: 2025-04-09

## 2025-04-09 NOTE — TELEPHONE ENCOUNTER
Recent Visits  No visits were found meeting these conditions.  Showing recent visits within past 540 days with a meds authorizing provider and meeting all other requirements  Future Appointments  Date Type Provider Dept   04/21/25 Appointment Blayne Arce MD [facility]   Showing future appointments within next 150 days with a meds authorizing provider and meeting all other requirements     3/11/2025

## 2025-04-10 RX ORDER — ALBUTEROL SULFATE 90 UG/1
2 INHALANT RESPIRATORY (INHALATION) 4 TIMES DAILY PRN
Qty: 54 EACH | Refills: 2 | Status: SHIPPED | OUTPATIENT
Start: 2025-04-10

## 2025-04-19 ENCOUNTER — PATIENT MESSAGE (OUTPATIENT)
Dept: INTERNAL MEDICINE CLINIC | Age: 89
End: 2025-04-19

## 2025-04-19 DIAGNOSIS — G35 MS (MULTIPLE SCLEROSIS) (HCC): Primary | ICD-10-CM

## 2025-04-19 DIAGNOSIS — R53.1 GENERALIZED WEAKNESS: ICD-10-CM

## 2025-04-19 DIAGNOSIS — J45.40 MODERATE PERSISTENT ASTHMA WITHOUT COMPLICATION: ICD-10-CM

## 2025-04-19 DIAGNOSIS — E11.21 DIABETIC NEPHROPATHY ASSOCIATED WITH TYPE 2 DIABETES MELLITUS (HCC): ICD-10-CM

## 2025-06-06 DIAGNOSIS — Z79.4 TYPE 2 DIABETES MELLITUS WITH COMPLICATION, WITH LONG-TERM CURRENT USE OF INSULIN (HCC): ICD-10-CM

## 2025-06-06 DIAGNOSIS — E11.8 TYPE 2 DIABETES MELLITUS WITH COMPLICATION, WITH LONG-TERM CURRENT USE OF INSULIN (HCC): ICD-10-CM

## 2025-06-06 RX ORDER — INSULIN LISPRO 100 [IU]/ML
INJECTION, SOLUTION INTRAVENOUS; SUBCUTANEOUS
Qty: 15 EACH | Refills: 3 | Status: SHIPPED | OUTPATIENT
Start: 2025-06-06

## 2025-06-06 NOTE — TELEPHONE ENCOUNTER
Recent Visits  No visits were found meeting these conditions.  Showing recent visits within past 540 days with a meds authorizing provider and meeting all other requirements  Future Appointments  Date Type Provider Dept   07/30/25 Appointment Blayne Arce MD [facility]   Showing future appointments within next 150 days with a meds authorizing provider and meeting all other requirements     3/11/2025

## 2025-06-09 ENCOUNTER — PATIENT MESSAGE (OUTPATIENT)
Dept: INTERNAL MEDICINE CLINIC | Age: 89
End: 2025-06-09

## 2025-06-11 NOTE — TELEPHONE ENCOUNTER
Humalog prescription was sent without a dose  Please add daily dose for pharmacy to giver her the medicine.  She takes 6 Units in the morning and 6 Units in the afternoon (according to the daughter)

## 2025-06-12 DIAGNOSIS — E11.8 TYPE 2 DIABETES MELLITUS WITH COMPLICATION, WITH LONG-TERM CURRENT USE OF INSULIN (HCC): ICD-10-CM

## 2025-06-12 DIAGNOSIS — Z79.4 TYPE 2 DIABETES MELLITUS WITH COMPLICATION, WITH LONG-TERM CURRENT USE OF INSULIN (HCC): ICD-10-CM

## 2025-06-13 NOTE — TELEPHONE ENCOUNTER
Recent Visits  No visits were found meeting these conditions.  Showing recent visits within past 540 days with a meds authorizing provider and meeting all other requirements  Future Appointments  Date Type Provider Dept   07/30/25 Appointment Blayne Arce MD [facility]   Showing future appointments within next 150 days with a meds authorizing provider and meeting all other requirements     3/11/2025

## 2025-06-16 RX ORDER — INSULIN ASPART 100 [IU]/ML
6 INJECTION, SOLUTION INTRAVENOUS; SUBCUTANEOUS
Qty: 30 ML | Refills: 5 | Status: SHIPPED | OUTPATIENT
Start: 2025-06-16

## 2025-06-16 NOTE — TELEPHONE ENCOUNTER
Recent Visits  No visits were found meeting these conditions.  Showing recent visits within past 540 days with a meds authorizing provider and meeting all other requirements  Future Appointments  Date Type Provider Dept   07/30/25 Appointment Blayne Arce MD [facility]   Showing future appointments within next 150 days with a meds authorizing provider and meeting all other requirements     3/11/2025

## 2025-06-25 ENCOUNTER — TELEPHONE (OUTPATIENT)
Dept: INTERNAL MEDICINE CLINIC | Age: 89
End: 2025-06-25

## 2025-06-25 NOTE — TELEPHONE ENCOUNTER
[employer] was sent the order for the ramp to [phone], however they might require a letter of medical necessity to be sent for insurance to cover it. Patient's daughter will call or send a [employer] message if the letter is needed.

## 2025-06-25 NOTE — TELEPHONE ENCOUNTER
Called and spoke to Olga to check on an update regarding LMN. Olga will call [employer] and then return call to office

## 2025-07-28 ENCOUNTER — TELEPHONE (OUTPATIENT)
Dept: INTERNAL MEDICINE CLINIC | Age: 89
End: 2025-07-28

## 2025-07-28 NOTE — TELEPHONE ENCOUNTER
Called and spoke to daughter of patient to get her rescheduled for a 30 minute appt. Patient is coming in for DM\HTN follow-up and appt was scheduled via [employer].     Called back to offer 8/14/25 at 11:15am due to MD not being in office 7/30/25 lvm.

## 2025-07-29 NOTE — TELEPHONE ENCOUNTER
Called and spoke to patients daughter verbally to get patient rescheduled, patient had already been scheduled.

## 2025-07-30 DIAGNOSIS — N30.01 ACUTE CYSTITIS WITH HEMATURIA: ICD-10-CM

## 2025-07-30 NOTE — TELEPHONE ENCOUNTER
Recent Visits  No visits were found meeting these conditions.  Showing recent visits within past 540 days with a meds authorizing provider and meeting all other requirements  Future Appointments  Date Type Provider Dept   09/10/25 Appointment Blayne Arce MD [facility]   Showing future appointments within next 150 days with a meds authorizing provider and meeting all other requirements     3/11/2025

## 2025-07-31 RX ORDER — LIDOCAINE 50 MG/G
OINTMENT TOPICAL
Qty: 35.44 G | Refills: 5 | Status: SHIPPED | OUTPATIENT
Start: 2025-07-31

## 2025-08-05 ENCOUNTER — TELEPHONE (OUTPATIENT)
Dept: ADMINISTRATIVE | Age: 89
End: 2025-08-05

## 2025-08-13 DIAGNOSIS — K21.9 GASTROESOPHAGEAL REFLUX DISEASE WITHOUT ESOPHAGITIS: ICD-10-CM

## 2025-08-13 DIAGNOSIS — E79.0 HYPERURICEMIA: ICD-10-CM

## 2025-08-13 RX ORDER — FAMOTIDINE 20 MG/1
20 TABLET, FILM COATED ORAL 2 TIMES DAILY
Qty: 180 TABLET | Refills: 1 | Status: SHIPPED | OUTPATIENT
Start: 2025-08-13

## 2025-08-13 RX ORDER — ALLOPURINOL 300 MG/1
300 TABLET ORAL DAILY
Qty: 90 TABLET | Refills: 1 | OUTPATIENT
Start: 2025-08-13

## 2025-08-18 DIAGNOSIS — I10 ESSENTIAL HYPERTENSION: ICD-10-CM

## 2025-08-21 RX ORDER — VERAPAMIL HYDROCHLORIDE 240 MG/1
120 TABLET, FILM COATED, EXTENDED RELEASE ORAL 2 TIMES DAILY
Qty: 90 TABLET | Refills: 1 | Status: SHIPPED | OUTPATIENT
Start: 2025-08-21

## 2025-08-27 ENCOUNTER — PATIENT MESSAGE (OUTPATIENT)
Dept: INTERNAL MEDICINE CLINIC | Age: 89
End: 2025-08-27

## 2025-08-27 DIAGNOSIS — R30.0 DYSURIA: Primary | ICD-10-CM

## 2025-08-27 DIAGNOSIS — R30.0 DYSURIA: ICD-10-CM

## 2025-08-27 LAB
BACTERIA URNS QL MICRO: ABNORMAL /HPF
CHARACTER UR: ABNORMAL
EPI CELLS #/AREA URNS AUTO: 5 /HPF (ref 0–5)
HYALINE CASTS #/AREA URNS AUTO: 12 /LPF (ref 0–8)
RBC #/AREA URNS HPF: ABNORMAL /HPF (ref 0–4)
WBC #/AREA URNS AUTO: 2622 /HPF (ref 0–5)

## 2025-08-27 RX ORDER — PHENAZOPYRIDINE HYDROCHLORIDE 200 MG/1
200 TABLET, FILM COATED ORAL 3 TIMES DAILY PRN
Qty: 30 TABLET | Refills: 0 | Status: SHIPPED | OUTPATIENT
Start: 2025-08-27 | End: 2025-09-06

## 2025-08-28 LAB
BILIRUB UR QL STRIP.AUTO: NEGATIVE
CLARITY UR: ABNORMAL
COLOR UR: YELLOW
GLUCOSE UR STRIP.AUTO-MCNC: NEGATIVE MG/DL
HGB UR QL STRIP.AUTO: ABNORMAL
KETONES UR STRIP.AUTO-MCNC: NEGATIVE MG/DL
LEUKOCYTE ESTERASE UR QL STRIP.AUTO: ABNORMAL
NITRITE UR QL STRIP.AUTO: NEGATIVE
PH UR STRIP.AUTO: 7.5 [PH] (ref 5–8)
PROT UR STRIP.AUTO-MCNC: 100 MG/DL
SP GR UR STRIP.AUTO: 1.01 (ref 1–1.03)
UA COMPLETE W REFLEX CULTURE PNL UR: YES
UA DIPSTICK W REFLEX MICRO PNL UR: YES
URN SPEC COLLECT METH UR: ABNORMAL
UROBILINOGEN UR STRIP-ACNC: 1 E.U./DL

## 2025-08-29 ENCOUNTER — TELEMEDICINE (OUTPATIENT)
Dept: INTERNAL MEDICINE CLINIC | Age: 89
End: 2025-08-29

## 2025-08-29 DIAGNOSIS — G40.89 OTHER SEIZURES (HCC): ICD-10-CM

## 2025-08-29 DIAGNOSIS — N39.0 UTI DUE TO KLEBSIELLA SPECIES: Primary | ICD-10-CM

## 2025-08-29 DIAGNOSIS — E11.21 DIABETIC NEPHROPATHY ASSOCIATED WITH TYPE 2 DIABETES MELLITUS (HCC): ICD-10-CM

## 2025-08-29 DIAGNOSIS — G35 MULTIPLE SCLEROSIS (HCC): ICD-10-CM

## 2025-08-29 DIAGNOSIS — B96.89 UTI DUE TO KLEBSIELLA SPECIES: Primary | ICD-10-CM

## 2025-08-29 DIAGNOSIS — N18.32 STAGE 3B CHRONIC KIDNEY DISEASE (HCC): ICD-10-CM

## 2025-08-29 LAB
BACTERIA UR CULT: ABNORMAL
ORGANISM: ABNORMAL

## 2025-08-29 RX ORDER — CIPROFLOXACIN 500 MG/1
500 TABLET, FILM COATED ORAL 2 TIMES DAILY
Qty: 14 TABLET | Refills: 0 | Status: SHIPPED | OUTPATIENT
Start: 2025-08-29 | End: 2025-09-05